# Patient Record
Sex: MALE | Race: WHITE | NOT HISPANIC OR LATINO | Employment: OTHER | URBAN - METROPOLITAN AREA
[De-identification: names, ages, dates, MRNs, and addresses within clinical notes are randomized per-mention and may not be internally consistent; named-entity substitution may affect disease eponyms.]

---

## 2017-02-08 ENCOUNTER — ANTICOAGULATION MONITORING (OUTPATIENT)
Dept: VASCULAR LAB | Facility: MEDICAL CENTER | Age: 82
End: 2017-02-08

## 2017-02-23 ENCOUNTER — HOSPITAL ENCOUNTER (EMERGENCY)
Facility: MEDICAL CENTER | Age: 82
End: 2017-02-23
Attending: EMERGENCY MEDICINE
Payer: MEDICARE

## 2017-02-23 VITALS
HEART RATE: 88 BPM | SYSTOLIC BLOOD PRESSURE: 175 MMHG | DIASTOLIC BLOOD PRESSURE: 86 MMHG | BODY MASS INDEX: 23.93 KG/M2 | HEIGHT: 69 IN | WEIGHT: 161.6 LBS | RESPIRATION RATE: 16 BRPM | OXYGEN SATURATION: 96 % | TEMPERATURE: 98.8 F

## 2017-02-23 DIAGNOSIS — I10 ESSENTIAL HYPERTENSION: ICD-10-CM

## 2017-02-23 DIAGNOSIS — H10.33 ACUTE CONJUNCTIVITIS OF BOTH EYES, UNSPECIFIED ACUTE CONJUNCTIVITIS TYPE: ICD-10-CM

## 2017-02-23 PROCEDURE — 99283 EMERGENCY DEPT VISIT LOW MDM: CPT

## 2017-02-23 RX ORDER — MOXIFLOXACIN 5 MG/ML
1 SOLUTION/ DROPS OPHTHALMIC
Qty: 1 BOTTLE | Refills: 0 | Status: SHIPPED | OUTPATIENT
Start: 2017-02-23 | End: 2018-09-01

## 2017-02-23 ASSESSMENT — PAIN SCALES - GENERAL: PAINLEVEL_OUTOF10: 0

## 2017-02-23 NOTE — ED AVS SNAPSHOT
Home Care Instructions                                                                                                                Joe Rashid   MRN: [id number]    Department:  [facility], Emergency Dept   Date of Visit:  2/23/2017            [facility], Emergency Dept    [address]    [place] [address]    Phone:  [phone]      You were seen by     Guy G Gansert, M.D.      Your Diagnosis Was     Acute conjunctivitis of both eyes, unspecified acute conjunctivitis type     H10.33       Follow-up Information     1. Follow up with [facility].    Why:  1.  Follow-up with your ophthalmologist tomorrow    Contact information    [address] [place]  [facility] [address] [phone]      Medication Information     Review all of your home medications and newly ordered medications with your primary doctor and/or pharmacist as soon as possible. Follow medication instructions as directed by your doctor and/or pharmacist.     Please keep your complete medication list with you and share with your physician. Update the information when medications are discontinued, doses are changed, or new medications (including over-the-counter products) are added; and carry medication information at all times in the event of emergency situations.               Medication List      START taking these medications        Instructions    moxifloxacin 0.5 % Soln   Commonly known as:  VIGAMOX    Place 1 Drop in both eyes every 2 hours while awake.   Dose:  1 Drop         ASK your doctor about these medications        Instructions    acetaminophen 500 MG Tabs   Commonly known as:  TYLENOL    Take 500-1,000 mg by mouth every 6 hours as needed for Mild Pain.   Dose:  500-1000 mg       CULTURELLE DIGESTIVE HEALTH Caps    Take 1 Capsule by mouth every day at 6 PM.   Dose:  1 Capsule       digoxin 125 MCG Tabs   Commonly known as:  LANOXIN    Take 1 Tab by mouth every day at 6 PM.    "  Dose:  125 mcg       diphenhydrAMINE 25 MG capsule   Commonly known as:  BENADRYL    Take 25 mg by mouth every 6 hours as needed for Itching.   Dose:  25 mg       FLOMAX 0.4 MG capsule   Generic drug:  tamsulosin    Take 0.4 mg by mouth BEDTIME(S).   Dose:  0.4 mg       furosemide 20 MG Tabs   Commonly known as:  LASIX    Take 20 mg by mouth every day.   Dose:  20 mg       ipratropium-albuterol 0.5-2.5 (3) MG/3ML nebulizer solution   Commonly known as:  DUONEB    3 mL by Nebulization route every four hours as needed for Shortness of Breath. 3ml inhale orally two times daily and 3ml inhale every 4 hours as needed for shortness of breath   Dose:  3 mL       lisinopril 40 MG tablet   Commonly known as:  PRINIVIL, ZESTRIL    Take 1 Tab by mouth every day.   Dose:  40 mg       metoprolol 25 MG Tabs   Commonly known as:  LOPRESSOR    Doctor's comments:  **Patient requests 90 days supply**   TAKE 1 TABLET BY MOUTH TWICE DAILY       * MULTI ADULT GUMMIES Chew    Take 1 Tab by mouth every day.   Dose:  1 Tab       * MULTIVITAMIN & MINERAL PO    Take 1 Capsule by mouth every day at 6 PM.   Dose:  1 Capsule       potassium chloride SA 10 MEQ Tbcr   Commonly known as:  K-DUR    Take 10 mEq by mouth 2 times a day.   Dose:  10 mEq       * Notice:  This list has 2 medication(s) that are the same as other medications prescribed for you. Read the directions carefully, and ask your doctor or other care provider to review them with you.              Discharge Instructions       Conjunctivitis  Conjunctivitis is commonly called \"pink eye.\" Conjunctivitis can be caused by bacterial or viral infection, allergies, or injuries. There is usually redness of the lining of the eye, itching, discomfort, and sometimes discharge. There may be deposits of matter along the eyelids. A viral infection usually causes a watery discharge, while a bacterial infection causes a yellowish, thick discharge. Pink eye is very contagious and spreads by direct " contact.  You may be given antibiotic eyedrops as part of your treatment. Before using your eye medicine, remove all drainage from the eye by washing gently with warm water and cotton balls. Continue to use the medication until you have awakened 2 mornings in a row without discharge from the eye. Do not rub your eye. This increases the irritation and helps spread infection. Use separate towels from other household members. Wash your hands with soap and water before and after touching your eyes. Use cold compresses to reduce pain and sunglasses to relieve irritation from light. Do not wear contact lenses or wear eye makeup until the infection is gone.  SEEK MEDICAL CARE IF:   · Your symptoms are not better after 3 days of treatment.  · You have increased pain or trouble seeing.  · The outer eyelids become very red or swollen.  Document Released: 01/25/2006 Document Revised: 03/11/2013 Document Reviewed: 12/18/2006  [employer]® Patient Information ©2014 [employer].            Patient Information     Patient Information    Following emergency treatment: all patient requiring follow-up care must return either to a private physician or a clinic if your condition worsens before you are able to obtain further medical attention, please return to the emergency room.     Billing Information    At [facility], we work to make the billing process streamlined for our patients.  Our Representatives are here to answer any questions you may have regarding your hospital bill.  If you have insurance coverage and have supplied your insurance information to us, we will submit a claim to your insurer on your behalf.  Should you have any questions regarding your bill, we can be reached online or by phone as follows:  Online: You are able pay your bills online or live chat with our representatives about any billing questions you may have. We are here to help Monday - Friday from 8:00am to 7:30pm and 9:00am - 12:00pm on Saturdays.   Please visit [email or web address]  for more information.   Phone:  [phone] or [phone]    Please note that your emergency physician, surgeon, pathologist, radiologist, anesthesiologist, and other specialists are not employed by [facility] and will therefore bill separately for their services.  Please contact them directly for any questions concerning their bills at the numbers below:     Emergency Physician Services:  [phone]  [facility]:  [phone]  [facility]:  [phone]  [facility]:  [phone]    1. Your final bill may vary from the amount quoted upon discharge if all procedures are not complete at that time, or if your doctor has additional procedures of which we are not aware. You will receive an additional bill if you return to the Emergency Department at [facility] for suture removal regardless of the facility of which the sutures were placed.     2. Please arrange for settlement of this account at the emergency registration.    3. All self-pay accounts are due in full at the time of treatment.  If you are unable to meet this obligation then payment is expected within 4-5 days.     4. If you have had radiology studies (CT, X-ray, Ultrasound, MRI), you have received a preliminary result during your emergency department visit. Please contact the radiology department [phone] to receive a copy of your final result. Please discuss the Final result with your primary physician or with the follow up physician provided.     Crisis Hotline:  [place] Crisis Hotline:  4-536-GVIGIKP or 1-123.328.5664  [place] Crisis Hotline:    1-846.877.5211 or 085-477-1435         ED Discharge Follow Up Questions    1. In order to provide you with very good care, we would like to follow up with a phone call in the next few days.  May we have your permission to contact you?     YES /  NO    2. What is the best phone number  to call you? (       )_____-__________    3. What is the best time to call you?      Morning  /  Afternoon  /  Evening                   Patient Signature:  ____________________________________________________________    Date:  ____________________________________________________________

## 2017-02-23 NOTE — ED AVS SNAPSHOT
2/23/2017          Joe Rashid  [address]  [place] [address]    Dear Joe:    [facility] wants to ensure your discharge home is safe and you or your loved ones have had all your questions answered regarding your care after you leave the hospital.    You may receive a telephone call within two days of your discharge.  This call is to make certain you understand your discharge instructions as well as ensure we provided you with the best care possible during your stay with us.     The call will only last approximately 3-5 minutes and will be done by a nurse.    Once again, we want to ensure your discharge home is safe and that you have a clear understanding of any next steps in your care.  If you have any questions or concerns, please do not hesitate to contact us, we are here for you.  Thank you for choosing [facility] for your healthcare needs.    Sincerely,    Lakhwinder Le    [facility]

## 2017-02-24 NOTE — ED PROVIDER NOTES
"ED Provider Note    CHIEF COMPLAINT  Chief Complaint   Patient presents with   • Eye Drainage       HPI  Joe Rashid is a 89 y.o. male who presents for evaluation drainage to his eyes of the last 3 days.  The patient states he noticed redness along with some yellow to green mucoid material draining from his eyes along with some increased tearing.  The patient has seen an ophthalmologist in the past that he has a prescription for ophthalmic prednisone medication.  In addition, the patient was noted to be hypertensive.  The patient is not having significant: Headache, chest pain, cardiorespiratory symptoms, gastrointestinal symptoms.  No other acute symptomatology or complaints.    REVIEW OF SYSTEMS  See HPI for further details. All other systems negative.    PAST MEDICAL HISTORY  Past Medical History   Diagnosis Date   • Hypertension    • Congestive heart failure    • Hepatitis B    • Chronic anticoagulation    • Chronic kidney disease, stage III (moderate)        FAMILY HISTORY  Family History   Problem Relation Age of Onset   • Heart Disease Brother        SOCIAL HISTORY  Nonsmoker; no alcohol or drugs;    SURGICAL HISTORY  Past Surgical History   Procedure Laterality Date   • Other       sinus surgery of unknown type   • Appendectomy     • Colon resection     • Pr transurethral elec-surg prostatectom     • Pr cryosurg ablation of prostate     • Cataract extraction with iol       both eyes   • Hip hemiarthroplasty Left 1/27/2016     Procedure: HIP HEMIARTHROPLASTY;  Surgeon: Adis Martínez M.D.;  Location: SURGERY [facility];  Service:        CURRENT MEDICATIONS  See nurses notes    ALLERGIES  Allergies   Allergen Reactions   • Aspirin Hives and Rash     Pts grandson states rash and hives.   • Sulfa Drugs Hives and Rash     Pts grandson states rash and hives.       PHYSICAL EXAM  VITAL SIGNS: /96 mmHg  Pulse 89  Temp(Src) 37.1 °C (98.8 °F)  Resp 14  Ht 1.753 m (5' 9.02\")  Wt 73.3 kg (161 lb 9.6 oz) "  BMI 23.85 kg/m2  SpO2 96%   Constitutional: 89-year-old male, awake, oriented ×3  HENT: Normocephalic, Atraumatic, Nares:Clear, Oropharynx: moist, well hydrated, posterior pharynx:clear   Eyes: SLIT Lamp exam: PERRL, EOMI, conjunctivae is beefy red bilaterally, corneas are clear without flare seen; anterior chambers deep and quiet;  Neck: Normal range of motion, No tenderness, Supple, No stridor.   Lymphatic: No lymphadenopathy noted.   Cardiovascular: Regular rate and rhythm without mumurs, gallups, rubs   Thorax & Lungs: Normal Equal breath sounds, No respiratory distress, No wheezing, no stridor, no rales. No chest tenderness.    Abdomen: Soft, nontender, nondistended, no organomegaly, positive bowel sounds normal in quality. No guarding or rebound.  Skin: Decreased skin turgor, skin atrophy, actinic keratoses; pink, warm, dry. No rashes, petechiae, purpura. Normal capillary refill.    Extremities: Intact distal pulses, No edema, No tenderness, No cyanosis,  Vascular: Pulses are 2+, symmetric in the upper and lower extremities.  Musculoskeletal: Diffuse arthritic changes. No tenderness to palpation or major deformities noted.   Neurologic: Alert & oriented x 3,  No gross focal deficits noted.   Psychiatric: Affect normal, Judgment normal, Mood normal.       RADIOLOGY/PROCEDURES  1. SLIT Lamp Exam    COURSE & MEDICAL DECISION MAKING  Pertinent Labs & Imaging studies reviewed. (See chart for details)  Discussion: At this time, the patient presents with findings consistent with significant conjunctivitis.  I did speak with ophthalmology on-call and discussed the case with him.  The patient will be placed on Vigamox ophthalmic drops.  I discussed the findings treat plan with the patient and his grandson is at the bedside.  The patient is to follow-up with his ophthalmologist tomorrow for further evaluation.    FINAL IMPRESSION  1. Acute conjunctivitis of both eyes, unspecified acute conjunctivitis type    2.  Essential hypertension           PLAN  1.  Approriate discharge instructions given  2.  Vigamox ophthalmic drops every 2 hours while awake  3.  Follow up closely with his ophthalmologist at [facility] tomorrow    Electronically signed by: Guy G Gansert, 2/23/2017 4:45 PM

## 2017-02-24 NOTE — ED NOTES
Pt BIB family with c/c of eye drainage. Pt states symptoms for several days. Pt stating right worse then left. Pt hypertensive. States he did take his medication today

## 2017-02-24 NOTE — DISCHARGE INSTRUCTIONS
"Conjunctivitis  Conjunctivitis is commonly called \"pink eye.\" Conjunctivitis can be caused by bacterial or viral infection, allergies, or injuries. There is usually redness of the lining of the eye, itching, discomfort, and sometimes discharge. There may be deposits of matter along the eyelids. A viral infection usually causes a watery discharge, while a bacterial infection causes a yellowish, thick discharge. Pink eye is very contagious and spreads by direct contact.  You may be given antibiotic eyedrops as part of your treatment. Before using your eye medicine, remove all drainage from the eye by washing gently with warm water and cotton balls. Continue to use the medication until you have awakened 2 mornings in a row without discharge from the eye. Do not rub your eye. This increases the irritation and helps spread infection. Use separate towels from other household members. Wash your hands with soap and water before and after touching your eyes. Use cold compresses to reduce pain and sunglasses to relieve irritation from light. Do not wear contact lenses or wear eye makeup until the infection is gone.  SEEK MEDICAL CARE IF:   · Your symptoms are not better after 3 days of treatment.  · You have increased pain or trouble seeing.  · The outer eyelids become very red or swollen.  Document Released: 01/25/2006 Document Revised: 03/11/2013 Document Reviewed: 12/18/2006  [employer]® Patient Information ©2014 [employer].    "

## 2017-04-26 PROBLEM — Z85.828 PERSONAL HISTORY OF OTHER MALIGNANT NEOPLASM OF SKIN: Status: ACTIVE | Noted: 2017-04-26

## 2017-04-26 PROBLEM — C44.319 BASAL CELL CARCINOMA OF SKIN OF OTHER PARTS OF FACE: Status: ACTIVE | Noted: 2017-04-26

## 2017-05-17 ENCOUNTER — APPOINTMENT (OUTPATIENT)
Dept: RADIOLOGY | Facility: MEDICAL CENTER | Age: 82
End: 2017-05-17
Attending: EMERGENCY MEDICINE
Payer: MEDICARE

## 2017-05-17 ENCOUNTER — HOSPITAL ENCOUNTER (OUTPATIENT)
Facility: MEDICAL CENTER | Age: 82
End: 2017-05-18
Attending: EMERGENCY MEDICINE | Admitting: HOSPITALIST
Payer: MEDICARE

## 2017-05-17 ENCOUNTER — APPOINTMENT (OUTPATIENT)
Dept: RADIOLOGY | Facility: MEDICAL CENTER | Age: 82
End: 2017-05-17
Attending: HOSPITALIST
Payer: MEDICARE

## 2017-05-17 ENCOUNTER — RESOLUTE PROFESSIONAL BILLING HOSPITAL PROF FEE (OUTPATIENT)
Dept: HOSPITALIST | Facility: MEDICAL CENTER | Age: 82
End: 2017-05-17
Payer: MEDICARE

## 2017-05-17 DIAGNOSIS — I63.9 CEREBROVASCULAR ACCIDENT (CVA), UNSPECIFIED MECHANISM (HCC): ICD-10-CM

## 2017-05-17 DIAGNOSIS — G45.9 TRANSIENT CEREBRAL ISCHEMIA, UNSPECIFIED TYPE: ICD-10-CM

## 2017-05-17 LAB
ALBUMIN SERPL BCP-MCNC: 4.2 G/DL (ref 3.2–4.9)
ALBUMIN/GLOB SERPL: 1.7 G/DL
ALP SERPL-CCNC: 62 U/L (ref 30–99)
ALT SERPL-CCNC: 22 U/L (ref 2–50)
ANION GAP SERPL CALC-SCNC: 8 MMOL/L (ref 0–11.9)
APPEARANCE UR: CLEAR
APTT PPP: 26.9 SEC (ref 24.7–36)
AST SERPL-CCNC: 19 U/L (ref 12–45)
BASOPHILS # BLD AUTO: 0.4 % (ref 0–1.8)
BASOPHILS # BLD: 0.03 K/UL (ref 0–0.12)
BILIRUB SERPL-MCNC: 1 MG/DL (ref 0.1–1.5)
BILIRUB UR QL STRIP.AUTO: NEGATIVE
BUN SERPL-MCNC: 20 MG/DL (ref 8–22)
CALCIUM SERPL-MCNC: 9.3 MG/DL (ref 8.5–10.5)
CHLORIDE SERPL-SCNC: 103 MMOL/L (ref 96–112)
CO2 SERPL-SCNC: 27 MMOL/L (ref 20–33)
COLOR UR: COLORLESS
CREAT SERPL-MCNC: 1.22 MG/DL (ref 0.5–1.4)
DIGOXIN SERPL-MCNC: <0.1 NG/ML (ref 0.8–2)
EOSINOPHIL # BLD AUTO: 0.08 K/UL (ref 0–0.51)
EOSINOPHIL NFR BLD: 1.1 % (ref 0–6.9)
ERYTHROCYTE [DISTWIDTH] IN BLOOD BY AUTOMATED COUNT: 43.1 FL (ref 35.9–50)
EST. AVERAGE GLUCOSE BLD GHB EST-MCNC: 143 MG/DL
GFR SERPL CREATININE-BSD FRML MDRD: 55 ML/MIN/1.73 M 2
GLOBULIN SER CALC-MCNC: 2.5 G/DL (ref 1.9–3.5)
GLUCOSE SERPL-MCNC: 97 MG/DL (ref 65–99)
GLUCOSE UR STRIP.AUTO-MCNC: NEGATIVE MG/DL
HBA1C MFR BLD: 6.6 % (ref 0–5.6)
HCT VFR BLD AUTO: 45.9 % (ref 42–52)
HGB BLD-MCNC: 14.9 G/DL (ref 14–18)
IMM GRANULOCYTES # BLD AUTO: 0.05 K/UL (ref 0–0.11)
IMM GRANULOCYTES NFR BLD AUTO: 0.7 % (ref 0–0.9)
INR PPP: 1.04 (ref 0.87–1.13)
KETONES UR STRIP.AUTO-MCNC: NEGATIVE MG/DL
LEUKOCYTE ESTERASE UR QL STRIP.AUTO: NEGATIVE
LV EJECT FRACT  99904: 60
LV EJECT FRACT MOD 2C 99903: 43.59
LV EJECT FRACT MOD 4C 99902: 47.38
LV EJECT FRACT MOD BP 99901: 41.28
LYMPHOCYTES # BLD AUTO: 1.24 K/UL (ref 1–4.8)
LYMPHOCYTES NFR BLD: 17.4 % (ref 22–41)
MAGNESIUM SERPL-MCNC: 2 MG/DL (ref 1.5–2.5)
MCH RBC QN AUTO: 28.4 PG (ref 27–33)
MCHC RBC AUTO-ENTMCNC: 32.5 G/DL (ref 33.7–35.3)
MCV RBC AUTO: 87.4 FL (ref 81.4–97.8)
MICRO URNS: NORMAL
MONOCYTES # BLD AUTO: 0.66 K/UL (ref 0–0.85)
MONOCYTES NFR BLD AUTO: 9.2 % (ref 0–13.4)
NEUTROPHILS # BLD AUTO: 5.08 K/UL (ref 1.82–7.42)
NEUTROPHILS NFR BLD: 71.2 % (ref 44–72)
NITRITE UR QL STRIP.AUTO: NEGATIVE
NRBC # BLD AUTO: 0 K/UL
NRBC BLD AUTO-RTO: 0 /100 WBC
PH UR STRIP.AUTO: 6.5 [PH]
PLATELET # BLD AUTO: 150 K/UL (ref 164–446)
PMV BLD AUTO: 9.6 FL (ref 9–12.9)
POTASSIUM SERPL-SCNC: 3.9 MMOL/L (ref 3.6–5.5)
PROT SERPL-MCNC: 6.7 G/DL (ref 6–8.2)
PROT UR QL STRIP: NEGATIVE MG/DL
PROTHROMBIN TIME: 13.9 SEC (ref 12–14.6)
RBC # BLD AUTO: 5.25 M/UL (ref 4.7–6.1)
RBC UR QL AUTO: NEGATIVE
SODIUM SERPL-SCNC: 138 MMOL/L (ref 135–145)
SP GR UR STRIP.AUTO: 1.01
TROPONIN I SERPL-MCNC: <0.01 NG/ML (ref 0–0.04)
TSH SERPL DL<=0.005 MIU/L-ACNC: 2.21 UIU/ML (ref 0.3–3.7)
WBC # BLD AUTO: 7.1 K/UL (ref 4.8–10.8)

## 2017-05-17 PROCEDURE — 84443 ASSAY THYROID STIM HORMONE: CPT

## 2017-05-17 PROCEDURE — 96372 THER/PROPH/DIAG INJ SC/IM: CPT | Mod: XU

## 2017-05-17 PROCEDURE — 83036 HEMOGLOBIN GLYCOSYLATED A1C: CPT

## 2017-05-17 PROCEDURE — G0378 HOSPITAL OBSERVATION PER HR: HCPCS

## 2017-05-17 PROCEDURE — 70498 CT ANGIOGRAPHY NECK: CPT

## 2017-05-17 PROCEDURE — 700117 HCHG RX CONTRAST REV CODE 255: Performed by: EMERGENCY MEDICINE

## 2017-05-17 PROCEDURE — 71010 DX-CHEST-LIMITED (1 VIEW): CPT

## 2017-05-17 PROCEDURE — 99220 PR INITIAL OBSERVATION CARE,LEVL III: CPT | Performed by: HOSPITALIST

## 2017-05-17 PROCEDURE — 70450 CT HEAD/BRAIN W/O DYE: CPT

## 2017-05-17 PROCEDURE — 99285 EMERGENCY DEPT VISIT HI MDM: CPT

## 2017-05-17 PROCEDURE — 85730 THROMBOPLASTIN TIME PARTIAL: CPT

## 2017-05-17 PROCEDURE — 700101 HCHG RX REV CODE 250: Performed by: HOSPITALIST

## 2017-05-17 PROCEDURE — 93306 TTE W/DOPPLER COMPLETE: CPT | Mod: 26 | Performed by: INTERNAL MEDICINE

## 2017-05-17 PROCEDURE — 85025 COMPLETE CBC W/AUTO DIFF WBC: CPT

## 2017-05-17 PROCEDURE — 700102 HCHG RX REV CODE 250 W/ 637 OVERRIDE(OP): Performed by: HOSPITALIST

## 2017-05-17 PROCEDURE — 70496 CT ANGIOGRAPHY HEAD: CPT

## 2017-05-17 PROCEDURE — 85610 PROTHROMBIN TIME: CPT

## 2017-05-17 PROCEDURE — 80162 ASSAY OF DIGOXIN TOTAL: CPT

## 2017-05-17 PROCEDURE — 93306 TTE W/DOPPLER COMPLETE: CPT

## 2017-05-17 PROCEDURE — 80053 COMPREHEN METABOLIC PANEL: CPT

## 2017-05-17 PROCEDURE — 700111 HCHG RX REV CODE 636 W/ 250 OVERRIDE (IP): Performed by: HOSPITALIST

## 2017-05-17 PROCEDURE — 83735 ASSAY OF MAGNESIUM: CPT

## 2017-05-17 PROCEDURE — A9270 NON-COVERED ITEM OR SERVICE: HCPCS | Performed by: HOSPITALIST

## 2017-05-17 PROCEDURE — 84484 ASSAY OF TROPONIN QUANT: CPT

## 2017-05-17 PROCEDURE — 81003 URINALYSIS AUTO W/O SCOPE: CPT

## 2017-05-17 RX ORDER — LABETALOL HYDROCHLORIDE 5 MG/ML
10 INJECTION, SOLUTION INTRAVENOUS EVERY 4 HOURS PRN
Status: DISCONTINUED | OUTPATIENT
Start: 2017-05-17 | End: 2017-05-18 | Stop reason: HOSPADM

## 2017-05-17 RX ORDER — AMOXICILLIN 250 MG
2 CAPSULE ORAL 2 TIMES DAILY
Status: DISCONTINUED | OUTPATIENT
Start: 2017-05-17 | End: 2017-05-18 | Stop reason: HOSPADM

## 2017-05-17 RX ORDER — CLOPIDOGREL BISULFATE 75 MG/1
75 TABLET ORAL DAILY
Status: DISCONTINUED | OUTPATIENT
Start: 2017-05-17 | End: 2017-05-18 | Stop reason: HOSPADM

## 2017-05-17 RX ORDER — ATORVASTATIN CALCIUM 40 MG/1
40 TABLET, FILM COATED ORAL EVERY EVENING
Status: DISCONTINUED | OUTPATIENT
Start: 2017-05-17 | End: 2017-05-18 | Stop reason: HOSPADM

## 2017-05-17 RX ORDER — ONDANSETRON 4 MG/1
4 TABLET, ORALLY DISINTEGRATING ORAL EVERY 4 HOURS PRN
Status: DISCONTINUED | OUTPATIENT
Start: 2017-05-17 | End: 2017-05-18 | Stop reason: HOSPADM

## 2017-05-17 RX ORDER — ONDANSETRON 2 MG/ML
4 INJECTION INTRAMUSCULAR; INTRAVENOUS EVERY 4 HOURS PRN
Status: DISCONTINUED | OUTPATIENT
Start: 2017-05-17 | End: 2017-05-18 | Stop reason: HOSPADM

## 2017-05-17 RX ORDER — MOXIFLOXACIN 5 MG/ML
1 SOLUTION/ DROPS OPHTHALMIC
Status: DISCONTINUED | OUTPATIENT
Start: 2017-05-17 | End: 2017-05-18 | Stop reason: HOSPADM

## 2017-05-17 RX ORDER — DIPHENHYDRAMINE HCL 25 MG
25 TABLET ORAL EVERY 6 HOURS PRN
Status: DISCONTINUED | OUTPATIENT
Start: 2017-05-17 | End: 2017-05-18 | Stop reason: HOSPADM

## 2017-05-17 RX ORDER — FUROSEMIDE 20 MG/1
20 TABLET ORAL DAILY
Status: DISCONTINUED | OUTPATIENT
Start: 2017-05-17 | End: 2017-05-18 | Stop reason: HOSPADM

## 2017-05-17 RX ORDER — LISINOPRIL 20 MG/1
40 TABLET ORAL DAILY
Status: DISCONTINUED | OUTPATIENT
Start: 2017-05-18 | End: 2017-05-18 | Stop reason: HOSPADM

## 2017-05-17 RX ORDER — IPRATROPIUM BROMIDE AND ALBUTEROL SULFATE 2.5; .5 MG/3ML; MG/3ML
3 SOLUTION RESPIRATORY (INHALATION)
Status: DISCONTINUED | OUTPATIENT
Start: 2017-05-17 | End: 2017-05-18 | Stop reason: HOSPADM

## 2017-05-17 RX ORDER — BISACODYL 10 MG
10 SUPPOSITORY, RECTAL RECTAL
Status: DISCONTINUED | OUTPATIENT
Start: 2017-05-17 | End: 2017-05-18 | Stop reason: HOSPADM

## 2017-05-17 RX ORDER — POLYETHYLENE GLYCOL 3350 17 G/17G
1 POWDER, FOR SOLUTION ORAL
Status: DISCONTINUED | OUTPATIENT
Start: 2017-05-17 | End: 2017-05-18 | Stop reason: HOSPADM

## 2017-05-17 RX ORDER — HYDRALAZINE HYDROCHLORIDE 20 MG/ML
10 INJECTION INTRAMUSCULAR; INTRAVENOUS
Status: DISCONTINUED | OUTPATIENT
Start: 2017-05-17 | End: 2017-05-18 | Stop reason: HOSPADM

## 2017-05-17 RX ORDER — DIGOXIN 125 MCG
125 TABLET ORAL DAILY
Status: DISCONTINUED | OUTPATIENT
Start: 2017-05-17 | End: 2017-05-18 | Stop reason: HOSPADM

## 2017-05-17 RX ORDER — POTASSIUM CHLORIDE 20 MEQ/1
10 TABLET, EXTENDED RELEASE ORAL 2 TIMES DAILY
Status: DISCONTINUED | OUTPATIENT
Start: 2017-05-17 | End: 2017-05-18 | Stop reason: HOSPADM

## 2017-05-17 RX ORDER — TAMSULOSIN HYDROCHLORIDE 0.4 MG/1
0.4 CAPSULE ORAL
Status: DISCONTINUED | OUTPATIENT
Start: 2017-05-17 | End: 2017-05-18 | Stop reason: HOSPADM

## 2017-05-17 RX ORDER — ACETAMINOPHEN 325 MG/1
650 TABLET ORAL EVERY 6 HOURS PRN
Status: DISCONTINUED | OUTPATIENT
Start: 2017-05-17 | End: 2017-05-18 | Stop reason: HOSPADM

## 2017-05-17 RX ADMIN — FUROSEMIDE 20 MG: 20 TABLET ORAL at 16:38

## 2017-05-17 RX ADMIN — IOHEXOL 100 ML: 350 INJECTION, SOLUTION INTRAVENOUS at 13:45

## 2017-05-17 RX ADMIN — CLOPIDOGREL BISULFATE 75 MG: 75 TABLET, FILM COATED ORAL at 16:38

## 2017-05-17 RX ADMIN — ROSUVASTATIN CALCIUM 125 MCG: 10 TABLET, FILM COATED ORAL at 17:44

## 2017-05-17 RX ADMIN — ENOXAPARIN SODIUM 40 MG: 100 INJECTION SUBCUTANEOUS at 16:39

## 2017-05-17 RX ADMIN — POTASSIUM CHLORIDE 10 MEQ: 1500 TABLET, EXTENDED RELEASE ORAL at 21:31

## 2017-05-17 RX ADMIN — ATORVASTATIN CALCIUM 40 MG: 40 TABLET, FILM COATED ORAL at 21:30

## 2017-05-17 RX ADMIN — METOPROLOL TARTRATE 25 MG: 25 TABLET, FILM COATED ORAL at 16:38

## 2017-05-17 RX ADMIN — MOXIFLOXACIN HYDROCHLORIDE 1 DROP: 5 SOLUTION/ DROPS OPHTHALMIC at 17:44

## 2017-05-17 ASSESSMENT — PAIN SCALES - GENERAL
PAINLEVEL_OUTOF10: 0

## 2017-05-17 ASSESSMENT — LIFESTYLE VARIABLES: EVER_SMOKED: NEVER

## 2017-05-17 ASSESSMENT — PAIN SCALES - WONG BAKER: WONGBAKER_NUMERICALRESPONSE: HURTS EVEN MORE

## 2017-05-17 NOTE — ED NOTES
.  Chief Complaint   Patient presents with   • Possible Stroke     symptom onset 1230   • Weakness     left hand   • Aphagia   • Facial Droop     BIBA from assisted living pt wife and pt noticed onset of symptoms at 1230 whe pt was unable to hold a glass and dropped it. Symptoms have since resolved lasting apx 5 minutes. Pt is A&O x 4. = . Pt to CT prior to room  Door time 1313

## 2017-05-17 NOTE — IP AVS SNAPSHOT
" Home Care Instructions                                                                                                                  Name:Joe Rashid  Medical Record Number:[id number]  CSN: [id number]    YOB: 1920   Age: 89 y.o.  Sex: male  HT:1.803 m (5' 11\") WT: 73 kg (160 lb 15 oz)          Admit Date: 5/17/2017     Discharge Date:   Today's Date: 5/18/2017  Attending Doctor:  Hector Soni D.O.                  Allergies:  Aspirin and Sulfa drugs            Discharge Instructions       Discharge Instructions    Discharged to home by car with relative. Discharged via wheelchair, hospital escort: Yes.  Special equipment needed: Not Applicable    Be sure to schedule a follow-up appointment with your primary care doctor or any specialists as instructed.     Discharge Plan:   Diet Plan: Discussed  Activity Level: Discussed  Confirmed Follow up Appointment: Patient to Call and Schedule Appointment  Confirmed Symptoms Management: Discussed  Medication Reconciliation Updated: Yes  Influenza Vaccine Indication: Not indicated: Previously immunized this influenza season and > 8 years of age    I understand that a diet low in cholesterol, fat, and sodium is recommended for good health. Unless I have been given specific instructions below for another diet, I accept this instruction as my diet prescription.   Other diet:     Special Instructions: None    · Is patient discharged on Warfarin / Coumadin?   No     · Is patient Post Blood Transfusion?  No    Depression / Suicide Risk    As you are discharged from this [facility] facility, it is important to learn how to keep safe from harming yourself.    Recognize the warning signs:  · Abrupt changes in personality, positive or negative- including increase in energy   · Giving away possessions  · Change in eating patterns- significant weight changes-  positive or negative  · Change in sleeping patterns- unable to sleep or sleeping all the time   · Unwillingness or " "inability to communicate  · Depression  · Unusual sadness, discouragement and loneliness  · Talk of wanting to die  · Neglect of personal appearance   · Rebelliousness- reckless behavior  · Withdrawal from people/activities they love  · Confusion- inability to concentrate     If you or a loved one observes any of these behaviors or has concerns about self-harm, here's what you can do:  · Talk about it- your feelings and reasons for harming yourself  · Remove any means that you might use to hurt yourself (examples: pills, rope, extension cords, firearm)  · Get professional help from the community (Mental Health, Substance Abuse, psychological counseling)  · Do not be alone:Call your Safe Contact- someone whom you trust who will be there for you.  · Call your local CRISIS HOTLINE 711-7830 or 748-373-5198  · Call your local Children's Mobile Crisis Response Team [place] (232) 874-1305 or www.Zipongo  · Call the toll free National Suicide Prevention Hotlines   · National Suicide Prevention Lifeline 910-181-DCLR (0845)  · Note Line Network 800-SUICIDE (704-5247)    Transient Ischemic Attack  A transient ischemic attack (TIA) is a \"warning stroke\" that causes stroke-like symptoms. Unlike a stroke, a TIA does not cause permanent damage to the brain. The symptoms of a TIA can happen very fast and do not last long. It is important to know the symptoms of a TIA and what to do. This can help prevent a major stroke or death.  CAUSES   A TIA is caused by a temporary blockage in an artery in the brain or neck (carotid artery). The blockage does not allow the brain to get the blood supply it needs and can cause different symptoms. The blockage can be caused by either:  · A blood clot.  · Fatty buildup (plaque) in a neck or brain artery.  RISK FACTORS  · High blood pressure (hypertension).  · High cholesterol.  · Diabetes mellitus.  · Heart disease.  · The buildup of plaque in the blood vessels (peripheral " artery disease or atherosclerosis).  · The buildup of plaque in the blood vessels that provide blood and oxygen to the brain (carotid artery stenosis).  · An abnormal heart rhythm (atrial fibrillation).  · Obesity.  · Using any tobacco products, including cigarettes, chewing tobacco, or electronic cigarettes.  · Taking oral contraceptives, especially in combination with using tobacco.  · Physical inactivity.  · A diet high in fats, salt (sodium), and calories.  · Excessive alcohol use.  · Use of illegal drugs (especially cocaine and methamphetamine).  · Being male.  · Being .  · Being over the age of 55 years.  · Family history of stroke.  · Previous history of blood clots, stroke, TIA, or heart attack.  · Sickle cell disease.  SIGNS AND SYMPTOMS   TIA symptoms are the same as a stroke but are temporary. These symptoms usually develop suddenly, or may be newly present upon waking from sleep:  · Sudden weakness or numbness of the face, arm, or leg, especially on one side of the body.  · Sudden trouble walking or difficulty moving arms or legs.  · Sudden confusion.  · Sudden personality changes.  · Trouble speaking (aphasia) or understanding.  · Difficulty swallowing.  · Sudden trouble seeing in one or both eyes.  · Double vision.  · Dizziness.  · Loss of balance or coordination.  · Sudden severe headache with no known cause.  · Trouble reading or writing.  · Loss of bowel or bladder control.  · Loss of consciousness.  DIAGNOSIS   Your health care provider may be able to determine the presence or absence of a TIA based on your symptoms, history, and physical exam. CT scan of the brain is usually performed to help identify a TIA. Other tests may include:  · Electrocardiography (ECG).  · Continuous heart monitoring.  · Echocardiography.  · Carotid ultrasonography.  · MRI.  · A scan of the brain circulation.  · Blood tests.  TREATMENT   Since the symptoms of TIA are the same as a stroke, it is important  to seek treatment as soon as possible. You may need a medicine to dissolve a blood clot (thrombolytic) if that is the cause of the TIA. This medicine cannot be given if too much time has passed. Treatment may also include:   · Rest, oxygen, fluids through an IV tube, and medicines to thin the blood (anticoagulants).  · Measures will be taken to prevent short-term and long-term complications, including infection from breathing foreign material into the lungs (aspiration pneumonia), blood clots in the legs, and falls.  · Procedures to either remove plaque in the carotid arteries or dilate carotid arteries that have narrowed due to plaque. Those procedures are:  ¨ Carotid endarterectomy.  ¨ Carotid angioplasty and stenting.  · Medicines and diet may be used to address diabetes, high blood pressure, and other underlying risk factors.  HOME CARE INSTRUCTIONS   · Take medicines only as directed by your health care provider. Follow the directions carefully. Medicines may be used to control risk factors for a stroke. Be sure you understand all your medicine instructions.  · You may be told to take aspirin or the anticoagulant warfarin. Warfarin needs to be taken exactly as instructed.  ¨ Taking too much or too little warfarin is dangerous. Too much warfarin increases the risk of bleeding. Too little warfarin continues to allow the risk for blood clots. While taking warfarin, you will need to have regular blood tests to measure your blood clotting time. A PT blood test measures how long it takes for blood to clot. Your PT is used to calculate another value called an INR. Your PT and INR help your health care provider to adjust your dose of warfarin. The dose can change for many reasons. It is critically important that you take warfarin exactly as prescribed.  ¨ Many foods, especially foods high in vitamin K can interfere with warfarin and affect the PT and INR. Foods high in vitamin K include spinach, kale, broccoli,  cabbage, ye and turnip greens, [place] sprouts, peas, cauliflower, seaweed, and parsley, as well as beef and pork liver, green tea, and soybean oil. You should eat a consistent amount of foods high in vitamin K. Avoid major changes in your diet, or notify your health care provider before changing your diet. Arrange a visit with a dietitian to answer your questions.  ¨ Many medicines can interfere with warfarin and affect the PT and INR. You must tell your health care provider about any and all medicines you take; this includes all vitamins and supplements. Be especially cautious with aspirin and anti-inflammatory medicines. Do not take or discontinue any prescribed or over-the-counter medicine except on the advice of your health care provider or pharmacist.  ¨ Warfarin can have side effects, such as excessive bruising or bleeding. You will need to hold pressure over cuts for longer than usual. Your health care provider or pharmacist will discuss other potential side effects.  ¨ Avoid sports or activities that may cause injury or bleeding.  ¨ Be careful when shaving, flossing your teeth, or handling sharp objects.  ¨ Alcohol can change the body's ability to handle warfarin. It is best to avoid alcoholic drinks or consume only very small amounts while taking warfarin. Notify your health care provider if you change your alcohol intake.  ¨ Notify your dentist or other health care providers before procedures.  · Eat a diet that includes 5 or more servings of fruits and vegetables each day. This may reduce the risk of stroke. Certain diets may be prescribed to address high blood pressure, high cholesterol, diabetes, or obesity.  ¨ A diet low in sodium, saturated fat, trans fat, and cholesterol is recommended to manage high blood pressure.  ¨ A diet low in saturated fat, trans fat, and cholesterol, and high in fiber may control cholesterol levels.  ¨ A controlled-carbohydrate, controlled-sugar diet is recommended to  manage diabetes.  ¨ A reduced-calorie diet that is low in sodium, saturated fat, trans fat, and cholesterol is recommended to manage obesity.  · Maintain a healthy weight.  · Stay physically active. It is recommended that you get at least 30 minutes of activity on most or all days.  · Do not use any tobacco products, including cigarettes, chewing tobacco, or electronic cigarettes. If you need help quitting, ask your health care provider.  · Limit alcohol intake to no more than 1 drink per day for nonpregnant women and 2 drinks per day for men. One drink equals 12 ounces of beer, 5 ounces of wine, or 1½ ounces of hard liquor.  · Do not abuse drugs.  · A safe home environment is important to reduce the risk of falls. Your health care provider may arrange for specialists to evaluate your home. Having grab bars in the bedroom and bathroom is often important. Your health care provider may arrange for equipment to be used at home, such as raised toilets and a seat for the shower.  · Follow all instructions for follow-up with your health care provider. This is very important. This includes any referrals and lab tests. Proper follow-up can prevent a stroke or another TIA from occurring.  PREVENTION   The risk of a TIA can be decreased by appropriately treating high blood pressure, high cholesterol, diabetes, heart disease, and obesity, and by quitting smoking, limiting alcohol, and staying physically active.  SEEK MEDICAL CARE IF:  · You have personality changes.  · You have difficulty swallowing.  · You are seeing double.  · You have dizziness.  · You have a fever.  SEEK IMMEDIATE MEDICAL CARE IF:   Any of the following symptoms may represent a serious problem that is an emergency. Do not wait to see if the symptoms will go away. Get medical help right away. Call your local emergency services (911 in [place] Do not drive yourself to the hospital.  · You have sudden weakness or numbness of the face, arm, or leg, especially  on one side of the body.  · You have sudden trouble walking or difficulty moving arms or legs.  · You have sudden confusion.  · You have trouble speaking (aphasia) or understanding.  · You have sudden trouble seeing in one or both eyes.  · You have a loss of balance or coordination.  · You have a sudden, severe headache with no known cause.  · You have new chest pain or an irregular heartbeat.  · You have a partial or total loss of consciousness.  MAKE SURE YOU:   · Understand these instructions.  · Will watch your condition.  · Will get help right away if you are not doing well or get worse.     This information is not intended to replace advice given to you by your health care provider. Make sure you discuss any questions you have with your health care provider.     Document Released: 09/27/2006 Document Revised: 01/08/2016 Document Reviewed: 03/25/2015  [employer] Patient Education ©2016 [employer]      Follow-up Information     1. Follow up with Mervin Pompa M.D..    Specialty:  Family Medicine    Why:  A provider will vist your home on 5/26/2017.    Contact information    [address]   [address]  [facility] [address]  [phone]           Discharge Medication Instructions:    Below are the medications your physician expects you to take upon discharge:    Review all your home medications and newly ordered medications with your doctor and/or pharmacist. Follow medication instructions as directed by your doctor and/or pharmacist.    Please keep your medication list with you and share with your physician.               Medication List      START taking these medications        Instructions    Morning Afternoon Evening Bedtime    clopidogrel 75 MG Tabs   Last time this was given:  75 mg on 5/18/2017  9:37 AM   Commonly known as:  PLAVIX        Take 1 Tab by mouth every day.   Dose:  75 mg                          CONTINUE taking these medications        Instructions    Morning Afternoon Evening Bedtime     acetaminophen 500 MG Tabs   Commonly known as:  TYLENOL        Take 500-1,000 mg by mouth every 6 hours as needed for Mild Pain.   Dose:  500-1000 mg                        CULTURELLE DIGESTIVE HEALTH Caps        Take 1 Capsule by mouth every day at 6 PM.   Dose:  1 Capsule                        digoxin 125 MCG Tabs   Last time this was given:  125 mcg on 5/17/2017  5:44 PM   Commonly known as:  LANOXIN        Take 1 Tab by mouth every day at 6 PM.   Dose:  125 mcg                        diphenhydrAMINE 25 MG capsule   Commonly known as:  BENADRYL        Take 25 mg by mouth every 6 hours as needed for Itching.   Dose:  25 mg                        FLOMAX 0.4 MG capsule   Last time this was given:  0.4 mg on 5/18/2017  9:38 AM   Generic drug:  tamsulosin        Take 0.4 mg by mouth BEDTIME(S).   Dose:  0.4 mg                        furosemide 20 MG Tabs   Last time this was given:  20 mg on 5/18/2017  9:38 AM   Commonly known as:  LASIX        Take 20 mg by mouth every day.   Dose:  20 mg                        ipratropium-albuterol 0.5-2.5 (3) MG/3ML nebulizer solution   Commonly known as:  DUONEB        3 mL by Nebulization route every four hours as needed for Shortness of Breath. 3ml inhale orally two times daily and 3ml inhale every 4 hours as needed for shortness of breath   Dose:  3 mL                        lisinopril 40 MG tablet   Last time this was given:  40 mg on 5/18/2017  9:37 AM   Commonly known as:  PRINIVIL, ZESTRIL        Take 1 Tab by mouth every day.   Dose:  40 mg                        metoprolol 25 MG Tabs   Last time this was given:  25 mg on 5/18/2017  9:38 AM   Commonly known as:  LOPRESSOR        Doctor's comments:  **Patient requests 90 days supply**   TAKE 1 TABLET BY MOUTH TWICE DAILY                        moxifloxacin 0.5 % Soln   Last time this was given:  1 Drop on 5/18/2017  9:39 AM   Commonly known as:  VIGAMOX        Place 1 Drop in both eyes every 2 hours while awake.   Dose:  1  Drop                        * MULTI ADULT GUMMIES Chew        Take 1 Tab by mouth every day.   Dose:  1 Tab                        * MULTIVITAMIN & MINERAL PO        Take 1 Capsule by mouth every day at 6 PM.   Dose:  1 Capsule                        potassium chloride SA 10 MEQ Tbcr   Last time this was given:  10 mEq on 5/18/2017  9:39 AM   Commonly known as:  K-DUR        Take 10 mEq by mouth 2 times a day.   Dose:  10 mEq                        * Notice:  This list has 2 medication(s) that are the same as other medications prescribed for you. Read the directions carefully, and ask your doctor or other care provider to review them with you.         Where to Get Your Medications      These medications were sent to [facility] [id number]  [place] - [address] AT [facility] [address]  [address] [place] [address]     Phone:  [phone]    - clopidogrel 75 MG Tabs            Instructions           Diet / Nutrition:    Follow any diet instructions given to you by your doctor or the dietician, including how much salt (sodium) you are allowed each day.    If you are overweight, talk to your doctor about a weight reduction plan.    Activity:    Remain physically active following your doctor's instructions about exercise and activity.    Rest often.     Any time you become even a little tired or short of breath, SIT DOWN and rest.    Worsening Symptoms:    Report any of the following signs and symptoms to the doctor's office immediately:    *Pain of jaw, arm, or neck  *Chest pain not relieved by medication                               *Dizziness or loss of consciousness  *Difficulty breathing even when at rest   *More tired than usual                                       *Bleeding drainage or swelling of surgical site  *Swelling of feet, ankles, legs or stomach                 *Fever (>100ºF)  *Pink or blood tinged sputum  *Weight gain (3lbs/day or 5lbs /week)           *Shock from  internal defibrillator (if applicable)  *Palpitations or irregular heartbeats                *Cool and/or numb extremities    Stroke Awareness    Common Risk Factors for Stroke include:    Age  Atrial Fibrillation  Carotid Artery Stenosis  Diabetes Mellitus  Excessive alcohol consumption  High blood pressure  Overweight   Physical inactivity  Smoking    Warning signs and symptoms of a stroke include:    *Sudden numbness or weakness of the face, arm or leg (especially on one side of the body).  *Sudden confusion, trouble speaking or understanding.  *Sudden trouble seeing in one or both eyes.  *Sudden trouble walking, dizziness, loss of balance or coordination.Sudden severe headache with no known cause.    It is very important to get treatment quickly when a stroke occurs. If you experience any of the above warning signs, call 911 immediately.                   Disclaimer         Quit Smoking / Tobacco Use:    I understand the use of any tobacco products increases my chance of suffering from future heart disease or stroke and could cause other illnesses which may shorten my life. Quitting the use of tobacco products is the single most important thing I can do to improve my health. For further information on smoking / tobacco cessation call a Toll Free Quit Line at 1-202.465.1444 (*National Cancer Pr[employer]) or 1-947.971.6863 (American Lung Association) or you can access the web based program at www.lungusa.org.    [place] Tobacco Users Help Line:  (456) 259-6433       Toll Free: 1-196.405.8136  Quit Tobacco Program [facility] (154)210-4406    Crisis Hotline:    [place] Crisis Hotline:  0-558-EXTBGUF or 1-838.252.9772    [place] Crisis Hotline:    1-388.964.4841 or 737-393-8821    Discharge Survey:   Thank you for choosing [facility]. We hope we did everything we could to make your hospital stay a pleasant one. You may be receiving a phone survey and we would appreciate your time and  participation in answering the questions. Your input is very valuable to us in our efforts to improve our service to our patients and their families.        My signature on this form indicates that:    1. I have reviewed and understand the above information.  2. My questions regarding this information have been answered to my satisfaction.  3. I have formulated a plan with my discharge nurse to obtain my prescribed medications for home.                  Disclaimer         __________________________________                     __________       ________                       Patient Signature                                                 Date                    Time

## 2017-05-17 NOTE — ED NOTES
Pt's unable to provide med list. Per pt, wife takes care of his medicine. Called and LM to pt's wife.

## 2017-05-17 NOTE — ED PROVIDER NOTES
ED Provider Note  CHIEF COMPLAINT  Possible CVA. History of hypertension and heart failure.    HPI  Joe Rashid is a 89 y.o. male who presents to triage as a pre-stroke alert. Apparently he lives with his 78-year-old wife at a extended care facility. They have gone down for lunch and about an hour to an hour and a half ago he had some drooling and drooping of the side of his face and some weakness in his left arm. He was brought in as a pre-alert stroke. And on arrival here he was starting to improve by 2 PM he is now back to normal. His NIH stroke at 2 PM his 0. He has history of hypertension and congestive heart failure. Medications include Lopressor, Prinivil, Lasix,    REVIEW OF SYSTEMS  No headache, no jaw pain, no chest pain, no abdominal pain. No bowel or bladder dysfunction.  ALL OTHER SYSTEMS NEGATIVE    ALLERGIES  Allergies   Allergen Reactions   • Aspirin Hives and Rash     Pts grandson states rash and hives.   • Sulfa Drugs Hives and Rash     Pts grandson states rash and hives.       CURRENT MEDICATIONS  Lopressor, Zestril, Lasix, Lanoxin.    PAST MEDICAL HISTORY  Past Medical History   Diagnosis Date   • Hypertension    • Congestive heart failure    • Hepatitis B    • Chronic anticoagulation    • Chronic kidney disease, stage III (moderate)        SURGICAL HISTORY  Past Surgical History   Procedure Laterality Date   • Other       sinus surgery of unknown type   • Appendectomy     • Colon resection     • Pr transurethral elec-surg prostatectom     • Pr cryosurg ablation of prostate     • Cataract extraction with iol       both eyes   • Hip hemiarthroplasty Left 1/27/2016     Procedure: HIP HEMIARTHROPLASTY;  Surgeon: Adis Martínez M.D.;  Location: SURGERY [facility];  Service:        FAMILY HISTORY  Family History   Problem Relation Age of Onset   • Heart Disease Brother        SOCIAL HISTORY  89 gentleman lives with his 98-year-old wife at a extended care facility.    PHYSICAL EXAM  GENERAL:  Alert smiling male adult  VITAL SIGNS: Pulse of 80 respiratory rate 16. Blood pressure was initially 210/102  Constitutional: Alert healthy-appearing adult HENT: Scalp is normal size and nontender. Ears are clear. Nose is clear. Throat is clear with no stridor no drooling no trismus. Teeth are all intact.  Eyes: Pupils equal round and reactive to light, extraocular motor fall. There is no scleral icterus.  Neck: Neck is supple and nontender. There is no meningismus. No adenitis. No thyromegaly.  Lymphatic: No adenopathy.   Cardiovascular: Heart regular rhythm without murmurs or gallops   Thorax & Lungs: No chest wall tenderness. Lungs are clear. Patient has good breath sounds bilateral. No rales, no rhonchi, no wheezes.  Abdomen: Abdomen is soft, nontender, not rigid, no guarding, and no organomegaly. There is no palpable hernia   Skin: Warm, pink, and dry with no erythema and no rash.   Back: Nontender, no midline bony tenderness, no flank tenderness.  Extremities: Full range of motion  No tenderness to palpation and no deformities noted. No calf or thigh swelling. No calf or thigh tenderness. No clinical DVT.  Neurologic: Alert & oriented . Cranial nerves are grossly intact as tested. Patient moves all 4 extremities well. Patient has good strong flexion and extension of the ankle joints knee joints hip joints and elbow joints. Sensation is normal and symmetrical in the upper and lower extremities.   Psychiatric: Patient is alert oriented coherent and rational.     RADIOLOGY/PROCEDURES  CT-CTA HEAD WITH & W/O-POST PROCESS   Final Result      CT angiogram of the [place] within normal limits. Mild calcified atherosclerotic plaque.      CT-CTA NECK WITH & W/O-POST PROCESSING   Final Result      CT angiogram of the neck within normal limits.      CT-HEAD W/O   Final Result      1.  Cerebral atrophy.      2.  White matter lucencies most consistent with small vessel ischemic change versus demyelination or  gliosis.      3.  Otherwise, Head CT without contrast with no acute findings. No evidence of acute cerebral  hemorrhage or mass lesion.      DX-CHEST-LIMITED (1 VIEW)    (Results Pending)         COURSE & MEDICAL DECISION MAKING  She arrives at triage as a pre-stroke alert. He has history of hypertension, congestive heart failure, chronic kidney disease. Medications include Lopressor and Prinivil for his hypertension. He is also on DuoNeb when necessary. He is also on Lanoxin. Sounds like his NIH stroke score is about 3 when this happened an hour and a half ago but now is 0. Because of his age and the fact that he is improving and he now is back to normal TPA will not be given. Neurology will be consulted.    Plan: #1 immediately over to CAT scan for the stroke protocol radiological studies. #2 stroke lab including CBC, CMP, troponin, ProTime, PTT, etc. #3. Review of previous medical records    Review of previous medical records: Medications include Lopressor, Prinivil, Lasix, DuoNeb, Lanoxin.    Laboratory and reexamination: Chemistry panel is normal. INR is normal. CBC is normal. No anemia. No bandemia. Troponin negative. CT angiogram of the [place] is normal. Some atherosclerotic plaques. CT angiogram of the neck is normal. CT of head is cerebral atrophy and white matter disease.  Results for orders placed or performed during the hospital encounter of 05/17/17   PROTHROMBIN TIME   Result Value Ref Range    PT 13.9 12.0 - 14.6 sec    INR 1.04 0.87 - 1.13   APTT   Result Value Ref Range    APTT 26.9 24.7 - 36.0 sec   CBC WITH DIFFERENTIAL   Result Value Ref Range    WBC 7.1 4.8 - 10.8 K/uL    RBC 5.25 4.70 - 6.10 M/uL    Hemoglobin 14.9 14.0 - 18.0 g/dL    Hematocrit 45.9 42.0 - 52.0 %    MCV 87.4 81.4 - 97.8 fL    MCH 28.4 27.0 - 33.0 pg    MCHC 32.5 (L) 33.7 - 35.3 g/dL    RDW 43.1 35.9 - 50.0 fL    Platelet Count 150 (L) 164 - 446 K/uL    MPV 9.6 9.0 - 12.9 fL    Neutrophils-Polys 71.20 44.00 - 72.00 %     Lymphocytes 17.40 (L) 22.00 - 41.00 %    Monocytes 9.20 0.00 - 13.40 %    Eosinophils 1.10 0.00 - 6.90 %    Basophils 0.40 0.00 - 1.80 %    Immature Granulocytes 0.70 0.00 - 0.90 %    Nucleated RBC 0.00 /100 WBC    Neutrophils (Absolute) 5.08 1.82 - 7.42 K/uL    Lymphs (Absolute) 1.24 1.00 - 4.80 K/uL    Monos (Absolute) 0.66 0.00 - 0.85 K/uL    Eos (Absolute) 0.08 0.00 - 0.51 K/uL    Baso (Absolute) 0.03 0.00 - 0.12 K/uL    Immature Granulocytes (abs) 0.05 0.00 - 0.11 K/uL    NRBC (Absolute) 0.00 K/uL   COMP METABOLIC PANEL   Result Value Ref Range    Sodium 138 135 - 145 mmol/L    Potassium 3.9 3.6 - 5.5 mmol/L    Chloride 103 96 - 112 mmol/L    Co2 27 20 - 33 mmol/L    Anion Gap 8.0 0.0 - 11.9    Glucose 97 65 - 99 mg/dL    Bun 20 8 - 22 mg/dL    Creatinine 1.22 0.50 - 1.40 mg/dL    Calcium 9.3 8.5 - 10.5 mg/dL    AST(SGOT) 19 12 - 45 U/L    ALT(SGPT) 22 2 - 50 U/L    Alkaline Phosphatase 62 30 - 99 U/L    Total Bilirubin 1.0 0.1 - 1.5 mg/dL    Albumin 4.2 3.2 - 4.9 g/dL    Total Protein 6.7 6.0 - 8.2 g/dL    Globulin 2.5 1.9 - 3.5 g/dL    A-G Ratio 1.7 g/dL   TROPONIN   Result Value Ref Range    Troponin I <0.01 0.00 - 0.04 ng/mL   ESTIMATED GFR   Result Value Ref Range    GFR If African American >60 >60 mL/min/1.73 m 2    GFR If Non African American 55 (A) >60 mL/min/1.73 m 2    CT of the head shows no acute stroke. Vascular studies of the head are normal. He'll be admitted. Hospitalist has been called the case discussed. Neurology has been called. Critical care time 35 minutes  FINAL IMPRESSION  1. Acute CVA resolving  2. TIA  3. Hypertension         Electronically signed by: Gary Gansert, 5/17/2017 2 PM

## 2017-05-17 NOTE — ED NOTES
Pt brought back to room via [place] from CT. Stood at side of [facility] and voided without difficulty. Pt A&Ox4, speech normal. Still has some weakness to L . Mild cough and runny nose. Wife at bedside.

## 2017-05-18 ENCOUNTER — PATIENT OUTREACH (OUTPATIENT)
Dept: HEALTH INFORMATION MANAGEMENT | Facility: OTHER | Age: 82
End: 2017-05-18

## 2017-05-18 VITALS
HEIGHT: 71 IN | RESPIRATION RATE: 20 BRPM | WEIGHT: 160.94 LBS | OXYGEN SATURATION: 95 % | DIASTOLIC BLOOD PRESSURE: 90 MMHG | TEMPERATURE: 96.6 F | SYSTOLIC BLOOD PRESSURE: 155 MMHG | HEART RATE: 65 BPM | BODY MASS INDEX: 22.53 KG/M2

## 2017-05-18 PROBLEM — G45.9 TIA (TRANSIENT ISCHEMIC ATTACK): Status: RESOLVED | Noted: 2017-05-17 | Resolved: 2017-05-18

## 2017-05-18 LAB
CHOLEST SERPL-MCNC: 142 MG/DL (ref 100–199)
HDLC SERPL-MCNC: 70 MG/DL
LDLC SERPL CALC-MCNC: 64 MG/DL
TRIGL SERPL-MCNC: 42 MG/DL (ref 0–149)

## 2017-05-18 PROCEDURE — 93880 EXTRACRANIAL BILAT STUDY: CPT

## 2017-05-18 PROCEDURE — G0378 HOSPITAL OBSERVATION PER HR: HCPCS

## 2017-05-18 PROCEDURE — 700102 HCHG RX REV CODE 250 W/ 637 OVERRIDE(OP): Performed by: HOSPITALIST

## 2017-05-18 PROCEDURE — G8989 SELF CARE D/C STATUS: HCPCS | Mod: CI

## 2017-05-18 PROCEDURE — 97535 SELF CARE MNGMENT TRAINING: CPT

## 2017-05-18 PROCEDURE — 700111 HCHG RX REV CODE 636 W/ 250 OVERRIDE (IP): Performed by: HOSPITALIST

## 2017-05-18 PROCEDURE — 99217 PR OBSERVATION CARE DISCHARGE: CPT | Performed by: INTERNAL MEDICINE

## 2017-05-18 PROCEDURE — 97165 OT EVAL LOW COMPLEX 30 MIN: CPT

## 2017-05-18 PROCEDURE — G8988 SELF CARE GOAL STATUS: HCPCS | Mod: CI

## 2017-05-18 PROCEDURE — A9270 NON-COVERED ITEM OR SERVICE: HCPCS | Performed by: HOSPITALIST

## 2017-05-18 PROCEDURE — 36415 COLL VENOUS BLD VENIPUNCTURE: CPT

## 2017-05-18 PROCEDURE — 93880 EXTRACRANIAL BILAT STUDY: CPT | Mod: 26 | Performed by: SURGERY

## 2017-05-18 PROCEDURE — 80061 LIPID PANEL: CPT

## 2017-05-18 PROCEDURE — 70551 MRI BRAIN STEM W/O DYE: CPT

## 2017-05-18 PROCEDURE — 96372 THER/PROPH/DIAG INJ SC/IM: CPT

## 2017-05-18 PROCEDURE — G8987 SELF CARE CURRENT STATUS: HCPCS | Mod: CI

## 2017-05-18 RX ORDER — CLOPIDOGREL BISULFATE 75 MG/1
75 TABLET ORAL DAILY
Qty: 30 TAB | Refills: 2 | Status: SHIPPED | OUTPATIENT
Start: 2017-05-18 | End: 2017-09-28 | Stop reason: SDUPTHER

## 2017-05-18 RX ADMIN — METOPROLOL TARTRATE 25 MG: 25 TABLET, FILM COATED ORAL at 09:38

## 2017-05-18 RX ADMIN — ENOXAPARIN SODIUM 40 MG: 100 INJECTION SUBCUTANEOUS at 09:39

## 2017-05-18 RX ADMIN — STANDARDIZED SENNA CONCENTRATE AND DOCUSATE SODIUM 2 TABLET: 8.6; 5 TABLET, FILM COATED ORAL at 09:38

## 2017-05-18 RX ADMIN — FUROSEMIDE 20 MG: 20 TABLET ORAL at 09:38

## 2017-05-18 RX ADMIN — MOXIFLOXACIN HYDROCHLORIDE 1 DROP: 5 SOLUTION/ DROPS OPHTHALMIC at 09:39

## 2017-05-18 RX ADMIN — TAMSULOSIN HYDROCHLORIDE 0.4 MG: 0.4 CAPSULE ORAL at 09:38

## 2017-05-18 RX ADMIN — CLOPIDOGREL BISULFATE 75 MG: 75 TABLET, FILM COATED ORAL at 09:37

## 2017-05-18 RX ADMIN — POTASSIUM CHLORIDE 10 MEQ: 1500 TABLET, EXTENDED RELEASE ORAL at 09:39

## 2017-05-18 RX ADMIN — LISINOPRIL 40 MG: 20 TABLET ORAL at 09:37

## 2017-05-18 ASSESSMENT — PAIN SCALES - GENERAL: PAINLEVEL_OUTOF10: 0

## 2017-05-18 ASSESSMENT — ACTIVITIES OF DAILY LIVING (ADL): TOILETING: INDEPENDENT

## 2017-05-18 NOTE — PROGRESS NOTES
Pt impulsive and forgetful, pt doesn't use call light to get up.  Bed alarm set.  Will continue to closely monitor. Call light within reach.

## 2017-05-18 NOTE — PROGRESS NOTES
Received pt from ED.  Pt is A/O x4.  Respirations are even and unlabored on RA.  Neuro intact.  Rn swallow eval completed and diet ordered and placed in chart.  Monitors applied, VSS.  POC reviewed, verbalized understanding.  Call light within reach.

## 2017-05-18 NOTE — PROGRESS NOTES
Received report from evening Rn. Pt is A/Ox4.  Respirations are even and unlabored.  Monitors applied, VSS.  POC reviewed, verbalized understanding.  Neuro intact.  Will continue to closely monitor. Call light within reach.

## 2017-05-18 NOTE — PROGRESS NOTES
IV D/C'd.  Discharge instructions provided to pt.  Pt states understanding.  Pt states all questions have been answered.  Copy of discharge provided to pt.  Signed copy in chart.  Prescriptions provided to pt, copy in chart. Pt states that all personal belongings are in possession.  Pt escorted off unit without incident.

## 2017-05-18 NOTE — H&P
DATE OF ADMISSION:  5/17/2017    CHIEF COMPLAINT:  Left hand weakness.    PRIMARY CARE PHYSICIAN:  Mervin Pompa MD    ADMITTED TO:  [facility] hospitalist, Luiz Gaytan MD    DIAGNOSIS:  Transient ischemic attack, rule out cerebrovascular accident.    HISTORY OF CURRENT ILLNESS:  The patient is a 89-year-old gentleman who lives   in an assisted living facility with his wife who is 98 ; they have been    over 70 years.  The patient today at 10:00 a.m. this morning   developed sudden onset of left hand weakness.  He could not grab anything with   the left, could not move the left and decided to come to the emergency room.    By the time he got to the emergency room around 11:30, the weakness had   started to resolve and shortly after that completely resolved.  The patient   thus at this point will be admitted to the clinical decision unit under   neurological monitoring.  He will be continued at this point on Plavix.  He   cannot take ASPIRIN AS HE IS ALLERGIC TO IT.  He will also continue with a   statin and patient will have a workup for stroke with an MRI, carotid   ultrasound, as well as an echocardiogram.    PAST MEDICAL HISTORY:  Includes hypertension, congestive heart failure,   hepatitis B, history of chronic anticoagulation, and chronic kidney disease   stage III.    PAST SURGICAL HISTORY:  Includes sinus surgery, appendectomy, colon resection,   transurethral resection of his prostate with cryosurgery of the prostate with   ablation, cataracts bilaterally, and left hip hemiarthroplasty.    ALLERGIES:  HE HAS ALLERGY TO ASPIRIN AS WELL AS SULFA.    MEDICATIONS:  At the time of admission include Tylenol 500 mg every 6 hours   p.r.n. for fever or pain, Lanoxin 125 mcg p.o. daily, Benadryl 25 mg every 6   hours p.r.n. for itching, Lasix 20 mg p.o. daily, DuoNeb 3 mL nebulized every   6 hours p.r.n. for shortness of breath, lactobacillus 1 tablet p.o. daily,   lisinopril 40 mg p.o. daily, metoprolol  25 mg p.o. b.i.d., moxifloxacin eye   drops 0.5% solution 1 drop in both eyes every 2 hours while awake,   multivitamin 1 tablet p.o. daily, potassium chloride 20 mEq p.o. daily, and   Flomax 0.4 mg p.o. daily.    SOCIAL HISTORY:  He has never smoked.  No drugs.  No alcohol.  He has an   advanced directive.  He wishes to be DNR code status.    FAMILY HISTORY:  He does not remember of any pertinent family history.    REVIEW OF SYSTEMS:  He complains of left hand weakness which lasted about an   hour and 30 minutes and has at this point completely resolved.  It was   accompanied by headache.  He denies any nausea, vomiting, chest pain,   shortness of breath, or abdominal pain.  Denies any problems with gait.    Denies any ataxia.  All other review of systems were reviewed and are   negative.    PHYSICAL EXAMINATION:  VITAL SIGNS:  Temperature 36.8 with a pulse 81, respirations 18, and blood   pressure 215/108.  His weight, his weight is 73 kilograms and height is 180   cm.  GENERAL:  He is currently alert, awake, oriented x3, pleasant, cooperative   gentleman who appears his stated age.  HEENT:  Head atraumatic.  Eyes follow in normal range of gaze.  Pupils are   equal, round, reactive bilaterally.  Nose is midline without discharge.  Ears   bilaterally intact without discharge.  Oral cavity, moist mucous membranes.    No apparent focus of infection in the oral cavity.  NECK:  Soft and supple.  No JVD, carotid bruit, thyromegaly, or   lymphadenopathy appreciated.  CHEST WALL:  Moves equal with inspiration and expiration.  No paradoxical   motion.  No reproducible chest wall tenderness.  HEART:  S1, S2.  No murmurs or gallops detected.  LUNGS:  At this point, clear to auscultation.  No rales or rhonchi detected.  ABDOMEN:  Soft.  Bowel sounds present in all 4 quadrants.  No hepatomegaly,   splenomegaly, rebound or tenderness elicited.  GENITAL:  Exam deferred.  RECTAL:  Exam Deferred.  EXTREMITIES:  Upper and lower  extremities, positive pulses, no edema.  Good   muscle strength.  Good muscle tone.  Positive deep tendon reflexes.  NEUROLOGIC:  Cranial nerves II-XII grossly within normal limits without any   focal deficits appreciated.  There is no deviation on the latency exam.  Past   pointing is within normal limits.  Gait at this point is shuffling, but intact   and normal according to patient's previous status.    LABORATORY EVALUATION:  At this point, WBC count is 7.1 with a hemoglobin   14.9, hematocrit 45.9, and platelets are 150.  Sodium 138, potassium 3.9,   chloride 103, CO2 of 27, BUN 20, creatinine 1.22, calcium 9.3 with a glucose   of 97.  Liver function tests are within normal limits.  INR is 1 with a   troponin less than 0.01.  Urinalysis is within normal limits.  At this point   imaging studies, CT of the head without contrast which was done earlier today   shows cerebral atrophy, white matter lucencies consistent with small vessel   disease, but no acute findings of hemorrhage, lesion, or infarct.  The patient   then had also a CTA of the head, which shows [place] within normal   limits with atherosclerosis and then CTA of the neck which shows CTA of the   neck within normal limits.  Chest x-ray, which I have reviewed myself, shows   no acute cardiopulmonary process.  He does have mild atelectasis bilaterally,   which is equal.    IMPRESSION AND PLAN:  At this point:  1.  Transient ischemic attack, rule out cerebrovascular accident.  Patient   will undergo at this point an MRI of the brain.  He will undergo an ultrasound   of the carotids.  He will also have at this point a 2D echocardiogram.  We   will continue on Plavix as well as a statin.  He will be receiving high statin   therapy.  The patient for his chronic obstructive pulmonary disease will   continue with oxygen as needed and then also with DuoNeb.  2.  For his atrial fibrillation, continue with the digoxin, he is also at this   point  anticoagulation.  He has a history of fluid overload.  Continue with   Lasix once appropriate.  3.  For his conjunctivitis bilaterally, continue with moxifloxacin eyedrops.  4.  For his benign prostatic hypertrophy, continue with Flomax.  5.  For her hypertension, continue with metoprolol and lisinopril.  6.  For hypokalemia, continue with potassium supplementation.  7.  Patient at this point will be on deep venous thrombosis and   gastroesophageal reflux disease prophylaxis.  Patient at this point is   observation admission to the neurology floor.       ____________________________________     MD VON KRAUS / BRUNO    DD:  05/17/2017 17:01:48  DT:  05/17/2017 19:22:03    D#:  [id number]  Job#:  [id number]    cc: Mervin Pompa MD

## 2017-05-18 NOTE — THERAPY
"Occupational Therapy Evaluation completed.   Functional Status:  Pt seen for OT eval due to recent TIA. Pt reports sensation has returned to his normal and his coordination/strength is at baseline. SBA for ADL transfers with SPC. This appears at baseline as well. See PT note for gait assist recs. Pt has assistance at home for ADLs/IADLs.   Plan of Care: Patient with no further skilled OT needs in the acute care setting at this time  Discharge Recommendations:  Equipment: No Equipment Needed. Post-acute therapy Currently anticipate no further skilled therapy needs once patient is discharged from the inpatient setting.    See \"Rehab Therapy-Acute\" Patient Summary Report for complete documentation.    "

## 2017-05-18 NOTE — DISCHARGE INSTRUCTIONS
Discharge Instructions    Discharged to home by car with relative. Discharged via wheelchair, hospital escort: Yes.  Special equipment needed: Not Applicable    Be sure to schedule a follow-up appointment with your primary care doctor or any specialists as instructed.     Discharge Plan:   Diet Plan: Discussed  Activity Level: Discussed  Confirmed Follow up Appointment: Patient to Call and Schedule Appointment  Confirmed Symptoms Management: Discussed  Medication Reconciliation Updated: Yes  Influenza Vaccine Indication: Not indicated: Previously immunized this influenza season and > 8 years of age    I understand that a diet low in cholesterol, fat, and sodium is recommended for good health. Unless I have been given specific instructions below for another diet, I accept this instruction as my diet prescription.   Other diet:     Special Instructions: None    · Is patient discharged on Warfarin / Coumadin?   No     · Is patient Post Blood Transfusion?  No    Depression / Suicide Risk    As you are discharged from this [facility] facility, it is important to learn how to keep safe from harming yourself.    Recognize the warning signs:  · Abrupt changes in personality, positive or negative- including increase in energy   · Giving away possessions  · Change in eating patterns- significant weight changes-  positive or negative  · Change in sleeping patterns- unable to sleep or sleeping all the time   · Unwillingness or inability to communicate  · Depression  · Unusual sadness, discouragement and loneliness  · Talk of wanting to die  · Neglect of personal appearance   · Rebelliousness- reckless behavior  · Withdrawal from people/activities they love  · Confusion- inability to concentrate     If you or a loved one observes any of these behaviors or has concerns about self-harm, here's what you can do:  · Talk about it- your feelings and reasons for harming yourself  · Remove any means that you might use to hurt  "yourself (examples: pills, rope, extension cords, firearm)  · Get professional help from the community (Mental Health, Substance Abuse, psychological counseling)  · Do not be alone:Call your Safe Contact- someone whom you trust who will be there for you.  · Call your local CRISIS HOTLINE 183-0957 or 234-481-8073  · Call your local Children's Mobile Crisis Response Team [place] (488) 998-4720 or www.Wiser (formerly Wise[employer]er)  · Call the toll free National Suicide Prevention Hotlines   · National Suicide Prevention Lifeline 004-726-UDVX (3540)  · CellTran Line Network 800-SUICIDE (726-5596)    Transient Ischemic Attack  A transient ischemic attack (TIA) is a \"warning stroke\" that causes stroke-like symptoms. Unlike a stroke, a TIA does not cause permanent damage to the brain. The symptoms of a TIA can happen very fast and do not last long. It is important to know the symptoms of a TIA and what to do. This can help prevent a major stroke or death.  CAUSES   A TIA is caused by a temporary blockage in an artery in the brain or neck (carotid artery). The blockage does not allow the brain to get the blood supply it needs and can cause different symptoms. The blockage can be caused by either:  · A blood clot.  · Fatty buildup (plaque) in a neck or brain artery.  RISK FACTORS  · High blood pressure (hypertension).  · High cholesterol.  · Diabetes mellitus.  · Heart disease.  · The buildup of plaque in the blood vessels (peripheral artery disease or atherosclerosis).  · The buildup of plaque in the blood vessels that provide blood and oxygen to the brain (carotid artery stenosis).  · An abnormal heart rhythm (atrial fibrillation).  · Obesity.  · Using any tobacco products, including cigarettes, chewing tobacco, or electronic cigarettes.  · Taking oral contraceptives, especially in combination with using tobacco.  · Physical inactivity.  · A diet high in fats, salt (sodium), and calories.  · Excessive alcohol use.  · Use of " illegal drugs (especially cocaine and methamphetamine).  · Being male.  · Being .  · Being over the age of 55 years.  · Family history of stroke.  · Previous history of blood clots, stroke, TIA, or heart attack.  · Sickle cell disease.  SIGNS AND SYMPTOMS   TIA symptoms are the same as a stroke but are temporary. These symptoms usually develop suddenly, or may be newly present upon waking from sleep:  · Sudden weakness or numbness of the face, arm, or leg, especially on one side of the body.  · Sudden trouble walking or difficulty moving arms or legs.  · Sudden confusion.  · Sudden personality changes.  · Trouble speaking (aphasia) or understanding.  · Difficulty swallowing.  · Sudden trouble seeing in one or both eyes.  · Double vision.  · Dizziness.  · Loss of balance or coordination.  · Sudden severe headache with no known cause.  · Trouble reading or writing.  · Loss of bowel or bladder control.  · Loss of consciousness.  DIAGNOSIS   Your health care provider may be able to determine the presence or absence of a TIA based on your symptoms, history, and physical exam. CT scan of the brain is usually performed to help identify a TIA. Other tests may include:  · Electrocardiography (ECG).  · Continuous heart monitoring.  · Echocardiography.  · Carotid ultrasonography.  · MRI.  · A scan of the brain circulation.  · Blood tests.  TREATMENT   Since the symptoms of TIA are the same as a stroke, it is important to seek treatment as soon as possible. You may need a medicine to dissolve a blood clot (thrombolytic) if that is the cause of the TIA. This medicine cannot be given if too much time has passed. Treatment may also include:   · Rest, oxygen, fluids through an IV tube, and medicines to thin the blood (anticoagulants).  · Measures will be taken to prevent short-term and long-term complications, including infection from breathing foreign material into the lungs (aspiration pneumonia), blood clots in  the legs, and falls.  · Procedures to either remove plaque in the carotid arteries or dilate carotid arteries that have narrowed due to plaque. Those procedures are:  ¨ Carotid endarterectomy.  ¨ Carotid angioplasty and stenting.  · Medicines and diet may be used to address diabetes, high blood pressure, and other underlying risk factors.  HOME CARE INSTRUCTIONS   · Take medicines only as directed by your health care provider. Follow the directions carefully. Medicines may be used to control risk factors for a stroke. Be sure you understand all your medicine instructions.  · You may be told to take aspirin or the anticoagulant warfarin. Warfarin needs to be taken exactly as instructed.  ¨ Taking too much or too little warfarin is dangerous. Too much warfarin increases the risk of bleeding. Too little warfarin continues to allow the risk for blood clots. While taking warfarin, you will need to have regular blood tests to measure your blood clotting time. A PT blood test measures how long it takes for blood to clot. Your PT is used to calculate another value called an INR. Your PT and INR help your health care provider to adjust your dose of warfarin. The dose can change for many reasons. It is critically important that you take warfarin exactly as prescribed.  ¨ Many foods, especially foods high in vitamin K can interfere with warfarin and affect the PT and INR. Foods high in vitamin K include spinach, kale, broccoli, cabbage, ye and turnip greens, [place] sprouts, peas, cauliflower, seaweed, and parsley, as well as beef and pork liver, green tea, and soybean oil. You should eat a consistent amount of foods high in vitamin K. Avoid major changes in your diet, or notify your health care provider before changing your diet. Arrange a visit with a dietitian to answer your questions.  ¨ Many medicines can interfere with warfarin and affect the PT and INR. You must tell your health care provider about any and all  medicines you take; this includes all vitamins and supplements. Be especially cautious with aspirin and anti-inflammatory medicines. Do not take or discontinue any prescribed or over-the-counter medicine except on the advice of your health care provider or pharmacist.  ¨ Warfarin can have side effects, such as excessive bruising or bleeding. You will need to hold pressure over cuts for longer than usual. Your health care provider or pharmacist will discuss other potential side effects.  ¨ Avoid sports or activities that may cause injury or bleeding.  ¨ Be careful when shaving, flossing your teeth, or handling sharp objects.  ¨ Alcohol can change the body's ability to handle warfarin. It is best to avoid alcoholic drinks or consume only very small amounts while taking warfarin. Notify your health care provider if you change your alcohol intake.  ¨ Notify your dentist or other health care providers before procedures.  · Eat a diet that includes 5 or more servings of fruits and vegetables each day. This may reduce the risk of stroke. Certain diets may be prescribed to address high blood pressure, high cholesterol, diabetes, or obesity.  ¨ A diet low in sodium, saturated fat, trans fat, and cholesterol is recommended to manage high blood pressure.  ¨ A diet low in saturated fat, trans fat, and cholesterol, and high in fiber may control cholesterol levels.  ¨ A controlled-carbohydrate, controlled-sugar diet is recommended to manage diabetes.  ¨ A reduced-calorie diet that is low in sodium, saturated fat, trans fat, and cholesterol is recommended to manage obesity.  · Maintain a healthy weight.  · Stay physically active. It is recommended that you get at least 30 minutes of activity on most or all days.  · Do not use any tobacco products, including cigarettes, chewing tobacco, or electronic cigarettes. If you need help quitting, ask your health care provider.  · Limit alcohol intake to no more than 1 drink per day for  nonpregnant women and 2 drinks per day for men. One drink equals 12 ounces of beer, 5 ounces of wine, or 1½ ounces of hard liquor.  · Do not abuse drugs.  · A safe home environment is important to reduce the risk of falls. Your health care provider may arrange for specialists to evaluate your home. Having grab bars in the bedroom and bathroom is often important. Your health care provider may arrange for equipment to be used at home, such as raised toilets and a seat for the shower.  · Follow all instructions for follow-up with your health care provider. This is very important. This includes any referrals and lab tests. Proper follow-up can prevent a stroke or another TIA from occurring.  PREVENTION   The risk of a TIA can be decreased by appropriately treating high blood pressure, high cholesterol, diabetes, heart disease, and obesity, and by quitting smoking, limiting alcohol, and staying physically active.  SEEK MEDICAL CARE IF:  · You have personality changes.  · You have difficulty swallowing.  · You are seeing double.  · You have dizziness.  · You have a fever.  SEEK IMMEDIATE MEDICAL CARE IF:   Any of the following symptoms may represent a serious problem that is an emergency. Do not wait to see if the symptoms will go away. Get medical help right away. Call your local emergency services (911 in [place] Do not drive yourself to the hospital.  · You have sudden weakness or numbness of the face, arm, or leg, especially on one side of the body.  · You have sudden trouble walking or difficulty moving arms or legs.  · You have sudden confusion.  · You have trouble speaking (aphasia) or understanding.  · You have sudden trouble seeing in one or both eyes.  · You have a loss of balance or coordination.  · You have a sudden, severe headache with no known cause.  · You have new chest pain or an irregular heartbeat.  · You have a partial or total loss of consciousness.  MAKE SURE YOU:   · Understand these  instructions.  · Will watch your condition.  · Will get help right away if you are not doing well or get worse.     This information is not intended to replace advice given to you by your health care provider. Make sure you discuss any questions you have with your health care provider.     Document Released: 09/27/2006 Document Revised: 01/08/2016 Document Reviewed: 03/25/2015  [employer] Interactive Patient Education ©2016 [employer]

## 2017-05-18 NOTE — THERAPY
Pt is a 89 y/o male presenting to physical therapy with sudden onset L UE weakness which has since resolved. Pt reports he just worked with OT, groomed at sink and walked the unit. Feels he is back to his old self, and does not require FWW at this time. Pt encouraged to continue to use SPC as needed for fall prevention and energy conservation. Has good support from 89 y/o spouse. Would recommend  PT for gait, balance and fall prevention training if desired. Otherwise no further acute skilled PT needs at this time. Thanks

## 2017-05-18 NOTE — PROGRESS NOTES
Neurology Progress Note        Subjective:  I am following up Joe in neurological consultation today.  He was admitted last night after presenting with transient left sided weakness and dysarthria.  He reports he is back to normal today.  He has no specific complaints.    Objective:  Vitals:  Filed Vitals:    05/17/17 2000 05/18/17 0000 05/18/17 0400 05/18/17 0828   BP: 165/74 175/86 141/79 165/85   Pulse: 60 60 60 65   Temp: 36.5 °C (97.7 °F) 36.3 °C (97.3 °F) 36.6 °C (97.9 °F) 36.1 °C (96.9 °F)   Resp: 20 19 20 20   Height:       Weight:       SpO2: 92% 96% 95%      General:  Awake, alert and in no apparent distress, cooperative with exam  Mental Status:  Alert, oriented to person, place and month, speech is fluent, comprehension is intact.  Cranial Nerves:  PERRL, EOMI with no nystagmus, face is symmetric, facial sensation is intact.  No dysarthria.  Motor: 5/5 throughout  Coordination:  Normal finger to nose bilaterally  Gait:  Deferred    Recent Labs      05/17/17   1315   WBC  7.1   RBC  5.25   HEMOGLOBIN  14.9   HEMATOCRIT  45.9   MCV  87.4   MCH  28.4   RDW  43.1   PLATELETCT  150*   MPV  9.6   NEUTSPOLYS  71.20   LYMPHOCYTES  17.40*   MONOCYTES  9.20   EOSINOPHILS  1.10   BASOPHILS  0.40     Recent Labs      05/17/17   1315   SODIUM  138   POTASSIUM  3.9   CHLORIDE  103   CO2  27   GLUCOSE  97   BUN  20     MRI brain:1.  No evidence of acute territorial infarct, intracranial hemorrhage or mass lesion.  2.  Small right mastoid effusion. Findings could be consistent with mastoiditis in the appropriate clinical setting.  3.  Mild pansinusitis.  4.  Moderate diffuse cerebral and cerebellar substance loss.  5.  Moderate microangiopathic ischemic change versus demyelination or gliosis.    Echo:Normal left ventricular systolic function.   No evidence of valvular abnormality based on Doppler evaluation.     Impression: Mr. Rashid is a 89 year old man with a TIA.  His main risk factor is age and  hypertension.    Recommendations:  1.  Plavix for secondary stroke risk reduction given his aspirin allergy  2.  Could hold off on statin given his age and normal cholesterol.  3.  Ok for discharge from my standpoint.

## 2017-05-18 NOTE — PROGRESS NOTES
Pt aao x 4. Rosa. No expressive aphasia noted. Impulsive and forgetful. NIHSS completed. Pt on tele-SR, SB. Frequent urination noted. poc discussed with pt and granddaughter. Built in bed alarm on. Call light within reach. Instructed pt to call for needs. Hourly rounding in use

## 2017-05-18 NOTE — DISCHARGE PLANNING
Care Transition Team Assessment    Information Source  Orientation : Oriented x 4  Information Given By: Patient         Elopement Risk  Legal Hold: No  Ambulatory or Self Mobile in Wheelchair: Yes  Disoriented: No  Psychiatric Symptoms: None  History of Wandering: No  Elopement this Admit: No  Vocalizing Wanting to Leave: No  Displays Behaviors, Body Language Wanting to Leave: No-Not at Risk for Elopement  Elopement Risk: Not at Risk for Elopement    Interdisciplinary Discharge Planning  Does Admitting Nurse Feel This Could be a Complex Discharge?: No  Lives with - Patient's Self Care Capacity: Spouse  Support Systems: Family Member(s)  Housing / Facility:  (Independant Living)  Able to Return to Previous ADL's: Future Time w/Therapy  Mobility Issues: Yes (uses cane)  Prior Services: None, Home-Independent                   Vision / Hearing Impairment  Vision Impairment : Yes  Right Eye Vision: Wears Glasses  Left Eye Vision: Wears Glasses  Hearing Impairment : No              Domestic Abuse  Have you ever been the victim of abuse or violence?: No  Physical Abuse or Sexual Abuse: No  Verbal Abuse or Emotional Abuse: No  Possible Abuse Reported to:: Not Applicable

## 2017-05-18 NOTE — CONSULTS
DATE OF SERVICE:  05/17/2017    DATE OF CONSULTATION:  05/17/2017    REFERRING PHYSICIAN:  Guy G. Gansert, MD    REFERRAL REASON:  Possible stroke.    HISTORY OF PRESENT ILLNESS:  The patient a 89-year-old man who was paged out   as a code stroke prior to arrival.  It was noted he had onset of left arm   weakness and difficulty speaking at 12:30 p.m.  He was trying to reach for a   glass and it felt through his hand and broken, he was unable to  it.  He   lives in an assisted living facility and they also noted some drooping on the   left side of his face.  By the time he arrived at the emergency room, he was   starting to improve and he had NIH stroke scale of 1 at the time of my   assessment.  He has no previous history of stroke or TIA.    PAST MEDICAL HISTORY:  Significant for hypertension and congestive heart   failure.    HOME MEDICATIONS:  1.  Lopressor.  2.  Zestril.  3.  Lasix.  4.  Digoxin.    SOCIAL HISTORY:  Denies any tobacco, alcohol, or drug use.  He lives in   assisted living facility with his wife of 74 years.    REVIEW OF SYSTEMS:  Otherwise, negative except for as mentioned above.    PHYSICAL EXAMINATION:  VITAL SIGNS:  Initial blood pressure was 215/108, temperature 36.8 degrees   Celsius, heart rate 81.  GENERAL:  The patient is on the ambulance stretcher, in no apparent distress.    He is pleasant and cooperative with the exam.  MENTAL STATUS:  He is awake, alert, oriented x3.  His speech is fluent.  He is   able to name all the objects on the card.  His comprehension appears intact.  CRANIAL NERVES:  Pupils are small, but reactive.  Extraocular movements are   intact with no nystagmus.  Visual fields are full to confrontation   bilaterally.  Extraocular movements are intact with no nystagmus.  Face is   symmetric.  Facial sensation is intact.  Initially, he had some mild   dysarthria.  This appeared to improve throughout our evaluation.  MOTOR:  5/5 throughout with no drift.  SENSATION:   Intact to light touch throughout.  No neglect.  COORDINATION:  Normal finger-to-nose and heel-to-shin bilaterally.  GAIT:  Deferred.    DIAGNOSTIC DATA:  The patient had a CT of the brain, which showed cerebral   atrophy.  Small vessel ischemic change noted.  No acute changes.  CT angiogram   of the neck showed to be within normal limits.  CT angiogram of the head   showed no occlusion with mild calcified atherosclerotic plaque seen.    IMPRESSION:  The patient is a 89-year-old man who presented with left-sided   weakness and dysarthria, which resolved consistent with a transient ischemic   attack.  It is certainly possibly a small cortical infarct, which rapidly   resolved.  His blood pressure was quite elevated, so this could be a case of   hypertensive emergency as well.    PLAN:  1.  Start him on Plavix given his aspirin allergy that is listed, if this is a   true allergy.  2.  Get an MRI of the brain and echocardiogram to evaluate for transient   ischemic attack or stroke.  3.  He is not an IV alteplase candidate due to the resolving minor to   non-disabling symptoms.       ____________________________________     MD ASHLY Barahona / BRUNO    DD:  05/17/2017 16:59:08  DT:  05/17/2017 17:21:24    D#:  [id number]  Job#:  [id number]

## 2017-05-19 ENCOUNTER — PATIENT OUTREACH (OUTPATIENT)
Dept: HEALTH INFORMATION MANAGEMENT | Facility: OTHER | Age: 82
End: 2017-05-19

## 2017-05-19 NOTE — DISCHARGE SUMMARY
YOB: 1920    DATE OF ADMISSION:  05/17/2017    DATE OF DISCHARGE:  05/18/2017    CHIEF COMPLAINT ON ADMISSION:  Left hand numbness and weakness.    HISTORY OF PRESENT ILLNESS AND HOSPITAL COURSE:  For complete history and   physical, please review the note posted by Dr. Gaytan on 05/17/2017.  In   summary, this is a 89-year-old male who presented to the emergency room with   sudden onset of left hand weakness and numbness.  On admission to the   emergency room, his symptoms did resolve, but he was worked up for CVA.    Initial laboratory data was unremarkable.  CT of the head was negative for any   acute intracranial abnormalities.  CTA of the head and of the neck were both   within normal limits.  Chest x-ray was negative for any acute abnormalities.    MRI of the brain was negative for any acute infarct, intracranial hemorrhage,   or mass lesion.  Echocardiogram revealed a left ventricular ejection fraction   of 60% with no other acute abnormalities.  The patient's vital signs remained   stable and he had no further neurological deficits over his stay.  The patient   likely experienced a transient ischemic attack, which has resolved.  As the   patient is stable, he will be discharged home at this time.    DISCHARGE PROBLEM LIST:  1.  Transient ischemic attack, resolved.  2.  Atrial fibrillation.  3.  Hypertension.  4.  Congestive heart failure.  5.  Chronic kidney disease, stage III.    FOLLOWUP APPOINTMENTS:  The patient will follow up with Dr. Mervin Pompa on   05/26/2017.    DISCHARGE MEDICATIONS:  1.  Tylenol 500 mg tabs, take 500-1000 mg by mouth every 6 hours as needed for   mild pain.  2.  Plavix 75 mg tab, take 1 tab by mouth every day.  3.  Lanoxin 125 mcg tab, take 1 tab by mouth every day at 6 p.m.  4.  Benadryl 25 mg capsule, take 25 mg by mouth every 6 hours as needed for   itching.  5.  Lasix 20 mg tab, take 20 mg by mouth every day.  6.  DuoNeb 0.5/2.5 mg nebulizer solution, inhale 3  mL by nebulization route   every 4 hours as needed for shortness of breath.  7.  Lactobacillus capsule, take 1 capsule by mouth every day at 6 p.m.  8.  Lisinopril 40 mg tablet, take 1 tab by mouth every day.  9.  Metoprolol 25 mg tab, take 1 tab by mouth twice daily.  10.  Moxifloxacin 0.5% solution, place 1 drop in both eyes every 2 hours while   awake.  11.  Multivitamin tab, take 1 by mouth every day.  12.  Potassium chloride 10 mEq tab, take 10 mEq by mouth 2 times a day.  13.  Flomax 0.4 mg capsule, take 0.4 mg by mouth at bedtime.    DIET:  Cardiac diet.    ACTIVITY:  As tolerated.    CONSULTATIONS:  Neurology.    LABORATORY DATA:  Sodium 138, potassium 3.9, chloride 103, CO2 of 27, glucose   97, BUN 20, creatinine 1.22.  White blood cell 7.1, hemoglobin 14.9,   hematocrit 45.9, platelet count 150.    Total time on discharge process was approximately 36 minutes.       ____________________________________     RAYNA Booker    CSF / NTS    DD:  05/18/2017 12:18:51  DT:  05/18/2017 22:54:10    D#:  [id number]  Job#:  [id number]

## 2017-06-16 NOTE — DISCHARGE SUMMARY
Please review the discharge summary posted on 05/18/2017 for a complete HPI and hospital course.  In addition to the hospital course, it should be noted the patient has a history of atrial fibrillation, although this was not evident on EKG performed over admission.  The patient remained in NSR over his hospital stay.  Therefore, there was no need for any further anticoagulation other than Plavix.

## 2017-06-16 NOTE — ADDENDUM NOTE
Encounter addended by: WIL Traore on: 6/16/2017  1:29 PM<BR>     Documentation filed: Clinical Notes

## 2017-07-03 ENCOUNTER — HOSPITAL ENCOUNTER (OUTPATIENT)
Dept: LAB | Facility: MEDICAL CENTER | Age: 82
End: 2017-07-03
Attending: INTERNAL MEDICINE
Payer: MEDICARE

## 2017-07-03 LAB
ALBUMIN SERPL BCP-MCNC: 3.9 G/DL (ref 3.2–4.9)
ALBUMIN/GLOB SERPL: 1.6 G/DL
ALP SERPL-CCNC: 57 U/L (ref 30–99)
ALT SERPL-CCNC: 15 U/L (ref 2–50)
ANION GAP SERPL CALC-SCNC: 5 MMOL/L (ref 0–11.9)
AST SERPL-CCNC: 16 U/L (ref 12–45)
BASOPHILS # BLD AUTO: 0.5 % (ref 0–1.8)
BASOPHILS # BLD: 0.04 K/UL (ref 0–0.12)
BILIRUB SERPL-MCNC: 0.8 MG/DL (ref 0.1–1.5)
BUN SERPL-MCNC: 25 MG/DL (ref 8–22)
CALCIUM SERPL-MCNC: 9.4 MG/DL (ref 8.5–10.5)
CHLORIDE SERPL-SCNC: 98 MMOL/L (ref 96–112)
CO2 SERPL-SCNC: 30 MMOL/L (ref 20–33)
CREAT SERPL-MCNC: 1.14 MG/DL (ref 0.5–1.4)
CRP SERPL HS-MCNC: 0.2 MG/DL (ref 0–0.75)
EOSINOPHIL # BLD AUTO: 0.07 K/UL (ref 0–0.51)
EOSINOPHIL NFR BLD: 0.8 % (ref 0–6.9)
ERYTHROCYTE [DISTWIDTH] IN BLOOD BY AUTOMATED COUNT: 42 FL (ref 35.9–50)
ERYTHROCYTE [SEDIMENTATION RATE] IN BLOOD BY WESTERGREN METHOD: 1 MM/HOUR (ref 0–20)
GFR SERPL CREATININE-BSD FRML MDRD: 59 ML/MIN/1.73 M 2
GLOBULIN SER CALC-MCNC: 2.4 G/DL (ref 1.9–3.5)
GLUCOSE SERPL-MCNC: 159 MG/DL (ref 65–99)
HCT VFR BLD AUTO: 47.1 % (ref 42–52)
HGB BLD-MCNC: 15.5 G/DL (ref 14–18)
IMM GRANULOCYTES # BLD AUTO: 0.05 K/UL (ref 0–0.11)
IMM GRANULOCYTES NFR BLD AUTO: 0.6 % (ref 0–0.9)
LYMPHOCYTES # BLD AUTO: 1.88 K/UL (ref 1–4.8)
LYMPHOCYTES NFR BLD: 21.7 % (ref 22–41)
MCH RBC QN AUTO: 29 PG (ref 27–33)
MCHC RBC AUTO-ENTMCNC: 32.9 G/DL (ref 33.7–35.3)
MCV RBC AUTO: 88 FL (ref 81.4–97.8)
MONOCYTES # BLD AUTO: 0.76 K/UL (ref 0–0.85)
MONOCYTES NFR BLD AUTO: 8.8 % (ref 0–13.4)
NEUTROPHILS # BLD AUTO: 5.85 K/UL (ref 1.82–7.42)
NEUTROPHILS NFR BLD: 67.6 % (ref 44–72)
NRBC # BLD AUTO: 0 K/UL
NRBC BLD AUTO-RTO: 0 /100 WBC
PLATELET # BLD AUTO: 161 K/UL (ref 164–446)
PMV BLD AUTO: 9.6 FL (ref 9–12.9)
POTASSIUM SERPL-SCNC: 4.7 MMOL/L (ref 3.6–5.5)
PROT SERPL-MCNC: 6.3 G/DL (ref 6–8.2)
RBC # BLD AUTO: 5.35 M/UL (ref 4.7–6.1)
SODIUM SERPL-SCNC: 133 MMOL/L (ref 135–145)
WBC # BLD AUTO: 8.7 K/UL (ref 4.8–10.8)

## 2017-07-03 PROCEDURE — 85025 COMPLETE CBC W/AUTO DIFF WBC: CPT

## 2017-07-03 PROCEDURE — 86140 C-REACTIVE PROTEIN: CPT

## 2017-07-03 PROCEDURE — 80053 COMPREHEN METABOLIC PANEL: CPT

## 2017-07-03 PROCEDURE — 36415 COLL VENOUS BLD VENIPUNCTURE: CPT

## 2017-07-03 PROCEDURE — 85652 RBC SED RATE AUTOMATED: CPT

## 2017-09-07 DIAGNOSIS — I10 ESSENTIAL HYPERTENSION, BENIGN: ICD-10-CM

## 2017-09-07 RX ORDER — LISINOPRIL 40 MG/1
40 TABLET ORAL DAILY
Qty: 30 TAB | Refills: 0 | Status: SHIPPED | OUTPATIENT
Start: 2017-09-07 | End: 2017-11-29 | Stop reason: SDUPTHER

## 2017-09-14 ENCOUNTER — HOSPITAL ENCOUNTER (OUTPATIENT)
Dept: PHYSICAL THERAPY | Facility: REHABILITATION | Age: 82
End: 2017-09-14
Attending: INTERNAL MEDICINE
Payer: MEDICARE

## 2017-09-14 PROCEDURE — 97542 WHEELCHAIR MNGMENT TRAINING: CPT

## 2017-09-28 ENCOUNTER — OFFICE VISIT (OUTPATIENT)
Dept: CARDIOLOGY | Facility: MEDICAL CENTER | Age: 82
End: 2017-09-28
Payer: MEDICARE

## 2017-09-28 VITALS
HEIGHT: 71 IN | WEIGHT: 160 LBS | SYSTOLIC BLOOD PRESSURE: 136 MMHG | BODY MASS INDEX: 22.4 KG/M2 | DIASTOLIC BLOOD PRESSURE: 70 MMHG | OXYGEN SATURATION: 95 % | HEART RATE: 52 BPM

## 2017-09-28 DIAGNOSIS — I48.0 PAROXYSMAL ATRIAL FIBRILLATION (HCC): ICD-10-CM

## 2017-09-28 DIAGNOSIS — Z86.73 HISTORY OF TIA (TRANSIENT ISCHEMIC ATTACK): Chronic | ICD-10-CM

## 2017-09-28 DIAGNOSIS — I10 ESSENTIAL HYPERTENSION, BENIGN: ICD-10-CM

## 2017-09-28 DIAGNOSIS — I27.20 PULMONARY HYPERTENSION (HCC): ICD-10-CM

## 2017-09-28 PROCEDURE — 99214 OFFICE O/P EST MOD 30 MIN: CPT | Performed by: INTERNAL MEDICINE

## 2017-09-28 RX ORDER — CLOPIDOGREL BISULFATE 75 MG/1
75 TABLET ORAL DAILY
Qty: 90 TAB | Refills: 3 | Status: SHIPPED | OUTPATIENT
Start: 2017-09-28 | End: 2018-01-01

## 2017-09-28 NOTE — LETTER
[facility]   [address], [address]  [place] [address]  Phone: [phone]  Fax: [phone]              Joe Rashid  1/7/1920    Encounter Date: 9/28/2017    Chaz Linder M.D.          PROGRESS NOTE:  Subjective:   Joe Rashid is a 89 y.o. male who presents today For follow-up of his history of paroxysmal atrial fibrillation with TIA      Doing well especially given his age no major health concerns since her last visit    Past Medical History:   Diagnosis Date   • Chronic anticoagulation    • Chronic kidney disease, stage III (moderate)    • Congestive heart failure (CMS-HCC)    • Hepatitis B    • History of TIA (transient ischemic attack)    • Hypertension      Past Surgical History:   Procedure Laterality Date   • HIP HEMIARTHROPLASTY Left 1/27/2016    Procedure: HIP HEMIARTHROPLASTY;  Surgeon: Adis Martínez M.D.;  Location: SURGERY [facility];  Service:    • APPENDECTOMY     • CATARACT EXTRACTION WITH IOL      both eyes   • COLON RESECTION     • OTHER      sinus surgery of unknown type   • PB CRYOSURG ABLATION OF PBOSTATE     • PB TRANSURETHRAL ELEC-SURG PBOSTATECTOM       Family History   Problem Relation Age of Onset   • Heart Disease Brother      History   Smoking Status   • Never Smoker   Smokeless Tobacco   • Never Used     Allergies   Allergen Reactions   • Aspirin Hives and Rash     Pts grandson states rash and hives.   • Sulfa Drugs Hives and Rash     Pts grandson states rash and hives.     Outpatient Encounter Prescriptions as of 9/28/2017   Medication Sig Dispense Refill   • Probiotic Product (PROBIOTIC DAILY PO) Take  by mouth.     • Cholecalciferol (VITAMIN D PO) Take  by mouth.     • clopidogrel (PLAVIX) 75 MG Tab Take 1 Tab by mouth every day. 90 Tab 3   • lisinopril (PRINIVIL, ZESTRIL) 40 MG tablet Take 1 Tab by mouth every day. Needs to be seen for further refills. Thank you 30 Tab 0   • moxifloxacin (VIGAMOX) 0.5 % Solution Place  1 Drop in both eyes every 2 hours while awake. 1 Bottle 0   • metoprolol (LOPRESSOR) 25 MG Tab TAKE 1 TABLET BY MOUTH TWICE DAILY 180 Tab 3   • Multiple Vitamins-Minerals (MULTIVITAMIN & MINERAL PO) Take 1 Capsule by mouth every day at 6 PM.     • tamsulosin (FLOMAX) 0.4 MG capsule Take 0.4 mg by mouth BEDTIME(S).     • [DISCONTINUED] clopidogrel (PLAVIX) 75 MG Tab Take 1 Tab by mouth every day. 30 Tab 2   • [DISCONTINUED] potassium chloride SA (K-DUR) 10 MEQ Tab CR Take 10 mEq by mouth 2 times a day.     • [DISCONTINUED] diphenhydrAMINE (BENADRYL) 25 MG capsule Take 25 mg by mouth every 6 hours as needed for Itching.     • [DISCONTINUED] furosemide (LASIX) 20 MG Tab Take 20 mg by mouth every day.     • [DISCONTINUED] Lactobacillus-Inulin ([facility] DIGESTIVE [facility]) Cap Take 1 Capsule by mouth every day at 6 PM.     • acetaminophen (TYLENOL) 500 MG Tab Take 500-1,000 mg by mouth every 6 hours as needed for Mild Pain.     • [DISCONTINUED] ipratropium-albuterol (DUONEB) 0.5-2.5 (3) MG/3ML nebulizer solution 3 mL by Nebulization route every four hours as needed for Shortness of Breath. 3ml inhale orally two times daily and 3ml inhale every 4 hours as needed for shortness of breath     • [DISCONTINUED] digoxin (LANOXIN) 125 MCG Tab Take 1 Tab by mouth every day at 6 PM. 30 Tab 3   • Multiple Vitamins-Minerals (MULTI ADULT GUMMIES) Chew Tab Take 1 Tab by mouth every day.       No facility-administered encounter medications on file as of 9/28/2017.      Review of Systems   Constitutional: Negative for chills and fever.   HENT: Negative for sore throat.    Eyes: Negative for blurred vision.   Respiratory: Negative for cough and shortness of breath.    Cardiovascular: Negative for chest pain, palpitations, claudication, leg swelling and PND.   Gastrointestinal: Negative for abdominal pain and nausea.   Musculoskeletal: Negative for falls and joint pain.   Skin: Negative for rash.   Neurological: Negative for dizziness,  "focal weakness and weakness.   Endo/Heme/Allergies: Does not bruise/bleed easily.        Objective:   /70   Pulse (!) 52   Ht 1.803 m (5' 11\")   Wt 72.6 kg (160 lb)   SpO2 95%   BMI 22.32 kg/m²      Physical Exam   Constitutional: No distress.   HENT:   Mouth/Throat: Oropharynx is clear and moist.   Eyes: No scleral icterus.   Neck: Neck supple. No JVD present.   Cardiovascular: Normal rate, regular rhythm, normal heart sounds and intact distal pulses.  Exam reveals no gallop and no friction rub.    No murmur heard.  Pulmonary/Chest: Effort normal. He has no rales.   Abdominal: Soft. Bowel sounds are normal. There is no tenderness.   Musculoskeletal: He exhibits no edema.   Neurological: He is alert.   Skin: No rash noted. He is not diaphoretic.   Psychiatric: He has a normal mood and affect.       Assessment:     1. Paroxysmal atrial fibrillation (CMS-HCC)     2. Essential hypertension, benign     3. History of TIA (transient ischemic attack)     4. Pulmonary hypertension - estiated RVSP 70 mmHG 9/2015         Medical Decision Making:  Today's Assessment / Status / Plan:     It was my pleasure to meet with Mr. Rashid.    We'll continue to monitor him closely he's done quite well over the past year with no specific changes he is tolerating anticoagulation well with Plavix which is reasonable given his advanced age    I will see Mr. Rashid back in 1 year time and encouraged him to follow up with us over the phone or e-mail using my MyChart as issues arise.    It is my pleasure to participate in the care of Mr. Rashid.  Please do not hesitate to contact me with questions or concerns.    Chaz Linder MD PhD FACC  Cardiologist [facility]        Mervin Pompa M.D.  [address]  [place] [address]  VIA Facsimile: [phone]                 "

## 2017-10-10 ASSESSMENT — ENCOUNTER SYMPTOMS
WEAKNESS: 0
NAUSEA: 0
FALLS: 0
COUGH: 0
DIZZINESS: 0
FEVER: 0
CHILLS: 0
BRUISES/BLEEDS EASILY: 0
PND: 0
ABDOMINAL PAIN: 0
FOCAL WEAKNESS: 0
SORE THROAT: 0
SHORTNESS OF BREATH: 0
PALPITATIONS: 0
CLAUDICATION: 0
BLURRED VISION: 0

## 2017-10-11 NOTE — PROGRESS NOTES
Subjective:   Joe Rashid is a 89 y.o. male who presents today For follow-up of his history of paroxysmal atrial fibrillation with TIA      Doing well especially given his age no major health concerns since her last visit    Past Medical History:   Diagnosis Date   • Chronic anticoagulation    • Chronic kidney disease, stage III (moderate)    • Congestive heart failure (CMS-HCC)    • Hepatitis B    • History of TIA (transient ischemic attack)    • Hypertension      Past Surgical History:   Procedure Laterality Date   • HIP HEMIARTHROPLASTY Left 1/27/2016    Procedure: HIP HEMIARTHROPLASTY;  Surgeon: Adis Martínez M.D.;  Location: SURGERY [facility];  Service:    • APPENDECTOMY     • CATARACT EXTRACTION WITH IOL      both eyes   • COLON RESECTION     • OTHER      sinus surgery of unknown type   • PB CRYOSURG ABLATION OF PBOSTATE     • PB TRANSURETHRAL ELEC-SURG PBOSTATECTOM       Family History   Problem Relation Age of Onset   • Heart Disease Brother      History   Smoking Status   • Never Smoker   Smokeless Tobacco   • Never Used     Allergies   Allergen Reactions   • Aspirin Hives and Rash     Pts grandson states rash and hives.   • Sulfa Drugs Hives and Rash     Pts grandson states rash and hives.     Outpatient Encounter Prescriptions as of 9/28/2017   Medication Sig Dispense Refill   • Probiotic Product (PROBIOTIC DAILY PO) Take  by mouth.     • Cholecalciferol (VITAMIN D PO) Take  by mouth.     • clopidogrel (PLAVIX) 75 MG Tab Take 1 Tab by mouth every day. 90 Tab 3   • lisinopril (PRINIVIL, ZESTRIL) 40 MG tablet Take 1 Tab by mouth every day. Needs to be seen for further refills. Thank you 30 Tab 0   • moxifloxacin (VIGAMOX) 0.5 % Solution Place 1 Drop in both eyes every 2 hours while awake. 1 Bottle 0   • metoprolol (LOPRESSOR) 25 MG Tab TAKE 1 TABLET BY MOUTH TWICE DAILY 180 Tab 3   • Multiple Vitamins-Minerals (MULTIVITAMIN & MINERAL PO) Take 1 Capsule by mouth every day at 6 PM.     • tamsulosin  "(FLOMAX) 0.4 MG capsule Take 0.4 mg by mouth BEDTIME(S).     • [DISCONTINUED] clopidogrel (PLAVIX) 75 MG Tab Take 1 Tab by mouth every day. 30 Tab 2   • [DISCONTINUED] potassium chloride SA (K-DUR) 10 MEQ Tab CR Take 10 mEq by mouth 2 times a day.     • [DISCONTINUED] diphenhydrAMINE (BENADRYL) 25 MG capsule Take 25 mg by mouth every 6 hours as needed for Itching.     • [DISCONTINUED] furosemide (LASIX) 20 MG Tab Take 20 mg by mouth every day.     • [DISCONTINUED] Lactobacillus-Inulin ([facility] DIGESTIVE [facility]) Cap Take 1 Capsule by mouth every day at 6 PM.     • acetaminophen (TYLENOL) 500 MG Tab Take 500-1,000 mg by mouth every 6 hours as needed for Mild Pain.     • [DISCONTINUED] ipratropium-albuterol (DUONEB) 0.5-2.5 (3) MG/3ML nebulizer solution 3 mL by Nebulization route every four hours as needed for Shortness of Breath. 3ml inhale orally two times daily and 3ml inhale every 4 hours as needed for shortness of breath     • [DISCONTINUED] digoxin (LANOXIN) 125 MCG Tab Take 1 Tab by mouth every day at 6 PM. 30 Tab 3   • Multiple Vitamins-Minerals (MULTI ADULT GUMMIES) Chew Tab Take 1 Tab by mouth every day.       No facility-administered encounter medications on file as of 9/28/2017.      Review of Systems   Constitutional: Negative for chills and fever.   HENT: Negative for sore throat.    Eyes: Negative for blurred vision.   Respiratory: Negative for cough and shortness of breath.    Cardiovascular: Negative for chest pain, palpitations, claudication, leg swelling and PND.   Gastrointestinal: Negative for abdominal pain and nausea.   Musculoskeletal: Negative for falls and joint pain.   Skin: Negative for rash.   Neurological: Negative for dizziness, focal weakness and weakness.   Endo/Heme/Allergies: Does not bruise/bleed easily.        Objective:   /70   Pulse (!) 52   Ht 1.803 m (5' 11\")   Wt 72.6 kg (160 lb)   SpO2 95%   BMI 22.32 kg/m²     Physical Exam   Constitutional: No distress.   HENT: "   Mouth/Throat: Oropharynx is clear and moist.   Eyes: No scleral icterus.   Neck: Neck supple. No JVD present.   Cardiovascular: Normal rate, regular rhythm, normal heart sounds and intact distal pulses.  Exam reveals no gallop and no friction rub.    No murmur heard.  Pulmonary/Chest: Effort normal. He has no rales.   Abdominal: Soft. Bowel sounds are normal. There is no tenderness.   Musculoskeletal: He exhibits no edema.   Neurological: He is alert.   Skin: No rash noted. He is not diaphoretic.   Psychiatric: He has a normal mood and affect.       Assessment:     1. Paroxysmal atrial fibrillation (CMS-HCC)     2. Essential hypertension, benign     3. History of TIA (transient ischemic attack)     4. Pulmonary hypertension - estiated RVSP 70 mmHG 9/2015         Medical Decision Making:  Today's Assessment / Status / Plan:     It was my pleasure to meet with Mr. Rashid.    We'll continue to monitor him closely he's done quite well over the past year with no specific changes he is tolerating anticoagulation well with Plavix which is reasonable given his advanced age    I will see Mr. Rashid back in 1 year time and encouraged him to follow up with us over the phone or e-mail using my MyChart as issues arise.    It is my pleasure to participate in the care of Mr. Rashid.  Please do not hesitate to contact me with questions or concerns.    Chaz Linder MD PhD [facility]  Cardiologist [facility]

## 2017-10-12 ENCOUNTER — TELEPHONE (OUTPATIENT)
Dept: CARDIOLOGY | Facility: MEDICAL CENTER | Age: 82
End: 2017-10-12

## 2017-10-12 NOTE — TELEPHONE ENCOUNTER
----- Message from Tracy Henry sent at 10/12/2017  4:18 PM PDT -----  Regarding: Problem with fingers bleeding  CW/Ana    Patient was given a prescription for Clopidogrel and is having problems with 2 of his fingers bleeding. He wants a call back at [phone] to find out what he should do.

## 2017-10-12 NOTE — TELEPHONE ENCOUNTER
"Spoke with patient who states he has two \"pin prick\" sized areas on two fingers that have bled 3 times now without any injury, dryness, cracking of his skin, etc. The bleeding is stopped and does not last long and the size is so small that he is not oozing very much blood.  Advised to put a snug bandage over the 2 sites, see how he does overnight and I will speak with him tomorrow.  He agrees.  "

## 2017-10-13 ENCOUNTER — TELEPHONE (OUTPATIENT)
Dept: CARDIOLOGY | Facility: MEDICAL CENTER | Age: 82
End: 2017-10-13

## 2017-10-13 NOTE — TELEPHONE ENCOUNTER
Patient states he has no bleeding since yesterday.  He will monitor and call back if problems.  He understands to not stop his Plavix.

## 2017-10-13 NOTE — TELEPHONE ENCOUNTER
----- Message from Joi Castillo sent at 10/13/2017  9:09 AM PDT -----  Regarding: update  Contact: [phone]  CARLA/alexandra    Pt calling to update you following yesterdays call about fingers bleeding.  Please call pt at [phone]

## 2017-10-13 NOTE — TELEPHONE ENCOUNTER
----- Message from Joi Castillo sent at 10/13/2017  9:09 AM PDT -----  Regarding: update  Contact: [phone]  CARLA/alexandra    Pt calling to update you following yesterdays call about fingers bleeding.  Please call pt at [phone]

## 2017-10-16 ENCOUNTER — APPOINTMENT (RX ONLY)
Dept: URBAN - METROPOLITAN AREA CLINIC 4 | Facility: CLINIC | Age: 82
Setting detail: DERMATOLOGY
End: 2017-10-16

## 2017-10-16 DIAGNOSIS — L82.1 OTHER SEBORRHEIC KERATOSIS: ICD-10-CM

## 2017-10-16 DIAGNOSIS — L57.0 ACTINIC KERATOSIS: ICD-10-CM

## 2017-10-16 PROBLEM — Z85.828 PERSONAL HISTORY OF OTHER MALIGNANT NEOPLASM OF SKIN: Status: ACTIVE | Noted: 2017-10-16

## 2017-10-16 PROBLEM — D48.5 NEOPLASM OF UNCERTAIN BEHAVIOR OF SKIN: Status: ACTIVE | Noted: 2017-10-16

## 2017-10-16 PROBLEM — I10 ESSENTIAL (PRIMARY) HYPERTENSION: Status: ACTIVE | Noted: 2017-10-16

## 2017-10-16 PROCEDURE — 99213 OFFICE O/P EST LOW 20 MIN: CPT | Mod: 25

## 2017-10-16 PROCEDURE — ? LIQUID NITROGEN

## 2017-10-16 PROCEDURE — 11100: CPT | Mod: 59

## 2017-10-16 PROCEDURE — ? BIOPSY BY SHAVE METHOD

## 2017-10-16 PROCEDURE — ? COUNSELING

## 2017-10-16 PROCEDURE — 17000 DESTRUCT PREMALG LESION: CPT

## 2017-10-16 ASSESSMENT — LOCATION SIMPLE DESCRIPTION DERM
LOCATION SIMPLE: LEFT FOREHEAD
LOCATION SIMPLE: RIGHT FOREARM

## 2017-10-16 ASSESSMENT — LOCATION ZONE DERM
LOCATION ZONE: FACE
LOCATION ZONE: ARM

## 2017-10-16 ASSESSMENT — LOCATION DETAILED DESCRIPTION DERM
LOCATION DETAILED: RIGHT VENTRAL PROXIMAL FOREARM
LOCATION DETAILED: LEFT LATERAL FOREHEAD

## 2017-10-16 NOTE — PROCEDURE: LIQUID NITROGEN
Detail Level: Detailed
Post-Care Instructions: I reviewed with the patient in detail post-care instructions. Patient is to wear sunprotection, and avoid picking at any of the treated lesions. Pt may apply Vaseline to crusted or scabbing areas.
Consent: The patient's consent was obtained including but not limited to risks of blistering, scarring, darker or lighter pigmentary change, and recurrence.
Render Post-Care Instructions In Note?: no
Duration Of Freeze Thaw-Cycle (Seconds): 0
Number Of Freeze-Thaw Cycles: 2 freeze-thaw cycles

## 2017-10-16 NOTE — PROCEDURE: BIOPSY BY SHAVE METHOD
Bill For Surgical Tray: no
Silver Nitrate Text: The wound bed was treated with silver nitrate after the biopsy was performed.
Detail Level: Detailed
Lab Facility: 
Wound Care: Petrolatum
Electrodesiccation And Curettage Text: The wound bed was treated with electrodesiccation and curettage after the biopsy was performed.
Billing Type: Third-Party Bill
Post-Care Instructions: I reviewed with the patient in detail post-care instructions. Patient is to keep the biopsy site dry overnight, and then apply bacitracin twice daily until healed. Patient may apply hydrogen peroxide soaks to remove any crusting.
Cryotherapy Text: The wound bed was treated with cryotherapy after the biopsy was performed.
Hemostasis: Drysol
Type Of Destruction Used: Curettage
Additional Anesthesia Volume In Cc (Will Not Render If 0): 0
Biopsy Method: Personna blade
Biopsy Type: H and E
Curettage Text: The wound bed was treated with curettage after the biopsy was performed.
Lab: [id number]
Dressing: bandage
Electrodesiccation Text: The wound bed was treated with electrodesiccation after the biopsy was performed.
Consent: Written consent was obtained and risks were reviewed including but not limited to scarring, infection, bleeding, scabbing, incomplete removal, nerve damage and allergy to anesthesia.
Notification Instructions: Patient will be notified of biopsy results. However, patient instructed to call the office if not contacted within 2 weeks.
Anesthesia Type: 1% lidocaine with epinephrine
Anesthesia Volume In Cc: 0.5

## 2017-11-09 ENCOUNTER — APPOINTMENT (OUTPATIENT)
Dept: RADIOLOGY | Facility: MEDICAL CENTER | Age: 82
End: 2017-11-09
Attending: EMERGENCY MEDICINE
Payer: MEDICARE

## 2017-11-09 ENCOUNTER — HOSPITAL ENCOUNTER (EMERGENCY)
Facility: MEDICAL CENTER | Age: 82
End: 2017-11-09
Attending: EMERGENCY MEDICINE
Payer: MEDICARE

## 2017-11-09 VITALS
DIASTOLIC BLOOD PRESSURE: 101 MMHG | SYSTOLIC BLOOD PRESSURE: 213 MMHG | RESPIRATION RATE: 16 BRPM | BODY MASS INDEX: 23.58 KG/M2 | TEMPERATURE: 97.2 F | HEIGHT: 69 IN | OXYGEN SATURATION: 95 % | HEART RATE: 79 BPM | WEIGHT: 159.17 LBS

## 2017-11-09 DIAGNOSIS — S09.90XA CLOSED HEAD INJURY, INITIAL ENCOUNTER: ICD-10-CM
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
## 2017-11-09 DIAGNOSIS — R03.0 ELEVATED BLOOD PRESSURE READING: ICD-10-CM

## 2017-11-09 LAB
ANION GAP SERPL CALC-SCNC: 10 MMOL/L (ref 0–11.9)
APPEARANCE UR: CLEAR
BASOPHILS # BLD AUTO: 0.4 % (ref 0–1.8)
BASOPHILS # BLD: 0.03 K/UL (ref 0–0.12)
BUN SERPL-MCNC: 25 MG/DL (ref 8–22)
CALCIUM SERPL-MCNC: 9.3 MG/DL (ref 8.5–10.5)
CHLORIDE SERPL-SCNC: 101 MMOL/L (ref 96–112)
CO2 SERPL-SCNC: 26 MMOL/L (ref 20–33)
COLOR UR AUTO: YELLOW
CREAT SERPL-MCNC: 1.05 MG/DL (ref 0.5–1.4)
EOSINOPHIL # BLD AUTO: 0.11 K/UL (ref 0–0.51)
EOSINOPHIL NFR BLD: 1.5 % (ref 0–6.9)
ERYTHROCYTE [DISTWIDTH] IN BLOOD BY AUTOMATED COUNT: 41.4 FL (ref 35.9–50)
GFR SERPL CREATININE-BSD FRML MDRD: >60 ML/MIN/1.73 M 2
GLUCOSE SERPL-MCNC: 96 MG/DL (ref 65–99)
GLUCOSE UR QL STRIP.AUTO: NEGATIVE MG/DL
HCT VFR BLD AUTO: 49.5 % (ref 42–52)
HGB BLD-MCNC: 16.3 G/DL (ref 14–18)
IMM GRANULOCYTES # BLD AUTO: 0.03 K/UL (ref 0–0.11)
IMM GRANULOCYTES NFR BLD AUTO: 0.4 % (ref 0–0.9)
KETONES UR QL STRIP.AUTO: NEGATIVE MG/DL
LEUKOCYTE ESTERASE UR QL STRIP.AUTO: NEGATIVE
LYMPHOCYTES # BLD AUTO: 1.24 K/UL (ref 1–4.8)
LYMPHOCYTES NFR BLD: 17.2 % (ref 22–41)
MCH RBC QN AUTO: 28.6 PG (ref 27–33)
MCHC RBC AUTO-ENTMCNC: 32.9 G/DL (ref 33.7–35.3)
MCV RBC AUTO: 87 FL (ref 81.4–97.8)
MONOCYTES # BLD AUTO: 0.59 K/UL (ref 0–0.85)
MONOCYTES NFR BLD AUTO: 8.2 % (ref 0–13.4)
NEUTROPHILS # BLD AUTO: 5.23 K/UL (ref 1.82–7.42)
NEUTROPHILS NFR BLD: 72.3 % (ref 44–72)
NITRITE UR QL STRIP.AUTO: NEGATIVE
NRBC # BLD AUTO: 0 K/UL
NRBC BLD AUTO-RTO: 0 /100 WBC
PH UR STRIP.AUTO: 6 [PH]
PLATELET # BLD AUTO: 143 K/UL (ref 164–446)
PMV BLD AUTO: 9.7 FL (ref 9–12.9)
POTASSIUM SERPL-SCNC: 4.3 MMOL/L (ref 3.6–5.5)
PROT UR QL STRIP: 30 MG/DL
RBC # BLD AUTO: 5.69 M/UL (ref 4.7–6.1)
RBC UR QL AUTO: ABNORMAL
SODIUM SERPL-SCNC: 137 MMOL/L (ref 135–145)
SP GR UR: 1.02
TROPONIN I SERPL-MCNC: <0.01 NG/ML (ref 0–0.04)
WBC # BLD AUTO: 7.2 K/UL (ref 4.8–10.8)

## 2017-11-09 PROCEDURE — 700101 HCHG RX REV CODE 250: Performed by: EMERGENCY MEDICINE

## 2017-11-09 PROCEDURE — 80048 BASIC METABOLIC PNL TOTAL CA: CPT

## 2017-11-09 PROCEDURE — 70450 CT HEAD/BRAIN W/O DYE: CPT

## 2017-11-09 PROCEDURE — 96374 THER/PROPH/DIAG INJ IV PUSH: CPT

## 2017-11-09 PROCEDURE — 81002 URINALYSIS NONAUTO W/O SCOPE: CPT

## 2017-11-09 PROCEDURE — 85025 COMPLETE CBC W/AUTO DIFF WBC: CPT

## 2017-11-09 PROCEDURE — 96375 TX/PRO/DX INJ NEW DRUG ADDON: CPT

## 2017-11-09 PROCEDURE — 99284 EMERGENCY DEPT VISIT MOD MDM: CPT

## 2017-11-09 PROCEDURE — 93005 ELECTROCARDIOGRAM TRACING: CPT | Performed by: EMERGENCY MEDICINE

## 2017-11-09 PROCEDURE — 84484 ASSAY OF TROPONIN QUANT: CPT

## 2017-11-09 PROCEDURE — 700111 HCHG RX REV CODE 636 W/ 250 OVERRIDE (IP): Performed by: EMERGENCY MEDICINE

## 2017-11-09 RX ORDER — HYDRALAZINE HYDROCHLORIDE 20 MG/ML
10 INJECTION INTRAMUSCULAR; INTRAVENOUS ONCE
Status: COMPLETED | OUTPATIENT
Start: 2017-11-09 | End: 2017-11-09

## 2017-11-09 RX ORDER — METOPROLOL TARTRATE 1 MG/ML
5 INJECTION, SOLUTION INTRAVENOUS ONCE
Status: COMPLETED | OUTPATIENT
Start: 2017-11-09 | End: 2017-11-09

## 2017-11-09 RX ADMIN — HYDRALAZINE HYDROCHLORIDE 10 MG: 20 INJECTION INTRAMUSCULAR; INTRAVENOUS at 18:40

## 2017-11-09 RX ADMIN — METOPROLOL TARTRATE 5 MG: 5 INJECTION INTRAVENOUS at 15:57

## 2017-11-09 ASSESSMENT — LIFESTYLE VARIABLES: DO YOU DRINK ALCOHOL: NO

## 2017-11-09 ASSESSMENT — PAIN SCALES - GENERAL: PAINLEVEL_OUTOF10: 0

## 2017-11-09 NOTE — ED NOTES
Pt arrived to room with so at bedside. Agreed with triage nurse. Pt stated that he fell yesterday. Pt hypertensive, BP is 206/127.

## 2017-11-09 NOTE — ED NOTES
Chief Complaint   Patient presents with   • T-5000 Head Injury     Pt reports his legs gave out and he fell, hitting head on a desk.  Pt noted to have redness/abrasion on right forehead.  Pt denies LOC.  Pt AA&Ox4.  Pt reports he takes Plavix.     Pt to triage in NAD.  Pt educated on triage process and instructed to notify triage RN of any change in status.

## 2017-11-10 ENCOUNTER — PATIENT OUTREACH (OUTPATIENT)
Dept: HEALTH INFORMATION MANAGEMENT | Facility: OTHER | Age: 82
End: 2017-11-10

## 2017-11-10 NOTE — ED NOTES
D/C home with written and verbal instructions re: Rx, activity, f/u.  Verbalizes understanding.  Patient wheeled out to lobby with wife and placed in cab.

## 2017-11-10 NOTE — ED PROVIDER NOTES
"ED Provider Note    Scribed for Shilpi Hung M.D. by Maribel Jenkins. 11/9/2017, 4:35 PM.    Primary care provider: Mervin Pompa M.D.  Means of arrival: walk-in   History obtained from: patient  History limited by: none     CHIEF COMPLAINT  Chief Complaint   Patient presents with   • T-5000 Head Injury     Pt reports his legs gave out and he fell, hitting head on a desk.  Pt noted to have redness/abrasion on right forehead.  Pt denies LOC.  Pt AA&Ox4.  Pt reports he takes Plavix.       HPI  Joe Rashid is a 89 y.o. male who presents to the Emergency Department for evaluation of head injury. Patient reports head injury occurred yesterday. He states he woke up feeling weak and felt weak throughout the day. He states he went to sit at his desk, his \"legs gave out\", and he hit his forehead on desk leg. Denies loss of consciousness, dizziness, or history of falls. Denies chest pain, shortness of breath, or leg swelling. Patient denies any further medical complaints. Patient reports his cardiologist is Dr Linder. He reports history of hypertension and is complaint with hypertension medication daily.     REVIEW OF SYSTEMS  Pertinent positives include head injury, weakness, falls. Pertinent negatives include no chest pain, shortness of breath, loss of consciousness, dizziness or history of falls. All other systems reviewed and negative. C    PAST MEDICAL HISTORY   has a past medical history of Atrial fibrillation (CMS-HCC); Chronic anticoagulation; Chronic kidney disease, stage III (moderate); Congestive heart failure (CMS-HCC); Hepatitis B; History of TIA (transient ischemic attack); and Hypertension.    SURGICAL HISTORY   has a past surgical history that includes other; appendectomy; colon resection; transurethral elec-surg prostatectom; cryosurg ablation of prostate; cataract extraction with iol; and hip hemiarthroplasty (Left, 1/27/2016).    SOCIAL HISTORY  Social History   Substance Use Topics   • Smoking status: " "Never Smoker   • Smokeless tobacco: Never Used   • Alcohol use No      History   Drug Use No       FAMILY HISTORY  Family History   Problem Relation Age of Onset   • Heart Disease Brother        CURRENT MEDICATIONS  Home Medications     Reviewed by Carmen Huerta R.N. (Registered Nurse) on 11/09/17 at 1511  Med List Status: Partial   Medication Last Dose Status   acetaminophen (TYLENOL) 500 MG Tab  Active   Cholecalciferol (VITAMIN D PO) 9/28/2017 Active   clopidogrel (PLAVIX) 75 MG Tab  Active   lisinopril (PRINIVIL, ZESTRIL) 40 MG tablet 9/28/2017 Active   metoprolol (LOPRESSOR) 25 MG Tab 9/28/2017 Active   moxifloxacin (VIGAMOX) 0.5 % Solution 9/28/2017 Active   Multiple Vitamins-Minerals (MULTI ADULT GUMMIES) Chew Tab  Active   Multiple Vitamins-Minerals (MULTIVITAMIN & MINERAL PO) 9/28/2017 Active   Probiotic Product (PROBIOTIC DAILY PO) 9/28/2017 Active   tamsulosin (FLOMAX) 0.4 MG capsule 9/28/2017 Active              ALLERGIES  Allergies   Allergen Reactions   • Aspirin Hives and Rash     Pts grandson states rash and hives.   • Sulfa Drugs Hives and Rash     Pts grandson states rash and hives.     PHYSICAL EXAM  VITAL SIGNS: BP (!) 213/101 Comment: 241/208 rightr arm  Pulse 63   Temp 36.2 °C (97.2 °F) (Temporal)   Resp 18   Ht 1.753 m (5' 9\")   Wt 72.2 kg (159 lb 2.8 oz)   SpO2 95%   BMI 23.51 kg/m²   Constitutional: Alert in no apparent distress.  HENT: Hematoma to right forehead, Bilateral external ears normal, Nose normal.   Eyes: Pupils are equal and reactive, Conjunctiva normal, Non-icteric.   Neck: Normal range of motion, No tenderness, Supple, No stridor.   Cardiovascular: Tachycardic, no murmurs.   Thorax & Lungs: Normal breath sounds, No respiratory distress, No wheezing, No chest tenderness.   Abdomen: Bowel sounds normal, Soft, No tenderness, No masses, No peritoneal signs.  Skin: Warm, Dry, No erythema, No rash.   Musculoskeletal:  No major deformities noted.   Neurologic: Alert and " oriented, moving all extremities without difficulty, no focal deficits.    LABS  Results for orders placed or performed during the hospital encounter of 11/09/17   CBC WITH DIFFERENTIAL   Result Value Ref Range    WBC 7.2 4.8 - 10.8 K/uL    RBC 5.69 4.70 - 6.10 M/uL    Hemoglobin 16.3 14.0 - 18.0 g/dL    Hematocrit 49.5 42.0 - 52.0 %    MCV 87.0 81.4 - 97.8 fL    MCH 28.6 27.0 - 33.0 pg    MCHC 32.9 (L) 33.7 - 35.3 g/dL    RDW 41.4 35.9 - 50.0 fL    Platelet Count 143 (L) 164 - 446 K/uL    MPV 9.7 9.0 - 12.9 fL    Neutrophils-Polys 72.30 (H) 44.00 - 72.00 %    Lymphocytes 17.20 (L) 22.00 - 41.00 %    Monocytes 8.20 0.00 - 13.40 %    Eosinophils 1.50 0.00 - 6.90 %    Basophils 0.40 0.00 - 1.80 %    Immature Granulocytes 0.40 0.00 - 0.90 %    Nucleated RBC 0.00 /100 WBC    Neutrophils (Absolute) 5.23 1.82 - 7.42 K/uL    Lymphs (Absolute) 1.24 1.00 - 4.80 K/uL    Monos (Absolute) 0.59 0.00 - 0.85 K/uL    Eos (Absolute) 0.11 0.00 - 0.51 K/uL    Baso (Absolute) 0.03 0.00 - 0.12 K/uL    Immature Granulocytes (abs) 0.03 0.00 - 0.11 K/uL    NRBC (Absolute) 0.00 K/uL   BASIC METABOLIC PANEL   Result Value Ref Range    Sodium 137 135 - 145 mmol/L    Potassium 4.3 3.6 - 5.5 mmol/L    Chloride 101 96 - 112 mmol/L    Co2 26 20 - 33 mmol/L    Glucose 96 65 - 99 mg/dL    Bun 25 (H) 8 - 22 mg/dL    Creatinine 1.05 0.50 - 1.40 mg/dL    Calcium 9.3 8.5 - 10.5 mg/dL    Anion Gap 10.0 0.0 - 11.9   TROPONIN   Result Value Ref Range    Troponin I <0.01 0.00 - 0.04 ng/mL   ESTIMATED GFR   Result Value Ref Range    GFR If African American >60 >60 mL/min/1.73 m 2    GFR If Non African American >60 >60 mL/min/1.73 m 2   POC UA   Result Value Ref Range    POC Color Yellow     POC Appearance Clear     POC Glucose Negative Negative mg/dL    POC Ketones Negative Negative mg/dL    POC Specific Gravity 1.020 1.005 - 1.030    POC Blood Small (A) Negative    POC Urine PH 6.0 5.0 - 8.0    POC Protein 30 (A) Negative mg/dL    POC Nitrites Negative  Negative    POC Leukocyte Esterase Negative Negative   EKG (NOW)   Result Value Ref Range    Report       [facility] Emergency Dept.    Test Date:  2017  Pt Name:    YEIMY AGARWAL                   Department: ER  MRN:        [id number]                      Room:       BL 21  Gender:     M                            Technician: [id number]  :        [id number]                   Requested By:ERNESTO RUCKER  Order #:    [id number]                    Reading MD:    Measurements  Intervals                                Axis  Rate:       60                           P:          61  WY:         148                          QRS:        -32  QRSD:       102                          T:          29  QT:         452  QTc:        452    Interpretive Statements  SINUS RHYTHM  LEFT AXIS DEVIATION  LEFT VENTRICULAR HYPERTROPHY  Compared to ECG 2016 20:11:45  Left ventricular hypertrophy now present       All labs reviewed by me.    EKG  12 Lead EKG interpreted by me to show:  Normal sinus rhythm  Rate 60  Axis: Left axis deviation  Intervals: Normal  Normal T waves  Normal ST segments  My impression of this EKG: Does not indicate ischemia or arrythmia at this time.    RADIOLOGY  CT-HEAD W/O   Final Result      1.  Mild soft tissue swelling over the right side of the forehead.   2.  Moderate cerebral atrophy. Age-appropriate.   3.  Mild supratentorial white matter disease most consistent with microvascular ischemic change.   4.  No evidence of acute hemorrhagic or nonhemorrhagic intracranial injury pattern.   5.  Overall, no significant change from 2017.        The radiologist's interpretation of all radiological studies have been reviewed by me.    COURSE & MEDICAL DECISION MAKING  Pertinent Labs & Imaging studies reviewed. (See chart for details)    I reviewed patient's medical records which showed patient was admitted in May 2017 for CVA work up.     Differential diagnoses include but are not limited  to: Intracranial bleed, infection, dehydration     3:22 PM - Patient seen and examined at bedside. Patient will be treated with Lopressor 5 mg. Ordered EKG, CBC with differential, POC Urinalysis, Troponin, BMP, CT-Head to evaluate his symptoms.     7:08 PM Recheck: Patient is resting comfortably and reports feeling improved. I updated him on his results, which did not indicate any acute abnormalites.I discussed with patient his blood pressure is currently high. He states history of blood pressure elevation in the ED and his blood pressure is normal at home. I explained that he is now stable for discharge. I advised him to follow up with his primary care provider regarding his blood pressure and to return to the ED for worsening or new onset symptoms. She understands and will comply.       The patient will return for new or worsening symptoms and is stable at the time of discharge. Patient was given return precautions. Patient and/or family member verbalizes understanding and will comply.    DISPOSITION:  Patient will be discharged home in stable condition.    FOLLOW UP:  Mervin Pompa M.D.  [address] [address]  [phone]      Pplease follow up regarding your blood pressure. It was quite elevated today.    [facility], Emergency Dept  [address] [facility] [id number] [phone]    Return for worsening pain, headache, chest pain or other concerns    OUTPATIENT MEDICATIONS:  Discharge Medication List as of 11/9/2017  7:24 PM        FINAL IMPRESSION  1. Fall, initial encounter    2. Closed head injury, initial encounter    3. Elevated blood pressure reading         This dictation has been created using voice recognition software and/or scribes. The accuracy of the dictation is limited by the abilities of the software and the expertise of the scribes. I expect there may be some errors of grammar and possibly content. I made every attempt to manually correct the errors  within my dictation. However, errors related to voice recognition software and/or scribes may still exist and should be interpreted within the appropriate context.     I, Maribel Jenkins (Scribe), am scribing for, and in the presence of, Shilpi Hung M.D..    Electronically signed by: Maribel Jenkins (Scribe), 11/9/2017    I, Shilpi Hung M.D. personally performed the services described in this documentation, as scribed by Maribel Jenkins in my presence, and it is both accurate and complete.    The note accurately reflects work and decisions made by me.  Shilpi Hung  11/9/2017  8:30 PM

## 2017-11-10 NOTE — DISCHARGE INSTRUCTIONS
Concussion, Adult  A concussion, or closed-head injury, is a brain injury caused by a direct blow to the head or by a quick and sudden movement (jolt) of the head or neck. Concussions are usually not life-threatening. Even so, the effects of a concussion can be serious. If you have had a concussion before, you are more likely to experience concussion-like symptoms after a direct blow to the head.   CAUSES  · Direct blow to the head, such as from running into another player during a soccer game, being hit in a fight, or hitting your head on a hard surface.  · A jolt of the head or neck that causes the brain to move back and forth inside the skull, such as in a car crash.  SIGNS AND SYMPTOMS  The signs of a concussion can be hard to notice. Early on, they may be missed by you, family members, and health care providers. You may look fine but act or feel differently.  Symptoms are usually temporary, but they may last for days, weeks, or even longer. Some symptoms may appear right away while others may not show up for hours or days. Every head injury is different. Symptoms include:  · Mild to moderate headaches that will not go away.  · A feeling of pressure inside your head.  · Having more trouble than usual:  ¨ Learning or remembering things you have heard.  ¨ Answering questions.  ¨ Paying attention or concentrating.  ¨ Organizing daily tasks.  ¨ Making decisions and solving problems.  · Slowness in thinking, acting or reacting, speaking, or reading.  · Getting lost or being easily confused.  · Feeling tired all the time or lacking energy (fatigued).  · Feeling drowsy.  · Sleep disturbances.  ¨ Sleeping more than usual.  ¨ Sleeping less than usual.  ¨ Trouble falling asleep.  ¨ Trouble sleeping (insomnia).  · Loss of balance or feeling lightheaded or dizzy.  · Nausea or vomiting.  · Numbness or tingling.  · Increased sensitivity to:  ¨ Sounds.  ¨ Lights.  ¨ Distractions.  · Vision problems or eyes that tire  easily.  · Diminished sense of taste or smell.  · Ringing in the ears.  · Mood changes such as feeling sad or anxious.  · Becoming easily irritated or angry for little or no reason.  · Lack of motivation.  · Seeing or hearing things other people do not see or hear (hallucinations).  DIAGNOSIS  Your health care provider can usually diagnose a concussion based on a description of your injury and symptoms. He or she will ask whether you passed out (lost consciousness) and whether you are having trouble remembering events that happened right before and during your injury.  Your evaluation might include:  · A brain scan to look for signs of injury to the brain. Even if the test shows no injury, you may still have a concussion.  · Blood tests to be sure other problems are not present.  TREATMENT  · Concussions are usually treated in an emergency department, in urgent care, or at a clinic. You may need to stay in the hospital overnight for further treatment.  · Tell your health care provider if you are taking any medicines, including prescription medicines, over-the-counter medicines, and natural remedies. Some medicines, such as blood thinners (anticoagulants) and aspirin, may increase the chance of complications. Also tell your health care provider whether you have had alcohol or are taking illegal drugs. This information may affect treatment.  · Your health care provider will send you home with important instructions to follow.  · How fast you will recover from a concussion depends on many factors. These factors include how severe your concussion is, what part of your brain was injured, your age, and how healthy you were before the concussion.  · Most people with mild injuries recover fully. Recovery can take time. In general, recovery is slower in older persons. Also, persons who have had a concussion in the past or have other medical problems may find that it takes longer to recover from their current injury.  HOME  CARE INSTRUCTIONS  General Instructions  · Carefully follow the directions your health care provider gave you.  · Only take over-the-counter or prescription medicines for pain, discomfort, or fever as directed by your health care provider.  · Take only those medicines that your health care provider has approved.  · Do not drink alcohol until your health care provider says you are well enough to do so. Alcohol and certain other drugs may slow your recovery and can put you at risk of further injury.  · If it is harder than usual to remember things, write them down.  · If you are easily distracted, try to do one thing at a time. For example, do not try to watch TV while fixing dinner.  · Talk with family members or close friends when making important decisions.  · Keep all follow-up appointments. Repeated evaluation of your symptoms is recommended for your recovery.  · Watch your symptoms and tell others to do the same. Complications sometimes occur after a concussion. Older adults with a brain injury may have a higher risk of serious complications, such as a blood clot on the brain.  · Tell your teachers, school nurse, school counselor, , , or  about your injury, symptoms, and restrictions. Tell them about what you can or cannot do. They should watch for:  ¨ Increased problems with attention or concentration.  ¨ Increased difficulty remembering or learning new information.  ¨ Increased time needed to complete tasks or assignments.  ¨ Increased irritability or decreased ability to cope with stress.  ¨ Increased symptoms.  · Rest. Rest helps the brain to heal. Make sure you:  ¨ Get plenty of sleep at night. Avoid staying up late at night.  ¨ Keep the same bedtime hours on weekends and weekdays.  ¨ Rest during the day. Take daytime naps or rest breaks when you feel tired.  · Limit activities that require a lot of thought or concentration. These include:  ¨ Doing homework or job-related  work.  ¨ Watching TV.  ¨ Working on the computer.  · Avoid any situation where there is potential for another head injury (football, hockey, soccer, basketball, martial arts, downhill snow sports and horseback riding). Your condition will get worse every time you experience a concussion. You should avoid these activities until you are evaluated by the appropriate follow-up health care providers.  Returning To Your Regular Activities  You will need to return to your normal activities slowly, not all at once. You must give your body and brain enough time for recovery.  · Do not return to sports or other athletic activities until your health care provider tells you it is safe to do so.  · Ask your health care provider when you can drive, ride a bicycle, or operate heavy machinery. Your ability to react may be slower after a brain injury. Never do these activities if you are dizzy.  · Ask your health care provider about when you can return to work or school.  Preventing Another Concussion  It is very important to avoid another brain injury, especially before you have recovered. In rare cases, another injury can lead to permanent brain damage, brain swelling, or death. The risk of this is greatest during the first 7-10 days after a head injury. Avoid injuries by:  · Wearing a seat belt when riding in a car.  · Drinking alcohol only in moderation.  · Wearing a helmet when biking, skiing, skateboarding, skating, or doing similar activities.  · Avoiding activities that could lead to a second concussion, such as contact or recreational sports, until your health care provider says it is okay.  · Taking safety measures in your home.  ¨ Remove clutter and tripping hazards from floors and stairways.  ¨ Use grab bars in bathrooms and handrails by stairs.  ¨ Place non-slip mats on floors and in bathtubs.  ¨ Improve lighting in dim areas.  SEEK MEDICAL CARE IF:  · You have increased problems paying attention or  concentrating.  · You have increased difficulty remembering or learning new information.  · You need more time to complete tasks or assignments than before.  · You have increased irritability or decreased ability to cope with stress.  · You have more symptoms than before.  Seek medical care if you have any of the following symptoms for more than 2 weeks after your injury:  · Lasting (chronic) headaches.  · Dizziness or balance problems.  · Nausea.  · Vision problems.  · Increased sensitivity to noise or light.  · Depression or mood swings.  · Anxiety or irritability.  · Memory problems.  · Difficulty concentrating or paying attention.  · Sleep problems.  · Feeling tired all the time.  SEEK IMMEDIATE MEDICAL CARE IF:  · You have severe or worsening headaches. These may be a sign of a blood clot in the brain.  · You have weakness (even if only in one hand, leg, or part of the face).  · You have numbness.  · You have decreased coordination.  · You vomit repeatedly.  · You have increased sleepiness.  · One pupil is larger than the other.  · You have convulsions.  · You have slurred speech.  · You have increased confusion. This may be a sign of a blood clot in the brain.  · You have increased restlessness, agitation, or irritability.  · You are unable to recognize people or places.  · You have neck pain.  · It is difficult to wake you up.  · You have unusual behavior changes.  · You lose consciousness.  MAKE SURE YOU:  · Understand these instructions.  · Will watch your condition.  · Will get help right away if you are not doing well or get worse.     This information is not intended to replace advice given to you by your health care provider. Make sure you discuss any questions you have with your health care provider.     Document Released: 03/09/2005 Document Revised: 01/08/2016 Document Reviewed: 07/10/2014  [employer] Interactive Patient Education ©2016 [employer]      Hypertension  Hypertension, commonly called  high blood pressure, is when the force of blood pumping through your arteries is too strong. Your arteries are the blood vessels that carry blood from your heart throughout your body. A blood pressure reading consists of a higher number over a lower number, such as 110/72. The higher number (systolic) is the pressure inside your arteries when your heart pumps. The lower number (diastolic) is the pressure inside your arteries when your heart relaxes. Ideally you want your blood pressure below 120/80.  Hypertension forces your heart to work harder to pump blood. Your arteries may become narrow or stiff. Having untreated or uncontrolled hypertension can cause heart attack, stroke, kidney disease, and other problems.  RISK FACTORS  Some risk factors for high blood pressure are controllable. Others are not.   Risk factors you cannot control include:   · Race. You may be at higher risk if you are .  · Age. Risk increases with age.  · Gender. Men are at higher risk than women before age 45 years. After age 65, women are at higher risk than men.  Risk factors you can control include:  · Not getting enough exercise or physical activity.  · Being overweight.  · Getting too much fat, sugar, calories, or salt in your diet.  · Drinking too much alcohol.  SIGNS AND SYMPTOMS  Hypertension does not usually cause signs or symptoms. Extremely high blood pressure (hypertensive crisis) may cause headache, anxiety, shortness of breath, and nosebleed.  DIAGNOSIS  To check if you have hypertension, your health care provider will measure your blood pressure while you are seated, with your arm held at the level of your heart. It should be measured at least twice using the same arm. Certain conditions can cause a difference in blood pressure between your right and left arms. A blood pressure reading that is higher than normal on one occasion does not mean that you need treatment. If it is not clear whether you have high blood  pressure, you may be asked to return on a different day to have your blood pressure checked again. Or, you may be asked to monitor your blood pressure at home for 1 or more weeks.  TREATMENT  Treating high blood pressure includes making lifestyle changes and possibly taking medicine. Living a healthy lifestyle can help lower high blood pressure. You may need to change some of your habits.  Lifestyle changes may include:  · Following the DASH diet. This diet is high in fruits, vegetables, and whole grains. It is low in salt, red meat, and added sugars.  · Keep your sodium intake below 2,300 mg per day.  · Getting at least 30-45 minutes of aerobic exercise at least 4 times per week.  · Losing weight if necessary.  · Not smoking.  · Limiting alcoholic beverages.  · Learning ways to reduce stress.  Your health care provider may prescribe medicine if lifestyle changes are not enough to get your blood pressure under control, and if one of the following is true:  · You are 18-59 years of age and your systolic blood pressure is above 140.  · You are 60 years of age or older, and your systolic blood pressure is above 150.  · Your diastolic blood pressure is above 90.  · You have diabetes, and your systolic blood pressure is over 140 or your diastolic blood pressure is over 90.  · You have kidney disease and your blood pressure is above 140/90.  · You have heart disease and your blood pressure is above 140/90.  Your personal target blood pressure may vary depending on your medical conditions, your age, and other factors.  HOME CARE INSTRUCTIONS  · Have your blood pressure rechecked as directed by your health care provider.    · Take medicines only as directed by your health care provider. Follow the directions carefully. Blood pressure medicines must be taken as prescribed. The medicine does not work as well when you skip doses. Skipping doses also puts you at risk for problems.  · Do not smoke.    · Monitor your blood  pressure at home as directed by your health care provider.   SEEK MEDICAL CARE IF:   · You think you are having a reaction to medicines taken.  · You have recurrent headaches or feel dizzy.  · You have swelling in your ankles.  · You have trouble with your vision.  SEEK IMMEDIATE MEDICAL CARE IF:  · You develop a severe headache or confusion.  · You have unusual weakness, numbness, or feel faint.  · You have severe chest or abdominal pain.  · You vomit repeatedly.  · You have trouble breathing.  MAKE SURE YOU:   · Understand these instructions.  · Will watch your condition.  · Will get help right away if you are not doing well or get worse.     This information is not intended to replace advice given to you by your health care provider. Make sure you discuss any questions you have with your health care provider.     Document Released: 12/18/2006 Document Revised: 05/03/2016 Document Reviewed: 10/10/2014  [employer] Interactive Patient Education ©2016 [employer]

## 2017-11-20 LAB — EKG IMPRESSION: NORMAL

## 2017-11-29 DIAGNOSIS — I10 ESSENTIAL HYPERTENSION, BENIGN: ICD-10-CM

## 2017-11-29 DIAGNOSIS — I48.0 PAROXYSMAL ATRIAL FIBRILLATION (HCC): ICD-10-CM

## 2017-11-29 RX ORDER — LISINOPRIL 40 MG/1
TABLET ORAL
Qty: 90 TAB | Refills: 3 | Status: ON HOLD | OUTPATIENT
Start: 2017-11-29 | End: 2018-01-01

## 2017-12-04 ENCOUNTER — PATIENT OUTREACH (OUTPATIENT)
Dept: HEALTH INFORMATION MANAGEMENT | Facility: OTHER | Age: 82
End: 2017-12-04

## 2018-01-01 ENCOUNTER — HOME CARE VISIT (OUTPATIENT)
Dept: HOME HEALTH SERVICES | Facility: HOME HEALTHCARE | Age: 83
End: 2018-01-01
Payer: MEDICARE

## 2018-01-01 ENCOUNTER — APPOINTMENT (OUTPATIENT)
Dept: RADIOLOGY | Facility: MEDICAL CENTER | Age: 83
End: 2018-01-01
Attending: EMERGENCY MEDICINE
Payer: MEDICARE

## 2018-01-01 ENCOUNTER — TELEPHONE (OUTPATIENT)
Dept: CARDIOLOGY | Facility: MEDICAL CENTER | Age: 83
End: 2018-01-01

## 2018-01-01 ENCOUNTER — OFFICE VISIT (OUTPATIENT)
Dept: CARDIOLOGY | Facility: MEDICAL CENTER | Age: 83
End: 2018-01-01
Payer: MEDICARE

## 2018-01-01 ENCOUNTER — ANTICOAGULATION VISIT (OUTPATIENT)
Dept: VASCULAR LAB | Facility: MEDICAL CENTER | Age: 83
End: 2018-01-01
Attending: INTERNAL MEDICINE
Payer: MEDICARE

## 2018-01-01 ENCOUNTER — PATIENT OUTREACH (OUTPATIENT)
Dept: HEALTH INFORMATION MANAGEMENT | Facility: OTHER | Age: 83
End: 2018-01-01

## 2018-01-01 ENCOUNTER — TELEPHONE (OUTPATIENT)
Dept: HEALTH INFORMATION MANAGEMENT | Facility: OTHER | Age: 83
End: 2018-01-01

## 2018-01-01 ENCOUNTER — TELEPHONE (OUTPATIENT)
Dept: VASCULAR LAB | Facility: MEDICAL CENTER | Age: 83
End: 2018-01-01

## 2018-01-01 ENCOUNTER — APPOINTMENT (OUTPATIENT)
Dept: RADIOLOGY | Facility: MEDICAL CENTER | Age: 83
End: 2018-01-01
Attending: HOSPITALIST
Payer: MEDICARE

## 2018-01-01 ENCOUNTER — HOME CARE VISIT (OUTPATIENT)
Dept: HOME HEALTH SERVICES | Facility: HOME HEALTHCARE | Age: 83
End: 2018-01-01

## 2018-01-01 ENCOUNTER — HOSPITAL ENCOUNTER (OUTPATIENT)
Facility: MEDICAL CENTER | Age: 83
End: 2018-09-04
Attending: EMERGENCY MEDICINE | Admitting: INTERNAL MEDICINE
Payer: MEDICARE

## 2018-01-01 ENCOUNTER — HOME HEALTH ADMISSION (OUTPATIENT)
Dept: HOME HEALTH SERVICES | Facility: HOME HEALTHCARE | Age: 83
End: 2018-01-01
Payer: MEDICARE

## 2018-01-01 ENCOUNTER — HOSPITAL ENCOUNTER (OUTPATIENT)
Dept: LAB | Facility: MEDICAL CENTER | Age: 83
End: 2018-09-13
Attending: NURSE PRACTITIONER
Payer: MEDICARE

## 2018-01-01 VITALS
RESPIRATION RATE: 16 BRPM | SYSTOLIC BLOOD PRESSURE: 118 MMHG | HEART RATE: 72 BPM | TEMPERATURE: 97.4 F | DIASTOLIC BLOOD PRESSURE: 57 MMHG | OXYGEN SATURATION: 95 %

## 2018-01-01 VITALS
SYSTOLIC BLOOD PRESSURE: 160 MMHG | RESPIRATION RATE: 18 BRPM | TEMPERATURE: 98.1 F | HEART RATE: 76 BPM | DIASTOLIC BLOOD PRESSURE: 76 MMHG | OXYGEN SATURATION: 97 %

## 2018-01-01 VITALS
DIASTOLIC BLOOD PRESSURE: 60 MMHG | OXYGEN SATURATION: 95 % | TEMPERATURE: 99.1 F | HEART RATE: 68 BPM | SYSTOLIC BLOOD PRESSURE: 128 MMHG

## 2018-01-01 VITALS — SYSTOLIC BLOOD PRESSURE: 125 MMHG | HEART RATE: 56 BPM | DIASTOLIC BLOOD PRESSURE: 81 MMHG

## 2018-01-01 VITALS
HEART RATE: 68 BPM | TEMPERATURE: 48.4 F | SYSTOLIC BLOOD PRESSURE: 128 MMHG | DIASTOLIC BLOOD PRESSURE: 60 MMHG | RESPIRATION RATE: 20 BRPM | OXYGEN SATURATION: 95 %

## 2018-01-01 VITALS
RESPIRATION RATE: 17 BRPM | OXYGEN SATURATION: 95 % | DIASTOLIC BLOOD PRESSURE: 58 MMHG | TEMPERATURE: 97.6 F | SYSTOLIC BLOOD PRESSURE: 118 MMHG | HEART RATE: 73 BPM

## 2018-01-01 VITALS
WEIGHT: 155 LBS | HEIGHT: 70 IN | SYSTOLIC BLOOD PRESSURE: 90 MMHG | BODY MASS INDEX: 22.19 KG/M2 | DIASTOLIC BLOOD PRESSURE: 60 MMHG | RESPIRATION RATE: 14 BRPM | TEMPERATURE: 98.1 F | OXYGEN SATURATION: 95 % | HEART RATE: 83 BPM

## 2018-01-01 VITALS
TEMPERATURE: 98.5 F | HEART RATE: 64 BPM | OXYGEN SATURATION: 98 % | DIASTOLIC BLOOD PRESSURE: 64 MMHG | RESPIRATION RATE: 16 BRPM | SYSTOLIC BLOOD PRESSURE: 121 MMHG

## 2018-01-01 VITALS
HEART RATE: 72 BPM | TEMPERATURE: 97.2 F | OXYGEN SATURATION: 94 % | WEIGHT: 152.78 LBS | SYSTOLIC BLOOD PRESSURE: 156 MMHG | RESPIRATION RATE: 19 BRPM | HEIGHT: 70 IN | BODY MASS INDEX: 21.87 KG/M2 | DIASTOLIC BLOOD PRESSURE: 81 MMHG

## 2018-01-01 VITALS
SYSTOLIC BLOOD PRESSURE: 130 MMHG | RESPIRATION RATE: 17 BRPM | OXYGEN SATURATION: 95 % | DIASTOLIC BLOOD PRESSURE: 70 MMHG | HEART RATE: 73 BPM | TEMPERATURE: 98 F

## 2018-01-01 VITALS
OXYGEN SATURATION: 91 % | HEIGHT: 70 IN | SYSTOLIC BLOOD PRESSURE: 118 MMHG | HEART RATE: 73 BPM | TEMPERATURE: 97.3 F | BODY MASS INDEX: 22.19 KG/M2 | RESPIRATION RATE: 18 BRPM | WEIGHT: 155 LBS | DIASTOLIC BLOOD PRESSURE: 60 MMHG

## 2018-01-01 VITALS
HEART RATE: 58 BPM | DIASTOLIC BLOOD PRESSURE: 64 MMHG | OXYGEN SATURATION: 95 % | TEMPERATURE: 98.5 F | RESPIRATION RATE: 18 BRPM | SYSTOLIC BLOOD PRESSURE: 132 MMHG

## 2018-01-01 VITALS
RESPIRATION RATE: 18 BRPM | SYSTOLIC BLOOD PRESSURE: 132 MMHG | TEMPERATURE: 98.5 F | HEART RATE: 58 BPM | DIASTOLIC BLOOD PRESSURE: 64 MMHG | OXYGEN SATURATION: 95 %

## 2018-01-01 VITALS
SYSTOLIC BLOOD PRESSURE: 164 MMHG | TEMPERATURE: 98.4 F | RESPIRATION RATE: 16 BRPM | DIASTOLIC BLOOD PRESSURE: 72 MMHG | HEART RATE: 51 BPM

## 2018-01-01 VITALS
RESPIRATION RATE: 16 BRPM | HEART RATE: 51 BPM | OXYGEN SATURATION: 97 % | SYSTOLIC BLOOD PRESSURE: 164 MMHG | DIASTOLIC BLOOD PRESSURE: 72 MMHG | TEMPERATURE: 98.4 F

## 2018-01-01 VITALS
TEMPERATURE: 98.7 F | RESPIRATION RATE: 16 BRPM | DIASTOLIC BLOOD PRESSURE: 80 MMHG | OXYGEN SATURATION: 97 % | SYSTOLIC BLOOD PRESSURE: 156 MMHG | HEART RATE: 50 BPM

## 2018-01-01 VITALS
SYSTOLIC BLOOD PRESSURE: 152 MMHG | OXYGEN SATURATION: 94 % | DIASTOLIC BLOOD PRESSURE: 70 MMHG | HEIGHT: 69 IN | BODY MASS INDEX: 22.81 KG/M2 | WEIGHT: 154 LBS | HEART RATE: 84 BPM

## 2018-01-01 VITALS
HEART RATE: 62 BPM | DIASTOLIC BLOOD PRESSURE: 66 MMHG | SYSTOLIC BLOOD PRESSURE: 130 MMHG | RESPIRATION RATE: 16 BRPM | TEMPERATURE: 98.6 F

## 2018-01-01 DIAGNOSIS — I48.0 PAROXYSMAL ATRIAL FIBRILLATION (HCC): ICD-10-CM

## 2018-01-01 DIAGNOSIS — I10 ESSENTIAL HYPERTENSION, BENIGN: ICD-10-CM

## 2018-01-01 DIAGNOSIS — Z79.01 CHRONIC ANTICOAGULATION: ICD-10-CM

## 2018-01-01 DIAGNOSIS — Z86.73 HISTORY OF TIA (TRANSIENT ISCHEMIC ATTACK): Chronic | ICD-10-CM

## 2018-01-01 DIAGNOSIS — Z86.73 HISTORY OF TIA (TRANSIENT ISCHEMIC ATTACK): ICD-10-CM

## 2018-01-01 DIAGNOSIS — G45.9 TIA (TRANSIENT ISCHEMIC ATTACK): ICD-10-CM

## 2018-01-01 DIAGNOSIS — Z79.899 HIGH RISK MEDICATION USE: ICD-10-CM

## 2018-01-01 LAB
ABO GROUP BLD: NORMAL
ALBUMIN SERPL BCP-MCNC: 4.1 G/DL (ref 3.2–4.9)
ALBUMIN/GLOB SERPL: 2 G/DL
ALP SERPL-CCNC: 56 U/L (ref 30–99)
ALT SERPL-CCNC: 17 U/L (ref 2–50)
ANION GAP SERPL CALC-SCNC: 8 MMOL/L (ref 0–11.9)
ANION GAP SERPL CALC-SCNC: 9 MMOL/L (ref 0–11.9)
ANION GAP SERPL CALC-SCNC: 9 MMOL/L (ref 0–11.9)
APPEARANCE UR: CLEAR
APTT PPP: 29.2 SEC (ref 24.7–36)
AST SERPL-CCNC: 18 U/L (ref 12–45)
BASOPHILS # BLD AUTO: 0.4 % (ref 0–1.8)
BASOPHILS # BLD AUTO: 0.4 % (ref 0–1.8)
BASOPHILS # BLD: 0.03 K/UL (ref 0–0.12)
BASOPHILS # BLD: 0.04 K/UL (ref 0–0.12)
BILIRUB SERPL-MCNC: 0.8 MG/DL (ref 0.1–1.5)
BILIRUB UR QL STRIP.AUTO: NEGATIVE
BLD GP AB SCN SERPL QL: NORMAL
BUN SERPL-MCNC: 21 MG/DL (ref 8–22)
BUN SERPL-MCNC: 25 MG/DL (ref 8–22)
BUN SERPL-MCNC: 38 MG/DL (ref 8–22)
CALCIUM SERPL-MCNC: 9 MG/DL (ref 8.5–10.5)
CALCIUM SERPL-MCNC: 9.1 MG/DL (ref 8.5–10.5)
CALCIUM SERPL-MCNC: 9.7 MG/DL (ref 8.5–10.5)
CHLORIDE SERPL-SCNC: 101 MMOL/L (ref 96–112)
CHLORIDE SERPL-SCNC: 102 MMOL/L (ref 96–112)
CHLORIDE SERPL-SCNC: 103 MMOL/L (ref 96–112)
CHOLEST SERPL-MCNC: 135 MG/DL (ref 100–199)
CO2 SERPL-SCNC: 25 MMOL/L (ref 20–33)
CO2 SERPL-SCNC: 26 MMOL/L (ref 20–33)
CO2 SERPL-SCNC: 27 MMOL/L (ref 20–33)
COLOR UR: YELLOW
CREAT SERPL-MCNC: 1.11 MG/DL (ref 0.5–1.4)
CREAT SERPL-MCNC: 1.19 MG/DL (ref 0.5–1.4)
CREAT SERPL-MCNC: 1.51 MG/DL (ref 0.5–1.4)
EKG IMPRESSION: NORMAL
EKG IMPRESSION: NORMAL
EOSINOPHIL # BLD AUTO: 0.1 K/UL (ref 0–0.51)
EOSINOPHIL # BLD AUTO: 0.11 K/UL (ref 0–0.51)
EOSINOPHIL NFR BLD: 1.1 % (ref 0–6.9)
EOSINOPHIL NFR BLD: 1.5 % (ref 0–6.9)
EPI CELLS #/AREA URNS HPF: ABNORMAL /HPF
ERYTHROCYTE [DISTWIDTH] IN BLOOD BY AUTOMATED COUNT: 40.3 FL (ref 35.9–50)
ERYTHROCYTE [DISTWIDTH] IN BLOOD BY AUTOMATED COUNT: 42.2 FL (ref 35.9–50)
ERYTHROCYTE [DISTWIDTH] IN BLOOD BY AUTOMATED COUNT: 42.7 FL (ref 35.9–50)
EST. AVERAGE GLUCOSE BLD GHB EST-MCNC: 134 MG/DL
GLOBULIN SER CALC-MCNC: 2.1 G/DL (ref 1.9–3.5)
GLUCOSE BLD-MCNC: 83 MG/DL (ref 65–99)
GLUCOSE SERPL-MCNC: 116 MG/DL (ref 65–99)
GLUCOSE SERPL-MCNC: 208 MG/DL (ref 65–99)
GLUCOSE SERPL-MCNC: 94 MG/DL (ref 65–99)
GLUCOSE UR STRIP.AUTO-MCNC: NEGATIVE MG/DL
HBA1C MFR BLD: 6.3 % (ref 0–5.6)
HCT VFR BLD AUTO: 46.7 % (ref 42–52)
HCT VFR BLD AUTO: 47.4 % (ref 42–52)
HCT VFR BLD AUTO: 49.2 % (ref 42–52)
HDLC SERPL-MCNC: 67 MG/DL
HGB BLD-MCNC: 15.1 G/DL (ref 14–18)
HGB BLD-MCNC: 15.6 G/DL (ref 14–18)
HGB BLD-MCNC: 16.5 G/DL (ref 14–18)
HYALINE CASTS #/AREA URNS LPF: ABNORMAL /LPF
IMM GRANULOCYTES # BLD AUTO: 0.03 K/UL (ref 0–0.11)
IMM GRANULOCYTES # BLD AUTO: 0.03 K/UL (ref 0–0.11)
IMM GRANULOCYTES NFR BLD AUTO: 0.3 % (ref 0–0.9)
IMM GRANULOCYTES NFR BLD AUTO: 0.4 % (ref 0–0.9)
INR BLD: 1.2 (ref 0.9–1.2)
INR BLD: 1.2 (ref 0.9–1.2)
INR PPP: 1.13 (ref 0.87–1.13)
INR PPP: 1.2 (ref 2–3.5)
INR PPP: 1.2 (ref 2–3.5)
KETONES UR STRIP.AUTO-MCNC: ABNORMAL MG/DL
LDLC SERPL CALC-MCNC: 62 MG/DL
LEUKOCYTE ESTERASE UR QL STRIP.AUTO: NEGATIVE
LV EJECT FRACT  99904: 70
LV EJECT FRACT MOD 2C 99903: 56.11
LV EJECT FRACT MOD 4C 99902: 74.04
LV EJECT FRACT MOD BP 99901: 65.62
LYMPHOCYTES # BLD AUTO: 1.06 K/UL (ref 1–4.8)
LYMPHOCYTES # BLD AUTO: 1.17 K/UL (ref 1–4.8)
LYMPHOCYTES NFR BLD: 12.9 % (ref 22–41)
LYMPHOCYTES NFR BLD: 14.6 % (ref 22–41)
MAGNESIUM SERPL-MCNC: 1.9 MG/DL (ref 1.5–2.5)
MCH RBC QN AUTO: 29.1 PG (ref 27–33)
MCHC RBC AUTO-ENTMCNC: 32.3 G/DL (ref 33.7–35.3)
MCHC RBC AUTO-ENTMCNC: 32.9 G/DL (ref 33.7–35.3)
MCHC RBC AUTO-ENTMCNC: 33.5 G/DL (ref 33.7–35.3)
MCV RBC AUTO: 86.8 FL (ref 81.4–97.8)
MCV RBC AUTO: 88.4 FL (ref 81.4–97.8)
MCV RBC AUTO: 90 FL (ref 81.4–97.8)
MICRO URNS: ABNORMAL
MONOCYTES # BLD AUTO: 0.78 K/UL (ref 0–0.85)
MONOCYTES # BLD AUTO: 0.79 K/UL (ref 0–0.85)
MONOCYTES NFR BLD AUTO: 10.7 % (ref 0–13.4)
MONOCYTES NFR BLD AUTO: 8.7 % (ref 0–13.4)
NEUTROPHILS # BLD AUTO: 5.25 K/UL (ref 1.82–7.42)
NEUTROPHILS # BLD AUTO: 6.92 K/UL (ref 1.82–7.42)
NEUTROPHILS NFR BLD: 72.4 % (ref 44–72)
NEUTROPHILS NFR BLD: 76.6 % (ref 44–72)
NITRITE UR QL STRIP.AUTO: NEGATIVE
NRBC # BLD AUTO: 0 K/UL
NRBC # BLD AUTO: 0 K/UL
NRBC BLD-RTO: 0 /100 WBC
NRBC BLD-RTO: 0 /100 WBC
PH UR STRIP.AUTO: 5 [PH]
PLATELET # BLD AUTO: 141 K/UL (ref 164–446)
PLATELET # BLD AUTO: 150 K/UL (ref 164–446)
PLATELET # BLD AUTO: 163 K/UL (ref 164–446)
PMV BLD AUTO: 10.5 FL (ref 9–12.9)
PMV BLD AUTO: 9.5 FL (ref 9–12.9)
PMV BLD AUTO: 9.6 FL (ref 9–12.9)
POTASSIUM SERPL-SCNC: 3.7 MMOL/L (ref 3.6–5.5)
POTASSIUM SERPL-SCNC: 4 MMOL/L (ref 3.6–5.5)
POTASSIUM SERPL-SCNC: 4.1 MMOL/L (ref 3.6–5.5)
PROT SERPL-MCNC: 6.2 G/DL (ref 6–8.2)
PROT UR QL STRIP: NEGATIVE MG/DL
PROTHROMBIN TIME: 14.2 SEC (ref 12–14.6)
RBC # BLD AUTO: 5.19 M/UL (ref 4.7–6.1)
RBC # BLD AUTO: 5.36 M/UL (ref 4.7–6.1)
RBC # BLD AUTO: 5.67 M/UL (ref 4.7–6.1)
RBC # URNS HPF: ABNORMAL /HPF
RBC UR QL AUTO: ABNORMAL
RH BLD: NORMAL
SODIUM SERPL-SCNC: 136 MMOL/L (ref 135–145)
SODIUM SERPL-SCNC: 136 MMOL/L (ref 135–145)
SODIUM SERPL-SCNC: 138 MMOL/L (ref 135–145)
SP GR UR STRIP.AUTO: >=1.045
TRANS CELLS #/AREA URNS HPF: ABNORMAL /HPF
TRIGL SERPL-MCNC: 32 MG/DL (ref 0–149)
TROPONIN I SERPL-MCNC: 0.02 NG/ML (ref 0–0.04)
UROBILINOGEN UR STRIP.AUTO-MCNC: 0.2 MG/DL
WBC # BLD AUTO: 7.3 K/UL (ref 4.8–10.8)
WBC # BLD AUTO: 7.5 K/UL (ref 4.8–10.8)
WBC # BLD AUTO: 9.1 K/UL (ref 4.8–10.8)
WBC #/AREA URNS HPF: ABNORMAL /HPF

## 2018-01-01 PROCEDURE — G8987 SELF CARE CURRENT STATUS: HCPCS | Mod: CJ

## 2018-01-01 PROCEDURE — 665998 HH PPS REVENUE CREDIT

## 2018-01-01 PROCEDURE — 665999 HH PPS REVENUE DEBIT

## 2018-01-01 PROCEDURE — A9270 NON-COVERED ITEM OR SERVICE: HCPCS | Performed by: INTERNAL MEDICINE

## 2018-01-01 PROCEDURE — G8980 MOBILITY D/C STATUS: HCPCS | Mod: CI

## 2018-01-01 PROCEDURE — 84484 ASSAY OF TROPONIN QUANT: CPT

## 2018-01-01 PROCEDURE — A4216 STERILE WATER/SALINE, 10 ML: HCPCS

## 2018-01-01 PROCEDURE — 70551 MRI BRAIN STEM W/O DYE: CPT

## 2018-01-01 PROCEDURE — 700102 HCHG RX REV CODE 250 W/ 637 OVERRIDE(OP): Performed by: INTERNAL MEDICINE

## 2018-01-01 PROCEDURE — A9270 NON-COVERED ITEM OR SERVICE: HCPCS | Performed by: HOSPITALIST

## 2018-01-01 PROCEDURE — 36415 COLL VENOUS BLD VENIPUNCTURE: CPT

## 2018-01-01 PROCEDURE — 70496 CT ANGIOGRAPHY HEAD: CPT

## 2018-01-01 PROCEDURE — G0151 HHCP-SERV OF PT,EA 15 MIN: HCPCS

## 2018-01-01 PROCEDURE — 700117 HCHG RX CONTRAST REV CODE 255: Performed by: EMERGENCY MEDICINE

## 2018-01-01 PROCEDURE — G8988 SELF CARE GOAL STATUS: HCPCS | Mod: CI

## 2018-01-01 PROCEDURE — G0493 RN CARE EA 15 MIN HH/HOSPICE: HCPCS

## 2018-01-01 PROCEDURE — 93005 ELECTROCARDIOGRAM TRACING: CPT | Performed by: EMERGENCY MEDICINE

## 2018-01-01 PROCEDURE — G0153 HHCP-SVS OF S/L PATH,EA 15MN: HCPCS

## 2018-01-01 PROCEDURE — 85027 COMPLETE CBC AUTOMATED: CPT

## 2018-01-01 PROCEDURE — A5120 SKIN BARRIER, WIPE OR SWAB: HCPCS

## 2018-01-01 PROCEDURE — 85610 PROTHROMBIN TIME: CPT

## 2018-01-01 PROCEDURE — G8978 MOBILITY CURRENT STATUS: HCPCS | Mod: CI

## 2018-01-01 PROCEDURE — 99212 OFFICE O/P EST SF 10 MIN: CPT

## 2018-01-01 PROCEDURE — 86901 BLOOD TYPING SEROLOGIC RH(D): CPT

## 2018-01-01 PROCEDURE — 99220 PR INITIAL OBSERVATION CARE,LEVL III: CPT | Performed by: INTERNAL MEDICINE

## 2018-01-01 PROCEDURE — 97161 PT EVAL LOW COMPLEX 20 MIN: CPT

## 2018-01-01 PROCEDURE — 80061 LIPID PANEL: CPT

## 2018-01-01 PROCEDURE — 99226 PR SUBSEQUENT OBSERVATION CARE,LEVEL III: CPT | Performed by: INTERNAL MEDICINE

## 2018-01-01 PROCEDURE — 85730 THROMBOPLASTIN TIME PARTIAL: CPT

## 2018-01-01 PROCEDURE — 700105 HCHG RX REV CODE 258: Performed by: INTERNAL MEDICINE

## 2018-01-01 PROCEDURE — 83735 ASSAY OF MAGNESIUM: CPT

## 2018-01-01 PROCEDURE — 71045 X-RAY EXAM CHEST 1 VIEW: CPT

## 2018-01-01 PROCEDURE — 99285 EMERGENCY DEPT VISIT HI MDM: CPT

## 2018-01-01 PROCEDURE — 97165 OT EVAL LOW COMPLEX 30 MIN: CPT

## 2018-01-01 PROCEDURE — 700102 HCHG RX REV CODE 250 W/ 637 OVERRIDE(OP): Performed by: HOSPITALIST

## 2018-01-01 PROCEDURE — 80048 BASIC METABOLIC PNL TOTAL CA: CPT

## 2018-01-01 PROCEDURE — 83036 HEMOGLOBIN GLYCOSYLATED A1C: CPT

## 2018-01-01 PROCEDURE — 99214 OFFICE O/P EST MOD 30 MIN: CPT | Performed by: INTERNAL MEDICINE

## 2018-01-01 PROCEDURE — 70450 CT HEAD/BRAIN W/O DYE: CPT

## 2018-01-01 PROCEDURE — G0378 HOSPITAL OBSERVATION PER HR: HCPCS

## 2018-01-01 PROCEDURE — A6212 FOAM DRG <=16 SQ IN W/BORDER: HCPCS

## 2018-01-01 PROCEDURE — 86850 RBC ANTIBODY SCREEN: CPT

## 2018-01-01 PROCEDURE — 85025 COMPLETE CBC W/AUTO DIFF WBC: CPT

## 2018-01-01 PROCEDURE — 82962 GLUCOSE BLOOD TEST: CPT

## 2018-01-01 PROCEDURE — 80053 COMPREHEN METABOLIC PANEL: CPT

## 2018-01-01 PROCEDURE — G8979 MOBILITY GOAL STATUS: HCPCS | Mod: CI

## 2018-01-01 PROCEDURE — 81001 URINALYSIS AUTO W/SCOPE: CPT

## 2018-01-01 PROCEDURE — 99213 OFFICE O/P EST LOW 20 MIN: CPT

## 2018-01-01 PROCEDURE — 99217 PR OBSERVATION CARE DISCHARGE: CPT | Performed by: INTERNAL MEDICINE

## 2018-01-01 PROCEDURE — 94760 N-INVAS EAR/PLS OXIMETRY 1: CPT

## 2018-01-01 PROCEDURE — 665001 SOC-HOME HEALTH

## 2018-01-01 PROCEDURE — 93306 TTE W/DOPPLER COMPLETE: CPT

## 2018-01-01 PROCEDURE — G0152 HHCP-SERV OF OT,EA 15 MIN: HCPCS

## 2018-01-01 PROCEDURE — 93306 TTE W/DOPPLER COMPLETE: CPT | Mod: 26 | Performed by: INTERNAL MEDICINE

## 2018-01-01 PROCEDURE — 86900 BLOOD TYPING SEROLOGIC ABO: CPT

## 2018-01-01 RX ORDER — ATORVASTATIN CALCIUM 40 MG/1
40 TABLET, FILM COATED ORAL EVERY EVENING
Qty: 30 TAB | Refills: 0 | Status: SHIPPED | OUTPATIENT
Start: 2018-01-01 | End: 2018-01-01

## 2018-01-01 RX ORDER — CLOPIDOGREL BISULFATE 75 MG/1
75 TABLET ORAL DAILY
Status: DISCONTINUED | OUTPATIENT
Start: 2018-01-01 | End: 2018-01-01

## 2018-01-01 RX ORDER — ATORVASTATIN CALCIUM 40 MG/1
40 TABLET, FILM COATED ORAL EVERY EVENING
Qty: 30 TAB | Refills: 0 | Status: ON HOLD | OUTPATIENT
Start: 2018-01-01 | End: 2018-01-01

## 2018-01-01 RX ORDER — LISINOPRIL 20 MG/1
40 TABLET ORAL
Status: DISCONTINUED | OUTPATIENT
Start: 2018-01-01 | End: 2018-01-01 | Stop reason: HOSPADM

## 2018-01-01 RX ORDER — BLOOD PRESSURE TEST KIT
1 KIT MISCELLANEOUS ONCE
Qty: 1 KIT | Refills: 0 | Status: SHIPPED | OUTPATIENT
Start: 2018-01-01 | End: 2018-01-01

## 2018-01-01 RX ORDER — LABETALOL HYDROCHLORIDE 5 MG/ML
10 INJECTION, SOLUTION INTRAVENOUS EVERY 4 HOURS PRN
Status: DISCONTINUED | OUTPATIENT
Start: 2018-01-01 | End: 2018-01-01

## 2018-01-01 RX ORDER — ACETAMINOPHEN 325 MG/1
650 TABLET ORAL EVERY 6 HOURS PRN
Qty: 30 TAB | Refills: 0 | Status: SHIPPED | OUTPATIENT
Start: 2018-01-01 | End: 2019-01-01

## 2018-01-01 RX ORDER — ATORVASTATIN CALCIUM 80 MG/1
80 TABLET, FILM COATED ORAL EVERY EVENING
Qty: 30 TAB | Refills: 0 | Status: SHIPPED | OUTPATIENT
Start: 2018-01-01 | End: 2019-01-01

## 2018-01-01 RX ORDER — CLONIDINE HYDROCHLORIDE 0.1 MG/1
0.1 TABLET ORAL 2 TIMES DAILY PRN
Status: DISCONTINUED | OUTPATIENT
Start: 2018-01-01 | End: 2018-01-01 | Stop reason: HOSPADM

## 2018-01-01 RX ORDER — CLOPIDOGREL BISULFATE 75 MG/1
75 TABLET ORAL DAILY
Qty: 90 TAB | Refills: 3 | Status: ON HOLD | OUTPATIENT
Start: 2018-01-01 | End: 2018-01-01

## 2018-01-01 RX ORDER — CLONIDINE HYDROCHLORIDE 0.1 MG/1
0.1 TABLET ORAL 2 TIMES DAILY PRN
Qty: 30 TAB | Refills: 0 | Status: SHIPPED | OUTPATIENT
Start: 2018-01-01 | End: 2019-01-01

## 2018-01-01 RX ORDER — LISINOPRIL 20 MG/1
20 TABLET ORAL 2 TIMES DAILY
Qty: 60 TAB | Refills: 0 | Status: SHIPPED | OUTPATIENT
Start: 2018-01-01 | End: 2019-01-01 | Stop reason: SDUPTHER

## 2018-01-01 RX ORDER — TAMSULOSIN HYDROCHLORIDE 0.4 MG/1
0.4 CAPSULE ORAL EVERY EVENING
Status: DISCONTINUED | OUTPATIENT
Start: 2018-01-01 | End: 2018-01-01 | Stop reason: HOSPADM

## 2018-01-01 RX ORDER — LISINOPRIL 5 MG/1
5 TABLET ORAL
Status: DISCONTINUED | OUTPATIENT
Start: 2018-01-01 | End: 2018-01-01

## 2018-01-01 RX ORDER — ATORVASTATIN CALCIUM 80 MG/1
80 TABLET, FILM COATED ORAL EVERY EVENING
Status: DISCONTINUED | OUTPATIENT
Start: 2018-01-01 | End: 2018-01-01 | Stop reason: HOSPADM

## 2018-01-01 RX ORDER — FINASTERIDE 5 MG/1
5 TABLET, FILM COATED ORAL
Status: DISCONTINUED | OUTPATIENT
Start: 2018-01-01 | End: 2018-01-01 | Stop reason: HOSPADM

## 2018-01-01 RX ORDER — SODIUM CHLORIDE 9 MG/ML
1000 INJECTION, SOLUTION INTRAVENOUS ONCE
Status: COMPLETED | OUTPATIENT
Start: 2018-01-01 | End: 2018-01-01

## 2018-01-01 RX ORDER — HYDRALAZINE HYDROCHLORIDE 20 MG/ML
10 INJECTION INTRAMUSCULAR; INTRAVENOUS
Status: DISCONTINUED | OUTPATIENT
Start: 2018-01-01 | End: 2018-01-01

## 2018-01-01 RX ORDER — CLOPIDOGREL BISULFATE 75 MG/1
75 TABLET ORAL DAILY
Status: DISCONTINUED | OUTPATIENT
Start: 2018-01-01 | End: 2018-01-01 | Stop reason: HOSPADM

## 2018-01-01 RX ORDER — LISINOPRIL 20 MG/1
20 TABLET ORAL 2 TIMES DAILY
Status: DISCONTINUED | OUTPATIENT
Start: 2018-01-01 | End: 2018-01-01 | Stop reason: HOSPADM

## 2018-01-01 RX ORDER — CETIRIZINE HYDROCHLORIDE 10 MG/1
10 TABLET ORAL DAILY
COMMUNITY
End: 2019-01-01

## 2018-01-01 RX ADMIN — SENNOSIDES AND DOCUSATE SODIUM 2 TABLET: 8.6; 5 TABLET ORAL at 05:06

## 2018-01-01 RX ADMIN — TAMSULOSIN HYDROCHLORIDE 0.4 MG: 0.4 CAPSULE ORAL at 17:12

## 2018-01-01 RX ADMIN — CLONIDINE HYDROCHLORIDE 0.1 MG: 0.1 TABLET ORAL at 18:39

## 2018-01-01 RX ADMIN — CLOPIDOGREL 75 MG: 75 TABLET, FILM COATED ORAL at 05:06

## 2018-01-01 RX ADMIN — APIXABAN 2.5 MG: 2.5 TABLET, FILM COATED ORAL at 16:47

## 2018-01-01 RX ADMIN — LISINOPRIL 40 MG: 20 TABLET ORAL at 19:07

## 2018-01-01 RX ADMIN — SODIUM CHLORIDE 1000 ML: 9 INJECTION, SOLUTION INTRAVENOUS at 15:24

## 2018-01-01 RX ADMIN — METOPROLOL TARTRATE 25 MG: 25 TABLET, FILM COATED ORAL at 06:31

## 2018-01-01 RX ADMIN — TAMSULOSIN HYDROCHLORIDE 0.4 MG: 0.4 CAPSULE ORAL at 13:37

## 2018-01-01 RX ADMIN — FINASTERIDE 5 MG: 5 TABLET, FILM COATED ORAL at 13:37

## 2018-01-01 RX ADMIN — METOPROLOL TARTRATE 25 MG: 25 TABLET, FILM COATED ORAL at 15:23

## 2018-01-01 RX ADMIN — IOHEXOL 100 ML: 350 INJECTION, SOLUTION INTRAVENOUS at 12:16

## 2018-01-01 RX ADMIN — APIXABAN 2.5 MG: 2.5 TABLET, FILM COATED ORAL at 13:37

## 2018-01-01 RX ADMIN — ATORVASTATIN CALCIUM 40 MG: 40 TABLET, FILM COATED ORAL at 16:47

## 2018-01-01 RX ADMIN — APIXABAN 2.5 MG: 2.5 TABLET, FILM COATED ORAL at 06:31

## 2018-01-01 RX ADMIN — CLOPIDOGREL 75 MG: 75 TABLET, FILM COATED ORAL at 06:31

## 2018-01-01 RX ADMIN — METOPROLOL TARTRATE 25 MG: 25 TABLET, FILM COATED ORAL at 17:03

## 2018-01-01 RX ADMIN — LISINOPRIL 5 MG: 5 TABLET ORAL at 16:52

## 2018-01-01 RX ADMIN — APIXABAN 2.5 MG: 2.5 TABLET, FILM COATED ORAL at 17:12

## 2018-01-01 RX ADMIN — ATORVASTATIN CALCIUM 80 MG: 80 TABLET, FILM COATED ORAL at 17:12

## 2018-01-01 RX ADMIN — LISINOPRIL 20 MG: 20 TABLET ORAL at 17:11

## 2018-01-01 SDOH — ECONOMIC STABILITY: HOUSING INSECURITY
HOME SAFETY: RECOMMEND NONSLIP BATHMAT OUTSIDE OF SHOWER, EDUCATED IN USE OF SHOWER CHAIR FOR SAFETY. PT/WIFE DENY NEED AT THIS TIME.

## 2018-01-01 SDOH — ECONOMIC STABILITY: HOUSING INSECURITY
HOME SAFETY: PT AND WIFE AGREE TO KEEP WALKING PATHWAYS CLEAR, FWW AT ALL TIMES W/ SUPPORTIVE SHOES  PT DENIES ANY SMALL GREAT GRANDCHILDREN VISIT/ON FLOOR AS IN THE PAST 'THEY ARE IN SCHOOL NOW"

## 2018-01-01 SDOH — ECONOMIC STABILITY: HOUSING INSECURITY: UNSAFE APPLIANCES: 0

## 2018-01-01 SDOH — ECONOMIC STABILITY: HOUSING INSECURITY: UNSAFE COOKING RANGE AREA: 0

## 2018-01-01 SDOH — ECONOMIC STABILITY: HOUSING INSECURITY
HOME SAFETY: AS REPORTED FOR FALL PREVENTION  RE-EDUC TO PT AND WIFE FOR A OUTSIDE SURFACES, PACING OF ACTIVITY TO AVOID RUSHING

## 2018-01-01 ASSESSMENT — ENCOUNTER SYMPTOMS
MYALGIAS: 0
CLAUDICATION: 0
HEMOPTYSIS: 0
RESPIRATORY SYMPTOMS COMMENTS: NO ISSUES AT THE TIME OF THIS ASSESSMENT.
CLAUDICATION: 0
EYE PAIN: 0
ABDOMINAL PAIN: 0
BLURRED VISION: 0
VOMITING: DENIES
RESPIRATORY SYMPTOMS COMMENTS: NO ISSUES AT THE TIME OF THIS ASSESSMENT.
BRUISES/BLEEDS EASILY: 0
CHILLS: 0
SHORTNESS OF BREATH: 0
WEIGHT LOSS: 0
DOUBLE VISION: 0
TINGLING: 0
DEPRESSION: 0
NAUSEA: NO ISSUES AT THE TIME OF THIS ASSESSMENT.
INSOMNIA: 0
HALLUCINATIONS: 0
MYALGIAS: 0
VOMITING: NO ISSUES AT THE TIME OF THIS ASSESSMENT.
NERVOUS/ANXIOUS: 0
LOSS OF CONSCIOUSNESS: 0
TREMORS: 0
COUGH: 0
FALLS: 0
LOSS OF CONSCIOUSNESS: 0
SINUS PAIN: 0
BLOOD IN STOOL: 0
NAUSEA: 0
ABDOMINAL PAIN: 0
ORTHOPNEA: 0
DIZZINESS: 0
CHILLS: 0
SHORTNESS OF BREATH: T
MEMORY LOSS: 0
PND: 0
CONSTIPATION: 0
NAUSEA: 0
FEVER: 0
WHEEZING: 0
SENSORY CHANGE: 0
DIAPHORESIS: 0
DEPRESSION: 0
BLOOD IN STOOL: 0
SORE THROAT: 0
SPEECH CHANGE: 1
BLURRED VISION: 0
NAUSEA: NO ISSUES AT THE TIME OF THIS ASSESSMENT.
DIZZINESS: 0
SHORTNESS OF BREATH: T
HEADACHES: 0
PALPITATIONS: 0
FALLS: 0
EYE DISCHARGE: 0
NAUSEA: NO ISSUES AT THE TIME OF THIS ASSESSMENT.
STRIDOR: 0
SHORTNESS OF BREATH: 0
SPEECH CHANGE: 0
WEAKNESS: 0
HEADACHES: 0
SHORTNESS OF BREATH: T
HALLUCINATIONS: 0
VOMITING: NO ISSUES AT THE TIME OF THIS ASSESSMENT.
SENSORY CHANGE: 0
FEVER: 0
NECK PAIN: 0
VOMITING: NO ISSUES AT THE TIME OF THIS ASSESSMENT.
DIARRHEA: 0
FOCAL WEAKNESS: 1
SEIZURES: 0
NAUSEA: NO ISSUES AT THE TIME OF THIS ASSESSMENT.
SPUTUM PRODUCTION: 0
BACK PAIN: 0
RESPIRATORY SYMPTOMS COMMENTS: NO ISSUES AT THE TIME OF THIS ASSESSMENT.
RESPIRATORY SYMPTOMS COMMENTS: NO ISSUES AT THE TIME OF THIS ASSESSMENT.
FLANK PAIN: 0
DEPRESSED MOOD: 1
COUGH: 0
VOMITING: 0
PALPITATIONS: 0
DOUBLE VISION: 0
HEARTBURN: 0
WEIGHT LOSS: 0
DIFFICULTY THINKING: 1
ORTHOPNEA: 0
PND: 0
NAUSEA: DENIES
VOMITING: 0
VOMITING: NO ISSUES AT THE TIME OF THIS ASSESSMENT.

## 2018-01-01 ASSESSMENT — COGNITIVE AND FUNCTIONAL STATUS - GENERAL
TOILETING: A LITTLE
SUGGESTED CMS G CODE MODIFIER MOBILITY: CI
DAILY ACTIVITIY SCORE: 23
MOBILITY SCORE: 23
SUGGESTED CMS G CODE MODIFIER DAILY ACTIVITY: CI
CLIMB 3 TO 5 STEPS WITH RAILING: A LITTLE

## 2018-01-01 ASSESSMENT — PATIENT HEALTH QUESTIONNAIRE - PHQ9
SUM OF ALL RESPONSES TO PHQ9 QUESTIONS 1 AND 2: 0
1. LITTLE INTEREST OR PLEASURE IN DOING THINGS: 00
SUM OF ALL RESPONSES TO PHQ9 QUESTIONS 1 AND 2: 0
2. FEELING DOWN, DEPRESSED, IRRITABLE, OR HOPELESS: NOT AT ALL
1. LITTLE INTEREST OR PLEASURE IN DOING THINGS: NOT AT ALL
2. FEELING DOWN, DEPRESSED, IRRITABLE, OR HOPELESS: 00
1. LITTLE INTEREST OR PLEASURE IN DOING THINGS: NOT AT ALL
2. FEELING DOWN, DEPRESSED, IRRITABLE, OR HOPELESS: NOT AT ALL

## 2018-01-01 ASSESSMENT — ACTIVITIES OF DAILY LIVING (ADL)
GROOMING_ASSISTANCE: 0
TRANSPORTATION COMMENTS: REQUIRES ASSIST OF ONE OTHER PERSON TO LEAVE THE HOME
OASIS_M1830: 01
GROOMING_COMMENTS: STANDING AT SINK
TRANSPORTATION COMMENTS: REQUIRES ASSIST OF ONE OTHER PERSON TO LEAVE THE HOME
ORAL_CARE_ASSISTANCE: 0
DRESSING_UB_ASSISTANCE: 0
HOME_HEALTH_OASIS: 01
TRANSPORTATION COMMENTS: REQUIRES ASSIST OF ONE OTHER PERSON TO LEAVE THE HOME
DRESSING_LB_ASSISTANCE: 0
BATHING_ASSISTANCE: 1
LAUNDRY_ASSISTANCE: 6
HOME_HEALTH_OASIS: 01
TRANSPORTATION COMMENTS: REQUIRES ASSIST OF ONE OTHER PERSON TO LEAVE THE HOME
MEAL_PREP_ASSISTANCE: 6
TRANSPORTATION_ASSISTANCE: 6
HOME_HEALTH_OASIS: 00
TELEPHONE_ASSISTANCE: 0
OASIS_M1830: 02
TRANSPORTATION COMMENTS: W/ AD
TRANSPORTATION COMMENTS: REQUIRES ASSIST OF ONE OTHER PERSON TO LEAVE THE HOME
EATING_ASSISTANCE: 0
SHOPPING_ASSISTANCE: 6
TOILETING: INDEPENDENT
TOILETING_ASSISTANCE: 0
ADLS_COMMENTS: <!--EPICS-->PEND OT EVAL<!--EPICE-->
HOUSEKEEPING_ASSISTANCE: 6
TRANSPORTATION COMMENTS: W/ AD

## 2018-01-01 ASSESSMENT — LIFESTYLE VARIABLES
SUBSTANCE_ABUSE: 0
EVER_SMOKED: NEVER
EVER_SMOKED: NEVER
ALCOHOL_USE: NO

## 2018-01-01 ASSESSMENT — PAIN SCALES - GENERAL
PAINLEVEL_OUTOF10: 0

## 2018-01-01 ASSESSMENT — COPD QUESTIONNAIRES
COPD SCREENING SCORE: 2
DO YOU EVER COUGH UP ANY MUCUS OR PHLEGM?: NO/ONLY WITH OCCASIONAL COLDS OR INFECTIONS
HAVE YOU SMOKED AT LEAST 100 CIGARETTES IN YOUR ENTIRE LIFE: NO/DON'T KNOW
DURING THE PAST 4 WEEKS HOW MUCH DID YOU FEEL SHORT OF BREATH: NONE/LITTLE OF THE TIME

## 2018-01-01 ASSESSMENT — GAIT ASSESSMENTS
GAIT LEVEL OF ASSIST: SUPERVISED
DEVIATION: INCREASED BASE OF SUPPORT
ASSISTIVE DEVICE: FRONT WHEEL WALKER
DISTANCE (FEET): 250

## 2018-01-08 ENCOUNTER — HOSPITAL ENCOUNTER (OUTPATIENT)
Dept: LAB | Facility: MEDICAL CENTER | Age: 83
End: 2018-01-08
Attending: INTERNAL MEDICINE
Payer: MEDICARE

## 2018-01-08 LAB
25(OH)D3 SERPL-MCNC: 39 NG/ML (ref 30–100)
ALBUMIN SERPL BCP-MCNC: 3.9 G/DL (ref 3.2–4.9)
ALBUMIN/GLOB SERPL: 1.6 G/DL
ALP SERPL-CCNC: 55 U/L (ref 30–99)
ALT SERPL-CCNC: 15 U/L (ref 2–50)
ANION GAP SERPL CALC-SCNC: 6 MMOL/L (ref 0–11.9)
AST SERPL-CCNC: 17 U/L (ref 12–45)
BASOPHILS # BLD AUTO: 0.7 % (ref 0–1.8)
BASOPHILS # BLD: 0.05 K/UL (ref 0–0.12)
BILIRUB SERPL-MCNC: 0.8 MG/DL (ref 0.1–1.5)
BUN SERPL-MCNC: 25 MG/DL (ref 8–22)
CALCIUM SERPL-MCNC: 9.3 MG/DL (ref 8.5–10.5)
CHLORIDE SERPL-SCNC: 101 MMOL/L (ref 96–112)
CO2 SERPL-SCNC: 29 MMOL/L (ref 20–33)
CREAT SERPL-MCNC: 1.37 MG/DL (ref 0.5–1.4)
EOSINOPHIL # BLD AUTO: 0.24 K/UL (ref 0–0.51)
EOSINOPHIL NFR BLD: 3.3 % (ref 0–6.9)
ERYTHROCYTE [DISTWIDTH] IN BLOOD BY AUTOMATED COUNT: 44 FL (ref 35.9–50)
GLOBULIN SER CALC-MCNC: 2.5 G/DL (ref 1.9–3.5)
GLUCOSE SERPL-MCNC: 139 MG/DL (ref 65–99)
HCT VFR BLD AUTO: 46.4 % (ref 42–52)
HGB BLD-MCNC: 14.9 G/DL (ref 14–18)
IMM GRANULOCYTES # BLD AUTO: 0.05 K/UL (ref 0–0.11)
IMM GRANULOCYTES NFR BLD AUTO: 0.7 % (ref 0–0.9)
LYMPHOCYTES # BLD AUTO: 0.88 K/UL (ref 1–4.8)
LYMPHOCYTES NFR BLD: 12.1 % (ref 22–41)
MCH RBC QN AUTO: 28.7 PG (ref 27–33)
MCHC RBC AUTO-ENTMCNC: 32.1 G/DL (ref 33.7–35.3)
MCV RBC AUTO: 89.2 FL (ref 81.4–97.8)
MONOCYTES # BLD AUTO: 0.59 K/UL (ref 0–0.85)
MONOCYTES NFR BLD AUTO: 8.1 % (ref 0–13.4)
NEUTROPHILS # BLD AUTO: 5.44 K/UL (ref 1.82–7.42)
NEUTROPHILS NFR BLD: 75.1 % (ref 44–72)
NRBC # BLD AUTO: 0 K/UL
NRBC BLD-RTO: 0 /100 WBC
PLATELET # BLD AUTO: 163 K/UL (ref 164–446)
PMV BLD AUTO: 10.1 FL (ref 9–12.9)
POTASSIUM SERPL-SCNC: 4.1 MMOL/L (ref 3.6–5.5)
PROT SERPL-MCNC: 6.4 G/DL (ref 6–8.2)
PSA SERPL-MCNC: 2.62 NG/ML (ref 0–4)
RBC # BLD AUTO: 5.2 M/UL (ref 4.7–6.1)
SODIUM SERPL-SCNC: 136 MMOL/L (ref 135–145)
TSH SERPL DL<=0.005 MIU/L-ACNC: 2.54 UIU/ML (ref 0.38–5.33)
VIT B12 SERPL-MCNC: 608 PG/ML (ref 211–911)
WBC # BLD AUTO: 7.3 K/UL (ref 4.8–10.8)

## 2018-01-08 PROCEDURE — 84443 ASSAY THYROID STIM HORMONE: CPT

## 2018-01-08 PROCEDURE — 82306 VITAMIN D 25 HYDROXY: CPT | Mod: GA

## 2018-01-08 PROCEDURE — 84153 ASSAY OF PSA TOTAL: CPT | Mod: GA

## 2018-01-08 PROCEDURE — 36415 COLL VENOUS BLD VENIPUNCTURE: CPT

## 2018-01-08 PROCEDURE — 85025 COMPLETE CBC W/AUTO DIFF WBC: CPT

## 2018-01-08 PROCEDURE — 82607 VITAMIN B-12: CPT

## 2018-01-08 PROCEDURE — 80053 COMPREHEN METABOLIC PANEL: CPT

## 2018-03-27 ENCOUNTER — HOSPITAL ENCOUNTER (OUTPATIENT)
Dept: LAB | Facility: MEDICAL CENTER | Age: 83
End: 2018-03-27
Attending: INTERNAL MEDICINE
Payer: MEDICARE

## 2018-03-27 LAB
ALBUMIN SERPL BCP-MCNC: 4 G/DL (ref 3.2–4.9)
ALBUMIN/GLOB SERPL: 1.7 G/DL
ALP SERPL-CCNC: 58 U/L (ref 30–99)
ALT SERPL-CCNC: 18 U/L (ref 2–50)
ANION GAP SERPL CALC-SCNC: 4 MMOL/L (ref 0–11.9)
APPEARANCE UR: CLEAR
AST SERPL-CCNC: 16 U/L (ref 12–45)
BACTERIA #/AREA URNS HPF: NEGATIVE /HPF
BASOPHILS # BLD AUTO: 0.5 % (ref 0–1.8)
BASOPHILS # BLD: 0.03 K/UL (ref 0–0.12)
BILIRUB SERPL-MCNC: 0.8 MG/DL (ref 0.1–1.5)
BILIRUB UR QL STRIP.AUTO: NEGATIVE
BUN SERPL-MCNC: 27 MG/DL (ref 8–22)
CALCIUM SERPL-MCNC: 9 MG/DL (ref 8.5–10.5)
CHLORIDE SERPL-SCNC: 102 MMOL/L (ref 96–112)
CO2 SERPL-SCNC: 31 MMOL/L (ref 20–33)
COLOR UR: YELLOW
CREAT SERPL-MCNC: 1.35 MG/DL (ref 0.5–1.4)
CULTURE IF INDICATED INDCX: YES UA CULTURE
EOSINOPHIL # BLD AUTO: 0.12 K/UL (ref 0–0.51)
EOSINOPHIL NFR BLD: 2 % (ref 0–6.9)
EPI CELLS #/AREA URNS HPF: ABNORMAL /HPF
ERYTHROCYTE [DISTWIDTH] IN BLOOD BY AUTOMATED COUNT: 43.2 FL (ref 35.9–50)
GLOBULIN SER CALC-MCNC: 2.4 G/DL (ref 1.9–3.5)
GLUCOSE SERPL-MCNC: 241 MG/DL (ref 65–99)
GLUCOSE UR STRIP.AUTO-MCNC: 100 MG/DL
HCT VFR BLD AUTO: 48.1 % (ref 42–52)
HGB BLD-MCNC: 15.3 G/DL (ref 14–18)
HYALINE CASTS #/AREA URNS LPF: ABNORMAL /LPF
IMM GRANULOCYTES # BLD AUTO: 0.03 K/UL (ref 0–0.11)
IMM GRANULOCYTES NFR BLD AUTO: 0.5 % (ref 0–0.9)
INR PPP: 1.14 (ref 0.87–1.13)
KETONES UR STRIP.AUTO-MCNC: NEGATIVE MG/DL
LEUKOCYTE ESTERASE UR QL STRIP.AUTO: ABNORMAL
LYMPHOCYTES # BLD AUTO: 1.07 K/UL (ref 1–4.8)
LYMPHOCYTES NFR BLD: 17.5 % (ref 22–41)
MCH RBC QN AUTO: 29 PG (ref 27–33)
MCHC RBC AUTO-ENTMCNC: 31.8 G/DL (ref 33.7–35.3)
MCV RBC AUTO: 91.1 FL (ref 81.4–97.8)
MICRO URNS: ABNORMAL
MONOCYTES # BLD AUTO: 0.55 K/UL (ref 0–0.85)
MONOCYTES NFR BLD AUTO: 9 % (ref 0–13.4)
NEUTROPHILS # BLD AUTO: 4.32 K/UL (ref 1.82–7.42)
NEUTROPHILS NFR BLD: 70.5 % (ref 44–72)
NITRITE UR QL STRIP.AUTO: NEGATIVE
NRBC # BLD AUTO: 0 K/UL
NRBC BLD-RTO: 0 /100 WBC
PH UR STRIP.AUTO: 6 [PH]
PLATELET # BLD AUTO: 150 K/UL (ref 164–446)
PMV BLD AUTO: 9.8 FL (ref 9–12.9)
POTASSIUM SERPL-SCNC: 4.6 MMOL/L (ref 3.6–5.5)
PROT SERPL-MCNC: 6.4 G/DL (ref 6–8.2)
PROT UR QL STRIP: NEGATIVE MG/DL
PROTHROMBIN TIME: 14.3 SEC (ref 12–14.6)
RBC # BLD AUTO: 5.28 M/UL (ref 4.7–6.1)
RBC # URNS HPF: >150 /HPF
RBC UR QL AUTO: ABNORMAL
SODIUM SERPL-SCNC: 137 MMOL/L (ref 135–145)
SP GR UR STRIP.AUTO: 1.02
UROBILINOGEN UR STRIP.AUTO-MCNC: 0.2 MG/DL
WBC # BLD AUTO: 6.1 K/UL (ref 4.8–10.8)
WBC #/AREA URNS HPF: ABNORMAL /HPF

## 2018-03-27 PROCEDURE — 85025 COMPLETE CBC W/AUTO DIFF WBC: CPT

## 2018-03-27 PROCEDURE — 80053 COMPREHEN METABOLIC PANEL: CPT

## 2018-03-27 PROCEDURE — 36415 COLL VENOUS BLD VENIPUNCTURE: CPT

## 2018-03-27 PROCEDURE — 85610 PROTHROMBIN TIME: CPT

## 2018-03-27 PROCEDURE — 81001 URINALYSIS AUTO W/SCOPE: CPT

## 2018-03-27 PROCEDURE — 87086 URINE CULTURE/COLONY COUNT: CPT

## 2018-03-29 LAB
BACTERIA UR CULT: NORMAL
SIGNIFICANT IND 70042: NORMAL
SITE SITE: NORMAL
SOURCE SOURCE: NORMAL

## 2018-07-10 ENCOUNTER — HOSPITAL ENCOUNTER (EMERGENCY)
Facility: MEDICAL CENTER | Age: 83
End: 2018-07-10
Attending: EMERGENCY MEDICINE
Payer: MEDICARE

## 2018-07-10 ENCOUNTER — APPOINTMENT (OUTPATIENT)
Dept: RADIOLOGY | Facility: MEDICAL CENTER | Age: 83
End: 2018-07-10
Attending: EMERGENCY MEDICINE
Payer: MEDICARE

## 2018-07-10 VITALS
SYSTOLIC BLOOD PRESSURE: 190 MMHG | TEMPERATURE: 98.7 F | DIASTOLIC BLOOD PRESSURE: 100 MMHG | HEART RATE: 81 BPM | BODY MASS INDEX: 22.19 KG/M2 | WEIGHT: 155 LBS | HEIGHT: 70 IN | RESPIRATION RATE: 18 BRPM | OXYGEN SATURATION: 95 %

## 2018-07-10 DIAGNOSIS — I10 HYPERTENSION, UNSPECIFIED TYPE: ICD-10-CM

## 2018-07-10 DIAGNOSIS — G45.9 TRANSIENT CEREBRAL ISCHEMIA, UNSPECIFIED TYPE: ICD-10-CM

## 2018-07-10 LAB
ALBUMIN SERPL BCP-MCNC: 5.3 G/DL (ref 3.2–4.9)
ALBUMIN/GLOB SERPL: 5.3 G/DL
ALP SERPL-CCNC: 52 U/L (ref 30–99)
ALT SERPL-CCNC: 17 U/L (ref 2–50)
ANION GAP SERPL CALC-SCNC: 7 MMOL/L (ref 0–11.9)
APTT PPP: 27.7 SEC (ref 24.7–36)
AST SERPL-CCNC: 16 U/L (ref 12–45)
BASOPHILS # BLD AUTO: 0.7 % (ref 0–1.8)
BASOPHILS # BLD: 0.05 K/UL (ref 0–0.12)
BILIRUB SERPL-MCNC: 0.7 MG/DL (ref 0.1–1.5)
BUN SERPL-MCNC: 23 MG/DL (ref 8–22)
CALCIUM SERPL-MCNC: 8.9 MG/DL (ref 8.5–10.5)
CHLORIDE SERPL-SCNC: 102 MMOL/L (ref 96–112)
CO2 SERPL-SCNC: 28 MMOL/L (ref 20–33)
CREAT SERPL-MCNC: 1.02 MG/DL (ref 0.5–1.4)
EKG IMPRESSION: NORMAL
EOSINOPHIL # BLD AUTO: 0.19 K/UL (ref 0–0.51)
EOSINOPHIL NFR BLD: 2.7 % (ref 0–6.9)
ERYTHROCYTE [DISTWIDTH] IN BLOOD BY AUTOMATED COUNT: 42.8 FL (ref 35.9–50)
GLOBULIN SER CALC-MCNC: 1 G/DL (ref 1.9–3.5)
GLUCOSE SERPL-MCNC: 84 MG/DL (ref 65–99)
HCT VFR BLD AUTO: 48.7 % (ref 42–52)
HGB BLD-MCNC: 15.7 G/DL (ref 14–18)
IMM GRANULOCYTES # BLD AUTO: 0.04 K/UL (ref 0–0.11)
IMM GRANULOCYTES NFR BLD AUTO: 0.6 % (ref 0–0.9)
INR PPP: 1.09 (ref 0.87–1.13)
LYMPHOCYTES # BLD AUTO: 1.1 K/UL (ref 1–4.8)
LYMPHOCYTES NFR BLD: 15.4 % (ref 22–41)
MCH RBC QN AUTO: 28.9 PG (ref 27–33)
MCHC RBC AUTO-ENTMCNC: 32.2 G/DL (ref 33.7–35.3)
MCV RBC AUTO: 89.7 FL (ref 81.4–97.8)
MONOCYTES # BLD AUTO: 0.67 K/UL (ref 0–0.85)
MONOCYTES NFR BLD AUTO: 9.4 % (ref 0–13.4)
NEUTROPHILS # BLD AUTO: 5.11 K/UL (ref 1.82–7.42)
NEUTROPHILS NFR BLD: 71.2 % (ref 44–72)
NRBC # BLD AUTO: 0 K/UL
NRBC BLD-RTO: 0 /100 WBC
PLATELET # BLD AUTO: 162 K/UL (ref 164–446)
PMV BLD AUTO: 9.9 FL (ref 9–12.9)
POTASSIUM SERPL-SCNC: 4.3 MMOL/L (ref 3.6–5.5)
PROT SERPL-MCNC: 6.3 G/DL (ref 6–8.2)
PROTHROMBIN TIME: 13.8 SEC (ref 12–14.6)
RBC # BLD AUTO: 5.43 M/UL (ref 4.7–6.1)
SODIUM SERPL-SCNC: 137 MMOL/L (ref 135–145)
TROPONIN I SERPL-MCNC: 0.02 NG/ML (ref 0–0.04)
WBC # BLD AUTO: 7.2 K/UL (ref 4.8–10.8)

## 2018-07-10 PROCEDURE — 96374 THER/PROPH/DIAG INJ IV PUSH: CPT

## 2018-07-10 PROCEDURE — 84484 ASSAY OF TROPONIN QUANT: CPT

## 2018-07-10 PROCEDURE — 85610 PROTHROMBIN TIME: CPT

## 2018-07-10 PROCEDURE — 99284 EMERGENCY DEPT VISIT MOD MDM: CPT

## 2018-07-10 PROCEDURE — 93005 ELECTROCARDIOGRAM TRACING: CPT | Performed by: EMERGENCY MEDICINE

## 2018-07-10 PROCEDURE — 85025 COMPLETE CBC W/AUTO DIFF WBC: CPT

## 2018-07-10 PROCEDURE — 85730 THROMBOPLASTIN TIME PARTIAL: CPT

## 2018-07-10 PROCEDURE — 700101 HCHG RX REV CODE 250: Performed by: EMERGENCY MEDICINE

## 2018-07-10 PROCEDURE — 70450 CT HEAD/BRAIN W/O DYE: CPT

## 2018-07-10 PROCEDURE — 80053 COMPREHEN METABOLIC PANEL: CPT

## 2018-07-10 RX ORDER — LABETALOL HYDROCHLORIDE 5 MG/ML
10 INJECTION, SOLUTION INTRAVENOUS ONCE
Status: COMPLETED | OUTPATIENT
Start: 2018-07-10 | End: 2018-07-10

## 2018-07-10 RX ADMIN — LABETALOL HYDROCHLORIDE 10 MG: 5 INJECTION, SOLUTION INTRAVENOUS at 15:09

## 2018-07-10 ASSESSMENT — PAIN SCALES - GENERAL: PAINLEVEL_OUTOF10: 0

## 2018-07-10 NOTE — DISCHARGE INSTRUCTIONS
"    Hypertension  Hypertension is another name for high blood pressure. High blood pressure forces your heart to work harder to pump blood. A blood pressure reading has two numbers, which includes a higher number over a lower number (example: 110/72).  Follow these instructions at home:  · Have your blood pressure rechecked by your doctor.  · Only take medicine as told by your doctor. Follow the directions carefully. The medicine does not work as well if you skip doses. Skipping doses also puts you at risk for problems.  · Do not smoke.  · Monitor your blood pressure at home as told by your doctor.  Contact a doctor if:  · You think you are having a reaction to the medicine you are taking.  · You have repeat headaches or feel dizzy.  · You have puffiness (swelling) in your ankles.  · You have trouble with your vision.  Get help right away if:  · You get a very bad headache and are confused.  · You feel weak, numb, or faint.  · You get chest or belly (abdominal) pain.  · You throw up (vomit).  · You cannot breathe very well.  This information is not intended to replace advice given to you by your health care provider. Make sure you discuss any questions you have with your health care provider.  Document Released: 06/05/2009 Document Revised: 05/25/2017 Document Reviewed: 10/10/2014  [employer] Interactive Patient Education © 2017 [employer]  Transient Ischemic Attack  A transient ischemic attack (TIA) is a \"warning stroke\" that causes stroke-like symptoms. A TIA does not cause lasting damage to the brain. The symptoms of a TIA can happen fast and do not last long. It is important to know the symptoms of a TIA and what to do. This can help prevent stroke or death.  Follow these instructions at home:  · Take medicines only as told by your doctor. Make sure you understand all of the instructions.  · You may need to take aspirin or warfarin medicine. Warfarin needs to be taken exactly as told.  ¨ Taking too much or too " little warfarin is dangerous. Blood tests must be done as often as told by your doctor. A PT blood test measures how long it takes for blood to clot. Your PT is used to calculate another value called an INR. Your PT and INR help your doctor adjust your warfarin dosage. He or she will make sure you are taking the right amount.  ¨ Food can cause problems with warfarin and affect the results of your blood tests. This is true for foods high in vitamin K. Eat the same amount of foods high in vitamin K each day. Foods high in vitamin K include spinach, kale, broccoli, cabbage, ye and turnip greens, [place] sprouts, peas, cauliflower, seaweed, and parsley. Other foods high in vitamin K include beef and pork liver, green tea, and soybean oil. Eat the same amount of foods high in vitamin K each day. Avoid big changes in your diet. Tell your doctor before changing your diet. Talk to a  (dietitian) if you have questions.  ¨ Many medicines can cause problems with warfarin and affect your PT and INR. Tell your doctor about all medicines you take. This includes vitamins and dietary pills (supplements). Do not take or stop taking any prescribed or over-the-counter medicines unless your doctor tells you to.  ¨ Warfarin can cause more bruising or bleeding. Hold pressure over any cuts for longer than normal. Talk to your doctor about other side effects of warfarin.  ¨ Avoid sports or activities that may cause injury or bleeding.  ¨ Be careful when you shave, floss, or use sharp objects.  ¨ Avoid or drink very little alcohol while taking warfarin. Tell your doctor if you change how much alcohol you drink.  ¨ Tell your dentist and other doctors that you take warfarin before any procedures.  · Follow your diet program as told, if you are given one.  · Keep a healthy weight.  · Stay active. Try to get at least 30 minutes of activity on all or most days.  · Do not use any tobacco products, including cigarettes,  chewing tobacco, or electronic cigarettes. If you need help quitting, ask your doctor.  · Limit alcohol intake to no more than 1 drink per day for nonpregnant women and 2 drinks per day for men. One drink equals 12 ounces of beer, 5 ounces of wine, or 1½ ounces of hard liquor.  · Do not abuse drugs.  · Keep your home safe so you do not fall. You can do this by:  ¨ Putting grab bars in the bedroom and bathroom.  ¨ Raising toilet seats.  ¨ Putting a seat in the shower.  · Keep all follow-up visits as told by your doctor. This is important.  Contact a doctor if:  · Your personality changes.  · You have trouble swallowing.  · You have double vision.  · You are dizzy.  · You have a fever.  Get help right away if:  These symptoms may be an emergency. Do not wait to see if the symptoms will go away. Get medical help right away. Call your local emergency services (911 in the [place] Do not drive yourself to the hospital.  · You have sudden weakness or lose feeling (go numb), especially on one side of the body. This can affect your:  ¨ Face.  ¨ Arm.  ¨ Leg.  · You have sudden trouble walking.  · You have sudden trouble moving your arms or legs.  · You have sudden confusion.  · You have trouble talking.  · You have trouble understanding.  · You have sudden trouble seeing in one or both eyes.  · You lose your balance.  · Your movements are not smooth.  · You have a sudden, very bad headache with no known cause.  · You have new chest pain.  · Your heartbeat is unsteady.  · You are partly or totally unaware of what is going on around you.  This information is not intended to replace advice given to you by your health care provider. Make sure you discuss any questions you have with your health care provider.  Document Released: 09/26/2009 Document Revised: 08/21/2017 Document Reviewed: 03/25/2015  [employer] Interactive Patient Education © 2017 [employer]

## 2018-07-10 NOTE — ED PROVIDER NOTES
ED Provider Note    CHIEF COMPLAINT  Chief Complaint   Patient presents with   • Transient Ischemic Attack       HPI  Joe Rashid is a 89 y.o. male who presents for evaluation of a brief episode in which his left hand became tingly and clumsy had clumsiness of the left leg and difficulty word finding. Patient presented here at approximately 2 PM. Incident happened at around 12:30 and resolved after about 10-15 minutes. Patient has a prior history of TIA. He has been admitted and evaluated for this in the past and is on Plavix. He denies any chest pain palpitations headache. No other symptoms reported. No other complaints    REVIEW OF SYSTEMS  See HPI for further details. No high fevers chills muscles weight loss numbness tingling weakness rash All other systems are negative.     PAST MEDICAL HISTORY  Past Medical History:   Diagnosis Date   • Atrial fibrillation (HCC)    • Chronic anticoagulation    • Chronic kidney disease, stage III (moderate)    • Congestive heart failure (HCC)    • Hepatitis B    • History of TIA (transient ischemic attack)    • Hypertension        FAMILY HISTORY  Noncontributory    SOCIAL HISTORY  Social History     Social History   • Marital status:      Spouse name: N/A   • Number of children: N/A   • Years of education: N/A     Social History Main Topics   • Smoking status: Never Smoker   • Smokeless tobacco: Never Used   • Alcohol use No   • Drug use: No   • Sexual activity: Not on file     Other Topics Concern   • Not on file     Social History Narrative   • No narrative on file       SURGICAL HISTORY  Past Surgical History:   Procedure Laterality Date   • HIP HEMIARTHROPLASTY Left 1/27/2016    Procedure: HIP HEMIARTHROPLASTY;  Surgeon: Adis Martínez M.D.;  Location: SURGERY [facility];  Service:    • APPENDECTOMY     • CATARACT EXTRACTION WITH IOL      both eyes   • COLON RESECTION     • OTHER      sinus surgery of unknown type   • PB CRYOSURG ABLATION OF  "PBOSTATE     • PB TRANSURETHRAL ELEC-SURG PBOSTATECTOM         CURRENT MEDICATIONS  No current facility-administered medications for this encounter.     Current Outpatient Prescriptions:   •  lisinopril (PRINIVIL, ZESTRIL) 40 MG tablet, TAKE 1 TABLET BY MOUTH EVERY DAY, Disp: 90 Tab, Rfl: 3  •  metoprolol (LOPRESSOR) 25 MG Tab, TAKE 1 TABLET BY MOUTH TWICE DAILY, Disp: 180 Tab, Rfl: 3  •  Probiotic Product (PROBIOTIC DAILY PO), Take  by mouth., Disp: , Rfl:   •  Cholecalciferol (VITAMIN D PO), Take  by mouth., Disp: , Rfl:   •  clopidogrel (PLAVIX) 75 MG Tab, Take 1 Tab by mouth every day., Disp: 90 Tab, Rfl: 3  •  moxifloxacin (VIGAMOX) 0.5 % Solution, Place 1 Drop in both eyes every 2 hours while awake., Disp: 1 Bottle, Rfl: 0  •  metoprolol (LOPRESSOR) 25 MG Tab, TAKE 1 TABLET BY MOUTH TWICE DAILY, Disp: 180 Tab, Rfl: 3  •  Multiple Vitamins-Minerals (MULTIVITAMIN & MINERAL PO), Take 1 Capsule by mouth every day at 6 PM., Disp: , Rfl:   •  acetaminophen (TYLENOL) 500 MG Tab, Take 500-1,000 mg by mouth every 6 hours as needed for Mild Pain., Disp: , Rfl:   •  tamsulosin (FLOMAX) 0.4 MG capsule, Take 0.4 mg by mouth BEDTIME(S)., Disp: , Rfl:   •  Multiple Vitamins-Minerals (MULTI ADULT GUMMIES) Chew Tab, Take 1 Tab by mouth every day., Disp: , Rfl:       ALLERGIES  Allergies   Allergen Reactions   • Aspirin Hives and Rash     Pts grandson states rash and hives.   • Sulfa Drugs Hives and Rash     Pts grandson states rash and hives.       PHYSICAL EXAM  VITAL SIGNS: BP (!) 190/100   Pulse 87   Temp 37.1 °C (98.7 °F)   Resp 14   Ht 1.778 m (5' 10\")   Wt 70.3 kg (155 lb)   SpO2 92%   BMI 22.24 kg/m²  Room air O2: 97    Constitutional: Well developed, Well nourished, No acute distress, Non-toxic appearance.   HENT: Normocephalic, Atraumatic, Bilateral external ears normal, Oropharynx moist, No oral exudates, Nose normal.   Eyes: PERRLA, EOMI, Conjunctiva normal, No discharge.   Neck: Normal range of motion, No " tenderness, Supple, No stridor.   Cardiovascular: Normal heart rate, Normal rhythm, No murmurs, No rubs, No gallops.   Thorax & Lungs: Normal breath sounds, No respiratory distress, No wheezing, No chest tenderness.   Abdomen: Bowel sounds normal, Soft, No tenderness, No masses, No pulsatile masses.   Skin: Warm, Dry, No erythema, No rash.   Back: No tenderness, No CVA tenderness.   Extremities: Intact distal pulses, No edema, No tenderness, No cyanosis, No clubbing.   Musculoskeletal: Good range of motion in all major joints. No tenderness to palpation or major deformities noted.   Neurologic: Alert & oriented x 3, Normal motor function, Normal sensory function, No focal deficits noted. Cranial nerves II through XII grossly intact no pronator drift is normal no facial droop. NIH stroke scale score of 0  Psychiatric: Affect normal, Judgment normal, Mood normal.     EKG Interpretation    Interpreted by me    Rhythm: normal sinus   Rate: normal  Axis: LVH is noted   Ectopy: none  Conduction: normal  ST Segments: no acute change  T Waves: no acute change  Q Waves: none    Clinical Impression: no acute changes and normal EKG    CT-HEAD W/O   Final Result      1.  Diffuse atrophy and white matter changes.   2.  No acute intracranial hemorrhage or territorial infarct.           Results for orders placed or performed during the hospital encounter of 07/10/18   CBC WITH DIFFERENTIAL   Result Value Ref Range    WBC 7.2 4.8 - 10.8 K/uL    RBC 5.43 4.70 - 6.10 M/uL    Hemoglobin 15.7 14.0 - 18.0 g/dL    Hematocrit 48.7 42.0 - 52.0 %    MCV 89.7 81.4 - 97.8 fL    MCH 28.9 27.0 - 33.0 pg    MCHC 32.2 (L) 33.7 - 35.3 g/dL    RDW 42.8 35.9 - 50.0 fL    Platelet Count 162 (L) 164 - 446 K/uL    MPV 9.9 9.0 - 12.9 fL    Neutrophils-Polys 71.20 44.00 - 72.00 %    Lymphocytes 15.40 (L) 22.00 - 41.00 %    Monocytes 9.40 0.00 - 13.40 %    Eosinophils 2.70 0.00 - 6.90 %    Basophils 0.70 0.00 - 1.80 %    Immature Granulocytes 0.60 0.00 -  0.90 %    Nucleated RBC 0.00 /100 WBC    Neutrophils (Absolute) 5.11 1.82 - 7.42 K/uL    Lymphs (Absolute) 1.10 1.00 - 4.80 K/uL    Monos (Absolute) 0.67 0.00 - 0.85 K/uL    Eos (Absolute) 0.19 0.00 - 0.51 K/uL    Baso (Absolute) 0.05 0.00 - 0.12 K/uL    Immature Granulocytes (abs) 0.04 0.00 - 0.11 K/uL    NRBC (Absolute) 0.00 K/uL   COMP METABOLIC PANEL   Result Value Ref Range    Sodium 137 135 - 145 mmol/L    Potassium 4.3 3.6 - 5.5 mmol/L    Chloride 102 96 - 112 mmol/L    Co2 28 20 - 33 mmol/L    Anion Gap 7.0 0.0 - 11.9    Glucose 84 65 - 99 mg/dL    Bun 23 (H) 8 - 22 mg/dL    Creatinine 1.02 0.50 - 1.40 mg/dL    Calcium 8.9 8.5 - 10.5 mg/dL    AST(SGOT) 16 12 - 45 U/L    ALT(SGPT) 17 2 - 50 U/L    Alkaline Phosphatase 52 30 - 99 U/L    Total Bilirubin 0.7 0.1 - 1.5 mg/dL    Albumin 5.3 (H) 3.2 - 4.9 g/dL    Total Protein 6.3 6.0 - 8.2 g/dL    Globulin 1.0 (L) 1.9 - 3.5 g/dL    A-G Ratio 5.3 g/dL   PROTHROMBIN TIME   Result Value Ref Range    PT 13.8 12.0 - 14.6 sec    INR 1.09 0.87 - 1.13   APTT   Result Value Ref Range    APTT 27.7 24.7 - 36.0 sec   ESTIMATED GFR   Result Value Ref Range    GFR If African American >60 >60 mL/min/1.73 m 2    GFR If Non African American >60 >60 mL/min/1.73 m 2   EKG (ER)   Result Value Ref Range    Report       [facility]    Test Date:  2018-07-10  Pt Name:    YEIMY AGARWAL                   Department: ER  MRN:        [id number]                      Room:        10  Gender:     Male                         Technician: [id number]  :        1920                   Requested By:SAMUEL MCCANN  Order #:    [id number]                    Reading MD: SAMUEL MCCANN MD    Measurements  Intervals                                Axis  Rate:       81                           P:          16  HI:         156                          QRS:        94  QRSD:       100                          T:          8  QT:         400  QTc:        465    Interpretive  Statements  SINUS RHYTHM  LEFT POSTERIOR FASCICULAR BLOCK  Compared to ECG 11/09/2017 16:04:12  Left posterior fascicular block now present  Left-axis deviation no longer present  Left ventricular hypertrophy no longer present    Electronically Signed On 7- 14:49:53 PDT by JESÚS ESCOBAR MD              COURSE & MEDICAL DECISION MAKING  Pertinent Labs & Imaging studies reviewed. (See chart for details)  And IV was established. Patient presents here with symptoms strongly suggestive of a resolved TIA. I reviewed his records and he had an admission last year and had an extensive workup including MRI of the brain, carotid studies which were unremarkable echocardiogram which is unremarkable. Here his blood pressure was initially moderately high then came down and came up again. I did administer a dose of IV labetalol. I reviewed the case with his PCP Dr. Pompa who knows the patient well. He agrees that the patient is already anticoagulated with Plavix and any additional or more aggressive anticoagulation is not indicated in the 89-year-old.. The patient would prefer to go home. His blood pressure was still marginally elevated prior to discharge but the patient will follow up with his PCP in 2-3 days.     FINAL IMPRESSION  1.  1. Transient cerebral ischemia, unspecified type    2. Hypertension, unspecified type             Electronically signed by: Jesús Escobar, 7/10/2018 1:58 PM

## 2018-07-10 NOTE — ED NOTES
Pt dc to wr - cab to [facility], pt encouraged to follow up with pcp for bp reeval  , pt voiced understanding

## 2018-07-10 NOTE — ED TRIAGE NOTES
BIB [facility] to R10 w/ c/o L hand weakness & slurred speech onset 1220, lasting approx 10 min.  Pt reports he was attempting to take his medications, developed the hand weakness & dropped the glass of water on the floor.  Pt symptoms have completely resolved on arrival to the ER.  NIH 0.  Pt in gown, on monitor, chart up for ERP.

## 2018-07-10 NOTE — ED TRIAGE NOTES
"Lab called due to troponin delay - dr dumont request - lab states \"it'll be about 20 more minutes\" , dr dumont informed  "

## 2018-07-11 ENCOUNTER — PATIENT OUTREACH (OUTPATIENT)
Dept: HEALTH INFORMATION MANAGEMENT | Facility: OTHER | Age: 83
End: 2018-07-11

## 2018-07-16 ENCOUNTER — APPOINTMENT (RX ONLY)
Dept: URBAN - METROPOLITAN AREA CLINIC 4 | Facility: CLINIC | Age: 83
Setting detail: DERMATOLOGY
End: 2018-07-16

## 2018-07-16 DIAGNOSIS — L82.1 OTHER SEBORRHEIC KERATOSIS: ICD-10-CM

## 2018-07-16 DIAGNOSIS — L57.0 ACTINIC KERATOSIS: ICD-10-CM

## 2018-07-16 PROBLEM — D48.5 NEOPLASM OF UNCERTAIN BEHAVIOR OF SKIN: Status: ACTIVE | Noted: 2018-07-16

## 2018-07-16 PROCEDURE — 17000 DESTRUCT PREMALG LESION: CPT

## 2018-07-16 PROCEDURE — 17003 DESTRUCT PREMALG LES 2-14: CPT

## 2018-07-16 PROCEDURE — ? LIQUID NITROGEN

## 2018-07-16 PROCEDURE — ? COUNSELING

## 2018-07-16 PROCEDURE — 11100: CPT | Mod: 59

## 2018-07-16 PROCEDURE — 17282 DSTR MAL LS F/E/E/N/L/M1.1-2: CPT | Mod: 59

## 2018-07-16 PROCEDURE — ? BIOPSY BY SHAVE METHOD AND DESTRUCTION

## 2018-07-16 PROCEDURE — 99213 OFFICE O/P EST LOW 20 MIN: CPT | Mod: 25

## 2018-07-16 ASSESSMENT — LOCATION ZONE DERM
LOCATION ZONE: ARM
LOCATION ZONE: FACE

## 2018-07-16 ASSESSMENT — LOCATION DETAILED DESCRIPTION DERM
LOCATION DETAILED: RIGHT PROXIMAL RADIAL DORSAL FOREARM
LOCATION DETAILED: RIGHT SUPERIOR LATERAL FOREHEAD
LOCATION DETAILED: RIGHT DORSAL WRIST
LOCATION DETAILED: RIGHT PROXIMAL DORSAL FOREARM
LOCATION DETAILED: LEFT SUPERIOR FOREHEAD
LOCATION DETAILED: RIGHT DISTAL DORSAL FOREARM

## 2018-07-16 ASSESSMENT — LOCATION SIMPLE DESCRIPTION DERM
LOCATION SIMPLE: LEFT FOREHEAD
LOCATION SIMPLE: RIGHT FOREHEAD
LOCATION SIMPLE: RIGHT WRIST
LOCATION SIMPLE: RIGHT FOREARM

## 2018-07-16 NOTE — PROCEDURE: BIOPSY BY SHAVE METHOD AND DESTRUCTION
Biopsy Type: H and E
Destruction Type: electrodesiccation
Lab: [id number]
Bill As?: Malignant Destruction
Anesthesia Type: 1% lidocaine with epinephrine
Bill 59009 For Specimen Handling/Conveyance To Laboratory?: no
Anesthesia Volume In Cc: 0.5
Detail Level: Detailed
Notification Instructions: Patient will be notified of biopsy results. However, patient instructed to call the office if not contacted within 2 weeks.
Number Of Curettages: 2
Billing Type: Third-Party Bill
Consent: Written consent was obtained and risks were reviewed including but not limited to scarring, infection, bleeding, scabbing, incomplete removal, nerve damage and allergy to anesthesia.
Lab Facility: [id number]
Wound Care: Petrolatum
Size Of Lesion In Cm (Optional): 0
Post-Care Instructions: I reviewed with the patient in detail post-care instructions. Patient is to keep the biopsy site dry overnight, and then apply bacitracin twice daily until healed. Patient may apply hydrogen peroxide soaks to remove any crusting.
Hemostasis: Drysol
Size Of Lesion After Curettage: 1.8
Bill As?: Biopsy by Shave Method

## 2018-07-16 NOTE — PROCEDURE: LIQUID NITROGEN
Consent: The patient's consent was obtained including but not limited to risks of blistering, scarring, darker or lighter pigmentary change, and recurrence.
Post-Care Instructions: I reviewed with the patient in detail post-care instructions. Patient is to wear sunprotection, and avoid picking at any of the treated lesions. Pt may apply Vaseline to crusted or scabbing areas.
Detail Level: Detailed
Render Post-Care Instructions In Note?: no
Duration Of Freeze Thaw-Cycle (Seconds): 0
Number Of Freeze-Thaw Cycles: 2 freeze-thaw cycles

## 2018-09-01 ENCOUNTER — APPOINTMENT (OUTPATIENT)
Dept: RADIOLOGY | Facility: MEDICAL CENTER | Age: 83
End: 2018-09-01
Attending: EMERGENCY MEDICINE
Payer: MEDICARE

## 2018-09-01 ENCOUNTER — HOSPITAL ENCOUNTER (OUTPATIENT)
Facility: MEDICAL CENTER | Age: 83
End: 2018-09-03
Attending: EMERGENCY MEDICINE | Admitting: HOSPITALIST
Payer: MEDICARE

## 2018-09-01 DIAGNOSIS — I48.0 PAROXYSMAL ATRIAL FIBRILLATION (HCC): ICD-10-CM

## 2018-09-01 DIAGNOSIS — G45.9 TRANSIENT CEREBRAL ISCHEMIA, UNSPECIFIED TYPE: ICD-10-CM

## 2018-09-01 DIAGNOSIS — Z86.73 HISTORY OF TIA (TRANSIENT ISCHEMIC ATTACK): Chronic | ICD-10-CM

## 2018-09-01 LAB
ABO GROUP BLD: NORMAL
ALBUMIN SERPL BCP-MCNC: 4.1 G/DL (ref 3.2–4.9)
ALBUMIN/GLOB SERPL: 1.6 G/DL
ALP SERPL-CCNC: 59 U/L (ref 30–99)
ALT SERPL-CCNC: 20 U/L (ref 2–50)
ANION GAP SERPL CALC-SCNC: 8 MMOL/L (ref 0–11.9)
APPEARANCE UR: CLEAR
APTT PPP: 28.4 SEC (ref 24.7–36)
AST SERPL-CCNC: 20 U/L (ref 12–45)
BASOPHILS # BLD AUTO: 0.6 % (ref 0–1.8)
BASOPHILS # BLD: 0.04 K/UL (ref 0–0.12)
BILIRUB SERPL-MCNC: 0.8 MG/DL (ref 0.1–1.5)
BILIRUB UR QL STRIP.AUTO: NEGATIVE
BLD GP AB SCN SERPL QL: NORMAL
BUN SERPL-MCNC: 27 MG/DL (ref 8–22)
CALCIUM SERPL-MCNC: 9 MG/DL (ref 8.5–10.5)
CHLORIDE SERPL-SCNC: 102 MMOL/L (ref 96–112)
CO2 SERPL-SCNC: 26 MMOL/L (ref 20–33)
COLOR UR: YELLOW
CREAT SERPL-MCNC: 1.12 MG/DL (ref 0.5–1.4)
EOSINOPHIL # BLD AUTO: 0.11 K/UL (ref 0–0.51)
EOSINOPHIL NFR BLD: 1.8 % (ref 0–6.9)
ERYTHROCYTE [DISTWIDTH] IN BLOOD BY AUTOMATED COUNT: 42.4 FL (ref 35.9–50)
GLOBULIN SER CALC-MCNC: 2.5 G/DL (ref 1.9–3.5)
GLUCOSE BLD-MCNC: 104 MG/DL (ref 65–99)
GLUCOSE SERPL-MCNC: 115 MG/DL (ref 65–99)
GLUCOSE UR STRIP.AUTO-MCNC: NEGATIVE MG/DL
HCT VFR BLD AUTO: 47.7 % (ref 42–52)
HGB BLD-MCNC: 15.5 G/DL (ref 14–18)
IMM GRANULOCYTES # BLD AUTO: 0.04 K/UL (ref 0–0.11)
IMM GRANULOCYTES NFR BLD AUTO: 0.6 % (ref 0–0.9)
INR PPP: 1.08 (ref 0.87–1.13)
KETONES UR STRIP.AUTO-MCNC: NEGATIVE MG/DL
LEUKOCYTE ESTERASE UR QL STRIP.AUTO: NEGATIVE
LYMPHOCYTES # BLD AUTO: 1.24 K/UL (ref 1–4.8)
LYMPHOCYTES NFR BLD: 20.1 % (ref 22–41)
MCH RBC QN AUTO: 29 PG (ref 27–33)
MCHC RBC AUTO-ENTMCNC: 32.5 G/DL (ref 33.7–35.3)
MCV RBC AUTO: 89.2 FL (ref 81.4–97.8)
MICRO URNS: NORMAL
MONOCYTES # BLD AUTO: 0.7 K/UL (ref 0–0.85)
MONOCYTES NFR BLD AUTO: 11.3 % (ref 0–13.4)
NEUTROPHILS # BLD AUTO: 4.04 K/UL (ref 1.82–7.42)
NEUTROPHILS NFR BLD: 65.6 % (ref 44–72)
NITRITE UR QL STRIP.AUTO: NEGATIVE
NRBC # BLD AUTO: 0 K/UL
NRBC BLD-RTO: 0 /100 WBC
PH UR STRIP.AUTO: 7 [PH]
PLATELET # BLD AUTO: 150 K/UL (ref 164–446)
PMV BLD AUTO: 9.3 FL (ref 9–12.9)
POTASSIUM SERPL-SCNC: 4.1 MMOL/L (ref 3.6–5.5)
PROT SERPL-MCNC: 6.6 G/DL (ref 6–8.2)
PROT UR QL STRIP: NEGATIVE MG/DL
PROTHROMBIN TIME: 13.7 SEC (ref 12–14.6)
RBC # BLD AUTO: 5.35 M/UL (ref 4.7–6.1)
RBC UR QL AUTO: NEGATIVE
RH BLD: NORMAL
SODIUM SERPL-SCNC: 136 MMOL/L (ref 135–145)
SP GR UR STRIP.AUTO: 1.02
TROPONIN I SERPL-MCNC: <0.01 NG/ML (ref 0–0.04)
UROBILINOGEN UR STRIP.AUTO-MCNC: 0.2 MG/DL
WBC # BLD AUTO: 6.2 K/UL (ref 4.8–10.8)

## 2018-09-01 PROCEDURE — 85025 COMPLETE CBC W/AUTO DIFF WBC: CPT

## 2018-09-01 PROCEDURE — 700117 HCHG RX CONTRAST REV CODE 255: Performed by: EMERGENCY MEDICINE

## 2018-09-01 PROCEDURE — 86850 RBC ANTIBODY SCREEN: CPT

## 2018-09-01 PROCEDURE — 96374 THER/PROPH/DIAG INJ IV PUSH: CPT | Mod: XU

## 2018-09-01 PROCEDURE — 94760 N-INVAS EAR/PLS OXIMETRY 1: CPT

## 2018-09-01 PROCEDURE — 70450 CT HEAD/BRAIN W/O DYE: CPT

## 2018-09-01 PROCEDURE — G0378 HOSPITAL OBSERVATION PER HR: HCPCS

## 2018-09-01 PROCEDURE — 700102 HCHG RX REV CODE 250 W/ 637 OVERRIDE(OP): Performed by: HOSPITALIST

## 2018-09-01 PROCEDURE — 99285 EMERGENCY DEPT VISIT HI MDM: CPT

## 2018-09-01 PROCEDURE — 82962 GLUCOSE BLOOD TEST: CPT

## 2018-09-01 PROCEDURE — 80053 COMPREHEN METABOLIC PANEL: CPT

## 2018-09-01 PROCEDURE — 700111 HCHG RX REV CODE 636 W/ 250 OVERRIDE (IP): Performed by: HOSPITALIST

## 2018-09-01 PROCEDURE — A9270 NON-COVERED ITEM OR SERVICE: HCPCS | Performed by: HOSPITALIST

## 2018-09-01 PROCEDURE — 81003 URINALYSIS AUTO W/O SCOPE: CPT

## 2018-09-01 PROCEDURE — 85730 THROMBOPLASTIN TIME PARTIAL: CPT

## 2018-09-01 PROCEDURE — 85610 PROTHROMBIN TIME: CPT

## 2018-09-01 PROCEDURE — 70496 CT ANGIOGRAPHY HEAD: CPT

## 2018-09-01 PROCEDURE — 86901 BLOOD TYPING SEROLOGIC RH(D): CPT

## 2018-09-01 PROCEDURE — 70498 CT ANGIOGRAPHY NECK: CPT

## 2018-09-01 PROCEDURE — 700101 HCHG RX REV CODE 250: Performed by: HOSPITALIST

## 2018-09-01 PROCEDURE — 93005 ELECTROCARDIOGRAM TRACING: CPT | Performed by: EMERGENCY MEDICINE

## 2018-09-01 PROCEDURE — 71045 X-RAY EXAM CHEST 1 VIEW: CPT

## 2018-09-01 PROCEDURE — 86900 BLOOD TYPING SEROLOGIC ABO: CPT

## 2018-09-01 PROCEDURE — 0042T CT-CEREBRAL PERFUSION ANALYSIS: CPT

## 2018-09-01 PROCEDURE — 96375 TX/PRO/DX INJ NEW DRUG ADDON: CPT | Mod: XU

## 2018-09-01 PROCEDURE — 84484 ASSAY OF TROPONIN QUANT: CPT

## 2018-09-01 PROCEDURE — 99220 PR INITIAL OBSERVATION CARE,LEVL III: CPT | Performed by: HOSPITALIST

## 2018-09-01 RX ORDER — SODIUM PHOSPHATE,MONO-DIBASIC 19G-7G/118
500 ENEMA (ML) RECTAL
COMMUNITY
End: 2019-01-01

## 2018-09-01 RX ORDER — ONDANSETRON 4 MG/1
4 TABLET, ORALLY DISINTEGRATING ORAL EVERY 4 HOURS PRN
Status: DISCONTINUED | OUTPATIENT
Start: 2018-09-01 | End: 2018-01-01 | Stop reason: HOSPADM

## 2018-09-01 RX ORDER — LABETALOL HYDROCHLORIDE 5 MG/ML
10 INJECTION, SOLUTION INTRAVENOUS EVERY 4 HOURS PRN
Status: DISCONTINUED | OUTPATIENT
Start: 2018-09-01 | End: 2018-01-01 | Stop reason: HOSPADM

## 2018-09-01 RX ORDER — ENALAPRILAT 1.25 MG/ML
1.25 INJECTION INTRAVENOUS EVERY 6 HOURS PRN
Status: DISCONTINUED | OUTPATIENT
Start: 2018-09-01 | End: 2018-01-01 | Stop reason: HOSPADM

## 2018-09-01 RX ORDER — FINASTERIDE 5 MG/1
5 TABLET, FILM COATED ORAL DAILY
COMMUNITY

## 2018-09-01 RX ORDER — ATORVASTATIN CALCIUM 40 MG/1
40 TABLET, FILM COATED ORAL EVERY EVENING
Status: DISCONTINUED | OUTPATIENT
Start: 2018-09-01 | End: 2018-01-01 | Stop reason: HOSPADM

## 2018-09-01 RX ORDER — ONDANSETRON 2 MG/ML
4 INJECTION INTRAMUSCULAR; INTRAVENOUS EVERY 4 HOURS PRN
Status: DISCONTINUED | OUTPATIENT
Start: 2018-09-01 | End: 2018-01-01 | Stop reason: HOSPADM

## 2018-09-01 RX ORDER — BISACODYL 10 MG
10 SUPPOSITORY, RECTAL RECTAL
Status: DISCONTINUED | OUTPATIENT
Start: 2018-09-01 | End: 2018-01-01 | Stop reason: HOSPADM

## 2018-09-01 RX ORDER — ENALAPRILAT 1.25 MG/ML
1.25 INJECTION INTRAVENOUS EVERY 6 HOURS PRN
Status: DISCONTINUED | OUTPATIENT
Start: 2018-09-01 | End: 2018-09-01

## 2018-09-01 RX ORDER — TAMSULOSIN HYDROCHLORIDE 0.4 MG/1
0.4 CAPSULE ORAL
Status: DISCONTINUED | OUTPATIENT
Start: 2018-09-01 | End: 2018-01-01 | Stop reason: HOSPADM

## 2018-09-01 RX ORDER — CLOPIDOGREL BISULFATE 75 MG/1
75 TABLET ORAL DAILY
Status: DISCONTINUED | OUTPATIENT
Start: 2018-01-01 | End: 2018-01-01

## 2018-09-01 RX ORDER — POLYETHYLENE GLYCOL 3350 17 G/17G
1 POWDER, FOR SOLUTION ORAL
Status: DISCONTINUED | OUTPATIENT
Start: 2018-09-01 | End: 2018-01-01 | Stop reason: HOSPADM

## 2018-09-01 RX ORDER — FINASTERIDE 5 MG/1
5 TABLET, FILM COATED ORAL
Status: DISCONTINUED | OUTPATIENT
Start: 2018-01-01 | End: 2018-01-01 | Stop reason: HOSPADM

## 2018-09-01 RX ORDER — AMOXICILLIN 250 MG
2 CAPSULE ORAL 2 TIMES DAILY
Status: DISCONTINUED | OUTPATIENT
Start: 2018-09-01 | End: 2018-01-01 | Stop reason: HOSPADM

## 2018-09-01 RX ORDER — ACETAMINOPHEN 325 MG/1
650 TABLET ORAL EVERY 6 HOURS PRN
Status: DISCONTINUED | OUTPATIENT
Start: 2018-09-01 | End: 2018-01-01 | Stop reason: HOSPADM

## 2018-09-01 RX ADMIN — SENNOSIDES AND DOCUSATE SODIUM 2 TABLET: 8.6; 5 TABLET ORAL at 20:40

## 2018-09-01 RX ADMIN — ENALAPRILAT 1.25 MG: 1.25 INJECTION INTRAVENOUS at 22:00

## 2018-09-01 RX ADMIN — TAMSULOSIN HYDROCHLORIDE 0.4 MG: 0.4 CAPSULE ORAL at 20:40

## 2018-09-01 RX ADMIN — IOHEXOL 120 ML: 350 INJECTION, SOLUTION INTRAVENOUS at 17:15

## 2018-09-01 RX ADMIN — LABETALOL HYDROCHLORIDE 10 MG: 5 INJECTION, SOLUTION INTRAVENOUS at 20:43

## 2018-09-01 RX ADMIN — ATORVASTATIN CALCIUM 40 MG: 40 TABLET, FILM COATED ORAL at 20:40

## 2018-09-01 ASSESSMENT — PATIENT HEALTH QUESTIONNAIRE - PHQ9
2. FEELING DOWN, DEPRESSED, IRRITABLE, OR HOPELESS: NOT AT ALL
1. LITTLE INTEREST OR PLEASURE IN DOING THINGS: NOT AT ALL
SUM OF ALL RESPONSES TO PHQ9 QUESTIONS 1 AND 2: 0

## 2018-09-01 ASSESSMENT — COGNITIVE AND FUNCTIONAL STATUS - GENERAL
MOBILITY SCORE: 19
SUGGESTED CMS G CODE MODIFIER MOBILITY: CK
HELP NEEDED FOR BATHING: A LITTLE
STANDING UP FROM CHAIR USING ARMS: A LITTLE
SUGGESTED CMS G CODE MODIFIER DAILY ACTIVITY: CK
MOVING TO AND FROM BED TO CHAIR: A LITTLE
TOILETING: A LITTLE
MOVING FROM LYING ON BACK TO SITTING ON SIDE OF FLAT BED: A LITTLE
PERSONAL GROOMING: A LITTLE
CLIMB 3 TO 5 STEPS WITH RAILING: A LITTLE
DRESSING REGULAR UPPER BODY CLOTHING: A LITTLE
DRESSING REGULAR LOWER BODY CLOTHING: A LITTLE
WALKING IN HOSPITAL ROOM: A LITTLE
DAILY ACTIVITIY SCORE: 19

## 2018-09-01 ASSESSMENT — PAIN SCALES - GENERAL: PAINLEVEL_OUTOF10: 0

## 2018-09-01 ASSESSMENT — ENCOUNTER SYMPTOMS
CARDIOVASCULAR NEGATIVE: 1
GASTROINTESTINAL NEGATIVE: 1
SENSORY CHANGE: 1
HEADACHES: 1
MUSCULOSKELETAL NEGATIVE: 1
RESPIRATORY NEGATIVE: 1
PSYCHIATRIC NEGATIVE: 1
SPEECH CHANGE: 1
EYES NEGATIVE: 1

## 2018-09-01 ASSESSMENT — LIFESTYLE VARIABLES
EVER_SMOKED: NEVER
ALCOHOL_USE: NO

## 2018-09-01 NOTE — ED PROVIDER NOTES
ED Provider Note    Scribed for No att. providers found by Jennifer Boyle. 9/1/2018, 4:57 PM.    Primary care provider: Mervin Pompa M.D.  Means of arrival: Walk-in  History obtained from: Patient  History limited by: Some history provided by wife, poor historian    CHIEF COMPLAINT  Chief Complaint   Patient presents with   • Possible Stroke     Pt BIB family via wheelchair to triage for symptoms of possible stroke. pt reports left hand numbness/slurred speech that came on around 1555. Pt A&Ox4/ reports to be on Plavix.        HPI  Joe Rashid is a 89 y.o. male with a history of TIA who presents to the Emergency Department for evaluation of possible stroke. Wife states patient was fumbling with a ring of keys in his left hand at approximately 3:50PM today when he suddenly did not feel well. He developed numbness and weakness in the left hand, drooling, and slurred speech. This episode lasted 10-15 minutes. Wife contacted his doctor who recommended taking him in to the ER. Patient has a headache. He denies a personal history of CVA. He is able to recall the current month but unable to recall the current year. Patient is on Plavix.     Some history provided by wife. Patient is otherwise a poor historian.     REVIEW OF SYSTEMS  Pertinent positives include headache, left-sided numbness and weakness, drooling, slurred speech. Some history provided by wife. Patient is otherwise a poor historian.   C.     PAST MEDICAL HISTORY   has a past medical history of Atrial fibrillation (HCC); Chronic anticoagulation; Chronic kidney disease, stage III (moderate); Congestive heart failure (HCC); Hepatitis B; History of TIA (transient ischemic attack); and Hypertension.    SURGICAL HISTORY   has a past surgical history that includes other; appendectomy; colon resection; transurethral elec-surg prostatectom; cryosurg ablation of prostate; cataract extraction with iol; and hip hemiarthroplasty (Left, 1/27/2016).    SOCIAL  HISTORY  Social History   Substance Use Topics   • Smoking status: Never Smoker   • Smokeless tobacco: Never Used   • Alcohol use No      History   Drug Use No       FAMILY HISTORY  Family History   Problem Relation Age of Onset   • Heart Disease Brother        CURRENT MEDICATIONS  No current facility-administered medications for this encounter.     Current Outpatient Prescriptions:   •  lisinopril (PRINIVIL, ZESTRIL) 40 MG tablet, TAKE 1 TABLET BY MOUTH EVERY DAY, Disp: 90 Tab, Rfl: 3  •  metoprolol (LOPRESSOR) 25 MG Tab, TAKE 1 TABLET BY MOUTH TWICE DAILY, Disp: 180 Tab, Rfl: 3  •  Probiotic Product (PROBIOTIC DAILY PO), Take  by mouth., Disp: , Rfl:   •  Cholecalciferol (VITAMIN D PO), Take  by mouth., Disp: , Rfl:   •  clopidogrel (PLAVIX) 75 MG Tab, Take 1 Tab by mouth every day., Disp: 90 Tab, Rfl: 3  •  moxifloxacin (VIGAMOX) 0.5 % Solution, Place 1 Drop in both eyes every 2 hours while awake., Disp: 1 Bottle, Rfl: 0  •  metoprolol (LOPRESSOR) 25 MG Tab, TAKE 1 TABLET BY MOUTH TWICE DAILY, Disp: 180 Tab, Rfl: 3  •  Multiple Vitamins-Minerals (MULTIVITAMIN & MINERAL PO), Take 1 Capsule by mouth every day at 6 PM., Disp: , Rfl:   •  acetaminophen (TYLENOL) 500 MG Tab, Take 500-1,000 mg by mouth every 6 hours as needed for Mild Pain., Disp: , Rfl:   •  tamsulosin (FLOMAX) 0.4 MG capsule, Take 0.4 mg by mouth BEDTIME(S)., Disp: , Rfl:   •  Multiple Vitamins-Minerals (MULTI ADULT GUMMIES) Chew Tab, Take 1 Tab by mouth every day., Disp: , Rfl:     ALLERGIES  Allergies   Allergen Reactions   • Aspirin Hives and Rash     Pts grandson states rash and hives.   • Sulfa Drugs Hives and Rash     Pts grandson states rash and hives.       PHYSICAL EXAM  VITAL SIGNS: BP (!) 190/109   Pulse 72   Temp 36.3 °C (97.4 °F)   Resp 16   SpO2 94%     Constitutional: Well developed, Well nourished, No acute distress, Non-toxic appearance.   HENT: Normocephalic, Atraumatic, TMs normal, mucous membranes moist, no erythema, exudates,  swelling, or masses, nares patent  Eyes: nonicteric  Neck: Supple, no meningismus  Lymphatic: No lymphadenopathy noted.   Cardiovascular: Regular rate and rhythm, no gallops rubs or murmurs  Lungs: Clear bilaterally   Abdomen: Bowel sounds normal, Soft, No tenderness  Skin: Warm, Dry, no rash  Back: No tenderness, No CVA tenderness.   Genitalia: Deferred  Rectal: Deferred  Extremities: No edema  Neurologic: NIH 3. Speech normal, no aphasia. Appears to have some neglect towards the right side, intermittent drift in the left upper extremity. Sensation intact. Visual fields intact. Finger-nose-finger intact.   Psychiatric: Affect normal    DIAGNOSTIC STUDIES / PROCEDURES    LABS  Results for orders placed or performed during the hospital encounter of 09/01/18   CBC WITH DIFFERENTIAL   Result Value Ref Range    WBC 6.2 4.8 - 10.8 K/uL    RBC 5.35 4.70 - 6.10 M/uL    Hemoglobin 15.5 14.0 - 18.0 g/dL    Hematocrit 47.7 42.0 - 52.0 %    MCV 89.2 81.4 - 97.8 fL    MCH 29.0 27.0 - 33.0 pg    MCHC 32.5 (L) 33.7 - 35.3 g/dL    RDW 42.4 35.9 - 50.0 fL    Platelet Count 150 (L) 164 - 446 K/uL    MPV 9.3 9.0 - 12.9 fL    Neutrophils-Polys 65.60 44.00 - 72.00 %    Lymphocytes 20.10 (L) 22.00 - 41.00 %    Monocytes 11.30 0.00 - 13.40 %    Eosinophils 1.80 0.00 - 6.90 %    Basophils 0.60 0.00 - 1.80 %    Immature Granulocytes 0.60 0.00 - 0.90 %    Nucleated RBC 0.00 /100 WBC    Neutrophils (Absolute) 4.04 1.82 - 7.42 K/uL    Lymphs (Absolute) 1.24 1.00 - 4.80 K/uL    Monos (Absolute) 0.70 0.00 - 0.85 K/uL    Eos (Absolute) 0.11 0.00 - 0.51 K/uL    Baso (Absolute) 0.04 0.00 - 0.12 K/uL    Immature Granulocytes (abs) 0.04 0.00 - 0.11 K/uL    NRBC (Absolute) 0.00 K/uL   COMP METABOLIC PANEL   Result Value Ref Range    Sodium 136 135 - 145 mmol/L    Potassium 4.1 3.6 - 5.5 mmol/L    Chloride 102 96 - 112 mmol/L    Co2 26 20 - 33 mmol/L    Anion Gap 8.0 0.0 - 11.9    Glucose 115 (H) 65 - 99 mg/dL    Bun 27 (H) 8 - 22 mg/dL    Creatinine  1.12 0.50 - 1.40 mg/dL    Calcium 9.0 8.5 - 10.5 mg/dL    AST(SGOT) 20 12 - 45 U/L    ALT(SGPT) 20 2 - 50 U/L    Alkaline Phosphatase 59 30 - 99 U/L    Total Bilirubin 0.8 0.1 - 1.5 mg/dL    Albumin 4.1 3.2 - 4.9 g/dL    Total Protein 6.6 6.0 - 8.2 g/dL    Globulin 2.5 1.9 - 3.5 g/dL    A-G Ratio 1.6 g/dL   PROTHROMBIN TIME   Result Value Ref Range    PT 13.7 12.0 - 14.6 sec    INR 1.08 0.87 - 1.13   APTT   Result Value Ref Range    APTT 28.4 24.7 - 36.0 sec   TROPONIN   Result Value Ref Range    Troponin I <0.01 0.00 - 0.04 ng/mL   ESTIMATED GFR   Result Value Ref Range    GFR If African American >60 >60 mL/min/1.73 m 2    GFR If Non African American >60 >60 mL/min/1.73 m 2   All labs reviewed by me.    EKG  Obtained at 5:27 PM  Normal Sinus rhythm   Rate 71  Left axis with LVH  Intervals normal   No ST segment elevation or depression.   No T-wave inversions    RADIOLOGY  CT-CTA NECK WITH & W/O-POST PROCESSING   Final Result      CT angiogram of the neck within normal limits.      CT-CTA HEAD WITH & W/O-POST PROCESS   Final Result      1.  No evidence of intracranial vascular occlusion or aneurysm.      2.  Diminutive right vertebral artery.      3.  Normal variant fetal origin of the right posterior cerebral artery.      CT-HEAD W/O   Final Result      1.  No evidence of acute intracranial process.      2.  There is again seen interstitial pulmonary edema and bibasilar atelectasis.      CT-CEREBRAL PERFUSION ANALYSIS   Final Result      1.  CT perfusion examination over the limited section of brain reveals 0 mL of brain parenchyma has less than 30% of cerebral blood flow (CBF).      2.  Please note that the cerebral perfusion was performed on the limited brain tissue around the basal ganglia region. Infarct/ischemia outside the CT perfusion sections can be missed in this study.      DX-CHEST-PORTABLE (1 VIEW)    (Results Pending)   The radiologist's interpretation of all radiological studies have been reviewed by  me.    COURSE & MEDICAL DECISION MAKING  Nursing notes, VS, PMSFHx reviewed in chart.     4:57 PM Patient seen and examined at charge desk. The patient presents with stroke-like symptoms and the differential diagnosis includes but is not limited to CVA, TIA,. Ordered for DX Chest, CT Head, CT Cerebral Perfusion Analysis, CTA Head, CTA Neck, CBC, CMP, Prothrombin Time, APTT, COD, Troponin, Urinalysis, EKG to evaluate. Will obtain the rest of exam once patient is moved to room red 5.     5:19 PM Patient was evaluated by Dr. Wheeler, Neurology, at bedside who gave patient an NIH score of 1. No tPA.     5:38 PM Will hold ASA as patient has a documented allergy. Paging Hospitalist.     Decision Making:  This is a 89 y.o. year old male who presents with difficulty using his left hand as well as numbness in the left hand. He also had some dysarthria. This seemed to mostly resolve prior to arrival. On initial evaluation in triage she appear to have a stroke score of around 3, on subsequent evaluation by neurology history of score was 1. He appears to have very mild deficits. He is in the TPA window but does not have anythe TPA window but does not have any large deficits to suggest large deficits to suggest that TPA is indicated.  Patient has an allergy to aspirin so he did not receive it.  Patient's blood pressure is elevated and we are allowing this as he may have had  full stroke as opposed to TIA.    DISPOSITION:  Patient will be admitted to hospitalist in guarded condition.      FINAL IMPRESSION  1. Transient cerebral ischemia, unspecified type          I, Jennifer Boyle (Vangie), am scribing for, and in the presence of, No att. providers found.    Electronically signed by: Jennifer Boyle (Vangie), 9/1/2018    I, No att. providers found personally performed the services described in this documentation, as scribed by Jennifer Boyle in my presence, and it is both accurate and complete.    The note accurately  reflects work and decisions made by me.  Jose Jacques  9/1/2018  5:47 PM

## 2018-09-02 NOTE — ED TRIAGE NOTES
Chief Complaint   Patient presents with   • Possible Stroke     Pt BIB family via wheelchair to triage for symptoms of possible stroke. pt reports left hand numbness/slurred speech that came on around 1555. Pt A&Ox4/ reports to be on Plavix.

## 2018-09-02 NOTE — DISCHARGE PLANNING
Anticipated Discharge Disposition: Home with HH    Action: Met with pt at bedside to discuss HH. Pt agreeable to services and states he believes he's used [facility] in the past. Pt would like to use them again  Choice form completed and faxed to [facility]    Barriers to Discharge: HH acceptance     Plan: F/U with [facility]

## 2018-09-02 NOTE — DISCHARGE PLANNING
Aware of PMR referral from Dr. Kahlil Antonio. Left handed numbness, slurred speech. Symptoms resolved. MRI negative for acute infarct. Current chart documentation does not support diagnosis meeting CMS criteria for IRF level care. Chart notes do not reflect 2/3 therapy need meeting CMS criteria for IRF level care. This will not be forwarded to Physiatry for consult per protocol. If there are interval changes, please reach out to Rehab admission [phone]. Thank you for the referral.

## 2018-09-02 NOTE — PROGRESS NOTES
Monitor Summary: SR 67-79, WI .16, QRS .08, QT .40 with bigem,PAC & 1.3-1.4 second pause per strip from monitor room

## 2018-09-02 NOTE — THERAPY
"89 y/o mlae adm for lurred speech dx: TIA. Intact motor and sensory components BLE. Intact balance with level ground amb with FWW. To benefit from home health sevices. No further  acute PT services required a this time.    Physical Therapy Evaluation completed.   Bed Mobility:  Supine to Sit: Supervised  Transfers: Sit to Stand: Supervised  Gait: Level Of Assist: Supervised with Front-Wheel Walker       Plan of Care: Patient with no further skilled PT needs in the acute care setting at this time  Discharge Recommendations: Equipment: No Equipment Needed. Post-acute therapy Discharge to home with  home health for additional skilled therapy services.    See \"Rehab Therapy-Acute\" Patient Summary Report for complete documentation.     "

## 2018-09-02 NOTE — CARE PLAN
Problem: Risk of Aspiration  Goal: Absence of aspiration  Outcome: PROGRESSING AS EXPECTED  Dysphagia screening performed PO meds given     Problem: Risk for Deep Vein Thrombosis/Venous Thromboembolism  Goal: DVT/VTE Prevention Measures in Place  Outcome: PROGRESSING AS EXPECTED  SCDs in use

## 2018-09-02 NOTE — PROGRESS NOTES
Pt arrived to unit with transport. Pt A&Ox4, denies pain, N/T. Pt ambulates x1 hand held assist to restroom. Pt passed dysphagia screening, on regular diet. Tele monitor and SCD's in place. Stroke education book provided. Pt and family updated on plan of care, all questions answered at this time. Bed alarm on, call light within reach, hourly rounding in place.

## 2018-09-02 NOTE — PROGRESS NOTES
Talked to DIONNA regarding DC, DIONNA is coordinating with [facility] to see if they accept over the weekend. Dr. Thompson notified and states that HH must be in place for DC.

## 2018-09-02 NOTE — CARE PLAN
Problem: Safety  Goal: Will remain free from injury  Outcome: PROGRESSING AS EXPECTED  Patient has been educated on fall risk and verbalizes understanding. Bed alarm on, treaded socks, bed in lowest position  Goal: Will remain free from falls  Outcome: PROGRESSING AS EXPECTED  Patient calls appropriately, treaded socks on, bed in lowest position

## 2018-09-02 NOTE — PROGRESS NOTES
Patient Diagnosis and Management daily plan per neurology:      1) Transient Left numbness: Patient with PAF and episodes of TIA, never anticoagulated  NIH 0  Today he does not remember me, despite of spending a long time in the ER with me  This raises the possibility of possible seizures as the reason for the TIA as well.  Therefore : Eliquis 5 mg BID; neurology as outpatient with bridge clinic with EEG to be schedule.  If EEG is abnormal, then they will consider AEDs for treatment    Neurology signs off.    Complete neurological note to support plan is below.    SUBJECTIVE:No complains, no deficit        ROS:Unchanged      PHYSICAL EXAMINATION:    GENERAL:lying in bed ; taking in the phone, resting comfortably     CARDIOVASCULAR:S1,S2, no AFIB but seeing with bigeminy in telemetry    PULMONARY:CTA      EXTREMITIES:No edema    NEUROLOGICAL:    MENTAL:awake, alert, some memory recollection problem      CRANIAL NERVES:2-12 Normal.    MOTOR:5/5    SENSORY:Intact to all     DTR:+1    BABINSKI:neg    Movements: No abnormal noticed.    CEREBELLAR:  No   dysmetria     GAIT: deferred.                      Vitals:    09/01/18 2115 09/02/18 0000 09/02/18 0400 09/02/18 0800   BP: (!) 175/131 148/61 143/58 (!) 176/92   Pulse:  66 (!) 54 85   Resp:  20 20 16   Temp:  36.7 °C (98.1 °F) 36.5 °C (97.7 °F) 36.8 °C (98.2 °F)   SpO2:  91% 90% 94%   Weight:       Height:                      Imaging: neuroimaging reviewed and directly visualized by me  DX-CHEST-PORTABLE (1 VIEW)   Final Result      Cardiomegaly.      CT-CTA NECK WITH & W/O-POST PROCESSING   Final Result      CT angiogram of the neck within normal limits.      CT-CTA HEAD WITH & W/O-POST PROCESS   Final Result      1.  No evidence of intracranial vascular occlusion or aneurysm.      2.  Diminutive right vertebral artery.      3.  Normal variant fetal origin of the right posterior cerebral artery.      CT-HEAD W/O   Final Result      1.  No evidence of acute  intracranial process.      2.  There is again seen interstitial pulmonary edema and bibasilar atelectasis.      CT-CEREBRAL PERFUSION ANALYSIS   Final Result      1.  CT perfusion examination over the limited section of brain reveals 0 mL of brain parenchyma has less than 30% of cerebral blood flow (CBF).      2.  Please note that the cerebral perfusion was performed on the limited brain tissue around the basal ganglia region. Infarct/ischemia outside the CT perfusion sections can be missed in this study.      ECHOCARDIOGRAM COMP W/O CONT    (Results Pending)   MR-BRAIN-W/O    (Results Pending)                     Callie Sanchez M.D.    Diplomate Neurology, Vascular Neurology and Neurophysiology

## 2018-09-02 NOTE — FACE TO FACE
Face to Face Supporting Documentation - Home Health    The encounter with this patient was in whole or in part the primary reason for home health admission.    Date of encounter:   Patient:                    MRN:                       YOB: 2018  Joe Rashid  [id number]  1/7/1920     Home health to see patient for:  Skilled Nursing care for assessment, interventions & education  PT/OT  Skilled need for:  Medication Management stroke, par afib be on Eliquis    Skilled nursing interventions to include:  Comment: as above    Homebound status evidenced by:  Needs the assistance of another person in order to leave the home. Leaving home requires a considerable and taxing effort. There is a normal inability to leave the home.    Community Physician to provide follow up care: Mervin Pompa M.D.     Optional Interventions? No      I certify the face to face encounter for this home health care referral meets the CMS requirements and the encounter/clinical assessment with the patient was, in whole, or in part, for the medical condition(s) listed above, which is the primary reason for home health care. Based on my clinical findings: the service(s) are medically necessary, support the need for home health care, and the homebound criteria are met.  I certify that this patient has had a face to face encounter by myself.  Cristo Thompson M.D. - NPI: [id number]

## 2018-09-02 NOTE — PROGRESS NOTES
2 RN skin check performed. Area of blanchable redness noted to sacrum. No other signs of skin break down noted.

## 2018-09-02 NOTE — ED NOTES
Pt transferred to [id number], Roshan ORTIZ aware of pt's pending arrival, all belongings accounted for.

## 2018-09-02 NOTE — ED NOTES
Report received from Milly ORTIZ. Pt. Assisted multiple times to standing position at bedside to use urinal. Pt.unsteady on feet. Call light within reach. Will continue to monitor.

## 2018-09-02 NOTE — CONSULTS
CC:  :stroke alert    Date of Admission: 9/1/2018    Today's Date: 09/01/18    Consulting Physician: Jose Jacques M.D.      HPI:    Joe Rashid is a 89 y.o. male r handed male, with history of A FIB, only on Plavix due to  bleeding in the past, who has had TIAs; and comes with sudden left arm numb and weak and slurred speech  By the time of arrival to the CT, and my evaluation he had NIH 1 and TPA was not given  CTA no significant.  History of  AFIB, and previous TIA, no OAC due to  bleed    ROS:   Positive Left arm numb, and dysarthria, mild confusion  Pertinent negative: no headache, no chest pain, no SOB        Past Medical History:   Past Medical History:   Diagnosis Date   • Atrial fibrillation (HCC)    • Chronic anticoagulation    • Chronic kidney disease, stage III (moderate)    • Congestive heart failure (HCC)    • Hepatitis B    • History of TIA (transient ischemic attack)    • Hypertension        Past Surgical History:   Past Surgical History:   Procedure Laterality Date   • HIP HEMIARTHROPLASTY Left 1/27/2016    Procedure: HIP HEMIARTHROPLASTY;  Surgeon: Adis Martínez M.D.;  Location: SURGERY [facility];  Service:    • APPENDECTOMY     • CATARACT EXTRACTION WITH IOL      both eyes   • COLON RESECTION     • OTHER      sinus surgery of unknown type   • PB CRYOSURG ABLATION OF PBOSTATE     • PB TRANSURETHRAL ELEC-SURG PBOSTATECTOM         Social History:   Social History     Social History   • Marital status:      Spouse name: N/A   • Number of children: N/A   • Years of education: N/A     Occupational History   • Not on file.     Social History Main Topics   • Smoking status: Never Smoker   • Smokeless tobacco: Never Used   • Alcohol use No   • Drug use: No   • Sexual activity: Not on file     Other Topics Concern   • Not on file     Social History Narrative   • No narrative on file       Family History:   Family History   Problem Relation Age of Onset   • Heart Disease Brother   "      Allergies:   Allergies   Allergen Reactions   • Aspirin Hives and Rash     Pts grandson states rash and hives.   • Sulfa Drugs Hives and Rash     Pts grandson states rash and hives.       No current facility-administered medications for this encounter.     Current Outpatient Prescriptions:   •  lisinopril (PRINIVIL, ZESTRIL) 40 MG tablet, TAKE 1 TABLET BY MOUTH EVERY DAY, Disp: 90 Tab, Rfl: 3  •  metoprolol (LOPRESSOR) 25 MG Tab, TAKE 1 TABLET BY MOUTH TWICE DAILY, Disp: 180 Tab, Rfl: 3  •  Probiotic Product (PROBIOTIC DAILY PO), Take  by mouth., Disp: , Rfl:   •  Cholecalciferol (VITAMIN D PO), Take  by mouth., Disp: , Rfl:   •  clopidogrel (PLAVIX) 75 MG Tab, Take 1 Tab by mouth every day., Disp: 90 Tab, Rfl: 3  •  moxifloxacin (VIGAMOX) 0.5 % Solution, Place 1 Drop in both eyes every 2 hours while awake., Disp: 1 Bottle, Rfl: 0  •  metoprolol (LOPRESSOR) 25 MG Tab, TAKE 1 TABLET BY MOUTH TWICE DAILY, Disp: 180 Tab, Rfl: 3  •  Multiple Vitamins-Minerals (MULTIVITAMIN & MINERAL PO), Take 1 Capsule by mouth every day at 6 PM., Disp: , Rfl:   •  acetaminophen (TYLENOL) 500 MG Tab, Take 500-1,000 mg by mouth every 6 hours as needed for Mild Pain., Disp: , Rfl:   •  tamsulosin (FLOMAX) 0.4 MG capsule, Take 0.4 mg by mouth BEDTIME(S)., Disp: , Rfl:   •  Multiple Vitamins-Minerals (MULTI ADULT GUMMIES) Chew Tab, Take 1 Tab by mouth every day., Disp: , Rfl:       PHYSICAL EXAM    Vitals:    09/01/18 1652 09/01/18 1714   BP: (!) 190/109    Pulse: 72    Resp: 16    Temp: 36.3 °C (97.4 °F)    SpO2: 94%    Weight:  70.3 kg (155 lb)   Height:  1.778 m (5' 10\")       Head/Neck: NCAT. no meningismus neg kernig neg brudzinski. No obvious mass or heard bruit. No tender arteries or lost pulses. No rash of head or neck.  Has Kyphosis     Cardiovascular: Regular, rhythmic, no AFIB    Pulmonary: Normal breath sounds    Extremities: Normal inspection, No edema, normal pulses    Skin: Warm, dry, intact. No rashes observed " head/neck or body  No stigmata  Mild eczema     Eyes/Funduscopic: no papilledema or atrophy. Norm post segments.    Mental Status: Awake, alert, oriented x 3. Name/repeat/fluent/command follows. No neglect/extinction. Attention and concentration, recent & remote memory, Fund of Knowledge wnl     Cranial Nerves: CN II-XII intact. Pupillary reflex + 4  No afferent pupillary defect. EOM full;  VF full; No nystagmus.       Motor:  5/5,      Sensory: symmetric to all modalities.  Coordination: dysmetria absent    DTR's: + ;  no clonus.    Babinski: neg    Gait/Station: n/a fall concern       NIHSS:     1a: level of conciousness0  1b:month/age1  1c:open eyes/close hand0  2. Best gaze0  3:Visual fields0  4: facial palsy0  5: a motor left arm0  5 b: motor right arm0  6 a: motor left leg0  6 b : motor right leg0  7: limb ataxia0  8: sensory0  9: best language1  10: dysarthria-  11: extinction /neglect:0     Total:   1    Labs:  Recent Labs      09/01/18   1659   WBC  6.2   RBC  5.35   HEMOGLOBIN  15.5   HEMATOCRIT  47.7   MCV  89.2   MCH  29.0   MCHC  32.5*   RDW  42.4   PLATELETCT  150*   MPV  9.3         Recent Labs      09/01/18   1659   APTT  28.4   INR  1.08         Recent Labs      09/01/18   1659   TROPONINI  <0.01         No results for input(s): SODIUM, POTASSIUM, CHLORIDE, CO2, GLUCOSE, BUN, CPKTOTAL in the last 72 hours.  No results for input(s): SODIUM, POTASSIUM, CHLORIDE, CO2, BUN, CREATININE, MAGNESIUM, PHOSPHORUS, CALCIUM in the last 72 hours.  Recent Labs      09/01/18   1659   APTT  28.4   INR  1.08     Results for orders placed or performed during the hospital encounter of 05/17/17   Echocardiogram Comp W/O cont   Result Value Ref Range    Eject.Frac. MOD BP 41.28     Eject.Frac. MOD 4C 47.38     Eject.Frac. MOD 2C 43.59     Left Ventrical Ejection Fraction 60               Imaging: neuroimaging reviewed and directly visualized by me  CT-CTA HEAD WITH & W/O-POST PROCESS   Final Result      1.  No evidence  of intracranial vascular occlusion or aneurysm.      2.  Diminutive right vertebral artery.      3.  Normal variant fetal origin of the right posterior cerebral artery.      CT-HEAD W/O   Final Result      1.  No evidence of acute intracranial process.      2.  There is again seen interstitial pulmonary edema and bibasilar atelectasis.      CT-CEREBRAL PERFUSION ANALYSIS   Final Result      1.  CT perfusion examination over the limited section of brain reveals 0 mL of brain parenchyma has less than 30% of cerebral blood flow (CBF).      2.  Please note that the cerebral perfusion was performed on the limited brain tissue around the basal ganglia region. Infarct/ischemia outside the CT perfusion sections can be missed in this study.      DX-CHEST-PORTABLE (1 VIEW)    (Results Pending)   CT-CTA NECK WITH & W/O-POST PROCESSING    (Results Pending)       Assessment/Plan:    R TIA: with slurred speech, difficulty thinking and problems with the Left hand, that lasted for 1 hour.    Arrived to ER and after CT and CTA and the time of my evaluation at 5:15 am the patient only deficit was an error on naming in two objects  He cannot get ASA as he gets hives.  Admit to floor, for stroke monitoring.  Since he is A FIB, no history of falling, and no more  blood, and resolution of UTI ( that caused the hematuria), this patient will be recommended to get full anticoagulation if MRI is negative tomorrow.  He also needs, ECHO.  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    A FIB: Currently on sinus rhythm, likely PAF, ECHO to be done.  Will recommend eliquis 5mg BID if MRI is negative.    Callie Sanchez M.D.    Clinical Professor, [employer]  Diplomate, Neurology , Vascular Neurology, Neuphysiology

## 2018-09-02 NOTE — ASSESSMENT & PLAN NOTE
Plavix  Statin  Neuroloogy recommends Eliquis. Reviewed with Pharmacy and guidelines state that given age, weight, kidney function he should be in full dose. However he also has a bleeding risk, I.e., age>75, history of CKD Stage 3, and him being started on Plavix, I will opt to reduce to 2.5mg for now.

## 2018-09-02 NOTE — ASSESSMENT & PLAN NOTE
Home medications and titrate  after 24 hours permissive hypertension  Stable continue her home medications.

## 2018-09-02 NOTE — H&P
Hospital Medicine History & Physical Note    Date of Service  9/1/2018    Primary Care Physician  Mervin Pompa M.D.    Consultants  Neurology    Code Status  DNR    Chief Complaint  Left hand numbness    History of Presenting Illness  89 y.o. male who presented 9/1/2018 with past history of TIA, atrial fibrillation comes to the emergency department complaining of left hand numbness ongoing for 15 minutes. There is also associated slurred speech .patient brought to the emergency room  and his symptoms had already resolved. Patient was seen by neurology. Was not a TPA candidate. Patient was on Plavix as he has aspirin intolerance/allergy.    's only other complaint was headache . Mild bitemporal dull ache, intensity 4/ 10, no radiation          Review of Systems  Review of Systems   Constitutional: Positive for malaise/fatigue.   HENT: Negative.    Eyes: Negative.    Respiratory: Negative.    Cardiovascular: Negative.    Gastrointestinal: Negative.    Genitourinary: Negative.    Musculoskeletal: Negative.    Skin: Negative.    Neurological: Positive for sensory change, speech change and headaches.   Psychiatric/Behavioral: Negative.    All other systems reviewed and are negative.      Past Medical History   has a past medical history of Atrial fibrillation (HCC); Chronic anticoagulation; Chronic kidney disease, stage III (moderate); Congestive heart failure (HCC); Hepatitis B; History of TIA (transient ischemic attack); and Hypertension. He also has no past medical history of Diabetes.    Surgical History   has a past surgical history that includes other; appendectomy; colon resection; pr transurethral elec-surg prostatectom; pr cryosurg ablation of prostate; cataract extraction with iol; and hip hemiarthroplasty (Left, 1/27/2016).     Family History  family history includes Heart Disease in his brother.     Social History   reports that he has never smoked. He has never used smokeless tobacco. He reports that he  does not drink alcohol or use drugs.    Allergies  Allergies   Allergen Reactions   • Aspirin Hives and Rash     Pts grandson states rash and hives.   • Sulfa Drugs Hives and Rash     Pts grandson states rash and hives.       Medications  Prior to Admission Medications   Prescriptions Last Dose Informant Patient Reported? Taking?   Multiple Vitamins-Minerals (MULTIVITAMIN & MINERAL PO) 9/1/2018 at 1130 Patient Yes No   Sig: Take 1 Capsule by mouth every day before lunch.   Probiotic Product (PROBIOTIC DAILY PO) 8/31/2018 at 1730 Patient Yes No   Sig: Take 1 Tab by mouth every evening.   clopidogrel (PLAVIX) 75 MG Tab 9/1/2018 at 0700 Patient No No   Sig: Take 1 Tab by mouth every day.   finasteride (PROSCAR) 5 MG Tab 9/1/2018 at 1130 Patient Yes Yes   Sig: Take 5 mg by mouth every day with lunch.   glucosamine Sulfate 500 MG Cap 8/31/2018 at 1130 Patient Yes Yes   Sig: Take 500 mg by mouth every day with lunch.   lisinopril (PRINIVIL, ZESTRIL) 40 MG tablet 9/1/2018 at 0700 Patient No No   Sig: TAKE 1 TABLET BY MOUTH EVERY DAY   metoprolol (LOPRESSOR) 25 MG Tab 9/1/2018 at 0700 Patient No No   Sig: TAKE 1 TABLET BY MOUTH TWICE DAILY   tamsulosin (FLOMAX) 0.4 MG capsule 8/31/2018 at 2000 Patient Yes No   Sig: Take 0.4 mg by mouth BEDTIME(S).   vitamin D (CHOLECALCIFEROL) 1000 UNIT Tab 8/31/2018 at 2000 Patient Yes Yes   Sig: Take 1,000 Units by mouth every bedtime.      Facility-Administered Medications: None       Physical Exam  Blood Pressure : (!) 190/109   Temperature: 36.3 °C (97.4 °F)   Pulse: 72   Respiration: 16   Pulse Oximetry: 94 %     Physical Exam   Constitutional: He is oriented to person, place, and time. He appears well-developed and well-nourished. No distress.   HENT:   Head: Normocephalic and atraumatic.   Mouth/Throat: No oropharyngeal exudate.   Eyes: Left eye exhibits no discharge. No scleral icterus.   Neck: No JVD present. No tracheal deviation present. No thyromegaly present.   Cardiovascular:  Intact distal pulses.  Exam reveals no gallop and no friction rub.    No murmur heard.  Irregular regular   Pulmonary/Chest: Effort normal and breath sounds normal. No respiratory distress. He has no wheezes.   Abdominal: Soft. He exhibits no distension. There is no tenderness. There is no rebound.   Musculoskeletal: He exhibits no edema or deformity.   Neurological: He is alert and oriented to person, place, and time. No cranial nerve deficit. Coordination normal.   Skin: No rash noted. He is not diaphoretic. No erythema. No pallor.   Psychiatric: He has a normal mood and affect.       Laboratory:  Recent Labs      09/01/18   1659   WBC  6.2   RBC  5.35   HEMOGLOBIN  15.5   HEMATOCRIT  47.7   MCV  89.2   MCH  29.0   MCHC  32.5*   RDW  42.4   PLATELETCT  150*   MPV  9.3     Recent Labs      09/01/18   1659   SODIUM  136   POTASSIUM  4.1   CHLORIDE  102   CO2  26   GLUCOSE  115*   BUN  27*   CREATININE  1.12   CALCIUM  9.0     Recent Labs      09/01/18   1659   ALTSGPT  20   ASTSGOT  20   ALKPHOSPHAT  59   TBILIRUBIN  0.8   GLUCOSE  115*     Recent Labs      09/01/18   1659   APTT  28.4   INR  1.08             Lab Results   Component Value Date    TROPONINI <0.01 09/01/2018       Urinalysis:    Recent Labs      09/01/18   1737   SPECGRAVITY  1.024   GLUCOSEUR  Negative   KETONES  Negative   NITRITE  Negative   LEUKESTERAS  Negative        Imaging:  DX-CHEST-PORTABLE (1 VIEW)   Final Result      Cardiomegaly.      CT-CTA NECK WITH & W/O-POST PROCESSING   Final Result      CT angiogram of the neck within normal limits.      CT-CTA HEAD WITH & W/O-POST PROCESS   Final Result      1.  No evidence of intracranial vascular occlusion or aneurysm.      2.  Diminutive right vertebral artery.      3.  Normal variant fetal origin of the right posterior cerebral artery.      CT-HEAD W/O   Final Result      1.  No evidence of acute intracranial process.      2.  There is again seen interstitial pulmonary edema and bibasilar  atelectasis.      CT-CEREBRAL PERFUSION ANALYSIS   Final Result      1.  CT perfusion examination over the limited section of brain reveals 0 mL of brain parenchyma has less than 30% of cerebral blood flow (CBF).      2.  Please note that the cerebral perfusion was performed on the limited brain tissue around the basal ganglia region. Infarct/ischemia outside the CT perfusion sections can be missed in this study.      ECHOCARDIOGRAM COMP W/O CONT    (Results Pending)   MR-BRAIN-W/O    (Results Pending)         Assessment/Plan:  I anticipate this patient is appropriate for observation status at this time.    * TIA (transient ischemic attack)- (present on admission)   Assessment & Plan    Neuro checks    Speech therapy   Physical Therapy  Occupational therapy    Plavix daily  MRI brain    Echo    Consider long-term coagulation 24-48 hours after MRI results      Permissive hypertension for 24 hours        Chronic kidney disease, stage III (moderate)- (present on admission)   Assessment & Plan    Avoid nephrotoxins        BPH (benign prostatic hyperplasia)- (present on admission)   Assessment & Plan    Proscar    Flomax        Paroxysmal atrial fibrillation (HCC)- (present on admission)   Assessment & Plan    Rate is controlled     Consider long-term coagulation after neurology clearance        Essential hypertension, benign- (present on admission)   Assessment & Plan    Home medications and titrate  after 24 hours permissive hypertension            VTE prophylaxis:scd

## 2018-09-02 NOTE — DISCHARGE SUMMARY
Discharge Summary    CHIEF COMPLAINT ON ADMISSION  Chief Complaint   Patient presents with   • Possible Stroke     Pt BIB family via wheelchair to triage for symptoms of possible stroke. pt reports left hand numbness/slurred speech that came on around 1555. Pt A&Ox4/ reports to be on Plavix.        Reason for Admission  Possible Stroke     Admission Date  9/1/2018    CODE STATUS  DNAR/DNI    HPI & HOSPITAL COURSE  This is a 89 y.o. male here with Possible Stroke (Pt BIB family via wheelchair to triage for symptoms of possible stroke. pt reports left hand numbness/slurred speech that came on around 1555. Pt A&Ox4/ reports to be on Plavix. )    Please review Dr. Kahlil Antonio M.D. notes for further details of history of present illness, past medical/social/family histories, allergies and medications. Please review Dr. Tam, Neurology consultation notes.    89 y.o. male admitted 9/1/2018 with Possible Stroke (Pt BIB family via wheelchair to triage for symptoms of possible stroke. pt reports left hand numbness/slurred speech that came on around 1555. Pt A&Ox4/ reports to be on Plavix. )  History of TIA and paroxysmal atiral fibrillation not on anticoagulation.  Presents with L hand numbness, dysarthria.  Symptoms resolved at ED.  Head CT no intracranial bleed  EKG sinus but in the past, has Afib  CTA head and neck, unremarkable  MRI No evidence of acute infarction, hemorrhage, or mass lesion and overall, no acute findings and no significant change since MRI brain 5/17/2017.  Echocardiogram normal EF, normal wall motion but has severe pulmonary hypertension  Plavix was started as she has an aspirin allergy (hives). Neurology consulted. Anticoagulation recommended. Eliquis started. Follow up recommended. Neurology signed off.  Blood pressures were elevated because of permissive hypertension. Since MRI is negative and he doesn;t have any current symptoms, his lisinopril and metoprolol were restarted. His blood  pressures remained stable and was 110-120 systolic blood pressure at discharge on his chelita edoses of medications. He was not lightheaded and ambulated with walker at discharge.. PT/OT saw him. He is planned for [facility]. [facility] accepted him. His [facility] can record his blood pressures for primary provider to review.    His hospital course complicated by incontinence. He has known prostate issues. He was started to Flomax. He can follow up with primary provider who can refer him to Urology if needed.     He is ambulating with walker and PT, afebrile, hemodynamically stable, tolerating food and moving bowels. Joe Rashid wanted to be discharged today.    Discharge Physical Exam  General/Constitutional: No acute distress. Elderly, frail, somewhat thin.   Eyes: Conjunctivae and EOM are normal. No scleral icterus.   Neck: Normal range of motion. Neck supple.   Cardiovascular: Normal rate and regular rhythm.  Exam reveals no gallop and no friction rub.    No murmur heard.  Irregular   Pulmonary/Chest: Effort normal and breath sounds normal. No respiratory distress. He has no wheezes. He has no rales.   Abdominal: Soft. Bowel sounds are normal. He exhibits no distension. There is no tenderness. There is no rebound and no guarding.   Musculoskeletal: He exhibits no edema or tenderness.   Neurological: He is alert.   Cognitive deficits   Skin: Skin is warm.   Psychiatric: He has a normal mood and affect. His behavior is normal.   Pleasant     Imaging  ECHOCARDIOGRAM COMP W/O CONT   Final Result      MR-BRAIN-W/O   Final Result      1.  Moderate-advanced cerebral atrophy. Age-appropriate for patient's stated age of 89 years.   2.  Mild-moderate supratentorial white matter disease consistent with microvascular ischemic change. Stable findings since 5/17/2017.   3.  No evidence of acute infarction, hemorrhage, or mass lesion.   4.  Overall, no acute findings and no significant change since MRI brain 5/17/2017.       DX-CHEST-PORTABLE (1 VIEW)   Final Result      Cardiomegaly.      CT-CTA NECK WITH & W/O-POST PROCESSING   Final Result      CT angiogram of the neck within normal limits.      CT-CTA HEAD WITH & W/O-POST PROCESS   Final Result      1.  No evidence of intracranial vascular occlusion or aneurysm.      2.  Diminutive right vertebral artery.      3.  Normal variant fetal origin of the right posterior cerebral artery.      CT-HEAD W/O   Final Result      1.  No evidence of acute intracranial process.      2.  There is again seen interstitial pulmonary edema and bibasilar atelectasis.      CT-CEREBRAL PERFUSION ANALYSIS   Final Result      1.  CT perfusion examination over the limited section of brain reveals 0 mL of brain parenchyma has less than 30% of cerebral blood flow (CBF).      2.  Please note that the cerebral perfusion was performed on the limited brain tissue around the basal ganglia region. Infarct/ischemia outside the CT perfusion sections can be missed in this study.                      Therefore, he is discharged in good and stable condition to home with organized home healthcare and close outpatient follow-up.      Discharge Date  9/3/18    FOLLOW UP ITEMS POST DISCHARGE      DISCHARGE DIAGNOSES  Principal Problem:    TIA (transient ischemic attack) POA: Yes  Active Problems:    Chronic kidney disease, stage III (moderate) (Chronic) POA: Yes    Essential hypertension, benign POA: Yes    Paroxysmal atrial fibrillation (HCC) POA: Yes    BPH (benign prostatic hyperplasia) POA: Yes      FOLLOW UP  Future Appointments  Date Time Provider Department Center   12/18/2018 8:45 AM Chaz Linder M.D. [facility] None     No follow-up provider specified.    Plan to discharge tomorrow if blood pressures remain stable.   Follow up with Mervin Pompa M.D. In 1 week. Defer Urology referral to him for incontinence.  Follow up Dr. Tam or stroke bridge clinic in 3-4 weeks.  Follow up to anticaogulation clinic on  Tuesday, 9/4 for Eliquis education and initiation.    Instruct Joe Rashid to return to the ER in the event of worsening symptoms.  the patient on the importance of compliance and the patient has agreed to proceed with all medical recommendations and follow up plan indicated above.    the patient that all medications come with benefits and risks. Risks may include permanent injury or death and these risks can be minimized with close reassessment and monitoring.    MEDICATIONS ON DISCHARGE     Medication List      START taking these medications      Instructions   acetaminophen 325 MG Tabs  Commonly known as:  TYLENOL   Take 2 Tabs by mouth every 6 hours as needed (Mild Pain; (Pain scale 1-3); Temp greater than 100.5 F).  Dose:  650 mg     apixaban 2.5mg Tabs  Commonly known as:  ELIQUIS   Take 1 Tab by mouth 2 Times a Day.  Dose:  2.5 mg     atorvastatin 40 MG Tabs  Commonly known as:  LIPITOR   Take 1 Tab by mouth every evening.  Dose:  40 mg        CONTINUE taking these medications      Instructions   clopidogrel 75 MG Tabs  Commonly known as:  PLAVIX   Take 1 Tab by mouth every day.  Dose:  75 mg     finasteride 5 MG Tabs  Commonly known as:  PROSCAR   Take 5 mg by mouth every day with lunch.  Dose:  5 mg     FLOMAX 0.4 MG capsule  Generic drug:  tamsulosin   Take 0.4 mg by mouth BEDTIME(S).  Dose:  0.4 mg     glucosamine Sulfate 500 MG Caps   Take 500 mg by mouth every day with lunch.  Dose:  500 mg     lisinopril 40 MG tablet  Commonly known as:  PRINIVIL, ZESTRIL   TAKE 1 TABLET BY MOUTH EVERY DAY     metoprolol 25 MG Tabs  Commonly known as:  LOPRESSOR   Doctor's comments:  **Patient requests 90 days supply**  TAKE 1 TABLET BY MOUTH TWICE DAILY     MULTIVITAMIN & MINERAL PO   Take 1 Capsule by mouth every day before lunch.  Dose:  1 Capsule     PROBIOTIC DAILY PO   Take 1 Tab by mouth every evening.  Dose:  1 Tab     vitamin D 1000 UNIT Tabs  Commonly known as:  cholecalciferol   Take  1,000 Units by mouth every bedtime.  Dose:  1000 Units            Allergies  Allergies   Allergen Reactions   • Aspirin Hives and Rash     Pts grandson states rash and hives.   • Sulfa Drugs Hives and Rash     Pts grandson states rash and hives.       DIET  Orders Placed This Encounter   Procedures   • Diet Order Regular     Standing Status:   Standing     Number of Occurrences:   1     Order Specific Question:   Diet:     Answer:   Regular [1]       ACTIVITY  As per PT/OT at [facility]    CONSULTATIONS  Neurology    PROCEDURES  Ct-cta Head With & W/o-post Process    Result Date: 9/1/2018 9/1/2018 5:02 PM HISTORY/REASON FOR EXAM:  Slurred speech and left-sided weakness. TECHNIQUE/EXAM DESCRIPTION: CT angiogram of the [facility] [place] without and with contrast.  Initial precontrast images were obtained of the head from the skull base through the vertex.  Postcontrast images were obtained of the [facility] [place] following the power injection of nonionic contrast at 5.0 mL/sec. Thin-section helical images were obtained with overlapping reconstruction interval. Coronal and sagittal multiplanar volume reformats were generated.  3D angiographic images were reviewed on PACS.  Maximum intensity projection (MIP) images were generated and reviewed. 120 mL of Omnipaque 350 nonionic contrast was injected intravenously. Low dose optimization technique was utilized for this CT exam including automated exposure control and adjustment of the mA and/or kV according to patient size. COMPARISON:  None. FINDINGS: The right vertebral artery is very small in size. The left vertebral and basilar arteries are patent. There is normal variant fetal origin of the right posterior cerebral artery. The posterior cerebral arteries are patent bilaterally. There is atherosclerotic plaque involving the internal carotid arteries bilaterally. The anterior and middle cerebral arteries are patent bilaterally. No evidence of intracranial hemorrhage.     1.  No  evidence of intracranial vascular occlusion or aneurysm. 2.  Diminutive right vertebral artery. 3.  Normal variant fetal origin of the right posterior cerebral artery.    Ct-cta Neck With & W/o-post Processing    Result Date: 9/1/2018 9/1/2018 5:02 PM HISTORY/REASON FOR EXAM:  Neurological Deficit Slurred speech. Left-sided weakness. TECHNIQUE/EXAM DESCRIPTION: CT angiogram of the neck with contrast. Postcontrast images were obtained of the neck from the great vessels through the skull base following the power injection of nonionic contrast at 5.0 mL/sec. Thin-section helical images were obtained with overlapping reconstruction interval. Coronal and oblique multiplanar volume reformats were generated. Cervical internal carotid artery percent stenosis is calculated using the standard method according to the NASCET criteria wherein a segment of uniform caliber mid or distal cervical internal carotid is used as the reference denominator. 3D angiographic images were reviewed on PACS.  Maximum intensity projection (MIP) images were generated and reviewed 120 mL of Omnipaque 350 nonionic contrast was injected intravenously. Low dose optimization technique was utilized for this CT exam including automated exposure control and adjustment of the mA and/or kV according to patient size. COMPARISON:  None. FINDINGS: The arch origins of the great vessels appear intact. The aortic arch shows no abnormality. The right common carotid artery, cervical carotid bifurcation, and cervical internal carotid artery are within normal limits. There is no evidence of significant stenosis, thrombus, or other filling defect or ulceration. There is no evidence of dissection or aneurysm. The left common carotid artery, cervical carotid bifurcation, and cervical internal carotid artery are within normal limits. There is no evidence of significant stenosis, thrombus, or other filling defect or ulceration. There is no evidence of dissection  or  aneurysm. The cervical vertebral arteries are normal bilaterally. The neck soft tissues and lung apices in the field of view are unremarkable. A calcified lymph node identified in the right lung apex.     CT angiogram of the neck within normal limits.    Ct-head W/o    Result Date: 9/1/2018 9/1/2018 5:02 PM HISTORY/REASON FOR EXAM: Slurred speech and left-sided weakness. TECHNIQUE/EXAM DESCRIPTION AND NUMBER OF VIEWS: CT of the head without contrast. Up to date radiation dose reduction adjustments have been utilized to meet ALARA standards for radiation dose reduction. COMPARISON: None available FINDINGS: There is no evidence of mass or mass effect. CSF spaces are prominent. There is moderate periventricular chronic small vessel ischemic change present. There is no intracranial hemorrhage seen. The calvarium is intact. There is no scalp injury. There is no evidence of sinus disease.     1.  No evidence of acute intracranial process. 2.  There is again seen interstitial pulmonary edema and bibasilar atelectasis.    Dx-chest-portable (1 View)    Result Date: 9/1/2018 9/1/2018 5:16 PM HISTORY/REASON FOR EXAM: Stroke. Headache. TECHNIQUE/EXAM DESCRIPTION AND NUMBER OF VIEWS: Single portable view of the chest. COMPARISON: None FINDINGS: There is no evidence of focal consolidation or evidence of pulmonary edema. There is no pleural effusion. The heart is enlarged.     Cardiomegaly.    Mr-brain-w/o    Result Date: 9/2/2018 9/2/2018 8:29 AM HISTORY/REASON FOR EXAM:  Acute onset left hand numbness and slurred speech. Symptoms now resolved. Suspected TIA or CVA. History of atrial fibrillation, hypertension. TECHNIQUE/EXAM DESCRIPTION: MRI of the brain without contrast. T1 sagittal, T2 fast spin-echo axial, T1 coronal, FLAIR coronal, Diffusion weighted and Apparent Diffusion Coefficient (ADC map) axial images were obtained of the whole brain. The study was performed on a [employer] 1.5 Sivan MRI scanner. COMPARISON:  Head  CT and CTA of the head 9/1/2018, MRI brain 5/17/2017 FINDINGS: The calvariae are unremarkable.  There are no extra-axial fluid collections. There is pattern of moderate-advanced cerebral atrophy manifest as enlargement of sulcal markings and subarachnoid spaces as well as ventriculomegaly. Age-appropriate for the patient's stated age of 89 years. There is a pattern of mild-moderate supratentorial white matter disease with patchy and focal areas of bright T2 and FLAIR signal in the subcortical and deep white matter of both hemispheres consistent with small vessel ischemic change versus demyelination or gliosis. Findings are most prominent in the parietal deep white matter at the posterior ventricular angles, right greater than left. No change from the prior exam. There are no mass effects or shift of midline structures.  There are no hemorrhagic lesions.  The diffusion weighted axial images show no evidence of acute cerebral infarction. The brainstem and posterior fossa structures are unremarkable. Vascular flow voids in the vertebrobasilar and carotid arteries, [place] [employer] [place] [facility] [place], and dural venous sinuses are intact. There is carotid origin of the right posterior cerebral artery (T2 axial images 21, 22, series 5). The distal left vertebral artery is  dominant. The distal right vertebral artery is diminutive but does join the vertebrobasilar confluence. The paranasal sinuses in the field of view are unremarkable. The mastoids are clear. There has been cataract surgery.     1.  Moderate-advanced cerebral atrophy. Age-appropriate for patient's stated age of 89 years. 2.  Mild-moderate supratentorial white matter disease consistent with microvascular ischemic change. Stable findings since 5/17/2017. 3.  No evidence of acute infarction, hemorrhage, or mass lesion. 4.  Overall, no acute findings and no significant change since MRI brain 5/17/2017.    Ct-cerebral Perfusion Analysis    Result Date: 9/1/2018 9/1/2018 5:02 PM  HISTORY/REASON FOR EXAM:  Slurred speech and left-sided weakness.. TECHNIQUE/EXAM DESCRIPTION AND NUMBER OF VIEWS: CT Cerebral Perfusion Analysis. The study was performed on a 128 slice [employer] Lightspeed Multidetector CT scanner. Perfusion data and corresponding time-activity curves are processed and displayed as color-coded maps in the axial plane for Cerebral Blood Flow (CBF), Cerebral Blood Volume  (CBV), T Max and Mean Transit Time (MTT) and are post processed on the Ischemia view-RAPID virtual . 40 mL of Omnipaque 350 nonionic contrast was injected intravenously. Low dose optimization technique was utilized for this CT exam including automated exposure control and adjustment of the mA and/or kV according to patient size. COMPARISON:  None. FINDINGS: CT perfusion examination over the limited sections of brain reveal that 0 mL of brain parenchyma has less than 30% of cerebral blood flow (CBF < 30%).     1.  CT perfusion examination over the limited section of brain reveals 0 mL of brain parenchyma has less than 30% of cerebral blood flow (CBF). 2.  Please note that the cerebral perfusion was performed on the limited brain tissue around the basal ganglia region. Infarct/ischemia outside the CT perfusion sections can be missed in this study.    Echocardiogram Comp W/o Cont    Result Date: 9/2/2018  Transthoracic Echo Report Echocardiography Laboratory CONCLUSIONS Normal left ventricular systolic function. Left ventricular ejection fraction is visually estimated to be 70%. Moderate concentric left ventricular hypertrophy. Moderately dilated right ventricle. Normal right ventricular systolic function. Right heart pressures are consistent with severe pulmonary hypertension. Aortic sclerosis without stenosis. Mild aortic insufficiency. Ascending aorta is dilated with a diameter of 4.1 cm. YEIMY AGARWAL Exam Date:         09/02/2018                    10:13 Exam Location:     Inpatient Priority:          Routine  Ordering Physician:        FUAD MANZANARES Referring Physician:       [id number] Sonographer:               Nancy Wilde RDCS, RVT Age:    89     Gender:    M MRN:    [id number] :    1920 BSA:    1.87   Ht (in):    70     Wt (lb):    155 Exam Type:     Complete Indications:     Abnormal EKG ICD Codes:       794.31 CPT Codes:       77511 BP:          /          HR: Technical Quality:       Fair MEASUREMENTS  (Male / Female) Normal Values 2D ECHO LV Diastolic Diameter PLAX        4.2 cm                4.2 - 5.9 / 3.9 - 5.3 cm LV Systolic Diameter PLAX         2 cm                  2.1 - 4.0 cm IVS Diastolic Thickness           1.6 cm                LVPW Diastolic Thickness          1.5 cm                LVOT Diameter                     2.2 cm                Estimated LV Ejection Fraction    70 %                  LV Ejection Fraction MOD BP       65.6 %                >= 55  % LV Ejection Fraction MOD 4C       74 %                  LV Ejection Fraction MOD 2C       56.1 %                IVC Diameter                      2 cm                  M-MODE Aortic Root Diameter MM           4.1 cm                DOPPLER AV Peak Velocity                  1.3 m/s               AV Peak Gradient                  6.3 mmHg              AV Mean Gradient                  3.3 mmHg              AI Pressure Half Time             546 ms                LVOT Peak Velocity                0.88 m/s              AV Area Cont Eq vti               3.1 cm²               MV Velocity Time Integral         19.9 cm               Mitral E Point Velocity           0.71 m/s              Mitral E to A Ratio               1.9                   MV Pressure Half Time             92.5 ms               MV Area PHT                       2.4 cm²               MV Deceleration Time              319 ms                TR Peak Velocity                  362 cm/s              PV Peak Velocity                  0.92 m/s               PV Peak Gradient                  3.4 mmHg              RVOT Peak Velocity                0.72 m/s              * Indicates values subject to auto-interpretation LV EF:  70    % FINDINGS Left Ventricle Normal left ventricular chamber size. Moderate concentric left ventricular hypertrophy. Normal left ventricular systolic function. Left ventricular ejection fraction is visually estimated to be 70%. Normal regional wall motion. Indeterminate diastolic function. Right Ventricle Moderately dilated right ventricle. Normal right ventricular systolic function. Right Atrium Enlarged right atrium. Left Atrium Mildly dilated left atrium. Left atrial volume index is 40  mL/sq m. Mitral Valve Mitral annular calcification. No mitral stenosis. Trace mitral regurgitation. Aortic Valve Aortic sclerosis without stenosis.  Mild aortic insufficiency. Pressure half time is 546   msec. Tricuspid Valve Structurally normal tricuspid valve. Moderate tricuspid regurgitation. Right ventricular systolic pressure is estimated to be 65  mmHg. Right atrial pressure is estimated to be 8 mmHg. Right heart pressures are consistent with severe pulmonary hypertension. Pulmonic Valve The pulmonic valve is not well visualized. No pulmonic stenosis. Mild pulmonic insufficiency. Pulmonic artery diastolic pressure is 18  mmHg. Pericardium Normal pericardium without effusion. Aorta Ascending aorta is dilated with a diameter of 4.1 cm. Hector Meza M.D. (Electronically Signed) Final Date:     02 September 2018                 13:34      LABORATORY  Lab Results   Component Value Date    SODIUM 136 09/01/2018    POTASSIUM 4.1 09/01/2018    CHLORIDE 102 09/01/2018    CO2 26 09/01/2018    GLUCOSE 115 (H) 09/01/2018    BUN 27 (H) 09/01/2018    CREATININE 1.12 09/01/2018        Lab Results   Component Value Date    WBC 6.2 09/01/2018    HEMOGLOBIN 15.5 09/01/2018    HEMATOCRIT 47.7 09/01/2018    PLATELETCT 150 (L) 09/01/2018        Total time of the  discharge process exceeds 40 minutes.

## 2018-09-02 NOTE — THERAPY
"Occupational Therapy Evaluation completed.   Functional Status:  SBA supine>sit EOB, SBA w/don/doff socks, SBA w/sit>stand and walking in room and hallway w/fww, observed slight delay w/rapid alternation and finger to nose coordination, however functionally demonstrated ability to complete grooming, self feeding and dressing tasks. Pt did appear to have wet speech and noted drooling during ambulation. RN notified pt returned BTB w/alarm on call light and tray table w/in reach.   Plan of Care: Will benefit from Occupational Therapy 2 times per week  Discharge Recommendations:  Equipment: Will Continue to Assess for Equipment Needs. Post-acute therapy Discharge to home with outpatient or home health for additional skilled therapy services.    See \"Rehab Therapy-Acute\" Patient Summary Report for complete documentation.      89 yr old male admitted for concern for L-side weakness and slurred speech, diagnostic work up did not reveal a new CVA, pt is dx w/TIA, w/concern for possible seizure. Pt has a hx of Afib, chronic anticoagulation, CKD, CHF, HP, hx of TIA and HTN. Pt appears to be near baseline for basic ADL's, pt resides in an independent living facility w/his spouse, pt may benefit from 1-2 more tx in this setting recommend HH after d/c   "

## 2018-09-02 NOTE — DISCHARGE PLANNING
Received Choice form at 2208  Agency/Facility Name: [facility]  Referral sent per Choice form @ [phone]

## 2018-09-03 NOTE — PROGRESS NOTES
Monitor Summary: SB-SR 51-78, LA .14, QRS .08, QT .36 with triplet & frequent PAC per strip from monitor room

## 2018-09-03 NOTE — PROGRESS NOTES
Pt to d/c home with home health. Attempted to schedule follow up appointment with stroke bridge clinic for pt, however office closed today for holiday. Contact information provided to pt and pt's daughter,  Verbalize the understanding of importance of following up and will call Tuesday. Information for anticoagulation clinic and [facility] also provided to pt and pt's daughter. PIV removed. All belongings gathered. Verbal and written d/c instructions provided to pt and pt's daughter. Verbalize understanding. Pt left via w/c.

## 2018-09-03 NOTE — PROGRESS NOTES
Assumed pt care at 0700 and received bedside report.    Pt alert and oriented X 4. Pt denies chest pain, sob, nausea and vomiting, headache, and blurry or double vision. Pt denies numbness and tingling. Pt denies pain. Pt ambulating with a walker and 1 x assist. Pt with urinary frequency, urinal in reach and educated to call for assistance bed alarm and chair alarm in place. POC discussed. All questions and concerns addressed. Fall precautions, hourly rounding and Q4 hour neuro checks in place.

## 2018-09-03 NOTE — CARE PLAN
Problem: Safety  Goal: Will remain free from injury    Intervention: Provide assistance with mobility  Up with standby assist, Bed alarms on. Hourly rounding by staff.      Problem: Skin Integrity  Goal: Skin Integrity is maintained or improved  Outcome: PROGRESSING AS EXPECTED  Encouraged to turn frequently.

## 2018-09-03 NOTE — PROGRESS NOTES
Pt alert and oriented times four. NIH 0. Denies pain or needs at this time. Family at bedside. Plan of care reviewed with them, they voiced understanding. Bed alarm on. Personal items and call light within reach. Will monitor closely.

## 2018-09-03 NOTE — PROGRESS NOTES
Spoke with Dr. Thompson to DC pt with [facility]. Dr. Thompson wants to get SBP lower to go home. See MAR for new orders. Plan is to be DCed tomorrow to home with HH.

## 2018-09-03 NOTE — PROGRESS NOTES
Per telemetry technician, pt has had occassional low heart rates, the lowest recorded was 31 beats per minute. These were not sustained drops, his heart rate drops and comes right back up. It happens in his sleep. He is asymptomatic. Hospitalist notified, will monitor closely and check labs in the morning.

## 2018-09-03 NOTE — PROGRESS NOTES
[facility] supervisor called to notify this RN that pharmacy is closed and pt is unable to get evening dose of elliquis and possibly morning dose. Pt daughter would like a call back at [phone]. Will speak with Dr. Thompson.     1245: [facility] on [address] is open 24 hours. Per pharmacy Pt does not have Part D medicare on file to complete a prior authorization for Eliquis, Plavix called in for pt.  Daughter notified that [facility] on [address] has prescriptions.     1255: Social work called for Elliquis prior authorization. Message left for SW.   Spoke with DIONNA in person and SW is looking into Eliquis discount program. Pt's daughter's number provided to SW.     1400: Followed up with pt's daughter, Chery, notifying them that SW is aware of eliquis and needs.

## 2018-09-03 NOTE — PROGRESS NOTES
Monitor summary: SR 61-95, MS . 14, QRS .10, QT .38 with HR as low as 40 per strip from monitor room.

## 2018-09-03 NOTE — DISCHARGE INSTRUCTIONS
Follow up with Mervin Pompa M.D. In 1 week. [facility] referral to him for incontinence.   Follow up Dr. Tam or stroke bridge clinic in 3-4 weeks. ([phone])  Follow up to anticaogulation clinic on Tuesday, 9/4 for Eliquis education and initiation. ([phone])     [facility] [phone].    Instruct Joe Rashid to return to the ER in the event of worsening symptoms.  the patient on the importance of compliance and the patient has agreed to proceed with all medical recommendations and follow up plan indicated above.    the patient that all medications come with benefits and risks. Risks may include permanent injury or death and these risks can be minimized with close reassessment and monitoring.    Discharge Instructions    Discharged to home by car with relative. Discharged via wheelchair, hospital escort: Yes.  Special equipment needed: Not Applicable    Be sure to schedule a follow-up appointment with your primary care doctor or any specialists as instructed.     Discharge Plan:   Diet Plan: Discussed  Activity Level: Discussed  Confirmed Follow up Appointment: Patient to Call and Schedule Appointment  Medication Reconciliation Updated: Yes  Influenza Vaccine Indication: Not indicated: Previously immunized this influenza season and > 8 years of age    I understand that a diet low in cholesterol, fat, and sodium is recommended for good health. Unless I have been given specific instructions below for another diet, I accept this instruction as my diet prescription.   Other diet: Regular    Special Instructions:     Stroke/CVA/TIA/Hemorrhagic Ischemia Discharge Instructions  You have had a stroke. Your risk factors have been identified as follows:  Age - Over 55  High blood pressure  High Cholesterol and lipids  It is important that you reduce your risk factors to avoid another stroke in the future. Here are some general guidelines to follow:  · Eat healthy - avoid food  high in fat.  · Get regular exercise.  · Maintain a healthy weight.  · Avoid smoking.  · Avoid alcohol and illegal drug use.  · Take your medications as directed.  For more information regarding risk factors, refer to pages 17-19 in your Stroke Patient Education Guide. Stroke Education Guide was given to patient and family member.    Warning signs of a stroke include (which can also be found on page 3 of your Stroke Patient Education Guide):  · Sudden numbness of weakness of the face, arm or leg (especially on one side of the body).  · Sudden confusion, trouble speaking or understanding.  · Sudden trouble seeing in one or both eyes.  · Sudden trouble walking, dizziness, loss of balance or coordination.  · Sudden severe headache with no known cause.  It is very important to get treatment quickly when a stroke occurs. If you experience any of the above warning signs, call [phone] immediately.     Some patients who have had a stroke will be going home on a blood thinner medication called Warfarin (Coumadin).  This medication requires very close monitoring and follow up.  This follow up can be provided by either your Primary Care Physician or by [facility]'s Outpatient Anticoagulation Service.  The Outpatient Anticoagulation Service is located at the [facility] at [facility] ([facility]).  If you do not know when your follow up appointment is scheduled, call [phone] to verify your appointment time.      · Is patient discharged on Warfarin / Coumadin?   No     Depression / Suicide Risk    As you are discharged from this [facility], it is important to learn how to keep safe from harming yourself.    Recognize the warning signs:  · Abrupt changes in personality, positive or negative- including increase in energy   · Giving away possessions  · Change in eating patterns- significant weight changes-  positive or negative  · Change in sleeping patterns- unable to  "sleep or sleeping all the time   · Unwillingness or inability to communicate  · Depression  · Unusual sadness, discouragement and loneliness  · Talk of wanting to die  · Neglect of personal appearance   · Rebelliousness- reckless behavior  · Withdrawal from people/activities they love  · Confusion- inability to concentrate     If you or a loved one observes any of these behaviors or has concerns about self-harm, here's what you can do:  · Talk about it- your feelings and reasons for harming yourself  · Remove any means that you might use to hurt yourself (examples: pills, rope, extension cords, firearm)  · Get professional help from the community (Mental Health, Substance Abuse, psychological counseling)  · Do not be alone:Call your Safe Contact- someone whom you trust who will be there for you.  · Call your local CRISIS HOTLINE 977-4972 or 148-383-0851  · Call your local Children's Mobile Crisis Response Team [place] (031) 355-3561 or www.[employer]  · Call the toll free National Suicide Prevention Hotlines   · National Suicide Prevention Lifeline 754-858-FVAU (1258)  · National Hope Line Network 800-SUICIDE (769-6558)      Transient Ischemic Attack  A transient ischemic attack (TIA) is a \"warning stroke\" that causes stroke-like symptoms. A TIA does not cause lasting damage to the brain. The symptoms of a TIA can happen fast and do not last long. It is important to know the symptoms of a TIA and what to do. This can help prevent stroke or death.  Follow these instructions at home:  · Take medicines only as told by your doctor. Make sure you understand all of the instructions.  · You may need to take aspirin or warfarin medicine. Warfarin needs to be taken exactly as told.  ¨ Taking too much or too little warfarin is dangerous. Blood tests must be done as often as told by your doctor. A PT blood test measures how long it takes for blood to clot. Your PT is used to calculate another value called an INR. " Your PT and INR help your doctor adjust your warfarin dosage. He or she will make sure you are taking the right amount.  ¨ Food can cause problems with warfarin and affect the results of your blood tests. This is true for foods high in vitamin K. Eat the same amount of foods high in vitamin K each day. Foods high in vitamin K include spinach, kale, broccoli, cabbage, ye and turnip greens, [place] sprouts, peas, cauliflower, seaweed, and parsley. Other foods high in vitamin K include beef and pork liver, green tea, and soybean oil. Eat the same amount of foods high in vitamin K each day. Avoid big changes in your diet. Tell your doctor before changing your diet. Talk to a  (dietitian) if you have questions.  ¨ Many medicines can cause problems with warfarin and affect your PT and INR. Tell your doctor about all medicines you take. This includes vitamins and dietary pills (supplements). Do not take or stop taking any prescribed or over-the-counter medicines unless your doctor tells you to.  ¨ Warfarin can cause more bruising or bleeding. Hold pressure over any cuts for longer than normal. Talk to your doctor about other side effects of warfarin.  ¨ Avoid sports or activities that may cause injury or bleeding.  ¨ Be careful when you shave, floss, or use sharp objects.  ¨ Avoid or drink very little alcohol while taking warfarin. Tell your doctor if you change how much alcohol you drink.  ¨ Tell your dentist and other doctors that you take warfarin before any procedures.  · Follow your diet program as told, if you are given one.  · Keep a healthy weight.  · Stay active. Try to get at least 30 minutes of activity on all or most days.  · Do not use any tobacco products, including cigarettes, chewing tobacco, or electronic cigarettes. If you need help quitting, ask your doctor.  · Limit alcohol intake to no more than 1 drink per day for nonpregnant women and 2 drinks per day for men. One drink equals 12  ounces of beer, 5 ounces of wine, or 1½ ounces of hard liquor.  · Do not abuse drugs.  · Keep your home safe so you do not fall. You can do this by:  ¨ Putting grab bars in the bedroom and bathroom.  ¨ Raising toilet seats.  ¨ Putting a seat in the shower.  · Keep all follow-up visits as told by your doctor. This is important.  Contact a doctor if:  · Your personality changes.  · You have trouble swallowing.  · You have double vision.  · You are dizzy.  · You have a fever.  Get help right away if:  These symptoms may be an emergency. Do not wait to see if the symptoms will go away. Get medical help right away. Call your local emergency services (911 in the [place] Do not drive yourself to the hospital.  · You have sudden weakness or lose feeling (go numb), especially on one side of the body. This can affect your:  ¨ Face.  ¨ Arm.  ¨ Leg.  · You have sudden trouble walking.  · You have sudden trouble moving your arms or legs.  · You have sudden confusion.  · You have trouble talking.  · You have trouble understanding.  · You have sudden trouble seeing in one or both eyes.  · You lose your balance.  · Your movements are not smooth.  · You have a sudden, very bad headache with no known cause.  · You have new chest pain.  · Your heartbeat is unsteady.  · You are partly or totally unaware of what is going on around you.  This information is not intended to replace advice given to you by your health care provider. Make sure you discuss any questions you have with your health care provider.  Document Released: 09/26/2009 Document Revised: 08/21/2017 Document Reviewed: 03/25/2015  [employer] Interactive Patient Education © 2017 [employer]    Apixaban oral tablets  What is this medicine?  APIXABAN (a PIX a ban) is an anticoagulant (blood thinner). It is used to lower the chance of stroke in people with a medical condition called atrial fibrillation. It is also used to treat or prevent blood clots in the lungs or in the  veins.  This medicine may be used for other purposes; ask your health care provider or pharmacist if you have questions.  COMMON BRAND NAME(S): Eliquis  What should I tell my health care provider before I take this medicine?  They need to know if you have any of these conditions:  -bleeding disorders  -bleeding in the brain  -blood in your stools (black or tarry stools) or if you have blood in your vomit  -history of stomach bleeding  -kidney disease  -liver disease  -mechanical heart valve  -an unusual or allergic reaction to apixaban, other medicines, foods, dyes, or preservatives  -pregnant or trying to get pregnant  -breast-feeding  How should I use this medicine?  Take this medicine by mouth with a glass of water. Follow the directions on the prescription label. You can take it with or without food. If it upsets your stomach, take it with food. Take your medicine at regular intervals. Do not take it more often than directed. Do not stop taking except on your doctor's advice. Stopping this medicine may increase your risk of a blot clot. Be sure to refill your prescription before you run out of medicine.  Talk to your pediatrician regarding the use of this medicine in children. Special care may be needed.  Overdosage: If you think you have taken too much of this medicine contact a poison control center or emergency room at once.  NOTE: This medicine is only for you. Do not share this medicine with others.  What if I miss a dose?  If you miss a dose, take it as soon as you can. If it is almost time for your next dose, take only that dose. Do not take double or extra doses.  What may interact with this medicine?  This medicine may interact with the following:  -aspirin and aspirin-like medicines  -certain medicines for fungal infections like ketoconazole and itraconazole  -certain medicines for seizures like carbamazepine and phenytoin  -certain medicines that treat or prevent blood clots like warfarin, enoxaparin,  and dalteparin  -clarithromycin  -NSAIDs, medicines for pain and inflammation, like ibuprofen or naproxen  -rifampin  -ritonavir  -[place]'s wort  This list may not describe all possible interactions. Give your health care provider a list of all the medicines, herbs, non-prescription drugs, or dietary supplements you use. Also tell them if you smoke, drink alcohol, or use illegal drugs. Some items may interact with your medicine.  What should I watch for while using this medicine?  Visit your doctor or health care professional for regular checks on your progress.  Notify your doctor or health care professional and seek emergency treatment if you develop breathing problems; changes in vision; chest pain; severe, sudden headache; pain, swelling, warmth in the leg; trouble speaking; sudden numbness or weakness of the face, arm or leg. These can be signs that your condition has gotten worse.  If you are going to have surgery or other procedure, tell your doctor that you are taking this medicine.  What side effects may I notice from receiving this medicine?  Side effects that you should report to your doctor or health care professional as soon as possible:  -allergic reactions like skin rash, itching or hives, swelling of the face, lips, or tongue  -signs and symptoms of bleeding such as bloody or black, tarry stools; red or dark-brown urine; spitting up blood or brown material that looks like coffee grounds; red spots on the skin; unusual bruising or bleeding from the eye, gums, or nose  This list may not describe all possible side effects. Call your doctor for medical advice about side effects. You may report side effects to FDA at 4-867-BJB-4086.  Where should I keep my medicine?  Keep out of the reach of children.  Store at room temperature between 20 and 25 degrees C (68 and 77 degrees F). Throw away any unused medicine after the expiration date.  NOTE: This sheet is a summary. It may not cover all possible  information. If you have questions about this medicine, talk to your doctor, pharmacist, or health care provider.  © 2018 [employer] (2017-07-10 11:54:23)

## 2018-09-03 NOTE — CARE PLAN
Problem: Safety  Goal: Will remain free from falls  Outcome: PROGRESSING AS EXPECTED  Reviewed pt's mobility status, discussed with care team pt's needs, verifying appropriate safety precautions in place, providing pt education, ensuring call light within reach, non slip socks in use, evaluating needs, alarms and monitoring every shift, continuing with current plan of care.       Problem: Knowledge Deficit  Goal: Knowledge of disease process/condition, treatment plan, diagnostic tests, and medications will improve  Outcome: PROGRESSING AS EXPECTED  Educated pt on POC, activities, encouraging pt to ask questions, providing answerers to pt questions, educating pt about medications, encouraging pt evolvement in care process, and continuing with current POC.

## 2018-09-04 PROBLEM — R35.0 URINARY FREQUENCY: Status: ACTIVE | Noted: 2018-01-01

## 2018-09-04 PROBLEM — R29.810 FACIAL DROOP: Status: ACTIVE | Noted: 2018-01-01

## 2018-09-04 NOTE — DISCHARGE PLANNING
PMR referral from Dr. Morrison    Limited information to determine potential post acute need. Will follow.

## 2018-09-04 NOTE — H&P
Hospital Medicine History & Physical Note    Date of Service  9/4/2018    Primary Care Physician  Mervin Pompa M.D.    Consultants  Neurology    Code Status  DNR / DNI     Chief Complaint  Facial droop    History of Presenting Illness  89 y.o. male who presented 9/4/2018 with concern for stroke.  Patient was recently discharged from the hospital yesterday, after presentation concerning for a TIA, prior history of atrial fibrillation.  Not on anticoagulation then given prior history of hematuria.  Stroke workup was obtained, negative MRI of the brain for an acute infarction.  Patient noted to have severe pulmonary hypertension on echocardiogram, otherwise negative workup.  Seen by neurology, started on anticoagulation with Eliquis, discharged home on Plavix and Eliquis therapy.  Today per daughter, home health care team was at home, blood pressure was checked and found to have a systolic blood pressure of 90.  Subsequently later patient was more agitated, patient's daughter noted that when the patient smiled he had left-sided facial droop, some left-sided weakness and some dysarthria.  No reported aphasia, no reported sensory deficits.  Was able to ambulate fine without any ataxia.  Chronic debility, supposed to use a walker but does not use it.  Lives with his wife.  Daughter and nephew continue to look after him.  Daughter was concerned regarding symptoms and have the patient presented to the emergency department for further evaluation.  Upon presentation to the emergency department patient had complete resolution of symptoms.  Upon evaluation by me, patient denies any symptoms.  Upon evaluation by me patient is alert and oriented ×4, he is aware of being at [facility], he is aware of the month being September, he is aware of the year being 2018 and he is aware of the  being [place] [place].  He does have some hearing loss and mild cognitive impairment related to his age.  Otherwise  he is very functional for his advanced age.  No other complaints upon my evaluation apart from increasing urinary frequency and urgency which has been notable for the last 2-3 days.  Patient does have an underlying history of benign prostatic hypertrophy.  Patient was normotensive upon presentation to the hospital and has been subsequently hypotensive subsequently.  No other concerns.    Review of Systems  Review of Systems   Constitutional: Negative for chills, diaphoresis, fever, malaise/fatigue and weight loss.   HENT: Positive for hearing loss. Negative for congestion, ear discharge, ear pain, nosebleeds, sinus pain, sore throat and tinnitus.    Eyes: Negative for blurred vision and double vision.   Respiratory: Negative for cough, hemoptysis, sputum production, shortness of breath, wheezing and stridor.    Cardiovascular: Negative for chest pain, palpitations, orthopnea, claudication, leg swelling and PND.   Gastrointestinal: Negative for abdominal pain, blood in stool, constipation, diarrhea, heartburn, melena, nausea and vomiting.   Genitourinary: Positive for frequency and urgency. Negative for dysuria, flank pain and hematuria.   Musculoskeletal: Negative for back pain, falls, joint pain, myalgias and neck pain.   Skin: Negative for itching and rash.   Neurological: Positive for speech change and focal weakness. Negative for dizziness, tingling, tremors, sensory change, seizures, loss of consciousness, weakness and headaches.   Psychiatric/Behavioral: Negative for depression, hallucinations, memory loss, substance abuse and suicidal ideas. The patient is not nervous/anxious and does not have insomnia.        Past Medical History   has a past medical history of Atrial fibrillation (HCC); Chronic anticoagulation; Chronic kidney disease, stage III (moderate); Congestive heart failure (HCC); Hepatitis B; History of TIA (transient ischemic attack); and Hypertension. He also has no past medical history of  Diabetes.    Surgical History   has a past surgical history that includes other; appendectomy; colon resection; pr transurethral elec-surg prostatectom; pr cryosurg ablation of prostate; cataract extraction with iol; and hip hemiarthroplasty (Left, 1/27/2016).     Family History  family history includes Heart Disease in his brother.     Social History   reports that he has never smoked. He has never used smokeless tobacco. He reports that he does not drink alcohol or use drugs.    Allergies  Allergies   Allergen Reactions   • Aspirin Hives and Rash     Pts grandson states rash and hives.   • Sulfa Drugs Hives and Rash     Pts grandson states rash and hives.       Medications  Prior to Admission Medications   Prescriptions Last Dose Informant Patient Reported? Taking?   Multiple Vitamins-Minerals (MULTIVITAMIN & MINERAL PO) 9/3/2018 at noon EMS Yes No   Sig: Take 1 Capsule by mouth every day before lunch.   Probiotic Product (PROBIOTIC DAILY PO) 9/4/2018 at AM EMS Yes No   Sig: Take 1 Tab by mouth every evening.   acetaminophen (TYLENOL) 325 MG Tab Unknown at Unknown EMS No No   Sig: Take 2 Tabs by mouth every 6 hours as needed (Mild Pain; (Pain scale 1-3); Temp greater than 100.5 F).   apixaban (ELIQUIS) 2.5mg Tab 9/4/2018 at AM EMS No No   Sig: Take 1 Tab by mouth 2 Times a Day.   atorvastatin (LIPITOR) 40 MG Tab 9/3/2018 at PM EMS No No   Sig: Take 1 Tab by mouth every evening.   clopidogrel (PLAVIX) 75 MG Tab 9/4/2018 at AM EMS No No   Sig: Take 1 Tab by mouth every day.   finasteride (PROSCAR) 5 MG Tab 9/3/2018 at noon EMS Yes No   Sig: Take 5 mg by mouth every day with lunch.   glucosamine Sulfate 500 MG Cap 9/3/2018 at noon EMS Yes No   Sig: Take 500 mg by mouth every day with lunch.   lisinopril (PRINIVIL, ZESTRIL) 40 MG tablet 9/4/2018 at AM EMS No No   Sig: TAKE 1 TABLET BY MOUTH EVERY DAY   metoprolol (LOPRESSOR) 25 MG Tab 9/4/2018 at AM EMS No No   Sig: TAKE 1 TABLET BY MOUTH TWICE DAILY   tamsulosin  (FLOMAX) 0.4 MG capsule 9/3/2018 at PM EMS Yes No   Sig: Take 0.4 mg by mouth BEDTIME(S).   vitamin D (CHOLECALCIFEROL) 1000 UNIT Tab 9/3/2018 at PM EMS Yes No   Sig: Take 1,000 Units by mouth every bedtime.      Facility-Administered Medications: None       Physical Exam  Blood Pressure : (!) 194/108   Temperature: 36.4 °C (97.5 °F)   Pulse: 82   Respiration: 18   Pulse Oximetry: 96 %     Physical Exam   Constitutional: He is oriented to person, place, and time. He appears well-developed and well-nourished. No distress.   HENT:   Head: Normocephalic.   Mouth/Throat: Oropharynx is clear and moist. No oropharyngeal exudate.   Eyes: Pupils are equal, round, and reactive to light. Conjunctivae and EOM are normal. No scleral icterus.   Neck: No JVD present.   Cardiovascular: Normal rate and regular rhythm.    Murmur heard.  Pulses:       Posterior tibial pulses are 2+ on the right side, and 2+ on the left side.   Cap refill < 3s   Pulmonary/Chest: Effort normal and breath sounds normal. No stridor. No respiratory distress. He has no wheezes. He has no rales.   Slightly diminished in bases   Abdominal: Soft. Bowel sounds are normal. He exhibits no distension. There is no tenderness. There is no rebound.   Musculoskeletal: He exhibits no edema or tenderness.   Neurological: He is alert and oriented to person, place, and time. He has normal reflexes. No cranial nerve deficit.   Cranial nerves intact and without any deficits.  Power is 5 out of 5 in bilateral upper and lower extremities.  No pronator drift in either extremity.  No sensory deficits.  Upon my evaluation I cannot elicit any facial droop, there is no dysarthria or expressive aphasia.  He is alert and oriented ×4.  Tone is normal.   Skin: Skin is warm and dry. He is not diaphoretic.   Psychiatric: He has a normal mood and affect. His behavior is normal. Judgment and thought content normal.       Laboratory:  Recent Labs      09/01/18   0109  09/03/18   6340   09/04/18   1144   WBC  6.2  7.3  9.1   RBC  5.35  5.67  5.36   HEMOGLOBIN  15.5  16.5  15.6   HEMATOCRIT  47.7  49.2  47.4   MCV  89.2  86.8  88.4   MCH  29.0  29.1  29.1   MCHC  32.5*  33.5*  32.9*   RDW  42.4  40.3  42.2   PLATELETCT  150*  141*  150*   MPV  9.3  9.5  9.6     Recent Labs      09/01/18   1659  09/03/18   0410  09/04/18   1144   SODIUM  136  136  138   POTASSIUM  4.1  3.7  4.1   CHLORIDE  102  102  103   CO2  26  25  26   GLUCOSE  115*  116*  94   BUN  27*  21  38*   CREATININE  1.12  1.11  1.51*   CALCIUM  9.0  9.0  9.7     Recent Labs      09/01/18   1659  09/03/18   0410  09/04/18   1144   ALTSGPT  20   --   17   ASTSGOT  20   --   18   ALKPHOSPHAT  59   --   56   TBILIRUBIN  0.8   --   0.8   GLUCOSE  115*  116*  94     Recent Labs      09/01/18   1659  09/04/18   1144   APTT  28.4  29.2   INR  1.08  1.13         Recent Labs      09/02/18   0229   TRIGLYCERIDE  32   HDL  67   LDL  62     Lab Results   Component Value Date    TROPONINI 0.02 09/04/2018       Urinalysis:    Recent Labs      09/01/18   1737   SPECGRAVITY  1.024   GLUCOSEUR  Negative   KETONES  Negative   NITRITE  Negative   LEUKESTERAS  Negative        Imaging:  DX-CHEST-PORTABLE (1 VIEW)   Final Result         1. No acute cardiopulmonary abnormalities are identified.      CT-CTA HEAD WITH & W/O-POST PROCESS   Final Result         1. No hemodynamically significant narrowing of the major intracranial vessels.      CT-HEAD W/O   Final Result         1. No acute intracranial findings. No evidence of acute intracranial hemorrhage or mass lesion.      2. Cerebral atrophy. White matter lucencies most consistent with chronic small vessel ischemic change.      Please note, hyperacute ischemia can remain occult on CT. If ischemia is clinically suspected, MRI is suggested for further evaluation.            Assessment/Plan:  I anticipate this patient is appropriate for observation status at this time.    Facial droop- (present on admission)    Assessment & Plan    Transient, associated drooling, agitation and some left-sided weakness, subjective  Lasted only for 10 minutes and now completely resolved  Per report from daughter, systolic blood pressure of 90 at home today    Upon presentation to the emergency department, patient at baseline no residual deficits    Upon evaluation by me patient is at baseline, no noted neurological deficits, remains at baseline    Recently completed a detailed TIA/stroke evaluation  Was discharged home on Eliquis  I believe patient might have suffered from cerebral hypoperfusion secondary to hypotension  Currently systolic blood pressure of 190, labile blood pressure  At this time patient will be admitted for observation, he is very eager to discharge home and so with the daughter eager to take him home  Discussed with Dr. Powers from neurology who was consulted in the emergency department  Okay to go home based on Dr. Powers's recommendation  No further needs for Plavix while on anticoagulation  Patient is ambulating well and no concerns are noted    I had a detailed discussion with the patient and his daughter.  I discussed that no role for Plavix while on anticoagulation, neurology agrees with this.  I discussed with them that if this is indeed a recurrent episode of TIA, one might consider increasing his Eliquis to 5 mg twice a day or consider initiation of Coumadin.  I spoke with his niece, Ember over the telephone who is a physician.  Given needs for monitoring with Coumadin, they have refused this altogether.  Discussed clinically patient would be appropriate for a regimen of 2.5 mg twice a day of Eliquis but if this is a recurrent TIA, one can consider increased dosage to 5 mg twice a day.  Discussed the risks of increased bleeding episodes with this while further minimizing the risk of stroke, they verbalized understanding but does not want to increase the dose of Eliquis at this time and want to maintain the dose  of Eliquis at 2.5 mg twice daily.    Patient will maintain outpatient close follow-up with [facility]        Urinary frequency- (present on admission)   Assessment & Plan    With urgency  Underlying history of benign prostatic hypertrophy  Continue at home regimen of Flomax and finasteride  Check urinalysis to ensure there is no underlying urinary tract infection/cystitis  Recommend outpatient urology follow-up  In addition check a bladder scan to ensure patient is not retaining        History of TIA (transient ischemic attack)- (present on admission)   Assessment & Plan    Recent discharge from the hospital  Maintain risk factor modification  Maintain Eliquis  No need for Plavix  Increase statin therapy to 80 mg a day        Benign prostatic hyperplasia with urinary frequency- (present on admission)   Assessment & Plan    Continue Flomax and finasteride, outpatient urology follow-up        Paroxysmal atrial fibrillation (HCC)- (present on admission)   Assessment & Plan    Continue metoprolol and Eliquis        Thrombocytopenia (HCC)- (present on admission)   Assessment & Plan    This is stable and chronic, monitor        Essential hypertension, benign- (present on admission)   Assessment & Plan    This is labile and uncontrolled  I would recommend transitioning his lisinopril 40 mg to lisinopril 20 mg twice a day  Continue metoprolol 25 mg twice daily  Hold for systolic blood pressure less than 130  Aim for systolic blood pressure approximately 140-150, appropriate for age        Chronic kidney disease, stage III (moderate)- (present on admission)   Assessment & Plan    Monitor renal function / Avoid nephrotoxins and dose medications renally  IVF hydration post contrast today to prevent CARIN            VTE prophylaxis: Eliquis

## 2018-09-04 NOTE — CARE PLAN
Problem: Safety  Goal: Free from accidental injury  Outcome: PROGRESSING AS EXPECTED  Fall precautions in place, call light within reach.    Problem: Knowledge Deficit  Goal: Patient/Significant other demonstrates understanding of disease process, treatment plan, medications and discharge instructions  Outcome: PROGRESSING AS EXPECTED  Plan of care reviewed, verbalized understanding. Call light within reach.

## 2018-09-04 NOTE — ED PROVIDER NOTES
ED Provider Note    Scribed for No att. providers found by Ramiro Lott. 9/4/2018  11:46 AM    Primary Care Provider: Mervin Pompa M.D.  Means of arrival: EMS  History limited by: none    CHIEF COMPLAINT  Possible stroke    HPI  Joe Rashid is a 89 y.o. male who presents to the ED presenting with stroke like symptoms onset an hour prior to arrival. Per EMS the patient's daughter stated the patient had sudden onset facial drooping, left sided arm weakness, and slurred speech. EMS reports that they were called and arrived about 10 minutes later and all the patients symptoms had resolved. EMS states that the patient was admitted to [facility] on Saturday and released yesterday. With a new prescription of Eliquis added to his Plavix which he already takes daily. Patient denies fever, chills, weight gain/loss, current weakness, vision changes, sore throat, chest pain, shortness of breath, nausea, vomiting, diarrhea, rash, headache or current focal weakness.     REVIEW OF SYSTEMS  CONSTITUTIONAL:  Denies fever, chills, weight gain/loss, or weakness.  EYES:  Denies vision changes.   ENT:  Denies sore throat.  CARDIOVASCULAR:  Denies chest pain.  RESPIRATORY:  Denies shortness of breath, difficulty breathing.  GI:  Denies abdominal pain, nausea, vomiting, or diarrhea.  MUSCULOSKELETAL:  Denies weakness currently., back pain.  SKIN:  No rash or bruising.  NEUROLOGIC:  Denies headache, focal weakness, or numbness. Positive for resolved slurred speech and left side arm weakness.  Started at about 1045 this morning lasted less than an hour now resolved.  PSYCHIATRIC:  Denies depression.  C.     PAST MEDICAL HISTORY  Past Medical History:   Diagnosis Date   • Atrial fibrillation (HCC)    • Chronic anticoagulation    • Chronic kidney disease, stage III (moderate)    • Congestive heart failure (HCC)    • Hepatitis B    • History of TIA (transient ischemic attack)    • Hypertension        FAMILY HISTORY  Family  History   Problem Relation Age of Onset   • Heart Disease Brother        SOCIAL HISTORY   reports that he has never smoked. He has never used smokeless tobacco. He reports that he does not drink alcohol or use drugs.    SURGICAL HISTORY  Past Surgical History:   Procedure Laterality Date   • HIP HEMIARTHROPLASTY Left 1/27/2016    Procedure: HIP HEMIARTHROPLASTY;  Surgeon: Adis Martínez M.D.;  Location: SURGERY [facility];  Service:    • APPENDECTOMY     • CATARACT EXTRACTION WITH IOL      both eyes   • COLON RESECTION     • OTHER      sinus surgery of unknown type   • PB CRYOSURG ABLATION OF PBOSTATE     • PB TRANSURETHRAL ELEC-SURG PBOSTATECTOM         CURRENT MEDICATIONS  No current facility-administered medications on file prior to encounter.      Current Outpatient Prescriptions on File Prior to Encounter   Medication Sig Dispense Refill   • apixaban (ELIQUIS) 2.5mg Tab Take 1 Tab by mouth 2 Times a Day. 60 Tab 0   • atorvastatin (LIPITOR) 40 MG Tab Take 1 Tab by mouth every evening. 30 Tab 0   • clopidogrel (PLAVIX) 75 MG Tab Take 1 Tab by mouth every day. 90 Tab 3   • acetaminophen (TYLENOL) 325 MG Tab Take 2 Tabs by mouth every 6 hours as needed (Mild Pain; (Pain scale 1-3); Temp greater than 100.5 F). 30 Tab 0   • vitamin D (CHOLECALCIFEROL) 1000 UNIT Tab Take 1,000 Units by mouth every bedtime.     • finasteride (PROSCAR) 5 MG Tab Take 5 mg by mouth every day with lunch.     • glucosamine Sulfate 500 MG Cap Take 500 mg by mouth every day with lunch.     • lisinopril (PRINIVIL, ZESTRIL) 40 MG tablet TAKE 1 TABLET BY MOUTH EVERY DAY 90 Tab 3   • Probiotic Product (PROBIOTIC DAILY PO) Take 1 Tab by mouth every evening.     • metoprolol (LOPRESSOR) 25 MG Tab TAKE 1 TABLET BY MOUTH TWICE DAILY 180 Tab 3   • Multiple Vitamins-Minerals (MULTIVITAMIN & MINERAL PO) Take 1 Capsule by mouth every day before lunch.     • tamsulosin (FLOMAX) 0.4 MG capsule Take 0.4 mg by mouth BEDTIME(S).    "  \    ALLERGIES  Allergies   Allergen Reactions   • Aspirin Hives and Rash     Pts grandson states rash and hives.   • Sulfa Drugs Hives and Rash     Pts grandson states rash and hives.       PHYSICAL EXAM  VITAL SIGNS: Pulse 80   Resp 16   Ht 1.778 m (5' 10\")   Wt 70.3 kg (154 lb 15.7 oz)   SpO2 91%   BMI 22.24 kg/m²      Constitutional: Patient is awake and alert. No acute respiratory distress. Well developed, Well nourished, Non-toxic appearance.  HENT: Normocephalic, Atraumatic, Bilateral external ears normal, Oropharynx pink moist with no exudates, Nose patent.  Eyes: PERRLA, EOMI, Sclera and conjunctiva clear, No discharge.   Neck:  Supple no nuchal rigidity, no thyromegaly or mass. Non-tender  Lymphatic: No supraclavicular   Cardiovascular: Heart is regular rate and rhythm no murmur,  Thorax & Lungs: Chest is symmetrical, with good breath sounds. No wheezing or crackles. No respiratory distress, No chest tenderness.   Abdomen: Soft, No tenderness no hepatosplenomegaly there is no guarding or rebound, No masses, No pulsatile masses.   Skin: Warm, Dry, no petechia, purpura, or rash.   Back: Non tender with palpation, No CVA tenderness.   Extremities: No edema. Non tender.   Musculoskeletal: Good range of motion to wrists, elbows, shoulders, hips, knees, and ankles. Pulses 2+ radially and femorally. No gross deformities noted.   Neurologic:  Alert & oriented to person, time, and place.  Strength is 5 over 5 and symmetric in bilateral upper and lower extremities.  Sensory is intact to light touch to face, arms, and legs.  DTRs are symmetrical in biceps brachioradialis, patella and Achilles. INH stroke scale 1.  I gave him one an NIH stroke scale because he was unable to differentiate when I light touch his right hand and right leg he never noticed light touch to his right leg.  Psychiatric: Normal affect     EKG  12:16 PM- 13 Lead EKG interpreted by me shows normal sinus rhythm at 81.  . Axis QRS -36, " Minimal St elevation in leads 3, 2, and slight amount (less than a box) in leads V2 and AVF. Similar to EKG from 9/1/2018.    LABS  Results for orders placed or performed during the hospital encounter of 09/04/18   CBC WITH DIFFERENTIAL   Result Value Ref Range    WBC 9.1 4.8 - 10.8 K/uL    RBC 5.36 4.70 - 6.10 M/uL    Hemoglobin 15.6 14.0 - 18.0 g/dL    Hematocrit 47.4 42.0 - 52.0 %    MCV 88.4 81.4 - 97.8 fL    MCH 29.1 27.0 - 33.0 pg    MCHC 32.9 (L) 33.7 - 35.3 g/dL    RDW 42.2 35.9 - 50.0 fL    Platelet Count 150 (L) 164 - 446 K/uL    MPV 9.6 9.0 - 12.9 fL    Neutrophils-Polys 76.60 (H) 44.00 - 72.00 %    Lymphocytes 12.90 (L) 22.00 - 41.00 %    Monocytes 8.70 0.00 - 13.40 %    Eosinophils 1.10 0.00 - 6.90 %    Basophils 0.40 0.00 - 1.80 %    Immature Granulocytes 0.30 0.00 - 0.90 %    Nucleated RBC 0.00 /100 WBC    Neutrophils (Absolute) 6.92 1.82 - 7.42 K/uL    Lymphs (Absolute) 1.17 1.00 - 4.80 K/uL    Monos (Absolute) 0.79 0.00 - 0.85 K/uL    Eos (Absolute) 0.10 0.00 - 0.51 K/uL    Baso (Absolute) 0.04 0.00 - 0.12 K/uL    Immature Granulocytes (abs) 0.03 0.00 - 0.11 K/uL    NRBC (Absolute) 0.00 K/uL   COMP METABOLIC PANEL   Result Value Ref Range    Sodium 138 135 - 145 mmol/L    Potassium 4.1 3.6 - 5.5 mmol/L    Chloride 103 96 - 112 mmol/L    Co2 26 20 - 33 mmol/L    Anion Gap 9.0 0.0 - 11.9    Glucose 94 65 - 99 mg/dL    Bun 38 (H) 8 - 22 mg/dL    Creatinine 1.51 (H) 0.50 - 1.40 mg/dL    Calcium 9.7 8.5 - 10.5 mg/dL    AST(SGOT) 18 12 - 45 U/L    ALT(SGPT) 17 2 - 50 U/L    Alkaline Phosphatase 56 30 - 99 U/L    Total Bilirubin 0.8 0.1 - 1.5 mg/dL    Albumin 4.1 3.2 - 4.9 g/dL    Total Protein 6.2 6.0 - 8.2 g/dL    Globulin 2.1 1.9 - 3.5 g/dL    A-G Ratio 2.0 g/dL   PROTHROMBIN TIME   Result Value Ref Range    PT 14.2 12.0 - 14.6 sec    INR 1.13 0.87 - 1.13   APTT   Result Value Ref Range    APTT 29.2 24.7 - 36.0 sec   COD (ADULT)   Result Value Ref Range    ABO Grouping Only O     Rh Grouping Only POS      Antibody Screen-Cod NEG    TROPONIN   Result Value Ref Range    Troponin I 0.02 0.00 - 0.04 ng/mL   ESTIMATED GFR   Result Value Ref Range    GFR If  52 (A) >60 mL/min/1.73 m 2    GFR If Non  43 (A) >60 mL/min/1.73 m 2                          All labs reviewed by me.    RADIOLOGY/PROCEDURES  DX-CHEST-PORTABLE (1 VIEW)   Final Result         1. No acute cardiopulmonary abnormalities are identified.      CT-CTA HEAD WITH & W/O-POST PROCESS   Final Result         1. No hemodynamically significant narrowing of the major intracranial vessels.      CT-HEAD W/O   Final Result         1. No acute intracranial findings. No evidence of acute intracranial hemorrhage or mass lesion.      2. Cerebral atrophy. White matter lucencies most consistent with chronic small vessel ischemic change.      Please note, hyperacute ischemia can remain occult on CT. If ischemia is clinically suspected, MRI is suggested for further evaluation.        The radiologist's interpretations of all radiological studies have been reviewed by me.     COURSE & MEDICAL DECISION MAKING  Pertinent Labs & Imaging studies reviewed. (See chart for details)    Differential diagnoses include but are not limited to seizure, stroke, intracranial bleed, meningitis,, TIA, medication side effects, electrolyte imbalances    11:46 AM - Patient seen and examined at bedside. Ordered CBC, CMP, Prothrombin, APTT, COD, Troponin, UA, EKG.  Patient will be medicated with Omnipaque for his symptoms.     11:49 - Dr. Powers, neurologist, consulted on patient's recent admission.     12:23 PM I discussed the patient's case and the above findings with Dr. Ruby (hospitalist) who agreed to admit the patient.     12:26 PM - Patient was notified of discharge plan and is agreeable at this time.    Decision Making  Patient who had an episode today lasted less than an hour of left arm weakness slurred speech now resolved.  He had a workup previously in  the hospital and was just released for this.  He had another episode today.  I discussed the case with neurology.  I discussed the case with the [facility] Hospitalist and they will admitted to the hospital for the care workup and evaluation.  Patient is already anticoagulated as well again at this time he will be admitted for further evaluation care.      DISPOSITION:  Patient will be admitted to  Dr. Zamorano (hospitalist) in guarded condition.      FINAL IMPRESSION  1. History of TIA (transient ischemic attack)        PLAN  1.  [facility]  2.  Continue workup and evaluation of his episodic slurred speech and left arm weakness       I, Ramiro Lott (Scribe), am scribing for, and in the presence of, No att. providers found.    Electronically signed by: Ramiro Lott (Vangie), 9/4/2018    I, No att. providers found personally performed the services described in this documentation, as scribed by Ramiro Lott in my presence, and it is both accurate and complete.    The note accurately reflects work and decisions made by me.  Joao Wilson  9/4/2018  6:37 PM

## 2018-09-04 NOTE — CONSULTS
CC:  LEFT ARM WEAK SPEECH SLURRED    Date of Admission: 9/4/2018    Today's Date: 09/04/18    Consulting Physician: No att. providers found      HPI:    Joe Rashid is a 89 y.o. male sudden severe left arm weakness and mild slurring speech, TLKW 0945 felt tired onset 1045 symptoms lasted 45m approx resolved fully in ED.  No associated symptoms.  No exacerbating or alleviating factors.   .  No other complaints.       ROS:   Constitutional: No fevers or chills.  Eyes: No blurry vision or eye pain.  ENT: No dysphagia or hearing loss.  Respiratory: No cough or shortness of breath.  Cardiovascular: No chest pain or palpitations.  GI: No nausea, vomiting, or diarrhea.  : No urinary incontinence or dysuria.  Musculoskeletal: No joint swelling or arthralgias.  Skin: No skin rashes.  Neuro: See HPI.  Endocrine: No heat or cold intolerance. No polydipsia or polyuria.  Psych: No depression or anxiety.  Heme/Lymph: No easy bruising or swollen lymph nodes.  All other systems were reviewed and were negative.       Past Medical History:   Past Medical History:   Diagnosis Date   • Atrial fibrillation (HCC)    • Chronic anticoagulation    • Chronic kidney disease, stage III (moderate)    • Congestive heart failure (HCC)    • Hepatitis B    • History of TIA (transient ischemic attack)    • Hypertension        Past Surgical History:   Past Surgical History:   Procedure Laterality Date   • HIP HEMIARTHROPLASTY Left 1/27/2016    Procedure: HIP HEMIARTHROPLASTY;  Surgeon: Adis Martínez M.D.;  Location: SURGERY [facility];  Service:    • APPENDECTOMY     • CATARACT EXTRACTION WITH IOL      both eyes   • COLON RESECTION     • OTHER      sinus surgery of unknown type   • PB CRYOSURG ABLATION OF PBOSTATE     • PB TRANSURETHRAL ELEC-SURG PBOSTATECTOM         Social History:   Social History     Social History   • Marital status:      Spouse name: N/A   • Number of children: N/A   • Years of education: N/A  "    Occupational History   • Not on file.     Social History Main Topics   • Smoking status: Never Smoker   • Smokeless tobacco: Never Used   • Alcohol use No   • Drug use: No   • Sexual activity: Not on file     Other Topics Concern   • Not on file     Social History Narrative   • No narrative on file       Family History:   Family History   Problem Relation Age of Onset   • Heart Disease Brother        Allergies:   Allergies   Allergen Reactions   • Aspirin Hives and Rash     Pts grandson states rash and hives.   • Sulfa Drugs Hives and Rash     Pts grandson states rash and hives.         Current Facility-Administered Medications:   •  NS (BOLUS) infusion 1,000 mL, 1,000 mL, Intravenous, Once, Ruth Morrison M.D.  •  atorvastatin (LIPITOR) tablet 80 mg, 80 mg, Oral, Q EVENING, Ruth Morrison M.D.  •  [START ON 9/5/2018] finasteride (PROSCAR) tablet 5 mg, 5 mg, Oral, DAILY WITH LUNCH, Ruth Morrison M.D.  •  tamsulosin (FLOMAX) capsule 0.4 mg, 0.4 mg, Oral, AFTER BREAKFAST, Ruth Morrison M.D.  •  metoprolol (LOPRESSOR) tablet 25 mg, 25 mg, Oral, TWICE DAILY, Ruth Morrison M.D.  •  lisinopril (PRINIVIL, ZESTRIL) TABS 20 mg, 20 mg, Oral, BID, Ruth Morrison M.D.  •  apixaban (ELIQUIS) 2.5mg tablet 2.5 mg, 2.5 mg, Oral, BID, Ruth Morrison M.D.  •  Respiratory Care per Protocol, , Nebulization, Continuous RT, Ruth Morrison M.D.      PHYSICAL EXAM    Vitals:    09/04/18 1144 09/04/18 1212 09/04/18 1230 09/04/18 1346   BP:    (!) 194/108   Pulse:  84 80 82   Resp:   16 18   Temp:    36.4 °C (97.5 °F)   SpO2:  95% 91% 96%   Weight: 70.3 kg (154 lb 15.7 oz)   69.3 kg (152 lb 12.5 oz)   Height: 1.778 m (5' 10\")          Head/Neck: NCAT. no meningismus neg kernig neg brudzinski. No obvious mass or heard bruit. No tender arteries or lost pulses. No rash of head or neck.    Skin: Warm, dry, intact. No rashes observed head/neck or body    Eyes/Funduscopic:  unable    Mental Status: Awake, alert, oriented x 3. Name/repeat/fluent/command " follow intact. No neglect/extinction. Attention and concentration, recent & remote memory, Fund of Knowledge wnl.     Cranial Nerves:  CN II-XII intact. PERRL 2mm.   No afferent pupillary defect. EOM full. VF full. No nystagmus.       Motor:  intact, no abn mvmts.  bulk & tone wnl.      Sensory: symmetric to sharp     Coordination: dysmetria absent    DTR's: intact/sym. no clonus. Toes mute.    Gait/Station: n/a fall concern         Labs:  Recent Labs      09/01/18 1659 09/03/18 0410 09/04/18   1144   WBC  6.2  7.3  9.1   RBC  5.35  5.67  5.36   HEMOGLOBIN  15.5  16.5  15.6   HEMATOCRIT  47.7  49.2  47.4   MCV  89.2  86.8  88.4   MCH  29.0  29.1  29.1   MCHC  32.5*  33.5*  32.9*   RDW  42.4  40.3  42.2   PLATELETCT  150*  141*  150*   MPV  9.3  9.5  9.6     Recent Labs      09/01/18 1659 09/03/18   0410  09/04/18   1144   SODIUM  136  136  138   POTASSIUM  4.1  3.7  4.1   CHLORIDE  102  102  103   CO2  26  25  26   GLUCOSE  115*  116*  94   BUN  27*  21  38*   CREATININE  1.12  1.11  1.51*   CALCIUM  9.0  9.0  9.7     Recent Labs      09/01/18 1659 09/04/18   1144   APTT  28.4  29.2   INR  1.08  1.13         Recent Labs      09/01/18 1659 09/04/18   1144   TROPONINI  <0.01  0.02     Recent Labs      09/02/18   0229   TRIGLYCERIDE  32   HDL  67   LDL  62     Recent Labs      09/01/18 1659 09/03/18   0410  09/04/18   1144   SODIUM  136  136  138   POTASSIUM  4.1  3.7  4.1   CHLORIDE  102  102  103   CO2  26  25  26   GLUCOSE  115*  116*  94   BUN  27*  21  38*     Recent Labs      09/01/18 1659 09/03/18   0410  09/04/18   1144   SODIUM  136  136  138   POTASSIUM  4.1  3.7  4.1   CHLORIDE  102  102  103   CO2  26  25  26   BUN  27*  21  38*   CREATININE  1.12  1.11  1.51*   MAGNESIUM   --   1.9   --    CALCIUM  9.0  9.0  9.7     Recent Labs      09/01/18   1659  09/04/18   1144   APTT  28.4  29.2   INR  1.08  1.13     Results for orders placed or performed during the hospital encounter of 09/01/18    ECHOCARDIOGRAM COMP W/O CONT   Result Value Ref Range    Eject.Frac. MOD BP 65.62     Eject.Frac. MOD 4C 74.04     Eject.Frac. MOD 2C 56.11     Left Ventrical Ejection Fraction 70               Imaging: neuroimaging reviewed and directly visualized by me  DX-CHEST-PORTABLE (1 VIEW)   Final Result         1. No acute cardiopulmonary abnormalities are identified.      CT-CTA HEAD WITH & W/O-POST PROCESS   Final Result         1. No hemodynamically significant narrowing of the major intracranial vessels.      CT-HEAD W/O   Final Result         1. No acute intracranial findings. No evidence of acute intracranial hemorrhage or mass lesion.      2. Cerebral atrophy. White matter lucencies most consistent with chronic small vessel ischemic change.      Please note, hyperacute ischemia can remain occult on CT. If ischemia is clinically suspected, MRI is suggested for further evaluation.        CTA HEAD     Impression       1.  No evidence of intracranial vascular occlusion or aneurysm.    2.  Diminutive right vertebral artery.    3.  Normal variant fetal origin of the right posterior cerebral artery.   Reading Provider Reading Date   Gibran Hernández M.D. Sep 1, 2018     CTA NECK  Impression       CT angiogram of the neck within normal limits.   Reading Provider Reading Date   Abran Mac M.D. Sep 1, 2018         Transthoracic  Echo Report      Echocardiography Laboratory    CONCLUSIONS  Normal left ventricular systolic function.  Left ventricular ejection fraction is visually estimated to be 70%.  Moderate concentric left ventricular hypertrophy.  Moderately dilated right ventricle.  Normal right ventricular systolic function.  Right heart pressures are consistent with severe pulmonary   hypertension.  Aortic sclerosis without stenosis.  Mild aortic insufficiency.  Ascending aorta is dilated with a diameter of 4.1 cm.    YEIMY AGARWAL  Exam Date:         09/02/2018     Assessment/Plan:    TIA ON ELIQUIS &  PLAVIX  AFIB  RECENT ADMISSION, STROKE WORKUP COMPLETED 9/2 [facility] NEG, DCD 9/3    NIHSS score:  1a. LOC:   1b. LOC Questions:   1c. LOC Commands:   2. Best Gaze:   3. Visual Fields:   4. Facial Paresis:   5a. Motor arm left:   5b. Motor arm right:   6a. Motor leg left:   6b. Motor leg right:   7. Limb Ataxia:   8. Sensory:   9. Best Language:    10. Dysarthria:   11. Extinction/Inattention:     Total NIHSS: 0    Decision NOT to give alteplase: IN ED   Contraindication for IVtPA: NIHSS 0    Presumed mechanism by TOAST:  __Large Artery Atherosclerosis  __Small Vessel (Lacunar)  _x_Cardioembolic  __Other (Sickle Cell, Vasculitis, Hypercoagulable)  __Unknown      Stroke Risk factors: AFIB, AGE  Antithrombotic use prior to stroke: YES    CT/CTA VISUALIZED BY ME AT 1206- NO ACUTE    OK START HIGHER DOSE OF APIXABAN FOR AFIB & 2' STROKE PREVENTION; TIA MINOR DEFICITS RESOLVED, NO NEED FOR REPEAT MRI BRAIN MIGHT ONLY SHOW TINY ISCHEMIC INFARCT IF ANY, NEEDS AFIB ISCH STROKE RISK MITIGATION WITH ANTICOAGULATION BENEFITS TO THIS THERAPY OUTWEIGH ANY RISK OF ICH CONSIDERED LOWER    PLAVIX OR ANTIPLATELET ONLY IF CARD REQUIREMENT CAD SEVERE/SYMPTOMATIC    STATIN FULL DOSE CRESTOR OR LIPITOR    ACEI/ARB, HCTZ, AND/OR CCB FOR JNC GOALS     BG MONITOR/CNTRL, DIET & LIFESTYLE/AVOID SMOKING DISCUSSED. Discussed stroke risks & mechanism of stroke injury, all questions answered.    No further neuro workup as inpatient if aforementioned studies WNL & maintains neuro baseline.  Neuro sign off, reconsult PRN.  F/u otpt Neuro MD in coming months.     CRITICAL CARE  Upon my evaluation, this patient had a high probability of imminent or life-threatening deterioration due to cerebrovascular accident which required my direct attention, intervention, and personal management.      I personally provided 35 minutes of total critical care time outside of time spent on separately billable/documented procedures. Time includes: review of laboratory  data, review of radiology studies, discussion with consultants, discussion with family/patient, monitoring for potential decompensation.  Interventions were performed as documented above.       Young Powers M.D.  , Neurohospitalist & Stroke  Clinical Professor, [employer]  Diplomate, Neurology & Neuroimaging

## 2018-09-04 NOTE — ED NOTES
Med rec updated and complete  Allergies reviewed, per historical history  Received medications list from pharmacist, that she received from EMS.

## 2018-09-04 NOTE — ASSESSMENT & PLAN NOTE
This is labile and uncontrolled  I would recommend transitioning his lisinopril 40 mg to lisinopril 20 mg twice a day  Continue metoprolol 25 mg twice daily  Hold for systolic blood pressure less than 130  Aim for systolic blood pressure approximately 140-150, appropriate for age

## 2018-09-04 NOTE — ASSESSMENT & PLAN NOTE
Transient, associated drooling, agitation and some left-sided weakness, subjective  Lasted only for 10 minutes and now completely resolved  Per report from daughter, systolic blood pressure of 90 at home today    Upon presentation to the emergency department, patient at baseline no residual deficits    Upon evaluation by me patient is at baseline, no noted neurological deficits, remains at baseline    Recently completed a detailed TIA/stroke evaluation  Was discharged home on Eliquis  I believe patient might have suffered from cerebral hypoperfusion secondary to hypotension  Currently systolic blood pressure of 190, labile blood pressure  At this time patient will be admitted for observation, he is very eager to discharge home and so with the daughter eager to take him home  Discussed with Dr. oPwers from neurology who was consulted in the emergency department  Okay to go home based on Dr. Powers's recommendation  No further needs for Plavix while on anticoagulation  Patient is ambulating well and no concerns are noted    I had a detailed discussion with the patient and his daughter.  I discussed that no role for Plavix while on anticoagulation, neurology agrees with this.  I discussed with them that if this is indeed a recurrent episode of TIA, one might consider increasing his Eliquis to 5 mg twice a day or consider initiation of Coumadin.  I spoke with his niece, Ember over the telephone who is a physician.  Given needs for monitoring with Coumadin, they have refused this altogether.  Discussed clinically patient would be appropriate for a regimen of 2.5 mg twice a day of Eliquis but if this is a recurrent TIA, one can consider increased dosage to 5 mg twice a day.  Discussed the risks of increased bleeding episodes with this while further minimizing the risk of stroke, they verbalized understanding but does not want to increase the dose of Eliquis at this time and want to maintain the dose of Eliquis at 2.5 mg  twice daily.    Patient will maintain outpatient close follow-up with stroke bridge clinic

## 2018-09-04 NOTE — ASSESSMENT & PLAN NOTE
Recent discharge from the hospital  Maintain risk factor modification  Maintain Eliquis  No need for Plavix  Increase statin therapy to 80 mg a day

## 2018-09-04 NOTE — ASSESSMENT & PLAN NOTE
Monitor renal function / Avoid nephrotoxins and dose medications renally  IVF hydration post contrast today to prevent CARIN

## 2018-09-04 NOTE — DISCHARGE PLANNING
Care Transition Team Assessment    Met with pt at bedside. According to pt he lives with his wife Myranda, independent at baseline, wife ensures he takes his medications, uses walker and cane. Currently being visited by [facility], confirmed with [facility] pt did not need a new referral. Pt said his grandson Harlan will provide transport at discharge. No other needs identified at this time. CM to follow for needs.    Information Source  Orientation : Oriented x 4  Information Given By: Patient    Readmission Evaluation  Is this a readmission?: No    Interdisciplinary Discharge Planning  Does Admitting Nurse Feel This Could be a Complex Discharge?: No  Primary Care Physician: Mervin Pompa MD  Lives with - Patient's Self Care Capacity: Spouse  Patient or legal guardian wants to designate a caregiver (see row info): No  Support Systems: Children, Spouse / Significant Other (Adult daughter Aleksey)  Housing / Facility:  ([facility])  Do You Take your Prescribed Medications Regularly: Yes  Able to Return to Previous ADL's: Yes  Mobility Issues: No  Prior Services: None  Patient Expects to be Discharged to:: Home  Assistance Needed: No  Durable Medical Equipment: Walker, Other - Specify (Cane)    Discharge Preparedness  What are your discharge supports?: Child, Spouse (Adult daughter Aleksey and grandson Harlan)  Prior Functional Level: Ambulatory, Independent with Activities of Daily Living, Needs Assist with Medication Management  Difficulity with ADLs: None (Uses cane and walker)  Difficulity with IADLs: None    Functional Assesment  Prior Functional Level: Ambulatory, Independent with Activities of Daily Living, Needs Assist with Medication Management    Finances  Prescription Coverage: Yes    Discharge Risks or Barriers  Discharge risks or barriers?: No    Anticipated Discharge Information  Anticipated discharge disposition: Home  Discharge Address: [address] [facility]  Discharge Contact Phone  Number: Pro Myranda [phone]

## 2018-09-04 NOTE — PROGRESS NOTES
Received pt from ED.  Pt is A/Ox4.  Respirations are even and unlabored on RA.  Dr. Morrison at bedside.  Monitors applied, Pt has elevated BP (See MAR).  Orders for permissive hypertension.  Will continue to closely monitor. Call light within reach.

## 2018-09-04 NOTE — ASSESSMENT & PLAN NOTE
With urgency  Underlying history of benign prostatic hypertrophy  Continue at home regimen of Flomax and finasteride  Check urinalysis to ensure there is no underlying urinary tract infection/cystitis  Recommend outpatient urology follow-up  In addition check a bladder scan to ensure patient is not retaining

## 2018-09-05 NOTE — DISCHARGE SUMMARY
Discharge Summary    CHIEF COMPLAINT ON ADMISSION  No chief complaint on file.      Reason for Admission  Stroke     Admission Date  9/4/2018    CODE STATUS  DNAR/DNI    HPI & HOSPITAL COURSE  This is a 89 y.o. male here with stroke like symptoms.  Patient was recently discharged from the hospital yesterday, after presentation concerning for a TIA, prior history of atrial fibrillation.  Not on anticoagulation then given prior history of hematuria.  Stroke workup was obtained, negative MRI of the brain for an acute infarction.  Patient noted to have severe pulmonary hypertension on echocardiogram, otherwise negative workup.  Seen by neurology, started on anticoagulation with Eliquis, discharged home on Plavix and Eliquis therapy.  Today per daughter, home health care team was at home, blood pressure was checked and found to have a systolic blood pressure of 90.  Subsequently later patient was more agitated, patient's daughter noted that when the patient smiled he had left-sided facial droop, some left-sided weakness and some dysarthria.  No reported aphasia, no reported sensory deficits.  Was able to ambulate fine without any ataxia.  Chronic debility, supposed to use a walker but does not use it.  Lives with his wife.  Daughter and nephew continue to look after him.  Daughter was concerned regarding symptoms and have the patient presented to the emergency department for further evaluation.  Upon presentation to the emergency department patient had complete resolution of symptoms.  Upon evaluation by me, patient denies any symptoms.  Upon evaluation by me patient is alert and oriented ×4, he is aware of being at [facility], he is aware of the month being September, he is aware of the year being 2018 and he is aware of the  being Bong Veloz.  He does have some hearing loss and mild cognitive impairment related to his age.  Otherwise he is very functional for his advanced age.  No  other complaints upon my evaluation apart from increasing urinary frequency and urgency which has been notable for the last 2-3 days.  Patient does have an underlying history of benign prostatic hypertrophy.  Patient was normotensive upon presentation to the hospital and has been subsequently hypotensive subsequently.  No other concerns.    Patient was admitted to the CDU for observation.  He was seen by neurology.  No further imaging evaluation were recommended by neurology team.  Neurology team did not recommend continuation of Plavix at this time which was discontinued.  Eliquis was continued, age and renal dosed.  Offered to the patient that given his renal dysfunction and advanced age we may consider initiation of Coumadin therapy, patient and family refused to this.  Also given increased risk of bleeding with a higher dose of Eliquis, they did not want to proceed with a higher dose of Eliquis at this point in time.  Discussed potential for increased stroke protection with aggressive anticoagulation though there would be increased bleeding risks with this.  Per patient and family, is reluctant to use his walker and has a high fall risk with associated complications related to bleeding and they do not want to proceed with aggressive anticoagulation though they are agreeable to Eliquis at the current dosage of 2.5 mg twice daily.    Otherwise no abnormalities and CBC, chronic thrombocytopenia.  Chronic renal dysfunction, provide IV fluid hydration after receiving contrast in the emergency department.  Patient with symptoms of ongoing benign prostatic hypertrophy for which he should seek outpatient urology evaluation.  Urinalysis negative for any evidence of urinary tract infection.  The patient has a labile blood pressure with advanced age, hypertensive at home and subsequently hypertensive here.  No neurological deficits, cleared by neurology for discharge at this point in time, ambulating fine and without any  deficits.  He is very eager to discharge home and patient's family is very eager for him to come home.  Establish DNR/DNI CODE STATUS.  Given his issues with blood pressure, and change his lisinopril from 40 mg to lisinopril 20 mg twice daily, advised metoprolol 25 mg twice daily to continue and advised him to use clonidine as needed for systolic blood pressure greater than 160 at home.  Aim for blood pressure control should be systolic blood pressure less than 150-160 in this elderly geriatric population to minimize risks of iatrogenic hypotension.  Discharge planning discussed with the patient and family.  Is discharged home in stable condition once his blood pressure is less than 170 during his hospitalization.  He will need BMP with his primary care physician in 1 week of hospital discharge, his statin therapy has been escalated to atorvastatin 80 mg.  Hospital schedulers have been notified by me for arrangement of outpatient follow-up with neurology team and PCP.  His home health care will resume following discharge, discussed with social workers.         Therefore, he is discharged in good and stable condition to home with close outpatient follow-up.    Discharge Date  09/04/18    FOLLOW UP ITEMS POST DISCHARGE  Follow-up with your primary care physician within 1 week of hospital discharge.  Follow-up in outpatient stroke bridge clinic.  You are noted to have high blood pressure with intermittent episodes of low blood pressure, stop your lisinopril 40 mg and start lisinopril 20 mg twice a day instead and continue your metoprolol.  Monitor your blood pressure at home and if you have a systolic blood pressure greater than 160, take clonidine as needed.  Blood pressure apparatus has been prescribed for you, this is also available over-the-counter.  If this is costly for you can always have your blood pressure checked at the nearest pharmacy.  Continue physical therapy and Occupational Therapy with her home health  team.  Continue to take Eliquis 2.5 mg twice a day, stop your Plavix.  Discuss with your primary care physician, outpatient heart doctors and neurologist to see and discuss further if you would be a candidate for Coumadin or increased dosage of Eliquis.  Increase your atorvastatin to 80 mg daily.  Have your primary care physician check a kidney function panel in 1 week of hospital discharge.  All medications have been electronically prescribed to [id number] - [place], [place] - [address] AT [facility] of [facility],  following discharge.     DISCHARGE DIAGNOSES  Active Problems:    Facial droop POA: Yes    Urinary frequency POA: Yes    Chronic kidney disease, stage III (moderate) POA: Yes    Essential hypertension, benign POA: Yes    Thrombocytopenia (HCC) POA: Yes    Paroxysmal atrial fibrillation (HCC) POA: Yes    Benign prostatic hyperplasia with urinary frequency POA: Yes    History of TIA (transient ischemic attack) POA: Yes  Resolved Problems:    * No resolved hospital problems. *      FOLLOW UP  Future Appointments  Date Time Provider Department Center   9/6/2018 To Be Determined Marga Jara, OT [facility] None   9/7/2018 To Be Determined Ijeoma Roque, PT [facility] None   9/7/2018 To Be Determined Lynne Salinas R.N. [facility] None   9/10/2018 To Be Determined MIKAL Wise.N. [facility] None   9/11/2018 10:30 AM [facility] EXAM 4 [facility] None   9/13/2018 To Be Determined Lynne Salinas R.N. [facility] None   9/17/2018 To Be Determined Lynne Salinas R.N. [facility] None   9/20/2018 To Be Determined Lynne Salinas R.N. [facility] None   9/26/2018 To Be Determined Lynne Salinas R.N. [facility] None   10/3/2018 To Be Determined Lynne Salinas R.N. [facility] None   10/10/2018 To Be Determined Lynen Salinas R.N. [facility] None   10/17/2018 To Be Determined MIKAL Wise.N. [facility] None   10/24/2018 To Be Determined MIKAL Wise.N. [facility] None   10/31/2018 To Be Determined Lynne Salinas R.N. [facility] None   12/20/2018 10:45 AM  Chaz Linder M.D. [facility] None     No follow-up provider specified.    MEDICATIONS ON DISCHARGE     Medication List      START taking these medications      Instructions   Blood Pressure Kit   1 Each by Does not apply route Once for 1 dose.  Dose:  1 Each     cloNIDine 0.1 MG Tabs  Commonly known as:  CATAPRES   Take 1 Tab by mouth 2 times a day as needed (For SBP > 160).  Dose:  0.1 mg        CHANGE how you take these medications      Instructions   atorvastatin 80 MG tablet  What changed:  · medication strength  · how much to take  Commonly known as:  LIPITOR   Take 1 Tab by mouth every evening.  Dose:  80 mg     lisinopril 20 MG Tabs  What changed:  · medication strength  · See the new instructions.  Commonly known as:  PRINIVIL   Take 1 Tab by mouth 2 Times a Day.  Dose:  20 mg        CONTINUE taking these medications      Instructions   acetaminophen 325 MG Tabs  Commonly known as:  TYLENOL   Take 2 Tabs by mouth every 6 hours as needed (Mild Pain; (Pain scale 1-3); Temp greater than 100.5 F).  Dose:  650 mg     apixaban 2.5mg Tabs  Commonly known as:  ELIQUIS   Take 1 Tab by mouth 2 Times a Day.  Dose:  2.5 mg     finasteride 5 MG Tabs  Commonly known as:  PROSCAR   Take 5 mg by mouth every day with lunch.  Dose:  5 mg     FLOMAX 0.4 MG capsule  Generic drug:  tamsulosin   Take 0.4 mg by mouth BEDTIME(S).  Dose:  0.4 mg     glucosamine Sulfate 500 MG Caps   Take 500 mg by mouth every day with lunch.  Dose:  500 mg     metoprolol 25 MG Tabs  Commonly known as:  LOPRESSOR   Doctor's comments:  **Patient requests 90 days supply**  TAKE 1 TABLET BY MOUTH TWICE DAILY     MULTIVITAMIN & MINERAL PO   Take 1 Capsule by mouth every day before lunch.  Dose:  1 Capsule     PROBIOTIC DAILY PO   Take 1 Tab by mouth every evening.  Dose:  1 Tab     vitamin D 1000 UNIT Tabs  Commonly known as:  cholecalciferol   Take 1,000 Units by mouth every bedtime.  Dose:  1000 Units        STOP taking these medications     clopidogrel 75 MG Tabs  Commonly known as:  PLAVIX            Allergies  Allergies   Allergen Reactions   • Aspirin Hives and Rash     Pts grandson states rash and hives.   • Sulfa Drugs Hives and Rash     Pts grandson states rash and hives.       DIET  Orders Placed This Encounter   Procedures   • Diet Order Regular     Standing Status:   Standing     Number of Occurrences:   1     Order Specific Question:   Diet:     Answer:   Regular [1]       ACTIVITY  As tolerated.  Weight bearing as tolerated    CONSULTATIONS  Neurology    PROCEDURES  None    LABORATORY  Lab Results   Component Value Date    SODIUM 138 09/04/2018    POTASSIUM 4.1 09/04/2018    CHLORIDE 103 09/04/2018    CO2 26 09/04/2018    GLUCOSE 94 09/04/2018    BUN 38 (H) 09/04/2018    CREATININE 1.51 (H) 09/04/2018        Lab Results   Component Value Date    WBC 9.1 09/04/2018    HEMOGLOBIN 15.6 09/04/2018    HEMATOCRIT 47.4 09/04/2018    PLATELETCT 150 (L) 09/04/2018

## 2018-09-05 NOTE — PROGRESS NOTES
1945 Patient's VS rechecked. BP: 156/81, HR:70bpm, ID:[id number] RR:19, sPO2:94. Patient AOx4, ambulatory with steady gait. Patient's grandson Lopez notified. Will be on his way to  patient

## 2018-09-05 NOTE — DISCHARGE INSTRUCTIONS
Discharge Instructions    Discharged to home by car with relative. Discharged via wheelchair, hospital escort: Yes.  Special equipment needed: Not Applicable    Be sure to schedule a follow-up appointment with your primary care doctor or any specialists as instructed.     Discharge Plan:   Diet Plan: Discussed  Activity Level: Discussed  Confirmed Follow up Appointment: Patient to Call and Schedule Appointment  Confirmed Symptoms Management: Discussed  Medication Reconciliation Updated: Yes  Influenza Vaccine Indication: Not indicated: Previously immunized this influenza season and > 8 years of age    I understand that a diet low in cholesterol, fat, and sodium is recommended for good health. Unless I have been given specific instructions below for another diet, I accept this instruction as my diet prescription.   Other diet: Heart Healthy    Special Instructions:     · Is patient discharged on Warfarin / Coumadin?   No     FOLLOW UP ITEMS POST DISCHARGE    Follow-up with your primary care physician within 1 week of hospital discharge.  Follow-up in outpatient stroke bridge clinic.  You are noted to have high blood pressure with intermittent episodes of low blood pressure, stop your lisinopril 40 mg and start lisinopril 20 mg twice a day instead and continue your metoprolol.  Monitor your blood pressure at home and if you have a systolic blood pressure greater than 160, take clonidine as needed.  Blood pressure apparatus has been prescribed for you, this is also available over-the-counter.  If this is costly for you can always have your blood pressure checked at the nearest pharmacy.  Continue physical therapy and Occupational Therapy with her home health team.  Continue to take Eliquis 2.5 mg twice a day, stop your Plavix.  Discuss with your primary care physician, outpatient heart doctors and neurologist to see and discuss further if you would be a candidate for Coumadin or increased dosage of Eliquis.  Increase  "your atorvastatin to 80 mg daily.  Have your primary care physician check a kidney function panel in 1 week of hospital discharge.  All medications have been electronically prescribed to [facility] [place] - [address] AT [facility] of [place],  following discharge.         TRANSIENT ISCHEMIC ATTACK      A transient ischemic attack (TIA) is a \"warning stroke\" that causes stroke-like symptoms. A TIA does not cause lasting damage to the brain. The symptoms of a TIA can happen fast and do not last long. It is important to know the symptoms of a TIA and what to do. This can help prevent stroke or death.  Follow these instructions at home:  · Take medicines only as told by your doctor. Make sure you understand all of the instructions.  · You may need to take aspirin or warfarin medicine. Warfarin needs to be taken exactly as told.  ¨ Taking too much or too little warfarin is dangerous. Blood tests must be done as often as told by your doctor. A PT blood test measures how long it takes for blood to clot. Your PT is used to calculate another value called an INR. Your PT and INR help your doctor adjust your warfarin dosage. He or she will make sure you are taking the right amount.  ¨ Food can cause problems with warfarin and affect the results of your blood tests. This is true for foods high in vitamin K. Eat the same amount of foods high in vitamin K each day. Foods high in vitamin K include spinach, kale, broccoli, cabbage, ye and turnip greens, [place] sprouts, peas, cauliflower, seaweed, and parsley. Other foods high in vitamin K include beef and pork liver, green tea, and soybean oil. Eat the same amount of foods high in vitamin K each day. Avoid big changes in your diet. Tell your doctor before changing your diet. Talk to a  (dietitian) if you have questions.  ¨ Many medicines can cause problems with warfarin and affect your PT and INR. Tell your doctor " about all medicines you take. This includes vitamins and dietary pills (supplements). Do not take or stop taking any prescribed or over-the-counter medicines unless your doctor tells you to.  ¨ Warfarin can cause more bruising or bleeding. Hold pressure over any cuts for longer than normal. Talk to your doctor about other side effects of warfarin.  ¨ Avoid sports or activities that may cause injury or bleeding.  ¨ Be careful when you shave, floss, or use sharp objects.  ¨ Avoid or drink very little alcohol while taking warfarin. Tell your doctor if you change how much alcohol you drink.  ¨ Tell your dentist and other doctors that you take warfarin before any procedures.  · Follow your diet program as told, if you are given one.  · Keep a healthy weight.  · Stay active. Try to get at least 30 minutes of activity on all or most days.  · Do not use any tobacco products, including cigarettes, chewing tobacco, or electronic cigarettes. If you need help quitting, ask your doctor.  · Limit alcohol intake to no more than 1 drink per day for nonpregnant women and 2 drinks per day for men. One drink equals 12 ounces of beer, 5 ounces of wine, or 1½ ounces of hard liquor.  · Do not abuse drugs.  · Keep your home safe so you do not fall. You can do this by:  ¨ Putting grab bars in the bedroom and bathroom.  ¨ Raising toilet seats.  ¨ Putting a seat in the shower.  · Keep all follow-up visits as told by your doctor. This is important.  Contact a doctor if:  · Your personality changes.  · You have trouble swallowing.  · You have double vision.  · You are dizzy.  · You have a fever.  Get help right away if:  These symptoms may be an emergency. Do not wait to see if the symptoms will go away. Get medical help right away. Call your local emergency services (911 in the [place] Do not drive yourself to the hospital.  · You have sudden weakness or lose feeling (go numb), especially on one side of the body. This can affect  your:  ¨ Face.  ¨ Arm.  ¨ Leg.  · You have sudden trouble walking.  · You have sudden trouble moving your arms or legs.  · You have sudden confusion.  · You have trouble talking.  · You have trouble understanding.  · You have sudden trouble seeing in one or both eyes.  · You lose your balance.  · Your movements are not smooth.  · You have a sudden, very bad headache with no known cause.  · You have new chest pain.  · Your heartbeat is unsteady.  · You are partly or totally unaware of what is going on around you.  This information is not intended to replace advice given to you by your health care provider. Make sure you discuss any questions you have with your health care provider.  Document Released: 09/26/2009 Document Revised: 08/21/2017 Document Reviewed: 03/25/2015  [employer] Interactive Patient Education © 2017 [employer]      Depression / Suicide Risk    As you are discharged from this [facility] facility, it is important to learn how to keep safe from harming yourself.    Recognize the warning signs:  · Abrupt changes in personality, positive or negative- including increase in energy   · Giving away possessions  · Change in eating patterns- significant weight changes-  positive or negative  · Change in sleeping patterns- unable to sleep or sleeping all the time   · Unwillingness or inability to communicate  · Depression  · Unusual sadness, discouragement and loneliness  · Talk of wanting to die  · Neglect of personal appearance   · Rebelliousness- reckless behavior  · Withdrawal from people/activities they love  · Confusion- inability to concentrate     If you or a loved one observes any of these behaviors or has concerns about self-harm, here's what you can do:  · Talk about it- your feelings and reasons for harming yourself  · Remove any means that you might use to hurt yourself (examples: pills, rope, extension cords, firearm)  · Get professional help from the community (Mental Health, Substance  Abuse, psychological counseling)  · Do not be alone:Call your Safe Contact- someone whom you trust who will be there for you.  · Call your local CRISIS HOTLINE 700-8529 or 201-183-3140  · Call your local Children's Mobile Crisis Response Team [place] (847) 356-5232 or www.[employer]  · Call the toll free National Suicide Prevention Hotlines   · National Suicide Prevention Lifeline 416-556-GQMB (4537)  · National Hope Line Network 800-SUICIDE (216-4160)

## 2018-09-05 NOTE — PROGRESS NOTES
Updated family on plan of care to monitor patient longer, will contact family when patient ready for discharge.

## 2018-09-05 NOTE — TELEPHONE ENCOUNTER
Received referral from [facility]. Medications reviewed against recent discharge summary 9/4/18. No clinically significant interactions noted.     Nazia Page, KaroD

## 2018-09-05 NOTE — PROGRESS NOTES
2015 Patient with discharge orders. IVF and PIV discontinued. Discharge teaching and instructions and given, pt verbalized understanding. Copy provided to patient. All patient belongings sent with patient. Discharged home in stable condition, VS within normal limits. Escorted to lobby via wheelchair transported by CDU tech and patient's family.

## 2018-09-05 NOTE — PROGRESS NOTES
Report from Stephany ORTIZ. Assumed pt care. Pt awaiting to be discharged once SBP is more stable. Patient given dinner tray. Notified pt's grandson Lopez of pt's discharge.

## 2018-09-13 NOTE — TELEPHONE ENCOUNTER
Chaz Linder M.D.  Shania Mackenzie R.N.; Michael J Bloch, M.D.             Agree with Dr Bloch just does adjusted eliquis no plavix.  Ana can you let him know     Thank you      Spoke with patient and his spouse and they verbalize understanding.

## 2018-09-13 NOTE — TELEPHONE ENCOUNTER
Initial anticoag note and d/c summary reviewed.  Patient with afib and recent TIA with chads-vasc = 6  Was admitted on clopidogrel, but neurology recommended change to DOAC - appears that patient was discharged on both.  Given the patients age and renal function, concerned about risk v benefit of this combination.  Pending f/u with pcp and/or cards will d/c clopidogrel and continue eliquis 2.5 mg bid (dose adjusted for age and renal function).    Defer all further management of rhythm, rate, and other cv issues, aside from anticoag to cards.    Michael Bloch, MD  Anticoagulation Clinic    Cc:      CHELSIE Sanchez

## 2018-09-14 NOTE — TELEPHONE ENCOUNTER
[facility]    Unable to reach pt on 9/13/18 to have pt discontinue Plavix.  Called HH and requested them to convey message to the pt.  Note was entered from HH that confirms pt is no longer on Plavix.  Medication list is updated.    Young Garcia, PharmD

## 2018-12-12 NOTE — PROGRESS NOTES
Target end date:Afib, TIA     Indication: Indefinite     Drug: Eliquis 2.5 mg BID     CHADsVASC = CHADSVASC at least 6 (CHF, HTN, Age, TIA)    Health Status Since Last Assessment   Patient denies any new relevant medical problems, ED visits or hospitalizations   Patient denies any embolic events (stroke/tia/systemic embolism)    Adherence with DOAC Therapy   Pt has not missed any doses in the average week, Wife manages medications    Bleeding Risk Assessment     Light Epistaxis has occurred when blowing nose one time (yesterday)   Pt denies any excessive or unusual bleeding/hematomas.  Pt denies any GI bleeds or hematemesis.  Pt denies any concerning daily headache or sub dural hematoma symptoms.     Pt denies any hematuria or abnormal vaginal bleeding.   Latest Hemoglobin 15.1   ETOH overuse denies     Creatinine Clearance/Renal Function     Latest ClCr 34.0 ml/min     Drug Interactions   Platelets: 163   ASA/other antiplatelets unknown   NSAID unknown   Other drug interactions none   XVerified no anticonvulsant or azole therapy, education provided for future use.     Examination     Symptomatic hypotension unknown   Significant gait impairment/imbalance/high risk for falls? Uses a walker    Final Assessment and Recommendations:   Patient appears stable from the anticoagulation staindpoint.     Benefits of continued DOAC therapy outweigh risks for this patient   Recommend pt continue with current DOAC therapy Eliquis 2.5 mg BID (with dose  Adjustment).  PTs Scr is improved since starting the adjusted dose of Eliquis but historically pt does have a Scr greater than 1.5.  Will be obtaining new labs to guide further dosing.    PT has had mild epistaxis and some bruising that does slowly improve.  Recommending patient stay on the 2.5 mg BID dose at this time to protect against a bleeding event but will re-evaluate when renal indices return.       Other Actions: cmp/ cbc hemogram ordered prior to next  visit    Follow up:   Follow up in 3 months, Follow up call in 2 weeks for new renal indices.    Young Garcia PharmD

## 2018-12-20 NOTE — PROGRESS NOTES
Chief Complaint   Patient presents with   • Atrial Fibrillation     F/V: 1 YR       Subjective:   Joe Rashid is a 89 y.o. male who presents today for cardiac care and management of TIA, HTN, HLP.    In the interim, patient has been doing well without having any symptoms. Patient denies having chest pain, dyspnea, palpitation, presyncope, syncope episodes.      Past Medical History:   Diagnosis Date   • Atrial fibrillation (HCC)    • Chronic anticoagulation    • Chronic kidney disease, stage III (moderate) (HCC)    • Congestive heart failure (HCC)    • Hepatitis B    • History of TIA (transient ischemic attack)    • Hypertension      Past Surgical History:   Procedure Laterality Date   • HIP HEMIARTHROPLASTY Left 1/27/2016    Procedure: HIP HEMIARTHROPLASTY;  Surgeon: Adis Martínez M.D.;  Location: SURGERY [facility];  Service:    • APPENDECTOMY     • CATARACT EXTRACTION WITH IOL      both eyes   • COLON RESECTION     • OTHER      sinus surgery of unknown type   • PB CRYOSURG ABLATION OF PBOSTATE     • PB TRANSURETHRAL ELEC-SURG PBOSTATECTOM       Family History   Problem Relation Age of Onset   • Heart Disease Brother      Social History     Social History   • Marital status:      Spouse name: N/A   • Number of children: N/A   • Years of education: N/A     Occupational History   • Not on file.     Social History Main Topics   • Smoking status: Never Smoker   • Smokeless tobacco: Never Used   • Alcohol use No   • Drug use: No   • Sexual activity: Not on file     Other Topics Concern   • Not on file     Social History Narrative   • No narrative on file     Allergies   Allergen Reactions   • Aspirin Hives and Rash     Pts grandson states rash and hives.   • Sulfa Drugs Hives and Rash     Pts grandson states rash and hives.     Outpatient Encounter Prescriptions as of 12/20/2018   Medication Sig Dispense Refill   • cetirizine (ZYRTEC) 10 MG Tab Take 10 mg by mouth every day.     • lisinopril  (PRINIVIL) 20 MG Tab Take 1 Tab by mouth 2 Times a Day. 60 Tab 0   • cloNIDine (CATAPRES) 0.1 MG Tab Take 1 Tab by mouth 2 times a day as needed (For SBP > 160). 30 Tab 0   • atorvastatin (LIPITOR) 80 MG tablet Take 1 Tab by mouth every evening. 30 Tab 0   • apixaban (ELIQUIS) 2.5mg Tab Take 1 Tab by mouth 2 Times a Day. 60 Tab 0   • vitamin D (CHOLECALCIFEROL) 1000 UNIT Tab Take 1,000 Units by mouth every bedtime.     • finasteride (PROSCAR) 5 MG Tab Take 5 mg by mouth every day with lunch.     • glucosamine Sulfate 500 MG Cap Take 500 mg by mouth every day with lunch.     • Probiotic Product (PROBIOTIC DAILY PO) Take 1 Tab by mouth every evening.     • metoprolol (LOPRESSOR) 25 MG Tab TAKE 1 TABLET BY MOUTH TWICE DAILY 180 Tab 3   • Multiple Vitamins-Minerals (MULTIVITAMIN & MINERAL PO) Take 1 Capsule by mouth every day before lunch.     • tamsulosin (FLOMAX) 0.4 MG capsule Take 0.4 mg by mouth BEDTIME(S).     • metoprolol (LOPRESSOR) 25 MG Tab TAKE 1 TABLET BY MOUTH TWICE DAILY 180 Tab 0   • acetaminophen (TYLENOL) 325 MG Tab Take 2 Tabs by mouth every 6 hours as needed (Mild Pain; (Pain scale 1-3); Temp greater than 100.5 F). 30 Tab 0     No facility-administered encounter medications on file as of 12/20/2018.      Review of Systems   Constitutional: Negative for chills, fever, malaise/fatigue and weight loss.   HENT: Negative for ear discharge, ear pain, hearing loss and nosebleeds.    Eyes: Negative for blurred vision, double vision, pain and discharge.   Respiratory: Negative for cough and shortness of breath.    Cardiovascular: Negative for chest pain, palpitations, orthopnea, claudication, leg swelling and PND.   Gastrointestinal: Negative for abdominal pain, blood in stool, melena, nausea and vomiting.   Genitourinary: Negative for dysuria and hematuria.   Musculoskeletal: Negative for falls, joint pain and myalgias.   Skin: Negative for itching and rash.   Neurological: Negative for dizziness, sensory  "change, speech change, loss of consciousness and headaches.   Endo/Heme/Allergies: Negative for environmental allergies. Does not bruise/bleed easily.   Psychiatric/Behavioral: Negative for depression, hallucinations and suicidal ideas.        Objective:   /70 (BP Location: Right arm, Patient Position: Sitting, BP Cuff Size: Adult)   Pulse 84   Ht 1.753 m (5' 9\")   Wt 69.9 kg (154 lb)   SpO2 94%   BMI 22.74 kg/m²     Physical Exam   Constitutional: He is oriented to person, place, and time. No distress.   HENT:   Head: Normocephalic and atraumatic.   Right Ear: External ear normal.   Left Ear: External ear normal.   Eyes: Right eye exhibits no discharge. Left eye exhibits no discharge.   Neck: No JVD present. No thyromegaly present.   Cardiovascular: Normal rate, regular rhythm, normal heart sounds and intact distal pulses.  Exam reveals no gallop and no friction rub.    No murmur heard.  Pulmonary/Chest: Breath sounds normal. No respiratory distress.   Abdominal: Bowel sounds are normal. He exhibits no distension. There is no tenderness.   Musculoskeletal: He exhibits no edema or tenderness.   Neurological: He is alert and oriented to person, place, and time. No cranial nerve deficit.   Skin: Skin is warm and dry. He is not diaphoretic.   Psychiatric: He has a normal mood and affect. His behavior is normal.   Nursing note and vitals reviewed.      Assessment:     1. Paroxysmal atrial fibrillation (HCC)  COMP METABOLIC PANEL    LIPID PANEL   2. Essential hypertension, benign  COMP METABOLIC PANEL    LIPID PANEL   3. History of TIA (transient ischemic attack)  COMP METABOLIC PANEL    LIPID PANEL   4. High risk medication use  COMP METABOLIC PANEL    LIPID PANEL       Medical Decision Making:  Today's Assessment / Status / Plan:   At this time patient is clinically stable in terms of his cardiac standpoint.\  Ok for BP to be slightly higher.  Cont current medications at current dose.     I will see patient " back in clinic with lab tests and studies results in 12 months with Dr. Linder.    I thank you Dr. Coon for referring patient to our Cardiology Clinic today.

## 2018-12-20 NOTE — LETTER
[facility]   [address], [facility] [[email or web address]] [address]  [place] [address]  Phone: [phone]  Fax: [phone]              Joe Rashid  1/7/1920    Encounter Date: 12/20/2018    Leon Pinedo M.D.          PROGRESS NOTE:  Chief Complaint   Patient presents with   • Atrial Fibrillation     F/V: 1 YR       Subjective:   Joe Rashid is a 89 y.o. male who presents today for cardiac care and management of TIA, HTN, HLP.    In the interim, patient has been doing well without having any symptoms. Patient denies having chest pain, dyspnea, palpitation, presyncope, syncope episodes.      Past Medical History:   Diagnosis Date   • Atrial fibrillation (HCC)    • Chronic anticoagulation    • Chronic kidney disease, stage III (moderate) (HCC)    • Congestive heart failure (HCC)    • Hepatitis B    • History of TIA (transient ischemic attack)    • Hypertension      Past Surgical History:   Procedure Laterality Date   • HIP HEMIARTHROPLASTY Left 1/27/2016    Procedure: HIP HEMIARTHROPLASTY;  Surgeon: Adis Martínez M.D.;  Location: SURGERY [facility];  Service:    • APPENDECTOMY     • CATARACT EXTRACTION WITH IOL      both eyes   • COLON RESECTION     • OTHER      sinus surgery of unknown type   • PB CRYOSURG ABLATION OF PBOSTATE     • PB TRANSURETHRAL ELEC-SURG PBOSTATECTOM       Family History   Problem Relation Age of Onset   • Heart Disease Brother      Social History     Social History   • Marital status:      Spouse name: N/A   • Number of children: N/A   • Years of education: N/A     Occupational History   • Not on file.     Social History Main Topics   • Smoking status: Never Smoker   • Smokeless tobacco: Never Used   • Alcohol use No   • Drug use: No   • Sexual activity: Not on file     Other Topics Concern   • Not on file     Social History Narrative   • No narrative on file     Allergies   Allergen Reactions   • Aspirin Hives and Rash     Pts grandson  states rash and hives.   • Sulfa Drugs Hives and Rash     Pts grandson states rash and hives.     Outpatient Encounter Prescriptions as of 12/20/2018   Medication Sig Dispense Refill   • cetirizine (ZYRTEC) 10 MG Tab Take 10 mg by mouth every day.     • lisinopril (PRINIVIL) 20 MG Tab Take 1 Tab by mouth 2 Times a Day. 60 Tab 0   • cloNIDine (CATAPRES) 0.1 MG Tab Take 1 Tab by mouth 2 times a day as needed (For SBP > 160). 30 Tab 0   • atorvastatin (LIPITOR) 80 MG tablet Take 1 Tab by mouth every evening. 30 Tab 0   • apixaban (ELIQUIS) 2.5mg Tab Take 1 Tab by mouth 2 Times a Day. 60 Tab 0   • vitamin D (CHOLECALCIFEROL) 1000 UNIT Tab Take 1,000 Units by mouth every bedtime.     • finasteride (PROSCAR) 5 MG Tab Take 5 mg by mouth every day with lunch.     • glucosamine Sulfate 500 MG Cap Take 500 mg by mouth every day with lunch.     • Probiotic Product (PROBIOTIC DAILY PO) Take 1 Tab by mouth every evening.     • metoprolol (LOPRESSOR) 25 MG Tab TAKE 1 TABLET BY MOUTH TWICE DAILY 180 Tab 3   • Multiple Vitamins-Minerals (MULTIVITAMIN & MINERAL PO) Take 1 Capsule by mouth every day before lunch.     • tamsulosin (FLOMAX) 0.4 MG capsule Take 0.4 mg by mouth BEDTIME(S).     • metoprolol (LOPRESSOR) 25 MG Tab TAKE 1 TABLET BY MOUTH TWICE DAILY 180 Tab 0   • acetaminophen (TYLENOL) 325 MG Tab Take 2 Tabs by mouth every 6 hours as needed (Mild Pain; (Pain scale 1-3); Temp greater than 100.5 F). 30 Tab 0     No facility-administered encounter medications on file as of 12/20/2018.      Review of Systems   Constitutional: Negative for chills, fever, malaise/fatigue and weight loss.   HENT: Negative for ear discharge, ear pain, hearing loss and nosebleeds.    Eyes: Negative for blurred vision, double vision, pain and discharge.   Respiratory: Negative for cough and shortness of breath.    Cardiovascular: Negative for chest pain, palpitations, orthopnea, claudication, leg swelling and PND.   Gastrointestinal: Negative for  "abdominal pain, blood in stool, melena, nausea and vomiting.   Genitourinary: Negative for dysuria and hematuria.   Musculoskeletal: Negative for falls, joint pain and myalgias.   Skin: Negative for itching and rash.   Neurological: Negative for dizziness, sensory change, speech change, loss of consciousness and headaches.   Endo/Heme/Allergies: Negative for environmental allergies. Does not bruise/bleed easily.   Psychiatric/Behavioral: Negative for depression, hallucinations and suicidal ideas.        Objective:   /70 (BP Location: Right arm, Patient Position: Sitting, BP Cuff Size: Adult)   Pulse 84   Ht 1.753 m (5' 9\")   Wt 69.9 kg (154 lb)   SpO2 94%   BMI 22.74 kg/m²      Physical Exam   Constitutional: He is oriented to person, place, and time. No distress.   HENT:   Head: Normocephalic and atraumatic.   Right Ear: External ear normal.   Left Ear: External ear normal.   Eyes: Right eye exhibits no discharge. Left eye exhibits no discharge.   Neck: No JVD present. No thyromegaly present.   Cardiovascular: Normal rate, regular rhythm, normal heart sounds and intact distal pulses.  Exam reveals no gallop and no friction rub.    No murmur heard.  Pulmonary/Chest: Breath sounds normal. No respiratory distress.   Abdominal: Bowel sounds are normal. He exhibits no distension. There is no tenderness.   Musculoskeletal: He exhibits no edema or tenderness.   Neurological: He is alert and oriented to person, place, and time. No cranial nerve deficit.   Skin: Skin is warm and dry. He is not diaphoretic.   Psychiatric: He has a normal mood and affect. His behavior is normal.   Nursing note and vitals reviewed.      Assessment:     1. Paroxysmal atrial fibrillation (HCC)  COMP METABOLIC PANEL    LIPID PANEL   2. Essential hypertension, benign  COMP METABOLIC PANEL    LIPID PANEL   3. History of TIA (transient ischemic attack)  COMP METABOLIC PANEL    LIPID PANEL   4. High risk medication use  COMP METABOLIC " PANEL    LIPID PANEL       Medical Decision Making:  Today's Assessment / Status / Plan:   At this time patient is clinically stable in terms of his cardiac standpoint.\  Ok for BP to be slightly higher.  Cont current medications at current dose.     I will see patient back in clinic with lab tests and studies results in 12 months with Dr. Linder.    I thank you Dr. Coon for referring patient to our Cardiology Clinic today.        Mervin Pompa M.D.  [address]  [place] [address]  VIA Facsimile: [phone]

## 2018-12-27 NOTE — PROGRESS NOTES
[facility]    Appears previous SCr of >1.5 was very limited and likely JCARLOS.  Given new SCr, age, weight, previous stroke, no prior bleeding disposition, suggest 5 mg Eliquis BID over 2.5 mg BID.    Young Garcia, PharmD

## 2019-01-01 ENCOUNTER — HOME CARE VISIT (OUTPATIENT)
Dept: HOSPICE | Facility: HOSPICE | Age: 84
End: 2019-01-01
Payer: MEDICARE

## 2019-01-01 ENCOUNTER — ANTICOAGULATION VISIT (OUTPATIENT)
Dept: VASCULAR LAB | Facility: MEDICAL CENTER | Age: 84
End: 2019-01-01
Attending: INTERNAL MEDICINE
Payer: MEDICARE

## 2019-01-01 ENCOUNTER — ANTICOAGULATION MONITORING (OUTPATIENT)
Dept: VASCULAR LAB | Facility: MEDICAL CENTER | Age: 84
End: 2019-01-01

## 2019-01-01 ENCOUNTER — APPOINTMENT (OUTPATIENT)
Dept: RADIOLOGY | Facility: MEDICAL CENTER | Age: 84
DRG: 308 | End: 2019-01-01
Attending: STUDENT IN AN ORGANIZED HEALTH CARE EDUCATION/TRAINING PROGRAM
Payer: MEDICARE

## 2019-01-01 ENCOUNTER — HOSPITAL ENCOUNTER (OUTPATIENT)
Dept: LAB | Facility: MEDICAL CENTER | Age: 84
End: 2019-01-07
Attending: NURSE PRACTITIONER
Payer: MEDICARE

## 2019-01-01 ENCOUNTER — HOSPITAL ENCOUNTER (EMERGENCY)
Facility: MEDICAL CENTER | Age: 84
DRG: 308 | End: 2019-07-22
Attending: EMERGENCY MEDICINE
Payer: MEDICARE

## 2019-01-01 ENCOUNTER — APPOINTMENT (OUTPATIENT)
Dept: RADIOLOGY | Facility: MEDICAL CENTER | Age: 84
DRG: 308 | End: 2019-01-01
Attending: HOSPITALIST
Payer: MEDICARE

## 2019-01-01 ENCOUNTER — APPOINTMENT (OUTPATIENT)
Dept: RADIOLOGY | Facility: MEDICAL CENTER | Age: 84
DRG: 291 | End: 2019-01-01
Attending: EMERGENCY MEDICINE
Payer: MEDICARE

## 2019-01-01 ENCOUNTER — APPOINTMENT (OUTPATIENT)
Dept: RADIOLOGY | Facility: MEDICAL CENTER | Age: 84
End: 2019-01-01
Attending: EMERGENCY MEDICINE
Payer: MEDICARE

## 2019-01-01 ENCOUNTER — APPOINTMENT (OUTPATIENT)
Dept: RADIOLOGY | Facility: MEDICAL CENTER | Age: 84
DRG: 308 | End: 2019-01-01
Attending: INTERNAL MEDICINE
Payer: MEDICARE

## 2019-01-01 ENCOUNTER — PATIENT OUTREACH (OUTPATIENT)
Dept: HEALTH INFORMATION MANAGEMENT | Facility: OTHER | Age: 84
End: 2019-01-01

## 2019-01-01 ENCOUNTER — APPOINTMENT (OUTPATIENT)
Dept: HOSPICE | Facility: HOSPICE | Age: 84
End: 2019-01-01
Payer: MEDICARE

## 2019-01-01 ENCOUNTER — APPOINTMENT (OUTPATIENT)
Dept: RADIOLOGY | Facility: MEDICAL CENTER | Age: 84
DRG: 193 | End: 2019-01-01
Attending: HOSPITALIST
Payer: MEDICARE

## 2019-01-01 ENCOUNTER — APPOINTMENT (OUTPATIENT)
Dept: RADIOLOGY | Facility: MEDICAL CENTER | Age: 84
DRG: 291 | End: 2019-01-01
Attending: NURSE PRACTITIONER
Payer: MEDICARE

## 2019-01-01 ENCOUNTER — APPOINTMENT (OUTPATIENT)
Dept: RADIOLOGY | Facility: MEDICAL CENTER | Age: 84
DRG: 193 | End: 2019-01-01
Attending: EMERGENCY MEDICINE
Payer: MEDICARE

## 2019-01-01 ENCOUNTER — APPOINTMENT (OUTPATIENT)
Dept: CARDIOLOGY | Facility: MEDICAL CENTER | Age: 84
DRG: 308 | End: 2019-01-01
Attending: STUDENT IN AN ORGANIZED HEALTH CARE EDUCATION/TRAINING PROGRAM
Payer: MEDICARE

## 2019-01-01 ENCOUNTER — APPOINTMENT (OUTPATIENT)
Dept: RADIOLOGY | Facility: MEDICAL CENTER | Age: 84
DRG: 308 | End: 2019-01-01
Attending: EMERGENCY MEDICINE
Payer: MEDICARE

## 2019-01-01 ENCOUNTER — HOSPITAL ENCOUNTER (OUTPATIENT)
Dept: LAB | Facility: MEDICAL CENTER | Age: 84
End: 2019-01-07
Attending: INTERNAL MEDICINE
Payer: MEDICARE

## 2019-01-01 ENCOUNTER — HOSPITAL ENCOUNTER (EMERGENCY)
Facility: MEDICAL CENTER | Age: 84
End: 2019-03-02
Attending: EMERGENCY MEDICINE
Payer: MEDICARE

## 2019-01-01 ENCOUNTER — HOSPICE ADMISSION (OUTPATIENT)
Dept: HOSPICE | Facility: HOSPICE | Age: 84
End: 2019-01-01
Payer: MEDICARE

## 2019-01-01 ENCOUNTER — HOSPITAL ENCOUNTER (INPATIENT)
Facility: MEDICAL CENTER | Age: 84
LOS: 2 days | DRG: 291 | End: 2019-07-08
Attending: EMERGENCY MEDICINE | Admitting: HOSPITALIST
Payer: MEDICARE

## 2019-01-01 ENCOUNTER — HOSPITAL ENCOUNTER (INPATIENT)
Facility: MEDICAL CENTER | Age: 84
LOS: 4 days | DRG: 193 | End: 2019-04-16
Attending: EMERGENCY MEDICINE | Admitting: INTERNAL MEDICINE
Payer: MEDICARE

## 2019-01-01 ENCOUNTER — HOSPITAL ENCOUNTER (INPATIENT)
Facility: MEDICAL CENTER | Age: 84
LOS: 8 days | DRG: 308 | End: 2019-08-01
Attending: EMERGENCY MEDICINE | Admitting: INTERNAL MEDICINE
Payer: MEDICARE

## 2019-01-01 VITALS — DIASTOLIC BLOOD PRESSURE: 80 MMHG | SYSTOLIC BLOOD PRESSURE: 112 MMHG | RESPIRATION RATE: 18 BRPM | HEART RATE: 88 BPM

## 2019-01-01 VITALS
DIASTOLIC BLOOD PRESSURE: 63 MMHG | RESPIRATION RATE: 16 BRPM | DIASTOLIC BLOOD PRESSURE: 63 MMHG | RESPIRATION RATE: 16 BRPM | HEART RATE: 95 BPM | HEART RATE: 95 BPM | SYSTOLIC BLOOD PRESSURE: 99 MMHG | SYSTOLIC BLOOD PRESSURE: 99 MMHG

## 2019-01-01 VITALS — RESPIRATION RATE: 20 BRPM | HEART RATE: 82 BPM | SYSTOLIC BLOOD PRESSURE: 130 MMHG | DIASTOLIC BLOOD PRESSURE: 76 MMHG

## 2019-01-01 VITALS
SYSTOLIC BLOOD PRESSURE: 151 MMHG | HEIGHT: 69 IN | BODY MASS INDEX: 22.53 KG/M2 | HEART RATE: 63 BPM | TEMPERATURE: 97.4 F | DIASTOLIC BLOOD PRESSURE: 98 MMHG | OXYGEN SATURATION: 92 % | WEIGHT: 152.12 LBS | RESPIRATION RATE: 16 BRPM

## 2019-01-01 VITALS
RESPIRATION RATE: 18 BRPM | WEIGHT: 155 LBS | HEIGHT: 69 IN | DIASTOLIC BLOOD PRESSURE: 86 MMHG | HEART RATE: 82 BPM | OXYGEN SATURATION: 98 % | SYSTOLIC BLOOD PRESSURE: 122 MMHG | BODY MASS INDEX: 22.96 KG/M2 | TEMPERATURE: 98.4 F

## 2019-01-01 VITALS
SYSTOLIC BLOOD PRESSURE: 102 MMHG | WEIGHT: 171 LBS | OXYGEN SATURATION: 94 % | DIASTOLIC BLOOD PRESSURE: 64 MMHG | RESPIRATION RATE: 20 BRPM | HEART RATE: 87 BPM | BODY MASS INDEX: 25.25 KG/M2

## 2019-01-01 VITALS
RESPIRATION RATE: 20 BRPM | OXYGEN SATURATION: 94 % | HEART RATE: 79 BPM | HEIGHT: 69 IN | DIASTOLIC BLOOD PRESSURE: 110 MMHG | SYSTOLIC BLOOD PRESSURE: 196 MMHG | TEMPERATURE: 97.7 F | WEIGHT: 154.76 LBS | BODY MASS INDEX: 22.92 KG/M2

## 2019-01-01 VITALS
HEART RATE: 60 BPM | DIASTOLIC BLOOD PRESSURE: 75 MMHG | BODY MASS INDEX: 25.89 KG/M2 | HEIGHT: 69 IN | TEMPERATURE: 98 F | OXYGEN SATURATION: 97 % | SYSTOLIC BLOOD PRESSURE: 130 MMHG | WEIGHT: 174.82 LBS | RESPIRATION RATE: 16 BRPM

## 2019-01-01 VITALS — DIASTOLIC BLOOD PRESSURE: 69 MMHG | SYSTOLIC BLOOD PRESSURE: 157 MMHG

## 2019-01-01 VITALS — RESPIRATION RATE: 20 BRPM | SYSTOLIC BLOOD PRESSURE: 104 MMHG | DIASTOLIC BLOOD PRESSURE: 78 MMHG | HEART RATE: 84 BPM

## 2019-01-01 VITALS — SYSTOLIC BLOOD PRESSURE: 88 MMHG | DIASTOLIC BLOOD PRESSURE: 63 MMHG | HEART RATE: 58 BPM

## 2019-01-01 VITALS — DIASTOLIC BLOOD PRESSURE: 64 MMHG | HEART RATE: 71 BPM | SYSTOLIC BLOOD PRESSURE: 103 MMHG | RESPIRATION RATE: 16 BRPM

## 2019-01-01 VITALS
WEIGHT: 154 LBS | DIASTOLIC BLOOD PRESSURE: 89 MMHG | HEART RATE: 79 BPM | SYSTOLIC BLOOD PRESSURE: 176 MMHG | OXYGEN SATURATION: 92 % | TEMPERATURE: 98.5 F | BODY MASS INDEX: 22.74 KG/M2 | RESPIRATION RATE: 18 BRPM

## 2019-01-01 VITALS — RESPIRATION RATE: 20 BRPM | DIASTOLIC BLOOD PRESSURE: 60 MMHG | SYSTOLIC BLOOD PRESSURE: 105 MMHG | HEART RATE: 82 BPM

## 2019-01-01 VITALS — DIASTOLIC BLOOD PRESSURE: 61 MMHG | SYSTOLIC BLOOD PRESSURE: 103 MMHG | HEART RATE: 71 BPM | RESPIRATION RATE: 16 BRPM

## 2019-01-01 VITALS — SYSTOLIC BLOOD PRESSURE: 112 MMHG | DIASTOLIC BLOOD PRESSURE: 64 MMHG | HEART RATE: 136 BPM | RESPIRATION RATE: 22 BRPM

## 2019-01-01 VITALS — RESPIRATION RATE: 20 BRPM | DIASTOLIC BLOOD PRESSURE: 80 MMHG | HEART RATE: 96 BPM | SYSTOLIC BLOOD PRESSURE: 120 MMHG

## 2019-01-01 VITALS — RESPIRATION RATE: 12 BRPM | HEART RATE: 126 BPM

## 2019-01-01 VITALS — RESPIRATION RATE: 20 BRPM | DIASTOLIC BLOOD PRESSURE: 52 MMHG | SYSTOLIC BLOOD PRESSURE: 88 MMHG | HEART RATE: 60 BPM

## 2019-01-01 VITALS — RESPIRATION RATE: 32 BRPM | SYSTOLIC BLOOD PRESSURE: 118 MMHG | DIASTOLIC BLOOD PRESSURE: 51 MMHG | HEART RATE: 88 BPM

## 2019-01-01 DIAGNOSIS — E86.0 DEHYDRATION: ICD-10-CM

## 2019-01-01 DIAGNOSIS — J18.9 COMMUNITY ACQUIRED PNEUMONIA, UNSPECIFIED LATERALITY: ICD-10-CM

## 2019-01-01 DIAGNOSIS — N18.30 CHRONIC KIDNEY DISEASE, STAGE III (MODERATE) (HCC): ICD-10-CM

## 2019-01-01 DIAGNOSIS — Z79.899 HIGH RISK MEDICATION USE: ICD-10-CM

## 2019-01-01 DIAGNOSIS — R35.0 BENIGN PROSTATIC HYPERPLASIA WITH URINARY FREQUENCY: ICD-10-CM

## 2019-01-01 DIAGNOSIS — I10 ESSENTIAL HYPERTENSION, BENIGN: ICD-10-CM
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
## 2019-01-01 DIAGNOSIS — N18.30 ACUTE RENAL FAILURE WITH ACUTE TUBULAR NECROSIS SUPERIMPOSED ON STAGE 3 CHRONIC KIDNEY DISEASE (HCC): ICD-10-CM

## 2019-01-01 DIAGNOSIS — I48.0 PAROXYSMAL ATRIAL FIBRILLATION (HCC): ICD-10-CM

## 2019-01-01 DIAGNOSIS — I27.20 PULMONARY HYPERTENSION (HCC): ICD-10-CM

## 2019-01-01 DIAGNOSIS — Z79.01 ANTICOAGULATED BY ANTICOAGULATION TREATMENT: ICD-10-CM

## 2019-01-01 DIAGNOSIS — S32.009A LUMBAR TRANSVERSE PROCESS FRACTURE, CLOSED, INITIAL ENCOUNTER (HCC): ICD-10-CM

## 2019-01-01 DIAGNOSIS — I48.91 ATRIAL FIBRILLATION WITH RVR (HCC): ICD-10-CM

## 2019-01-01 DIAGNOSIS — I50.9 ACUTE CONGESTIVE HEART FAILURE, UNSPECIFIED HEART FAILURE TYPE (HCC): ICD-10-CM

## 2019-01-01 DIAGNOSIS — Z86.73 HISTORY OF TIA (TRANSIENT ISCHEMIC ATTACK): ICD-10-CM

## 2019-01-01 DIAGNOSIS — J69.0 ASPIRATION PNEUMONIA OF RIGHT MIDDLE LOBE, UNSPECIFIED ASPIRATION PNEUMONIA TYPE (HCC): ICD-10-CM

## 2019-01-01 DIAGNOSIS — R06.09 DOE (DYSPNEA ON EXERTION): ICD-10-CM

## 2019-01-01 DIAGNOSIS — I50.33 ACUTE ON CHRONIC DIASTOLIC (CONGESTIVE) HEART FAILURE (HCC): ICD-10-CM

## 2019-01-01 DIAGNOSIS — J10.1 INFLUENZA A: ICD-10-CM

## 2019-01-01 DIAGNOSIS — G45.9 TIA (TRANSIENT ISCHEMIC ATTACK): ICD-10-CM

## 2019-01-01 DIAGNOSIS — N40.1 BENIGN PROSTATIC HYPERPLASIA WITH URINARY FREQUENCY: ICD-10-CM

## 2019-01-01 DIAGNOSIS — N39.0 LOWER URINARY TRACT INFECTIOUS DISEASE: ICD-10-CM

## 2019-01-01 DIAGNOSIS — J96.01 ACUTE RESPIRATORY FAILURE WITH HYPOXIA (HCC): ICD-10-CM

## 2019-01-01 DIAGNOSIS — S36.892A TRAUMATIC RETROPERITONEAL HEMATOMA, INITIAL ENCOUNTER: ICD-10-CM

## 2019-01-01 DIAGNOSIS — I50.31 ACUTE DIASTOLIC CHF (CONGESTIVE HEART FAILURE) (HCC): ICD-10-CM

## 2019-01-01 DIAGNOSIS — N17.0 ACUTE RENAL FAILURE WITH ACUTE TUBULAR NECROSIS SUPERIMPOSED ON STAGE 3 CHRONIC KIDNEY DISEASE (HCC): ICD-10-CM

## 2019-01-01 DIAGNOSIS — Z66 DNR (DO NOT RESUSCITATE): ICD-10-CM

## 2019-01-01 LAB
ACTION RANGE TRIGGERED IACRT: NO
ACTION RANGE TRIGGERED IACRT: YES
ALBUMIN SERPL BCP-MCNC: 3.2 G/DL (ref 3.2–4.9)
ALBUMIN SERPL BCP-MCNC: 3.4 G/DL (ref 3.2–4.9)
ALBUMIN SERPL BCP-MCNC: 3.5 G/DL (ref 3.2–4.9)
ALBUMIN SERPL BCP-MCNC: 3.7 G/DL (ref 3.2–4.9)
ALBUMIN SERPL BCP-MCNC: 3.7 G/DL (ref 3.2–4.9)
ALBUMIN SERPL BCP-MCNC: 3.9 G/DL (ref 3.2–4.9)
ALBUMIN SERPL BCP-MCNC: 4 G/DL (ref 3.2–4.9)
ALBUMIN/GLOB SERPL: 1.3 G/DL
ALBUMIN/GLOB SERPL: 1.4 G/DL
ALBUMIN/GLOB SERPL: 1.5 G/DL
ALBUMIN/GLOB SERPL: 1.7 G/DL
ALBUMIN/GLOB SERPL: 1.7 G/DL
ALP SERPL-CCNC: 117 U/L (ref 30–99)
ALP SERPL-CCNC: 434 U/L (ref 30–99)
ALP SERPL-CCNC: 73 U/L (ref 30–99)
ALP SERPL-CCNC: 79 U/L (ref 30–99)
ALP SERPL-CCNC: 86 U/L (ref 30–99)
ALP SERPL-CCNC: 89 U/L (ref 30–99)
ALP SERPL-CCNC: 96 U/L (ref 30–99)
ALT SERPL-CCNC: 129 U/L (ref 2–50)
ALT SERPL-CCNC: 23 U/L (ref 2–50)
ALT SERPL-CCNC: 24 U/L (ref 2–50)
ALT SERPL-CCNC: 26 U/L (ref 2–50)
ALT SERPL-CCNC: 27 U/L (ref 2–50)
ALT SERPL-CCNC: 30 U/L (ref 2–50)
ALT SERPL-CCNC: 72 U/L (ref 2–50)
AMYLASE FLD-CCNC: 10 U/L
ANION GAP SERPL CALC-SCNC: 10 MMOL/L (ref 0–11.9)
ANION GAP SERPL CALC-SCNC: 11 MMOL/L (ref 0–11.9)
ANION GAP SERPL CALC-SCNC: 3 MMOL/L (ref 0–11.9)
ANION GAP SERPL CALC-SCNC: 5 MMOL/L (ref 0–11.9)
ANION GAP SERPL CALC-SCNC: 6 MMOL/L (ref 0–11.9)
ANION GAP SERPL CALC-SCNC: 6 MMOL/L (ref 0–11.9)
ANION GAP SERPL CALC-SCNC: 7 MMOL/L (ref 0–11.9)
ANION GAP SERPL CALC-SCNC: 8 MMOL/L (ref 0–11.9)
ANION GAP SERPL CALC-SCNC: 9 MMOL/L (ref 0–11.9)
APPEARANCE FLD: NORMAL
APPEARANCE UR: ABNORMAL
APPEARANCE UR: CLEAR
AST SERPL-CCNC: 19 U/L (ref 12–45)
AST SERPL-CCNC: 20 U/L (ref 12–45)
AST SERPL-CCNC: 21 U/L (ref 12–45)
AST SERPL-CCNC: 22 U/L (ref 12–45)
AST SERPL-CCNC: 23 U/L (ref 12–45)
AST SERPL-CCNC: 38 U/L (ref 12–45)
AST SERPL-CCNC: 68 U/L (ref 12–45)
BACTERIA #/AREA URNS HPF: ABNORMAL /HPF
BACTERIA #/AREA URNS HPF: NEGATIVE /HPF
BACTERIA BLD CULT: ABNORMAL
BACTERIA BLD CULT: ABNORMAL
BACTERIA BLD CULT: NORMAL
BACTERIA FLD AEROBE CULT: NORMAL
BACTERIA FLD AEROBE CULT: NORMAL
BASE EXCESS BLDA CALC-SCNC: -2 MMOL/L (ref -4–3)
BASE EXCESS BLDA CALC-SCNC: -8 MMOL/L (ref -4–3)
BASOPHILS # BLD AUTO: 0.2 % (ref 0–1.8)
BASOPHILS # BLD AUTO: 0.3 % (ref 0–1.8)
BASOPHILS # BLD AUTO: 0.4 % (ref 0–1.8)
BASOPHILS # BLD AUTO: 0.5 % (ref 0–1.8)
BASOPHILS # BLD AUTO: 0.6 % (ref 0–1.8)
BASOPHILS # BLD: 0.02 K/UL (ref 0–0.12)
BASOPHILS # BLD: 0.03 K/UL (ref 0–0.12)
BASOPHILS # BLD: 0.04 K/UL (ref 0–0.12)
BASOPHILS # BLD: 0.05 K/UL (ref 0–0.12)
BASOPHILS # BLD: 0.05 K/UL (ref 0–0.12)
BILIRUB SERPL-MCNC: 0.6 MG/DL (ref 0.1–1.5)
BILIRUB SERPL-MCNC: 0.6 MG/DL (ref 0.1–1.5)
BILIRUB SERPL-MCNC: 0.7 MG/DL (ref 0.1–1.5)
BILIRUB SERPL-MCNC: 1.1 MG/DL (ref 0.1–1.5)
BILIRUB SERPL-MCNC: 1.2 MG/DL (ref 0.1–1.5)
BILIRUB SERPL-MCNC: 1.4 MG/DL (ref 0.1–1.5)
BILIRUB SERPL-MCNC: 1.5 MG/DL (ref 0.1–1.5)
BILIRUB UR QL STRIP.AUTO: NEGATIVE
BILIRUB UR QL STRIP.AUTO: NEGATIVE
BLOOD CULTURE HOLD CXBCH: NORMAL
BNP SERPL-MCNC: 220 PG/ML (ref 0–100)
BNP SERPL-MCNC: 223 PG/ML (ref 0–100)
BODY FLD TYPE: NORMAL
BODY TEMPERATURE: ABNORMAL DEGREES
BODY TEMPERATURE: ABNORMAL DEGREES
BUN SERPL-MCNC: 22 MG/DL (ref 8–22)
BUN SERPL-MCNC: 23 MG/DL (ref 8–22)
BUN SERPL-MCNC: 24 MG/DL (ref 8–22)
BUN SERPL-MCNC: 26 MG/DL (ref 8–22)
BUN SERPL-MCNC: 27 MG/DL (ref 8–22)
BUN SERPL-MCNC: 30 MG/DL (ref 8–22)
BUN SERPL-MCNC: 31 MG/DL (ref 8–22)
BUN SERPL-MCNC: 31 MG/DL (ref 8–22)
BUN SERPL-MCNC: 32 MG/DL (ref 8–22)
BUN SERPL-MCNC: 33 MG/DL (ref 8–22)
BUN SERPL-MCNC: 33 MG/DL (ref 8–22)
BUN SERPL-MCNC: 38 MG/DL (ref 8–22)
BUN SERPL-MCNC: 39 MG/DL (ref 8–22)
BUN SERPL-MCNC: 40 MG/DL (ref 8–22)
BUN SERPL-MCNC: 40 MG/DL (ref 8–22)
BUN SERPL-MCNC: 42 MG/DL (ref 8–22)
BUN SERPL-MCNC: 44 MG/DL (ref 8–22)
BUN SERPL-MCNC: 45 MG/DL (ref 8–22)
BUN SERPL-MCNC: 46 MG/DL (ref 8–22)
CALCIUM SERPL-MCNC: 8 MG/DL (ref 8.5–10.5)
CALCIUM SERPL-MCNC: 8 MG/DL (ref 8.5–10.5)
CALCIUM SERPL-MCNC: 8.2 MG/DL (ref 8.5–10.5)
CALCIUM SERPL-MCNC: 8.3 MG/DL (ref 8.5–10.5)
CALCIUM SERPL-MCNC: 8.3 MG/DL (ref 8.5–10.5)
CALCIUM SERPL-MCNC: 8.4 MG/DL (ref 8.5–10.5)
CALCIUM SERPL-MCNC: 8.4 MG/DL (ref 8.5–10.5)
CALCIUM SERPL-MCNC: 8.5 MG/DL (ref 8.5–10.5)
CALCIUM SERPL-MCNC: 8.5 MG/DL (ref 8.5–10.5)
CALCIUM SERPL-MCNC: 8.6 MG/DL (ref 8.5–10.5)
CALCIUM SERPL-MCNC: 8.7 MG/DL (ref 8.5–10.5)
CALCIUM SERPL-MCNC: 8.7 MG/DL (ref 8.5–10.5)
CALCIUM SERPL-MCNC: 9.1 MG/DL (ref 8.5–10.5)
CALCIUM SERPL-MCNC: 9.2 MG/DL (ref 8.5–10.5)
CALCIUM SERPL-MCNC: 9.4 MG/DL (ref 8.5–10.5)
CALCIUM SERPL-MCNC: 9.5 MG/DL (ref 8.5–10.5)
CALCIUM SERPL-MCNC: 9.8 MG/DL (ref 8.5–10.5)
CHLORIDE SERPL-SCNC: 101 MMOL/L (ref 96–112)
CHLORIDE SERPL-SCNC: 102 MMOL/L (ref 96–112)
CHLORIDE SERPL-SCNC: 103 MMOL/L (ref 96–112)
CHLORIDE SERPL-SCNC: 103 MMOL/L (ref 96–112)
CHLORIDE SERPL-SCNC: 104 MMOL/L (ref 96–112)
CHLORIDE SERPL-SCNC: 106 MMOL/L (ref 96–112)
CHLORIDE SERPL-SCNC: 107 MMOL/L (ref 96–112)
CHLORIDE SERPL-SCNC: 108 MMOL/L (ref 96–112)
CHLORIDE SERPL-SCNC: 109 MMOL/L (ref 96–112)
CHLORIDE SERPL-SCNC: 98 MMOL/L (ref 96–112)
CHLORIDE SERPL-SCNC: 98 MMOL/L (ref 96–112)
CHLORIDE UR-SCNC: 98 MMOL/L
CHOLEST SERPL-MCNC: 134 MG/DL (ref 100–199)
CO2 BLDA-SCNC: 20 MMOL/L (ref 20–33)
CO2 BLDA-SCNC: 25 MMOL/L (ref 20–33)
CO2 SERPL-SCNC: 19 MMOL/L (ref 20–33)
CO2 SERPL-SCNC: 22 MMOL/L (ref 20–33)
CO2 SERPL-SCNC: 25 MMOL/L (ref 20–33)
CO2 SERPL-SCNC: 25 MMOL/L (ref 20–33)
CO2 SERPL-SCNC: 26 MMOL/L (ref 20–33)
CO2 SERPL-SCNC: 27 MMOL/L (ref 20–33)
CO2 SERPL-SCNC: 27 MMOL/L (ref 20–33)
CO2 SERPL-SCNC: 28 MMOL/L (ref 20–33)
CO2 SERPL-SCNC: 28 MMOL/L (ref 20–33)
CO2 SERPL-SCNC: 29 MMOL/L (ref 20–33)
CO2 SERPL-SCNC: 29 MMOL/L (ref 20–33)
CO2 SERPL-SCNC: 31 MMOL/L (ref 20–33)
CO2 SERPL-SCNC: 32 MMOL/L (ref 20–33)
CO2 SERPL-SCNC: 32 MMOL/L (ref 20–33)
CO2 SERPL-SCNC: 33 MMOL/L (ref 20–33)
CO2 SERPL-SCNC: 34 MMOL/L (ref 20–33)
CO2 SERPL-SCNC: 35 MMOL/L (ref 20–33)
COLOR FLD: YELLOW
COLOR UR: ABNORMAL
COLOR UR: YELLOW
CREAT SERPL-MCNC: 0.93 MG/DL (ref 0.5–1.4)
CREAT SERPL-MCNC: 0.99 MG/DL (ref 0.5–1.4)
CREAT SERPL-MCNC: 1 MG/DL (ref 0.5–1.4)
CREAT SERPL-MCNC: 1.03 MG/DL (ref 0.5–1.4)
CREAT SERPL-MCNC: 1.04 MG/DL (ref 0.5–1.4)
CREAT SERPL-MCNC: 1.06 MG/DL (ref 0.5–1.4)
CREAT SERPL-MCNC: 1.09 MG/DL (ref 0.5–1.4)
CREAT SERPL-MCNC: 1.12 MG/DL (ref 0.5–1.4)
CREAT SERPL-MCNC: 1.12 MG/DL (ref 0.5–1.4)
CREAT SERPL-MCNC: 1.13 MG/DL (ref 0.5–1.4)
CREAT SERPL-MCNC: 1.14 MG/DL (ref 0.5–1.4)
CREAT SERPL-MCNC: 1.19 MG/DL (ref 0.5–1.4)
CREAT SERPL-MCNC: 1.34 MG/DL (ref 0.5–1.4)
CREAT SERPL-MCNC: 1.37 MG/DL (ref 0.5–1.4)
CREAT SERPL-MCNC: 1.37 MG/DL (ref 0.5–1.4)
CREAT SERPL-MCNC: 1.43 MG/DL (ref 0.5–1.4)
CREAT SERPL-MCNC: 1.46 MG/DL (ref 0.5–1.4)
CREAT SERPL-MCNC: 1.49 MG/DL (ref 0.5–1.4)
CREAT SERPL-MCNC: 1.5 MG/DL (ref 0.5–1.4)
CREAT UR-MCNC: 50 MG/DL
CYTOLOGY REG CYTOL: NORMAL
CYTOLOGY REG CYTOL: NORMAL
D DIMER PPP IA.FEU-MCNC: 0.94 UG/ML (FEU) (ref 0–0.5)
DIGOXIN SERPL-MCNC: 1.8 NG/ML (ref 0.8–2)
DIGOXIN SERPL-MCNC: 2 NG/ML (ref 0.8–2)
EKG IMPRESSION: NORMAL
EOSINOPHIL # BLD AUTO: 0 K/UL (ref 0–0.51)
EOSINOPHIL # BLD AUTO: 0.01 K/UL (ref 0–0.51)
EOSINOPHIL # BLD AUTO: 0.03 K/UL (ref 0–0.51)
EOSINOPHIL # BLD AUTO: 0.04 K/UL (ref 0–0.51)
EOSINOPHIL # BLD AUTO: 0.06 K/UL (ref 0–0.51)
EOSINOPHIL # BLD AUTO: 0.08 K/UL (ref 0–0.51)
EOSINOPHIL # BLD AUTO: 0.09 K/UL (ref 0–0.51)
EOSINOPHIL # BLD AUTO: 0.15 K/UL (ref 0–0.51)
EOSINOPHIL # BLD AUTO: 0.2 K/UL (ref 0–0.51)
EOSINOPHIL # BLD AUTO: 0.31 K/UL (ref 0–0.51)
EOSINOPHIL NFR BLD: 0 % (ref 0–6.9)
EOSINOPHIL NFR BLD: 0.1 % (ref 0–6.9)
EOSINOPHIL NFR BLD: 0.4 % (ref 0–6.9)
EOSINOPHIL NFR BLD: 0.5 % (ref 0–6.9)
EOSINOPHIL NFR BLD: 0.6 % (ref 0–6.9)
EOSINOPHIL NFR BLD: 0.7 % (ref 0–6.9)
EOSINOPHIL NFR BLD: 0.7 % (ref 0–6.9)
EOSINOPHIL NFR BLD: 1 % (ref 0–6.9)
EOSINOPHIL NFR BLD: 1.1 % (ref 0–6.9)
EOSINOPHIL NFR BLD: 1.2 % (ref 0–6.9)
EOSINOPHIL NFR BLD: 1.4 % (ref 0–6.9)
EOSINOPHIL NFR BLD: 2.4 % (ref 0–6.9)
EOSINOPHIL NFR BLD: 3.3 % (ref 0–6.9)
EOSINOPHIL NFR BLD: 3.3 % (ref 0–6.9)
EPI CELLS #/AREA URNS HPF: NEGATIVE /HPF
EPI CELLS #/AREA URNS HPF: NEGATIVE /HPF
ERYTHROCYTE [DISTWIDTH] IN BLOOD BY AUTOMATED COUNT: 44.5 FL (ref 35.9–50)
ERYTHROCYTE [DISTWIDTH] IN BLOOD BY AUTOMATED COUNT: 44.8 FL (ref 35.9–50)
ERYTHROCYTE [DISTWIDTH] IN BLOOD BY AUTOMATED COUNT: 45.8 FL (ref 35.9–50)
ERYTHROCYTE [DISTWIDTH] IN BLOOD BY AUTOMATED COUNT: 46.1 FL (ref 35.9–50)
ERYTHROCYTE [DISTWIDTH] IN BLOOD BY AUTOMATED COUNT: 46.5 FL (ref 35.9–50)
ERYTHROCYTE [DISTWIDTH] IN BLOOD BY AUTOMATED COUNT: 46.6 FL (ref 35.9–50)
ERYTHROCYTE [DISTWIDTH] IN BLOOD BY AUTOMATED COUNT: 46.9 FL (ref 35.9–50)
ERYTHROCYTE [DISTWIDTH] IN BLOOD BY AUTOMATED COUNT: 47.1 FL (ref 35.9–50)
ERYTHROCYTE [DISTWIDTH] IN BLOOD BY AUTOMATED COUNT: 47.2 FL (ref 35.9–50)
ERYTHROCYTE [DISTWIDTH] IN BLOOD BY AUTOMATED COUNT: 47.4 FL (ref 35.9–50)
ERYTHROCYTE [DISTWIDTH] IN BLOOD BY AUTOMATED COUNT: 47.5 FL (ref 35.9–50)
ERYTHROCYTE [DISTWIDTH] IN BLOOD BY AUTOMATED COUNT: 47.6 FL (ref 35.9–50)
ERYTHROCYTE [DISTWIDTH] IN BLOOD BY AUTOMATED COUNT: 47.8 FL (ref 35.9–50)
ERYTHROCYTE [DISTWIDTH] IN BLOOD BY AUTOMATED COUNT: 48 FL (ref 35.9–50)
ERYTHROCYTE [DISTWIDTH] IN BLOOD BY AUTOMATED COUNT: 48.1 FL (ref 35.9–50)
ERYTHROCYTE [DISTWIDTH] IN BLOOD BY AUTOMATED COUNT: 48.3 FL (ref 35.9–50)
ERYTHROCYTE [DISTWIDTH] IN BLOOD BY AUTOMATED COUNT: 48.5 FL (ref 35.9–50)
ERYTHROCYTE [DISTWIDTH] IN BLOOD BY AUTOMATED COUNT: 48.7 FL (ref 35.9–50)
ERYTHROCYTE [DISTWIDTH] IN BLOOD BY AUTOMATED COUNT: 48.8 FL (ref 35.9–50)
EST. AVERAGE GLUCOSE BLD GHB EST-MCNC: 134 MG/DL
FASTING STATUS PATIENT QL REPORTED: NORMAL
FLUAV RNA SPEC QL NAA+PROBE: POSITIVE
FLUBV RNA SPEC QL NAA+PROBE: NEGATIVE
FUNGUS SPEC CULT: NORMAL
GLOBULIN SER CALC-MCNC: 2.1 G/DL (ref 1.9–3.5)
GLOBULIN SER CALC-MCNC: 2.3 G/DL (ref 1.9–3.5)
GLOBULIN SER CALC-MCNC: 2.4 G/DL (ref 1.9–3.5)
GLOBULIN SER CALC-MCNC: 2.5 G/DL (ref 1.9–3.5)
GLOBULIN SER CALC-MCNC: 2.7 G/DL (ref 1.9–3.5)
GLUCOSE BLD-MCNC: 132 MG/DL (ref 65–99)
GLUCOSE BLD-MCNC: 240 MG/DL (ref 65–99)
GLUCOSE FLD-MCNC: 174 MG/DL
GLUCOSE SERPL-MCNC: 124 MG/DL (ref 65–99)
GLUCOSE SERPL-MCNC: 126 MG/DL (ref 65–99)
GLUCOSE SERPL-MCNC: 132 MG/DL (ref 65–99)
GLUCOSE SERPL-MCNC: 137 MG/DL (ref 65–99)
GLUCOSE SERPL-MCNC: 139 MG/DL (ref 65–99)
GLUCOSE SERPL-MCNC: 140 MG/DL (ref 65–99)
GLUCOSE SERPL-MCNC: 140 MG/DL (ref 65–99)
GLUCOSE SERPL-MCNC: 145 MG/DL (ref 65–99)
GLUCOSE SERPL-MCNC: 147 MG/DL (ref 65–99)
GLUCOSE SERPL-MCNC: 152 MG/DL (ref 65–99)
GLUCOSE SERPL-MCNC: 155 MG/DL (ref 65–99)
GLUCOSE SERPL-MCNC: 156 MG/DL (ref 65–99)
GLUCOSE SERPL-MCNC: 161 MG/DL (ref 65–99)
GLUCOSE SERPL-MCNC: 162 MG/DL (ref 65–99)
GLUCOSE SERPL-MCNC: 167 MG/DL (ref 65–99)
GLUCOSE SERPL-MCNC: 172 MG/DL (ref 65–99)
GLUCOSE SERPL-MCNC: 195 MG/DL (ref 65–99)
GLUCOSE SERPL-MCNC: 215 MG/DL (ref 65–99)
GLUCOSE SERPL-MCNC: 215 MG/DL (ref 65–99)
GLUCOSE UR STRIP.AUTO-MCNC: 100 MG/DL
GLUCOSE UR STRIP.AUTO-MCNC: NEGATIVE MG/DL
GRAM STN SPEC: NORMAL
HBA1C MFR BLD: 6.3 % (ref 0–5.6)
HCO3 BLDA-SCNC: 18.6 MMOL/L (ref 17–25)
HCO3 BLDA-SCNC: 23.5 MMOL/L (ref 17–25)
HCT VFR BLD AUTO: 38.3 % (ref 42–52)
HCT VFR BLD AUTO: 41.2 % (ref 42–52)
HCT VFR BLD AUTO: 41.7 % (ref 42–52)
HCT VFR BLD AUTO: 42.5 % (ref 42–52)
HCT VFR BLD AUTO: 43 % (ref 42–52)
HCT VFR BLD AUTO: 43.1 % (ref 42–52)
HCT VFR BLD AUTO: 43.1 % (ref 42–52)
HCT VFR BLD AUTO: 43.5 % (ref 42–52)
HCT VFR BLD AUTO: 43.7 % (ref 42–52)
HCT VFR BLD AUTO: 43.8 % (ref 42–52)
HCT VFR BLD AUTO: 43.8 % (ref 42–52)
HCT VFR BLD AUTO: 43.9 % (ref 42–52)
HCT VFR BLD AUTO: 45.4 % (ref 42–52)
HCT VFR BLD AUTO: 46.2 % (ref 42–52)
HCT VFR BLD AUTO: 47.1 % (ref 42–52)
HCT VFR BLD AUTO: 47.4 % (ref 42–52)
HCT VFR BLD AUTO: 47.7 % (ref 42–52)
HCT VFR BLD AUTO: 48.2 % (ref 42–52)
HCT VFR BLD AUTO: 48.3 % (ref 42–52)
HCT VFR BLD AUTO: 49 % (ref 42–52)
HCT VFR BLD AUTO: 49.2 % (ref 42–52)
HDLC SERPL-MCNC: 79 MG/DL
HGB BLD-MCNC: 12.1 G/DL (ref 14–18)
HGB BLD-MCNC: 12.9 G/DL (ref 14–18)
HGB BLD-MCNC: 13 G/DL (ref 14–18)
HGB BLD-MCNC: 13.5 G/DL (ref 14–18)
HGB BLD-MCNC: 13.5 G/DL (ref 14–18)
HGB BLD-MCNC: 13.6 G/DL (ref 14–18)
HGB BLD-MCNC: 13.6 G/DL (ref 14–18)
HGB BLD-MCNC: 13.7 G/DL (ref 14–18)
HGB BLD-MCNC: 13.8 G/DL (ref 14–18)
HGB BLD-MCNC: 13.8 G/DL (ref 14–18)
HGB BLD-MCNC: 13.9 G/DL (ref 14–18)
HGB BLD-MCNC: 13.9 G/DL (ref 14–18)
HGB BLD-MCNC: 14.4 G/DL (ref 14–18)
HGB BLD-MCNC: 14.8 G/DL (ref 14–18)
HGB BLD-MCNC: 15 G/DL (ref 14–18)
HGB BLD-MCNC: 15.3 G/DL (ref 14–18)
HGB BLD-MCNC: 15.6 G/DL (ref 14–18)
HGB BLD-MCNC: 15.6 G/DL (ref 14–18)
HGB BLD-MCNC: 15.7 G/DL (ref 14–18)
HOROWITZ INDEX BLDA+IHG-RTO: 175 MM[HG]
HOROWITZ INDEX BLDA+IHG-RTO: 200 MM[HG]
HYALINE CASTS #/AREA URNS LPF: ABNORMAL /LPF
HYALINE CASTS #/AREA URNS LPF: ABNORMAL /LPF
IMM GRANULOCYTES # BLD AUTO: 0.02 K/UL (ref 0–0.11)
IMM GRANULOCYTES # BLD AUTO: 0.02 K/UL (ref 0–0.11)
IMM GRANULOCYTES # BLD AUTO: 0.04 K/UL (ref 0–0.11)
IMM GRANULOCYTES # BLD AUTO: 0.05 K/UL (ref 0–0.11)
IMM GRANULOCYTES # BLD AUTO: 0.06 K/UL (ref 0–0.11)
IMM GRANULOCYTES # BLD AUTO: 0.07 K/UL (ref 0–0.11)
IMM GRANULOCYTES # BLD AUTO: 0.12 K/UL (ref 0–0.11)
IMM GRANULOCYTES NFR BLD AUTO: 0.3 % (ref 0–0.9)
IMM GRANULOCYTES NFR BLD AUTO: 0.4 % (ref 0–0.9)
IMM GRANULOCYTES NFR BLD AUTO: 0.5 % (ref 0–0.9)
IMM GRANULOCYTES NFR BLD AUTO: 0.6 % (ref 0–0.9)
IMM GRANULOCYTES NFR BLD AUTO: 0.7 % (ref 0–0.9)
IMM GRANULOCYTES NFR BLD AUTO: 0.8 % (ref 0–0.9)
INR PPP: 1.25 (ref 0.87–1.13)
INR PPP: 1.3 (ref 0.87–1.13)
INR PPP: 1.53 (ref 0.87–1.13)
INST. QUALIFIED PATIENT IIQPT: YES
INST. QUALIFIED PATIENT IIQPT: YES
KETONES UR STRIP.AUTO-MCNC: ABNORMAL MG/DL
KETONES UR STRIP.AUTO-MCNC: NEGATIVE MG/DL
LACTATE BLD-SCNC: 0.4 MMOL/L (ref 0.5–2)
LACTATE BLD-SCNC: 1.3 MMOL/L (ref 0.5–2)
LACTATE BLD-SCNC: 1.5 MMOL/L (ref 0.5–2)
LACTATE BLD-SCNC: 1.6 MMOL/L (ref 0.5–2)
LACTATE BLD-SCNC: 1.7 MMOL/L (ref 0.5–2)
LACTATE BLD-SCNC: 2.2 MMOL/L (ref 0.5–2)
LACTATE BLD-SCNC: 2.2 MMOL/L (ref 0.5–2)
LACTATE BLD-SCNC: 2.5 MMOL/L (ref 0.5–2)
LDH FLD L TO P-CCNC: 59 U/L
LDH FLD L TO P-CCNC: 59 U/L
LDH SERPL L TO P-CCNC: 142 U/L (ref 107–266)
LDLC SERPL CALC-MCNC: 49 MG/DL
LEUKOCYTE ESTERASE UR QL STRIP.AUTO: ABNORMAL
LEUKOCYTE ESTERASE UR QL STRIP.AUTO: NEGATIVE
LV EJECT FRACT  99904: 65
LV EJECT FRACT MOD 2C 99903: 76.59
LV EJECT FRACT MOD 4C 99902: 71.13
LV EJECT FRACT MOD BP 99901: 74.63
LYMPHOCYTES # BLD AUTO: 0.23 K/UL (ref 1–4.8)
LYMPHOCYTES # BLD AUTO: 0.4 K/UL (ref 1–4.8)
LYMPHOCYTES # BLD AUTO: 0.63 K/UL (ref 1–4.8)
LYMPHOCYTES # BLD AUTO: 0.67 K/UL (ref 1–4.8)
LYMPHOCYTES # BLD AUTO: 0.67 K/UL (ref 1–4.8)
LYMPHOCYTES # BLD AUTO: 0.8 K/UL (ref 1–4.8)
LYMPHOCYTES # BLD AUTO: 0.82 K/UL (ref 1–4.8)
LYMPHOCYTES # BLD AUTO: 0.84 K/UL (ref 1–4.8)
LYMPHOCYTES # BLD AUTO: 0.85 K/UL (ref 1–4.8)
LYMPHOCYTES # BLD AUTO: 0.86 K/UL (ref 1–4.8)
LYMPHOCYTES # BLD AUTO: 0.87 K/UL (ref 1–4.8)
LYMPHOCYTES # BLD AUTO: 1.04 K/UL (ref 1–4.8)
LYMPHOCYTES # BLD AUTO: 1.07 K/UL (ref 1–4.8)
LYMPHOCYTES # BLD AUTO: 1.11 K/UL (ref 1–4.8)
LYMPHOCYTES # BLD AUTO: 1.17 K/UL (ref 1–4.8)
LYMPHOCYTES # BLD AUTO: 1.25 K/UL (ref 1–4.8)
LYMPHOCYTES NFR BLD: 10.4 % (ref 22–41)
LYMPHOCYTES NFR BLD: 10.7 % (ref 22–41)
LYMPHOCYTES NFR BLD: 11.2 % (ref 22–41)
LYMPHOCYTES NFR BLD: 11.6 % (ref 22–41)
LYMPHOCYTES NFR BLD: 13 % (ref 22–41)
LYMPHOCYTES NFR BLD: 13.2 % (ref 22–41)
LYMPHOCYTES NFR BLD: 13.7 % (ref 22–41)
LYMPHOCYTES NFR BLD: 13.9 % (ref 22–41)
LYMPHOCYTES NFR BLD: 14.5 % (ref 22–41)
LYMPHOCYTES NFR BLD: 17.8 % (ref 22–41)
LYMPHOCYTES NFR BLD: 2.7 % (ref 22–41)
LYMPHOCYTES NFR BLD: 3.1 % (ref 22–41)
LYMPHOCYTES NFR BLD: 4.6 % (ref 22–41)
LYMPHOCYTES NFR BLD: 6.8 % (ref 22–41)
LYMPHOCYTES NFR BLD: 7.2 % (ref 22–41)
LYMPHOCYTES NFR BLD: 9.2 % (ref 22–41)
LYMPHOCYTES NFR FLD: 55 %
MAGNESIUM SERPL-MCNC: 1.6 MG/DL (ref 1.5–2.5)
MAGNESIUM SERPL-MCNC: 1.8 MG/DL (ref 1.5–2.5)
MCH RBC QN AUTO: 28.4 PG (ref 27–33)
MCH RBC QN AUTO: 28.8 PG (ref 27–33)
MCH RBC QN AUTO: 28.8 PG (ref 27–33)
MCH RBC QN AUTO: 29 PG (ref 27–33)
MCH RBC QN AUTO: 29 PG (ref 27–33)
MCH RBC QN AUTO: 29.2 PG (ref 27–33)
MCH RBC QN AUTO: 29.3 PG (ref 27–33)
MCH RBC QN AUTO: 29.3 PG (ref 27–33)
MCH RBC QN AUTO: 29.4 PG (ref 27–33)
MCH RBC QN AUTO: 29.5 PG (ref 27–33)
MCH RBC QN AUTO: 29.5 PG (ref 27–33)
MCH RBC QN AUTO: 29.6 PG (ref 27–33)
MCH RBC QN AUTO: 29.8 PG (ref 27–33)
MCH RBC QN AUTO: 29.9 PG (ref 27–33)
MCH RBC QN AUTO: 30 PG (ref 27–33)
MCH RBC QN AUTO: 30.2 PG (ref 27–33)
MCHC RBC AUTO-ENTMCNC: 31 G/DL (ref 33.7–35.3)
MCHC RBC AUTO-ENTMCNC: 31.2 G/DL (ref 33.7–35.3)
MCHC RBC AUTO-ENTMCNC: 31.3 G/DL (ref 33.7–35.3)
MCHC RBC AUTO-ENTMCNC: 31.3 G/DL (ref 33.7–35.3)
MCHC RBC AUTO-ENTMCNC: 31.4 G/DL (ref 33.7–35.3)
MCHC RBC AUTO-ENTMCNC: 31.5 G/DL (ref 33.7–35.3)
MCHC RBC AUTO-ENTMCNC: 31.6 G/DL (ref 33.7–35.3)
MCHC RBC AUTO-ENTMCNC: 31.7 G/DL (ref 33.7–35.3)
MCHC RBC AUTO-ENTMCNC: 31.7 G/DL (ref 33.7–35.3)
MCHC RBC AUTO-ENTMCNC: 31.8 G/DL (ref 33.7–35.3)
MCHC RBC AUTO-ENTMCNC: 31.9 G/DL (ref 33.7–35.3)
MCHC RBC AUTO-ENTMCNC: 32 G/DL (ref 33.7–35.3)
MCHC RBC AUTO-ENTMCNC: 32.3 G/DL (ref 33.7–35.3)
MCHC RBC AUTO-ENTMCNC: 32.4 G/DL (ref 33.7–35.3)
MCHC RBC AUTO-ENTMCNC: 32.5 G/DL (ref 33.7–35.3)
MCV RBC AUTO: 90.5 FL (ref 81.4–97.8)
MCV RBC AUTO: 90.6 FL (ref 81.4–97.8)
MCV RBC AUTO: 90.8 FL (ref 81.4–97.8)
MCV RBC AUTO: 91.3 FL (ref 81.4–97.8)
MCV RBC AUTO: 91.3 FL (ref 81.4–97.8)
MCV RBC AUTO: 91.6 FL (ref 81.4–97.8)
MCV RBC AUTO: 91.7 FL (ref 81.4–97.8)
MCV RBC AUTO: 92.3 FL (ref 81.4–97.8)
MCV RBC AUTO: 92.4 FL (ref 81.4–97.8)
MCV RBC AUTO: 92.8 FL (ref 81.4–97.8)
MCV RBC AUTO: 93 FL (ref 81.4–97.8)
MCV RBC AUTO: 93.3 FL (ref 81.4–97.8)
MCV RBC AUTO: 93.3 FL (ref 81.4–97.8)
MCV RBC AUTO: 93.4 FL (ref 81.4–97.8)
MCV RBC AUTO: 93.5 FL (ref 81.4–97.8)
MCV RBC AUTO: 93.6 FL (ref 81.4–97.8)
MCV RBC AUTO: 94.2 FL (ref 81.4–97.8)
MCV RBC AUTO: 94.4 FL (ref 81.4–97.8)
MCV RBC AUTO: 94.8 FL (ref 81.4–97.8)
MCV RBC AUTO: 94.8 FL (ref 81.4–97.8)
MCV RBC AUTO: 96.1 FL (ref 81.4–97.8)
MICRO URNS: ABNORMAL
MICRO URNS: ABNORMAL
MONOCYTES # BLD AUTO: 0.54 K/UL (ref 0–0.85)
MONOCYTES # BLD AUTO: 0.55 K/UL (ref 0–0.85)
MONOCYTES # BLD AUTO: 0.69 K/UL (ref 0–0.85)
MONOCYTES # BLD AUTO: 0.73 K/UL (ref 0–0.85)
MONOCYTES # BLD AUTO: 0.74 K/UL (ref 0–0.85)
MONOCYTES # BLD AUTO: 0.76 K/UL (ref 0–0.85)
MONOCYTES # BLD AUTO: 0.77 K/UL (ref 0–0.85)
MONOCYTES # BLD AUTO: 0.8 K/UL (ref 0–0.85)
MONOCYTES # BLD AUTO: 0.81 K/UL (ref 0–0.85)
MONOCYTES # BLD AUTO: 0.87 K/UL (ref 0–0.85)
MONOCYTES # BLD AUTO: 0.9 K/UL (ref 0–0.85)
MONOCYTES # BLD AUTO: 0.92 K/UL (ref 0–0.85)
MONOCYTES # BLD AUTO: 0.92 K/UL (ref 0–0.85)
MONOCYTES # BLD AUTO: 1.29 K/UL (ref 0–0.85)
MONOCYTES # BLD AUTO: 1.36 K/UL (ref 0–0.85)
MONOCYTES # BLD AUTO: 1.39 K/UL (ref 0–0.85)
MONOCYTES NFR BLD AUTO: 10 % (ref 0–13.4)
MONOCYTES NFR BLD AUTO: 10.2 % (ref 0–13.4)
MONOCYTES NFR BLD AUTO: 10.8 % (ref 0–13.4)
MONOCYTES NFR BLD AUTO: 11.5 % (ref 0–13.4)
MONOCYTES NFR BLD AUTO: 12 % (ref 0–13.4)
MONOCYTES NFR BLD AUTO: 12 % (ref 0–13.4)
MONOCYTES NFR BLD AUTO: 12.7 % (ref 0–13.4)
MONOCYTES NFR BLD AUTO: 7.4 % (ref 0–13.4)
MONOCYTES NFR BLD AUTO: 8.1 % (ref 0–13.4)
MONOCYTES NFR BLD AUTO: 8.6 % (ref 0–13.4)
MONOCYTES NFR BLD AUTO: 9 % (ref 0–13.4)
MONOCYTES NFR BLD AUTO: 9.2 % (ref 0–13.4)
MONOCYTES NFR BLD AUTO: 9.3 % (ref 0–13.4)
MONOCYTES NFR BLD AUTO: 9.7 % (ref 0–13.4)
MONOCYTES NFR BLD AUTO: 9.9 % (ref 0–13.4)
MONOCYTES NFR BLD AUTO: 9.9 % (ref 0–13.4)
MONONUC CELLS NFR FLD: 4 %
NEUTROPHILS # BLD AUTO: 13.25 K/UL (ref 1.82–7.42)
NEUTROPHILS # BLD AUTO: 14.86 K/UL (ref 1.82–7.42)
NEUTROPHILS # BLD AUTO: 4.14 K/UL (ref 1.82–7.42)
NEUTROPHILS # BLD AUTO: 4.51 K/UL (ref 1.82–7.42)
NEUTROPHILS # BLD AUTO: 4.64 K/UL (ref 1.82–7.42)
NEUTROPHILS # BLD AUTO: 4.87 K/UL (ref 1.82–7.42)
NEUTROPHILS # BLD AUTO: 5.58 K/UL (ref 1.82–7.42)
NEUTROPHILS # BLD AUTO: 5.63 K/UL (ref 1.82–7.42)
NEUTROPHILS # BLD AUTO: 6 K/UL (ref 1.82–7.42)
NEUTROPHILS # BLD AUTO: 6.16 K/UL (ref 1.82–7.42)
NEUTROPHILS # BLD AUTO: 6.34 K/UL (ref 1.82–7.42)
NEUTROPHILS # BLD AUTO: 6.58 K/UL (ref 1.82–7.42)
NEUTROPHILS # BLD AUTO: 6.7 K/UL (ref 1.82–7.42)
NEUTROPHILS # BLD AUTO: 6.88 K/UL (ref 1.82–7.42)
NEUTROPHILS # BLD AUTO: 7.69 K/UL (ref 1.82–7.42)
NEUTROPHILS # BLD AUTO: 9.45 K/UL (ref 1.82–7.42)
NEUTROPHILS NFR BLD: 69.1 % (ref 44–72)
NEUTROPHILS NFR BLD: 71.3 % (ref 44–72)
NEUTROPHILS NFR BLD: 73 % (ref 44–72)
NEUTROPHILS NFR BLD: 74.2 % (ref 44–72)
NEUTROPHILS NFR BLD: 74.5 % (ref 44–72)
NEUTROPHILS NFR BLD: 74.5 % (ref 44–72)
NEUTROPHILS NFR BLD: 75.6 % (ref 44–72)
NEUTROPHILS NFR BLD: 75.8 % (ref 44–72)
NEUTROPHILS NFR BLD: 75.9 % (ref 44–72)
NEUTROPHILS NFR BLD: 76.7 % (ref 44–72)
NEUTROPHILS NFR BLD: 78.6 % (ref 44–72)
NEUTROPHILS NFR BLD: 79.9 % (ref 44–72)
NEUTROPHILS NFR BLD: 82.5 % (ref 44–72)
NEUTROPHILS NFR BLD: 86.3 % (ref 44–72)
NEUTROPHILS NFR BLD: 88.2 % (ref 44–72)
NEUTROPHILS NFR BLD: 88.4 % (ref 44–72)
NEUTROPHILS NFR FLD: 41 %
NITRITE UR QL STRIP.AUTO: NEGATIVE
NITRITE UR QL STRIP.AUTO: POSITIVE
NRBC # BLD AUTO: 0 K/UL
NRBC BLD-RTO: 0 /100 WBC
NT-PROBNP SERPL IA-MCNC: 7794 PG/ML (ref 0–125)
O2/TOTAL GAS SETTING VFR VENT: 40 %
O2/TOTAL GAS SETTING VFR VENT: 44 %
PCO2 BLDA: 41.2 MMHG (ref 26–37)
PCO2 BLDA: 42.4 MMHG (ref 26–37)
PCO2 TEMP ADJ BLDA: 39.9 MMHG (ref 26–37)
PCO2 TEMP ADJ BLDA: 41.6 MMHG (ref 26–37)
PH BLDA: 7.25 [PH] (ref 7.4–7.5)
PH BLDA: 7.36 [PH] (ref 7.4–7.5)
PH FLD: 7 [PH]
PH FLD: 8 [PH]
PH TEMP ADJ BLDA: 7.26 [PH] (ref 7.4–7.5)
PH TEMP ADJ BLDA: 7.38 [PH] (ref 7.4–7.5)
PH UR STRIP.AUTO: 6.5 [PH]
PH UR STRIP.AUTO: 7 [PH]
PLATELET # BLD AUTO: 107 K/UL (ref 164–446)
PLATELET # BLD AUTO: 112 K/UL (ref 164–446)
PLATELET # BLD AUTO: 114 K/UL (ref 164–446)
PLATELET # BLD AUTO: 115 K/UL (ref 164–446)
PLATELET # BLD AUTO: 116 K/UL (ref 164–446)
PLATELET # BLD AUTO: 118 K/UL (ref 164–446)
PLATELET # BLD AUTO: 118 K/UL (ref 164–446)
PLATELET # BLD AUTO: 120 K/UL (ref 164–446)
PLATELET # BLD AUTO: 122 K/UL (ref 164–446)
PLATELET # BLD AUTO: 124 K/UL (ref 164–446)
PLATELET # BLD AUTO: 125 K/UL (ref 164–446)
PLATELET # BLD AUTO: 129 K/UL (ref 164–446)
PLATELET # BLD AUTO: 129 K/UL (ref 164–446)
PLATELET # BLD AUTO: 131 K/UL (ref 164–446)
PLATELET # BLD AUTO: 134 K/UL (ref 164–446)
PLATELET # BLD AUTO: 157 K/UL (ref 164–446)
PLATELET # BLD AUTO: 162 K/UL (ref 164–446)
PLATELET # BLD AUTO: 164 K/UL (ref 164–446)
PLATELET # BLD AUTO: 176 K/UL (ref 164–446)
PLATELET # BLD AUTO: 176 K/UL (ref 164–446)
PLATELET # BLD AUTO: 182 K/UL (ref 164–446)
PMV BLD AUTO: 10 FL (ref 9–12.9)
PMV BLD AUTO: 10.2 FL (ref 9–12.9)
PMV BLD AUTO: 10.2 FL (ref 9–12.9)
PMV BLD AUTO: 10.3 FL (ref 9–12.9)
PMV BLD AUTO: 10.3 FL (ref 9–12.9)
PMV BLD AUTO: 10.4 FL (ref 9–12.9)
PMV BLD AUTO: 10.4 FL (ref 9–12.9)
PMV BLD AUTO: 10.5 FL (ref 9–12.9)
PMV BLD AUTO: 10.6 FL (ref 9–12.9)
PMV BLD AUTO: 10.6 FL (ref 9–12.9)
PMV BLD AUTO: 10.7 FL (ref 9–12.9)
PMV BLD AUTO: 10.7 FL (ref 9–12.9)
PMV BLD AUTO: 11.2 FL (ref 9–12.9)
PMV BLD AUTO: 9.4 FL (ref 9–12.9)
PMV BLD AUTO: 9.7 FL (ref 9–12.9)
PMV BLD AUTO: 9.8 FL (ref 9–12.9)
PMV BLD AUTO: 9.9 FL (ref 9–12.9)
PO2 BLDA: 77 MMHG (ref 64–87)
PO2 BLDA: 80 MMHG (ref 64–87)
PO2 TEMP ADJ BLDA: 74 MMHG (ref 64–87)
PO2 TEMP ADJ BLDA: 76 MMHG (ref 64–87)
POTASSIUM SERPL-SCNC: 3.3 MMOL/L (ref 3.6–5.5)
POTASSIUM SERPL-SCNC: 3.5 MMOL/L (ref 3.6–5.5)
POTASSIUM SERPL-SCNC: 3.5 MMOL/L (ref 3.6–5.5)
POTASSIUM SERPL-SCNC: 3.6 MMOL/L (ref 3.6–5.5)
POTASSIUM SERPL-SCNC: 3.8 MMOL/L (ref 3.6–5.5)
POTASSIUM SERPL-SCNC: 3.8 MMOL/L (ref 3.6–5.5)
POTASSIUM SERPL-SCNC: 3.9 MMOL/L (ref 3.6–5.5)
POTASSIUM SERPL-SCNC: 3.9 MMOL/L (ref 3.6–5.5)
POTASSIUM SERPL-SCNC: 4 MMOL/L (ref 3.6–5.5)
POTASSIUM SERPL-SCNC: 4.2 MMOL/L (ref 3.6–5.5)
POTASSIUM SERPL-SCNC: 4.3 MMOL/L (ref 3.6–5.5)
POTASSIUM SERPL-SCNC: 4.4 MMOL/L (ref 3.6–5.5)
POTASSIUM UR-SCNC: 46.8 MMOL/L
PROCALCITONIN SERPL-MCNC: 0.05 NG/ML
PROCALCITONIN SERPL-MCNC: 0.12 NG/ML
PROCALCITONIN SERPL-MCNC: 0.22 NG/ML
PROCALCITONIN SERPL-MCNC: <0.05 NG/ML
PROT FLD-MCNC: 1.9 G/DL
PROT SERPL-MCNC: 5.6 G/DL (ref 6–8.2)
PROT SERPL-MCNC: 5.6 G/DL (ref 6–8.2)
PROT SERPL-MCNC: 5.7 G/DL (ref 6–8.2)
PROT SERPL-MCNC: 6.1 G/DL (ref 6–8.2)
PROT SERPL-MCNC: 6.2 G/DL (ref 6–8.2)
PROT SERPL-MCNC: 6.4 G/DL (ref 6–8.2)
PROT SERPL-MCNC: 6.6 G/DL (ref 6–8.2)
PROT UR QL STRIP: 100 MG/DL
PROT UR QL STRIP: >=300 MG/DL
PROT UR-MCNC: 60.1 MG/DL (ref 0–15)
PROTHROMBIN TIME: 15.9 SEC (ref 12–14.6)
PROTHROMBIN TIME: 16.3 SEC (ref 12–14.6)
PROTHROMBIN TIME: 18.9 SEC (ref 12–14.6)
RBC # BLD AUTO: 4.09 M/UL (ref 4.7–6.1)
RBC # BLD AUTO: 4.41 M/UL (ref 4.7–6.1)
RBC # BLD AUTO: 4.52 M/UL (ref 4.7–6.1)
RBC # BLD AUTO: 4.57 M/UL (ref 4.7–6.1)
RBC # BLD AUTO: 4.61 M/UL (ref 4.7–6.1)
RBC # BLD AUTO: 4.61 M/UL (ref 4.7–6.1)
RBC # BLD AUTO: 4.64 M/UL (ref 4.7–6.1)
RBC # BLD AUTO: 4.64 M/UL (ref 4.7–6.1)
RBC # BLD AUTO: 4.69 M/UL (ref 4.7–6.1)
RBC # BLD AUTO: 4.71 M/UL (ref 4.7–6.1)
RBC # BLD AUTO: 4.76 M/UL (ref 4.7–6.1)
RBC # BLD AUTO: 4.8 M/UL (ref 4.7–6.1)
RBC # BLD AUTO: 4.89 M/UL (ref 4.7–6.1)
RBC # BLD AUTO: 4.97 M/UL (ref 4.7–6.1)
RBC # BLD AUTO: 5 M/UL (ref 4.7–6.1)
RBC # BLD AUTO: 5.06 M/UL (ref 4.7–6.1)
RBC # BLD AUTO: 5.11 M/UL (ref 4.7–6.1)
RBC # BLD AUTO: 5.27 M/UL (ref 4.7–6.1)
RBC # BLD AUTO: 5.27 M/UL (ref 4.7–6.1)
RBC # BLD AUTO: 5.31 M/UL (ref 4.7–6.1)
RBC # BLD AUTO: 5.37 M/UL (ref 4.7–6.1)
RBC # FLD: <2000 CELLS/UL
RBC # URNS HPF: >150 /HPF
RBC # URNS HPF: ABNORMAL /HPF
RBC UR QL AUTO: ABNORMAL
RBC UR QL AUTO: ABNORMAL
RHODAMINE-AURAMINE STN SPEC: NORMAL
SAO2 % BLDA: 93 % (ref 93–99)
SAO2 % BLDA: 95 % (ref 93–99)
SIGNIFICANT IND 70042: ABNORMAL
SIGNIFICANT IND 70042: NORMAL
SITE SITE: ABNORMAL
SITE SITE: NORMAL
SODIUM SERPL-SCNC: 135 MMOL/L (ref 135–145)
SODIUM SERPL-SCNC: 136 MMOL/L (ref 135–145)
SODIUM SERPL-SCNC: 136 MMOL/L (ref 135–145)
SODIUM SERPL-SCNC: 137 MMOL/L (ref 135–145)
SODIUM SERPL-SCNC: 138 MMOL/L (ref 135–145)
SODIUM SERPL-SCNC: 138 MMOL/L (ref 135–145)
SODIUM SERPL-SCNC: 139 MMOL/L (ref 135–145)
SODIUM SERPL-SCNC: 140 MMOL/L (ref 135–145)
SODIUM SERPL-SCNC: 141 MMOL/L (ref 135–145)
SODIUM SERPL-SCNC: 142 MMOL/L (ref 135–145)
SODIUM SERPL-SCNC: 143 MMOL/L (ref 135–145)
SODIUM SERPL-SCNC: 145 MMOL/L (ref 135–145)
SODIUM UR-SCNC: 51 MMOL/L
SOURCE SOURCE: ABNORMAL
SOURCE SOURCE: NORMAL
SP GR UR STRIP.AUTO: 1.02
SP GR UR STRIP.AUTO: 1.02
SPECIMEN DRAWN FROM PATIENT: ABNORMAL
SPECIMEN DRAWN FROM PATIENT: ABNORMAL
TRIGL SERPL-MCNC: 29 MG/DL (ref 0–149)
TROPONIN I SERPL-MCNC: 0.02 NG/ML (ref 0–0.04)
TROPONIN I SERPL-MCNC: 0.02 NG/ML (ref 0–0.04)
TROPONIN T SERPL-MCNC: 165 NG/L (ref 6–19)
TROPONIN T SERPL-MCNC: 167 NG/L (ref 6–19)
TROPONIN T SERPL-MCNC: 187 NG/L (ref 6–19)
TROPONIN T SERPL-MCNC: 189 NG/L (ref 6–19)
TSH SERPL DL<=0.005 MIU/L-ACNC: 2.69 UIU/ML (ref 0.38–5.33)
UROBILINOGEN UR STRIP.AUTO-MCNC: 1 MG/DL
UROBILINOGEN UR STRIP.AUTO-MCNC: 1 MG/DL
WBC # BLD AUTO: 11 K/UL (ref 4.8–10.8)
WBC # BLD AUTO: 11.8 K/UL (ref 4.8–10.8)
WBC # BLD AUTO: 15 K/UL (ref 4.8–10.8)
WBC # BLD AUTO: 17.2 K/UL (ref 4.8–10.8)
WBC # BLD AUTO: 6 K/UL (ref 4.8–10.8)
WBC # BLD AUTO: 6.3 K/UL (ref 4.8–10.8)
WBC # BLD AUTO: 6.4 K/UL (ref 4.8–10.8)
WBC # BLD AUTO: 6.4 K/UL (ref 4.8–10.8)
WBC # BLD AUTO: 7.1 K/UL (ref 4.8–10.8)
WBC # BLD AUTO: 7.3 K/UL (ref 4.8–10.8)
WBC # BLD AUTO: 7.4 K/UL (ref 4.8–10.8)
WBC # BLD AUTO: 7.8 K/UL (ref 4.8–10.8)
WBC # BLD AUTO: 8.1 K/UL (ref 4.8–10.8)
WBC # BLD AUTO: 8.4 K/UL (ref 4.8–10.8)
WBC # BLD AUTO: 9 K/UL (ref 4.8–10.8)
WBC # BLD AUTO: 9.3 K/UL (ref 4.8–10.8)
WBC # BLD AUTO: 9.7 K/UL (ref 4.8–10.8)
WBC # FLD: 1165 CELLS/UL
WBC #/AREA URNS HPF: ABNORMAL /HPF
WBC #/AREA URNS HPF: ABNORMAL /HPF

## 2019-01-01 PROCEDURE — 87205 SMEAR GRAM STAIN: CPT

## 2019-01-01 PROCEDURE — 87102 FUNGUS ISOLATION CULTURE: CPT

## 2019-01-01 PROCEDURE — 85027 COMPLETE CBC AUTOMATED: CPT

## 2019-01-01 PROCEDURE — A9270 NON-COVERED ITEM OR SERVICE: HCPCS | Performed by: STUDENT IN AN ORGANIZED HEALTH CARE EDUCATION/TRAINING PROGRAM

## 2019-01-01 PROCEDURE — 700102 HCHG RX REV CODE 250 W/ 637 OVERRIDE(OP): Performed by: HOSPITALIST

## 2019-01-01 PROCEDURE — A9270 NON-COVERED ITEM OR SERVICE: HCPCS | Performed by: NURSE PRACTITIONER

## 2019-01-01 PROCEDURE — G0378 HOSPITAL OBSERVATION PER HR: HCPCS

## 2019-01-01 PROCEDURE — 700111 HCHG RX REV CODE 636 W/ 250 OVERRIDE (IP): Performed by: INTERNAL MEDICINE

## 2019-01-01 PROCEDURE — 85025 COMPLETE CBC W/AUTO DIFF WBC: CPT

## 2019-01-01 PROCEDURE — 99285 EMERGENCY DEPT VISIT HI MDM: CPT | Mod: 25

## 2019-01-01 PROCEDURE — 99231 SBSQ HOSP IP/OBS SF/LOW 25: CPT | Mod: GC | Performed by: INTERNAL MEDICINE

## 2019-01-01 PROCEDURE — G0156 HHCP-SVS OF AIDE,EA 15 MIN: HCPCS

## 2019-01-01 PROCEDURE — 700102 HCHG RX REV CODE 250 W/ 637 OVERRIDE(OP): Performed by: INTERNAL MEDICINE

## 2019-01-01 PROCEDURE — 36415 COLL VENOUS BLD VENIPUNCTURE: CPT

## 2019-01-01 PROCEDURE — S9126 HOSPICE CARE, IN THE HOME, P: HCPCS

## 2019-01-01 PROCEDURE — A9270 NON-COVERED ITEM OR SERVICE: HCPCS | Performed by: INTERNAL MEDICINE

## 2019-01-01 PROCEDURE — 80048 BASIC METABOLIC PNL TOTAL CA: CPT

## 2019-01-01 PROCEDURE — 306637 HCHG MISC ORTHO ITEM RC 0274

## 2019-01-01 PROCEDURE — 82570 ASSAY OF URINE CREATININE: CPT

## 2019-01-01 PROCEDURE — 99291 CRITICAL CARE FIRST HOUR: CPT | Performed by: HOSPITALIST

## 2019-01-01 PROCEDURE — 71045 X-RAY EXAM CHEST 1 VIEW: CPT

## 2019-01-01 PROCEDURE — A9270 NON-COVERED ITEM OR SERVICE: HCPCS | Performed by: HOSPITALIST

## 2019-01-01 PROCEDURE — 87015 SPECIMEN INFECT AGNT CONCNTJ: CPT

## 2019-01-01 PROCEDURE — A9270 NON-COVERED ITEM OR SERVICE: HCPCS | Performed by: EMERGENCY MEDICINE

## 2019-01-01 PROCEDURE — 99284 EMERGENCY DEPT VISIT MOD MDM: CPT

## 2019-01-01 PROCEDURE — 770020 HCHG ROOM/CARE - TELE (206)

## 2019-01-01 PROCEDURE — 92526 ORAL FUNCTION THERAPY: CPT

## 2019-01-01 PROCEDURE — 80053 COMPREHEN METABOLIC PANEL: CPT

## 2019-01-01 PROCEDURE — 84157 ASSAY OF PROTEIN OTHER: CPT

## 2019-01-01 PROCEDURE — 99232 SBSQ HOSP IP/OBS MODERATE 35: CPT | Performed by: HOSPITALIST

## 2019-01-01 PROCEDURE — 83880 ASSAY OF NATRIURETIC PEPTIDE: CPT

## 2019-01-01 PROCEDURE — G0299 HHS/HOSPICE OF RN EA 15 MIN: HCPCS

## 2019-01-01 PROCEDURE — 700105 HCHG RX REV CODE 258: Performed by: INTERNAL MEDICINE

## 2019-01-01 PROCEDURE — 81001 URINALYSIS AUTO W/SCOPE: CPT

## 2019-01-01 PROCEDURE — 88112 CYTOPATH CELL ENHANCE TECH: CPT

## 2019-01-01 PROCEDURE — 99233 SBSQ HOSP IP/OBS HIGH 50: CPT | Performed by: HOSPITALIST

## 2019-01-01 PROCEDURE — 700102 HCHG RX REV CODE 250 W/ 637 OVERRIDE(OP): Performed by: STUDENT IN AN ORGANIZED HEALTH CARE EDUCATION/TRAINING PROGRAM

## 2019-01-01 PROCEDURE — 85610 PROTHROMBIN TIME: CPT

## 2019-01-01 PROCEDURE — 99220 PR INITIAL OBSERVATION CARE,LEVL III: CPT | Performed by: INTERNAL MEDICINE

## 2019-01-01 PROCEDURE — 4410569 US-THORACENTESIS PUNCTURE

## 2019-01-01 PROCEDURE — 99232 SBSQ HOSP IP/OBS MODERATE 35: CPT | Mod: GC | Performed by: INTERNAL MEDICINE

## 2019-01-01 PROCEDURE — 80061 LIPID PANEL: CPT

## 2019-01-01 PROCEDURE — 770022 HCHG ROOM/CARE - ICU (200)

## 2019-01-01 PROCEDURE — 700111 HCHG RX REV CODE 636 W/ 250 OVERRIDE (IP): Performed by: NURSE PRACTITIONER

## 2019-01-01 PROCEDURE — 700111 HCHG RX REV CODE 636 W/ 250 OVERRIDE (IP): Performed by: STUDENT IN AN ORGANIZED HEALTH CARE EDUCATION/TRAINING PROGRAM

## 2019-01-01 PROCEDURE — 84300 ASSAY OF URINE SODIUM: CPT

## 2019-01-01 PROCEDURE — 700105 HCHG RX REV CODE 258: Performed by: STUDENT IN AN ORGANIZED HEALTH CARE EDUCATION/TRAINING PROGRAM

## 2019-01-01 PROCEDURE — 96376 TX/PRO/DX INJ SAME DRUG ADON: CPT

## 2019-01-01 PROCEDURE — 700111 HCHG RX REV CODE 636 W/ 250 OVERRIDE (IP): Performed by: HOSPITALIST

## 2019-01-01 PROCEDURE — 92612 ENDOSCOPY SWALLOW (FEES) VID: CPT

## 2019-01-01 PROCEDURE — 84484 ASSAY OF TROPONIN QUANT: CPT

## 2019-01-01 PROCEDURE — 700105 HCHG RX REV CODE 258: Performed by: HOSPITALIST

## 2019-01-01 PROCEDURE — 83615 LACTATE (LD) (LDH) ENZYME: CPT

## 2019-01-01 PROCEDURE — 83735 ASSAY OF MAGNESIUM: CPT

## 2019-01-01 PROCEDURE — 83986 ASSAY PH BODY FLUID NOS: CPT

## 2019-01-01 PROCEDURE — 71250 CT THORAX DX C-: CPT

## 2019-01-01 PROCEDURE — 94760 N-INVAS EAR/PLS OXIMETRY 1: CPT

## 2019-01-01 PROCEDURE — 93971 EXTREMITY STUDY: CPT | Mod: RT

## 2019-01-01 PROCEDURE — 88305 TISSUE EXAM BY PATHOLOGIST: CPT

## 2019-01-01 PROCEDURE — 700102 HCHG RX REV CODE 250 W/ 637 OVERRIDE(OP): Performed by: EMERGENCY MEDICINE

## 2019-01-01 PROCEDURE — 80162 ASSAY OF DIGOXIN TOTAL: CPT

## 2019-01-01 PROCEDURE — 99497 ADVNCD CARE PLAN 30 MIN: CPT | Performed by: INTERNAL MEDICINE

## 2019-01-01 PROCEDURE — 304561 HCHG STAT O2

## 2019-01-01 PROCEDURE — 700105 HCHG RX REV CODE 258

## 2019-01-01 PROCEDURE — 87502 INFLUENZA DNA AMP PROBE: CPT

## 2019-01-01 PROCEDURE — 85379 FIBRIN DEGRADATION QUANT: CPT

## 2019-01-01 PROCEDURE — 99220 PR INITIAL OBSERVATION CARE,LEVL III: CPT | Performed by: HOSPITALIST

## 2019-01-01 PROCEDURE — 87206 SMEAR FLUORESCENT/ACID STAI: CPT

## 2019-01-01 PROCEDURE — 6650990 HC HOSPICE AND HOME CARE RX REV CODE 0250

## 2019-01-01 PROCEDURE — 99233 SBSQ HOSP IP/OBS HIGH 50: CPT | Performed by: INTERNAL MEDICINE

## 2019-01-01 PROCEDURE — 700102 HCHG RX REV CODE 250 W/ 637 OVERRIDE(OP): Performed by: NURSE PRACTITIONER

## 2019-01-01 PROCEDURE — 700111 HCHG RX REV CODE 636 W/ 250 OVERRIDE (IP): Performed by: EMERGENCY MEDICINE

## 2019-01-01 PROCEDURE — 0W9B3ZZ DRAINAGE OF LEFT PLEURAL CAVITY, PERCUTANEOUS APPROACH: ICD-10-PCS | Performed by: RADIOLOGY

## 2019-01-01 PROCEDURE — 84133 ASSAY OF URINE POTASSIUM: CPT

## 2019-01-01 PROCEDURE — G0155 HHCP-SVS OF CSW,EA 15 MIN: HCPCS

## 2019-01-01 PROCEDURE — 94002 VENT MGMT INPAT INIT DAY: CPT

## 2019-01-01 PROCEDURE — 97162 PT EVAL MOD COMPLEX 30 MIN: CPT

## 2019-01-01 PROCEDURE — 82962 GLUCOSE BLOOD TEST: CPT

## 2019-01-01 PROCEDURE — 99212 OFFICE O/P EST SF 10 MIN: CPT | Performed by: NURSE PRACTITIONER

## 2019-01-01 PROCEDURE — 93005 ELECTROCARDIOGRAM TRACING: CPT

## 2019-01-01 PROCEDURE — 84145 PROCALCITONIN (PCT): CPT

## 2019-01-01 PROCEDURE — 51798 US URINE CAPACITY MEASURE: CPT

## 2019-01-01 PROCEDURE — 97161 PT EVAL LOW COMPLEX 20 MIN: CPT

## 2019-01-01 PROCEDURE — 87070 CULTURE OTHR SPECIMN AEROBIC: CPT

## 2019-01-01 PROCEDURE — 92610 EVALUATE SWALLOWING FUNCTION: CPT

## 2019-01-01 PROCEDURE — 87116 MYCOBACTERIA CULTURE: CPT

## 2019-01-01 PROCEDURE — 99233 SBSQ HOSP IP/OBS HIGH 50: CPT | Mod: GC | Performed by: INTERNAL MEDICINE

## 2019-01-01 PROCEDURE — 99291 CRITICAL CARE FIRST HOUR: CPT | Performed by: INTERNAL MEDICINE

## 2019-01-01 PROCEDURE — 99226 PR SUBSEQUENT OBSERVATION CARE,LEVEL III: CPT | Performed by: HOSPITALIST

## 2019-01-01 PROCEDURE — 82436 ASSAY OF URINE CHLORIDE: CPT

## 2019-01-01 PROCEDURE — 83605 ASSAY OF LACTIC ACID: CPT

## 2019-01-01 PROCEDURE — 82945 GLUCOSE OTHER FLUID: CPT

## 2019-01-01 PROCEDURE — 99223 1ST HOSP IP/OBS HIGH 75: CPT | Mod: AI | Performed by: INTERNAL MEDICINE

## 2019-01-01 PROCEDURE — 99233 SBSQ HOSP IP/OBS HIGH 50: CPT | Mod: 25 | Performed by: INTERNAL MEDICINE

## 2019-01-01 PROCEDURE — 93005 ELECTROCARDIOGRAM TRACING: CPT | Performed by: EMERGENCY MEDICINE

## 2019-01-01 PROCEDURE — 96375 TX/PRO/DX INJ NEW DRUG ADDON: CPT

## 2019-01-01 PROCEDURE — 72131 CT LUMBAR SPINE W/O DYE: CPT

## 2019-01-01 PROCEDURE — 96374 THER/PROPH/DIAG INJ IV PUSH: CPT

## 2019-01-01 PROCEDURE — 0W993ZZ DRAINAGE OF RIGHT PLEURAL CAVITY, PERCUTANEOUS APPROACH: ICD-10-PCS | Performed by: RADIOLOGY

## 2019-01-01 PROCEDURE — 99239 HOSP IP/OBS DSCHRG MGMT >30: CPT | Performed by: HOSPITALIST

## 2019-01-01 PROCEDURE — 36600 WITHDRAWAL OF ARTERIAL BLOOD: CPT

## 2019-01-01 PROCEDURE — 99292 CRITICAL CARE ADDL 30 MIN: CPT | Performed by: INTERNAL MEDICINE

## 2019-01-01 PROCEDURE — 93970 EXTREMITY STUDY: CPT | Mod: 26 | Performed by: INTERNAL MEDICINE

## 2019-01-01 PROCEDURE — 99222 1ST HOSP IP/OBS MODERATE 55: CPT | Mod: GC | Performed by: INTERNAL MEDICINE

## 2019-01-01 PROCEDURE — 99232 SBSQ HOSP IP/OBS MODERATE 35: CPT | Performed by: INTERNAL MEDICINE

## 2019-01-01 PROCEDURE — 99285 EMERGENCY DEPT VISIT HI MDM: CPT

## 2019-01-01 PROCEDURE — 83036 HEMOGLOBIN GLYCOSYLATED A1C: CPT

## 2019-01-01 PROCEDURE — 87040 BLOOD CULTURE FOR BACTERIA: CPT | Mod: 91

## 2019-01-01 PROCEDURE — 84443 ASSAY THYROID STIM HORMONE: CPT

## 2019-01-01 PROCEDURE — 72192 CT PELVIS W/O DYE: CPT

## 2019-01-01 PROCEDURE — 97165 OT EVAL LOW COMPLEX 30 MIN: CPT

## 2019-01-01 PROCEDURE — 83605 ASSAY OF LACTIC ACID: CPT | Mod: 91

## 2019-01-01 PROCEDURE — 93306 TTE W/DOPPLER COMPLETE: CPT | Mod: 26 | Performed by: INTERNAL MEDICINE

## 2019-01-01 PROCEDURE — 700105 HCHG RX REV CODE 258: Performed by: EMERGENCY MEDICINE

## 2019-01-01 PROCEDURE — 93970 EXTREMITY STUDY: CPT

## 2019-01-01 PROCEDURE — 93306 TTE W/DOPPLER COMPLETE: CPT

## 2019-01-01 PROCEDURE — 93971 EXTREMITY STUDY: CPT | Mod: 26 | Performed by: SURGERY

## 2019-01-01 PROCEDURE — 84156 ASSAY OF PROTEIN URINE: CPT

## 2019-01-01 PROCEDURE — 306588 SLEEVE,VASO CALF MED: Performed by: INTERNAL MEDICINE

## 2019-01-01 PROCEDURE — 97530 THERAPEUTIC ACTIVITIES: CPT

## 2019-01-01 PROCEDURE — 87040 BLOOD CULTURE FOR BACTERIA: CPT

## 2019-01-01 PROCEDURE — 82803 BLOOD GASES ANY COMBINATION: CPT

## 2019-01-01 PROCEDURE — 89051 BODY FLUID CELL COUNT: CPT

## 2019-01-01 PROCEDURE — 82150 ASSAY OF AMYLASE: CPT

## 2019-01-01 PROCEDURE — 665036 HSPC NOTICE OF ELECTION NOE

## 2019-01-01 PROCEDURE — 99239 HOSP IP/OBS DSCHRG MGMT >30: CPT | Performed by: FAMILY MEDICINE

## 2019-01-01 RX ORDER — CLONIDINE HYDROCHLORIDE 0.1 MG/1
0.1 TABLET ORAL 2 TIMES DAILY PRN
Qty: 30 TAB | Refills: 0 | Status: ON HOLD | OUTPATIENT
Start: 2019-01-01 | End: 2019-01-01

## 2019-01-01 RX ORDER — SODIUM CHLORIDE 9 MG/ML
INJECTION, SOLUTION INTRAVENOUS CONTINUOUS
Status: DISCONTINUED | OUTPATIENT
Start: 2019-01-01 | End: 2019-01-01

## 2019-01-01 RX ORDER — POLYETHYLENE GLYCOL 3350 17 G/17G
1 POWDER, FOR SOLUTION ORAL
Status: DISCONTINUED | OUTPATIENT
Start: 2019-01-01 | End: 2019-01-01 | Stop reason: HOSPADM

## 2019-01-01 RX ORDER — OSELTAMIVIR PHOSPHATE 75 MG/1
75 CAPSULE ORAL EVERY 12 HOURS
Status: DISCONTINUED | OUTPATIENT
Start: 2019-01-01 | End: 2019-01-01

## 2019-01-01 RX ORDER — CEFAZOLIN SODIUM 2 G/100ML
2 INJECTION, SOLUTION INTRAVENOUS ONCE
Status: DISCONTINUED | OUTPATIENT
Start: 2019-01-01 | End: 2019-01-01

## 2019-01-01 RX ORDER — HYDROCODONE BITARTRATE AND ACETAMINOPHEN 5; 325 MG/1; MG/1
1 TABLET ORAL EVERY 8 HOURS PRN
Qty: 12 TAB | Refills: 0 | Status: SHIPPED | OUTPATIENT
Start: 2019-01-01 | End: 2019-01-01

## 2019-01-01 RX ORDER — ENALAPRILAT 1.25 MG/ML
1.25 INJECTION INTRAVENOUS EVERY 6 HOURS PRN
Status: DISCONTINUED | OUTPATIENT
Start: 2019-01-01 | End: 2019-01-01 | Stop reason: HOSPADM

## 2019-01-01 RX ORDER — ACETAMINOPHEN 325 MG/1
650 TABLET ORAL EVERY 6 HOURS PRN
Status: DISCONTINUED | OUTPATIENT
Start: 2019-01-01 | End: 2019-01-01

## 2019-01-01 RX ORDER — HYDRALAZINE HYDROCHLORIDE 10 MG/1
10 TABLET, FILM COATED ORAL 3 TIMES DAILY
COMMUNITY
End: 2019-01-01

## 2019-01-01 RX ORDER — DILTIAZEM HYDROCHLORIDE 5 MG/ML
10 INJECTION INTRAVENOUS EVERY 4 HOURS PRN
Status: DISCONTINUED | OUTPATIENT
Start: 2019-01-01 | End: 2019-01-01

## 2019-01-01 RX ORDER — AMOXICILLIN 250 MG
2 CAPSULE ORAL 2 TIMES DAILY
Status: DISCONTINUED | OUTPATIENT
Start: 2019-01-01 | End: 2019-01-01 | Stop reason: HOSPADM

## 2019-01-01 RX ORDER — METOPROLOL TARTRATE 50 MG/1
50 TABLET, FILM COATED ORAL 2 TIMES DAILY
Status: DISCONTINUED | OUTPATIENT
Start: 2019-01-01 | End: 2019-01-01

## 2019-01-01 RX ORDER — ATORVASTATIN CALCIUM 40 MG/1
40 TABLET, FILM COATED ORAL EVERY EVENING
Refills: 2 | Status: ON HOLD | COMMUNITY
End: 2019-01-01

## 2019-01-01 RX ORDER — DILTIAZEM HYDROCHLORIDE 5 MG/ML
10 INJECTION INTRAVENOUS ONCE
Status: COMPLETED | OUTPATIENT
Start: 2019-01-01 | End: 2019-01-01

## 2019-01-01 RX ORDER — HYDRALAZINE HYDROCHLORIDE 10 MG/1
10 TABLET, FILM COATED ORAL EVERY 8 HOURS
Qty: 90 TAB | Refills: 1 | Status: SHIPPED | OUTPATIENT
Start: 2019-01-01 | End: 2019-01-01

## 2019-01-01 RX ORDER — AMOXICILLIN AND CLAVULANATE POTASSIUM 875; 125 MG/1; MG/1
1 TABLET, FILM COATED ORAL EVERY 12 HOURS
Qty: 6 TAB | Refills: 0 | Status: SHIPPED | OUTPATIENT
Start: 2019-01-01 | End: 2019-01-01

## 2019-01-01 RX ORDER — FUROSEMIDE 10 MG/ML
40 INJECTION INTRAMUSCULAR; INTRAVENOUS ONCE
Status: COMPLETED | OUTPATIENT
Start: 2019-01-01 | End: 2019-01-01

## 2019-01-01 RX ORDER — CLONIDINE HYDROCHLORIDE 0.1 MG/1
0.1 TABLET ORAL 2 TIMES DAILY PRN
Status: DISCONTINUED | OUTPATIENT
Start: 2019-01-01 | End: 2019-01-01 | Stop reason: HOSPADM

## 2019-01-01 RX ORDER — HYDRALAZINE HYDROCHLORIDE 20 MG/ML
10 INJECTION INTRAMUSCULAR; INTRAVENOUS EVERY 6 HOURS PRN
Status: DISCONTINUED | OUTPATIENT
Start: 2019-01-01 | End: 2019-01-01 | Stop reason: HOSPADM

## 2019-01-01 RX ORDER — FUROSEMIDE 40 MG/1
40 TABLET ORAL
Status: DISCONTINUED | OUTPATIENT
Start: 2019-01-01 | End: 2019-01-01

## 2019-01-01 RX ORDER — FINASTERIDE 5 MG/1
5 TABLET, FILM COATED ORAL DAILY
Status: DISCONTINUED | OUTPATIENT
Start: 2019-01-01 | End: 2019-01-01 | Stop reason: HOSPADM

## 2019-01-01 RX ORDER — FUROSEMIDE 40 MG/1
40 TABLET ORAL
Status: DISCONTINUED | OUTPATIENT
Start: 2019-01-01 | End: 2019-01-01 | Stop reason: HOSPADM

## 2019-01-01 RX ORDER — LEVALBUTEROL INHALATION SOLUTION 0.63 MG/3ML
0.63 SOLUTION RESPIRATORY (INHALATION)
Status: DISCONTINUED | OUTPATIENT
Start: 2019-01-01 | End: 2019-01-01 | Stop reason: HOSPADM

## 2019-01-01 RX ORDER — BISACODYL 10 MG
10 SUPPOSITORY, RECTAL RECTAL
Status: DISCONTINUED | OUTPATIENT
Start: 2019-01-01 | End: 2019-01-01 | Stop reason: HOSPADM

## 2019-01-01 RX ORDER — ACETAMINOPHEN 325 MG/1
650 TABLET ORAL ONCE
Status: COMPLETED | OUTPATIENT
Start: 2019-01-01 | End: 2019-01-01

## 2019-01-01 RX ORDER — TAMSULOSIN HYDROCHLORIDE 0.4 MG/1
0.4 CAPSULE ORAL DAILY
Status: DISCONTINUED | OUTPATIENT
Start: 2019-01-01 | End: 2019-01-01 | Stop reason: HOSPADM

## 2019-01-01 RX ORDER — FUROSEMIDE 10 MG/ML
10 INJECTION INTRAMUSCULAR; INTRAVENOUS ONCE
Status: COMPLETED | OUTPATIENT
Start: 2019-01-01 | End: 2019-01-01

## 2019-01-01 RX ORDER — DOXYCYCLINE 100 MG/1
100 TABLET ORAL EVERY 12 HOURS
Status: DISCONTINUED | OUTPATIENT
Start: 2019-01-01 | End: 2019-01-01 | Stop reason: HOSPADM

## 2019-01-01 RX ORDER — OSELTAMIVIR PHOSPHATE 75 MG/1
75 CAPSULE ORAL ONCE
Status: COMPLETED | OUTPATIENT
Start: 2019-01-01 | End: 2019-01-01

## 2019-01-01 RX ORDER — SODIUM CHLORIDE, SODIUM LACTATE, POTASSIUM CHLORIDE, CALCIUM CHLORIDE 600; 310; 30; 20 MG/100ML; MG/100ML; MG/100ML; MG/100ML
1000 INJECTION, SOLUTION INTRAVENOUS CONTINUOUS
Status: DISCONTINUED | OUTPATIENT
Start: 2019-01-01 | End: 2019-01-01

## 2019-01-01 RX ORDER — SODIUM PHOSPHATE,MONO-DIBASIC 19G-7G/118
500 ENEMA (ML) RECTAL
Status: DISCONTINUED | OUTPATIENT
Start: 2019-01-01 | End: 2019-01-01 | Stop reason: HOSPADM

## 2019-01-01 RX ORDER — OMEPRAZOLE 20 MG/1
20 CAPSULE, DELAYED RELEASE ORAL DAILY
Qty: 30 CAP | Refills: 1 | Status: SHIPPED | OUTPATIENT
Start: 2019-01-01

## 2019-01-01 RX ORDER — GUAIFENESIN 600 MG/1
600 TABLET, EXTENDED RELEASE ORAL EVERY 12 HOURS
Status: DISCONTINUED | OUTPATIENT
Start: 2019-01-01 | End: 2019-01-01

## 2019-01-01 RX ORDER — DIGOXIN 125 MCG
125 TABLET ORAL
Status: DISCONTINUED | OUTPATIENT
Start: 2019-01-01 | End: 2019-01-01

## 2019-01-01 RX ORDER — ACETAMINOPHEN 325 MG/1
650 TABLET ORAL EVERY 6 HOURS PRN
Status: DISCONTINUED | OUTPATIENT
Start: 2019-01-01 | End: 2019-01-01 | Stop reason: HOSPADM

## 2019-01-01 RX ORDER — LISINOPRIL 20 MG/1
20 TABLET ORAL
Status: DISCONTINUED | OUTPATIENT
Start: 2019-01-01 | End: 2019-01-01 | Stop reason: HOSPADM

## 2019-01-01 RX ORDER — FUROSEMIDE 20 MG/1
20 TABLET ORAL DAILY
Qty: 10 TAB | Refills: 0 | Status: SHIPPED | OUTPATIENT
Start: 2019-01-01 | End: 2019-01-01

## 2019-01-01 RX ORDER — HYDRALAZINE HYDROCHLORIDE 10 MG/1
10 TABLET, FILM COATED ORAL EVERY 8 HOURS
Status: DISCONTINUED | OUTPATIENT
Start: 2019-01-01 | End: 2019-01-01 | Stop reason: HOSPADM

## 2019-01-01 RX ORDER — DOXYCYCLINE 100 MG/1
100 TABLET ORAL EVERY 12 HOURS
Qty: 6 TAB | Refills: 0 | Status: SHIPPED | OUTPATIENT
Start: 2019-01-01 | End: 2019-01-01

## 2019-01-01 RX ORDER — LACTOBACILLUS RHAMNOSUS GG 10B CELL
1 CAPSULE ORAL EVERY EVENING
Status: DISCONTINUED | OUTPATIENT
Start: 2019-01-01 | End: 2019-01-01 | Stop reason: HOSPADM

## 2019-01-01 RX ORDER — OSELTAMIVIR PHOSPHATE 30 MG/1
30 CAPSULE ORAL EVERY 12 HOURS
Status: DISCONTINUED | OUTPATIENT
Start: 2019-01-01 | End: 2019-01-01 | Stop reason: HOSPADM

## 2019-01-01 RX ORDER — METOPROLOL TARTRATE 50 MG/1
50 TABLET, FILM COATED ORAL 2 TIMES DAILY
Status: DISCONTINUED | OUTPATIENT
Start: 2019-01-01 | End: 2019-01-01 | Stop reason: HOSPADM

## 2019-01-01 RX ORDER — ATORVASTATIN CALCIUM 40 MG/1
40 TABLET, FILM COATED ORAL EVERY EVENING
Status: DISCONTINUED | OUTPATIENT
Start: 2019-01-01 | End: 2019-01-01

## 2019-01-01 RX ORDER — CEPHALEXIN 500 MG/1
500 CAPSULE ORAL 2 TIMES DAILY
Status: DISCONTINUED | OUTPATIENT
Start: 2019-01-01 | End: 2019-01-01

## 2019-01-01 RX ORDER — METOPROLOL TARTRATE 50 MG/1
100 TABLET, FILM COATED ORAL 2 TIMES DAILY
Status: DISCONTINUED | OUTPATIENT
Start: 2019-01-01 | End: 2019-01-01

## 2019-01-01 RX ORDER — METHOCARBAMOL 500 MG/1
750 TABLET, FILM COATED ORAL EVERY 6 HOURS PRN
Qty: 28 TAB | Refills: 1 | Status: SHIPPED | OUTPATIENT
Start: 2019-01-01 | End: 2019-01-01

## 2019-01-01 RX ORDER — DILTIAZEM HYDROCHLORIDE 240 MG/1
240 CAPSULE, COATED, EXTENDED RELEASE ORAL DAILY
Qty: 30 CAP | Refills: 2 | Status: SHIPPED | OUTPATIENT
Start: 2019-01-01 | End: 2019-01-01

## 2019-01-01 RX ORDER — FUROSEMIDE 10 MG/ML
40 INJECTION INTRAMUSCULAR; INTRAVENOUS
Status: DISCONTINUED | OUTPATIENT
Start: 2019-01-01 | End: 2019-01-01 | Stop reason: HOSPADM

## 2019-01-01 RX ORDER — POTASSIUM CHLORIDE 20 MEQ/1
20 TABLET, EXTENDED RELEASE ORAL DAILY
Qty: 30 TAB | Refills: 2 | Status: SHIPPED | OUTPATIENT
Start: 2019-01-01

## 2019-01-01 RX ORDER — DIGOXIN 0.25 MG/ML
250 INJECTION INTRAMUSCULAR; INTRAVENOUS ONCE
Status: COMPLETED | OUTPATIENT
Start: 2019-01-01 | End: 2019-01-01

## 2019-01-01 RX ORDER — LISINOPRIL 40 MG/1
20 TABLET ORAL DAILY
COMMUNITY
Start: 2019-01-01

## 2019-01-01 RX ORDER — OMEPRAZOLE 20 MG/1
20 CAPSULE, DELAYED RELEASE ORAL DAILY
Status: DISCONTINUED | OUTPATIENT
Start: 2019-01-01 | End: 2019-01-01 | Stop reason: HOSPADM

## 2019-01-01 RX ORDER — LISINOPRIL 20 MG/1
20 TABLET ORAL 2 TIMES DAILY
Qty: 180 TAB | Refills: 3 | Status: SHIPPED | OUTPATIENT
Start: 2019-01-01 | End: 2019-01-01

## 2019-01-01 RX ORDER — SODIUM CHLORIDE 9 MG/ML
500 INJECTION, SOLUTION INTRAVENOUS
Status: DISCONTINUED | OUTPATIENT
Start: 2019-01-01 | End: 2019-01-01 | Stop reason: HOSPADM

## 2019-01-01 RX ORDER — POTASSIUM CHLORIDE 20 MEQ/1
20 TABLET, EXTENDED RELEASE ORAL DAILY
Qty: 10 TAB | Refills: 0 | Status: SHIPPED | OUTPATIENT
Start: 2019-01-01 | End: 2019-01-01 | Stop reason: SDUPTHER

## 2019-01-01 RX ORDER — SODIUM CHLORIDE 9 MG/ML
INJECTION, SOLUTION INTRAVENOUS
Status: COMPLETED
Start: 2019-01-01 | End: 2019-01-01

## 2019-01-01 RX ORDER — LISINOPRIL 20 MG/1
20 TABLET ORAL 2 TIMES DAILY
Qty: 180 TAB | Refills: 3 | Status: SHIPPED | OUTPATIENT
Start: 2019-01-01 | End: 2019-01-01 | Stop reason: SDUPTHER

## 2019-01-01 RX ORDER — AMOXICILLIN 250 MG
2 CAPSULE ORAL 2 TIMES DAILY PRN
Qty: 30 TAB | Refills: 1 | Status: SHIPPED | OUTPATIENT
Start: 2019-01-01

## 2019-01-01 RX ORDER — FINASTERIDE 5 MG/1
5 TABLET, FILM COATED ORAL
Status: DISCONTINUED | OUTPATIENT
Start: 2019-01-01 | End: 2019-01-01 | Stop reason: HOSPADM

## 2019-01-01 RX ORDER — AMOXICILLIN AND CLAVULANATE POTASSIUM 875; 125 MG/1; MG/1
1 TABLET, FILM COATED ORAL EVERY 12 HOURS
Status: DISCONTINUED | OUTPATIENT
Start: 2019-01-01 | End: 2019-01-01 | Stop reason: HOSPADM

## 2019-01-01 RX ORDER — LISINOPRIL 20 MG/1
40 TABLET ORAL DAILY
Status: DISCONTINUED | OUTPATIENT
Start: 2019-01-01 | End: 2019-01-01 | Stop reason: HOSPADM

## 2019-01-01 RX ORDER — LEVALBUTEROL INHALATION SOLUTION 1.25 MG/3ML
1.25 SOLUTION RESPIRATORY (INHALATION) ONCE
Status: DISPENSED | OUTPATIENT
Start: 2019-01-01 | End: 2019-01-01

## 2019-01-01 RX ORDER — NITROFURANTOIN 25; 75 MG/1; MG/1
100 CAPSULE ORAL EVERY 12 HOURS
Status: DISCONTINUED | OUTPATIENT
Start: 2019-01-01 | End: 2019-01-01

## 2019-01-01 RX ORDER — M-VIT,TX,IRON,MINS/CALC/FOLIC 27MG-0.4MG
1 TABLET ORAL DAILY
COMMUNITY
End: 2019-01-01 | Stop reason: ALTCHOICE

## 2019-01-01 RX ORDER — FUROSEMIDE 10 MG/ML
20 INJECTION INTRAMUSCULAR; INTRAVENOUS ONCE
Status: DISCONTINUED | OUTPATIENT
Start: 2019-01-01 | End: 2019-01-01

## 2019-01-01 RX ORDER — ATORVASTATIN CALCIUM 20 MG/1
80 TABLET, FILM COATED ORAL EVERY EVENING
Status: DISCONTINUED | OUTPATIENT
Start: 2019-01-01 | End: 2019-01-01 | Stop reason: HOSPADM

## 2019-01-01 RX ORDER — SODIUM CHLORIDE 9 MG/ML
INJECTION, SOLUTION INTRAVENOUS
Status: ACTIVE
Start: 2019-01-01 | End: 2019-01-01

## 2019-01-01 RX ORDER — FUROSEMIDE 40 MG/1
40 TABLET ORAL EVERY MORNING
Status: ON HOLD | COMMUNITY
End: 2019-01-01

## 2019-01-01 RX ORDER — POTASSIUM CHLORIDE 20 MEQ/1
20 TABLET, EXTENDED RELEASE ORAL DAILY
Status: DISCONTINUED | OUTPATIENT
Start: 2019-01-01 | End: 2019-01-01 | Stop reason: HOSPADM

## 2019-01-01 RX ORDER — ATORVASTATIN CALCIUM 80 MG/1
80 TABLET, FILM COATED ORAL EVERY EVENING
Status: DISCONTINUED | OUTPATIENT
Start: 2019-01-01 | End: 2019-01-01 | Stop reason: HOSPADM

## 2019-01-01 RX ORDER — DIGOXIN 125 MCG
125 TABLET ORAL DAILY
Status: DISCONTINUED | OUTPATIENT
Start: 2019-01-01 | End: 2019-01-01

## 2019-01-01 RX ORDER — DIGOXIN 0.25 MG/ML
125 INJECTION INTRAMUSCULAR; INTRAVENOUS ONCE
Status: COMPLETED | OUTPATIENT
Start: 2019-01-01 | End: 2019-01-01

## 2019-01-01 RX ORDER — CETIRIZINE HYDROCHLORIDE 10 MG/1
10 TABLET ORAL DAILY
Status: DISCONTINUED | OUTPATIENT
Start: 2019-01-01 | End: 2019-01-01 | Stop reason: HOSPADM

## 2019-01-01 RX ORDER — METOPROLOL TARTRATE 50 MG/1
50 TABLET, FILM COATED ORAL 2 TIMES DAILY
Qty: 60 TAB | Refills: 1 | Status: SHIPPED | OUTPATIENT
Start: 2019-01-01

## 2019-01-01 RX ORDER — OSELTAMIVIR PHOSPHATE 30 MG/1
30 CAPSULE ORAL EVERY 12 HOURS
Qty: 2 CAP | Refills: 0 | Status: SHIPPED | OUTPATIENT
Start: 2019-01-01 | End: 2019-01-01

## 2019-01-01 RX ORDER — FUROSEMIDE 20 MG/1
20 TABLET ORAL DAILY
Qty: 7 TAB | Refills: 0 | Status: SHIPPED | OUTPATIENT
Start: 2019-01-01 | End: 2019-01-01

## 2019-01-01 RX ORDER — FUROSEMIDE 20 MG/1
40 TABLET ORAL DAILY
Qty: 30 TAB | Refills: 2 | Status: SHIPPED | OUTPATIENT
Start: 2019-01-01 | End: 2019-01-01

## 2019-01-01 RX ORDER — SODIUM CHLORIDE 9 MG/ML
1000 INJECTION, SOLUTION INTRAVENOUS ONCE
Status: COMPLETED | OUTPATIENT
Start: 2019-01-01 | End: 2019-01-01

## 2019-01-01 RX ORDER — TAMSULOSIN HYDROCHLORIDE 0.4 MG/1
0.4 CAPSULE ORAL
Status: DISCONTINUED | OUTPATIENT
Start: 2019-01-01 | End: 2019-01-01 | Stop reason: HOSPADM

## 2019-01-01 RX ORDER — POTASSIUM CHLORIDE 20 MEQ/1
20 TABLET, EXTENDED RELEASE ORAL DAILY
Qty: 30 TAB | Refills: 0 | Status: ON HOLD | OUTPATIENT
Start: 2019-01-01 | End: 2019-01-01

## 2019-01-01 RX ORDER — ZINC OXIDE 13 %
2 CREAM (GRAM) TOPICAL EVERY EVENING
Qty: 20 CAP | Refills: 0 | Status: SHIPPED | OUTPATIENT
Start: 2019-01-01 | End: 2019-01-01

## 2019-01-01 RX ADMIN — METOPROLOL TARTRATE 50 MG: 50 TABLET ORAL at 17:07

## 2019-01-01 RX ADMIN — DILTIAZEM HYDROCHLORIDE 60 MG: 60 TABLET, FILM COATED ORAL at 22:32

## 2019-01-01 RX ADMIN — ATORVASTATIN CALCIUM 80 MG: 20 TABLET, FILM COATED ORAL at 18:48

## 2019-01-01 RX ADMIN — AMOXICILLIN AND CLAVULANATE POTASSIUM 1 TABLET: 875; 125 TABLET, FILM COATED ORAL at 06:19

## 2019-01-01 RX ADMIN — TAMSULOSIN HYDROCHLORIDE 0.4 MG: 0.4 CAPSULE ORAL at 21:48

## 2019-01-01 RX ADMIN — FUROSEMIDE 40 MG: 10 INJECTION, SOLUTION INTRAMUSCULAR; INTRAVENOUS at 06:03

## 2019-01-01 RX ADMIN — PIPERACILLIN AND TAZOBACTAM 3.38 G: 3; .375 INJECTION, POWDER, LYOPHILIZED, FOR SOLUTION INTRAVENOUS; PARENTERAL at 21:08

## 2019-01-01 RX ADMIN — OMEPRAZOLE 20 MG: 20 CAPSULE, DELAYED RELEASE ORAL at 06:00

## 2019-01-01 RX ADMIN — PIPERACILLIN AND TAZOBACTAM 3.38 G: 3; .375 INJECTION, POWDER, LYOPHILIZED, FOR SOLUTION INTRAVENOUS; PARENTERAL at 13:39

## 2019-01-01 RX ADMIN — FUROSEMIDE 40 MG: 40 TABLET ORAL at 05:37

## 2019-01-01 RX ADMIN — CLONIDINE HYDROCHLORIDE 0.1 MG: 0.1 TABLET ORAL at 12:40

## 2019-01-01 RX ADMIN — DILTIAZEM HYDROCHLORIDE 60 MG: 60 TABLET, FILM COATED ORAL at 05:37

## 2019-01-01 RX ADMIN — ATORVASTATIN CALCIUM 80 MG: 20 TABLET, FILM COATED ORAL at 18:09

## 2019-01-01 RX ADMIN — DILTIAZEM HYDROCHLORIDE 30 MG: 30 TABLET, FILM COATED ORAL at 14:57

## 2019-01-01 RX ADMIN — SENNOSIDES, DOCUSATE SODIUM 2 TABLET: 50; 8.6 TABLET, FILM COATED ORAL at 16:55

## 2019-01-01 RX ADMIN — METOPROLOL TARTRATE 50 MG: 50 TABLET ORAL at 05:08

## 2019-01-01 RX ADMIN — VITAMIN D, TAB 1000IU (100/BT) 1000 UNITS: 25 TAB at 21:06

## 2019-01-01 RX ADMIN — DILTIAZEM HYDROCHLORIDE 2 MG/HR: 5 INJECTION INTRAVENOUS at 06:21

## 2019-01-01 RX ADMIN — POTASSIUM CHLORIDE 20 MEQ: 20 TABLET, EXTENDED RELEASE ORAL at 06:03

## 2019-01-01 RX ADMIN — HYDRALAZINE HYDROCHLORIDE 10 MG: 10 TABLET, FILM COATED ORAL at 06:03

## 2019-01-01 RX ADMIN — APIXABAN 2.5 MG: 2.5 TABLET, FILM COATED ORAL at 06:20

## 2019-01-01 RX ADMIN — SODIUM CHLORIDE: 9 INJECTION, SOLUTION INTRAVENOUS at 14:54

## 2019-01-01 RX ADMIN — TAMSULOSIN HYDROCHLORIDE 0.4 MG: 0.4 CAPSULE ORAL at 22:31

## 2019-01-01 RX ADMIN — OSELTAMIVIR PHOSPHATE 30 MG: 30 CAPSULE ORAL at 17:18

## 2019-01-01 RX ADMIN — PIPERACILLIN AND TAZOBACTAM 3.38 G: 3; .375 INJECTION, POWDER, LYOPHILIZED, FOR SOLUTION INTRAVENOUS; PARENTERAL at 21:38

## 2019-01-01 RX ADMIN — SENNOSIDES,DOCUSATE SODIUM 2 TABLET: 8.6; 5 TABLET, FILM COATED ORAL at 05:29

## 2019-01-01 RX ADMIN — OMEPRAZOLE 20 MG: 20 CAPSULE, DELAYED RELEASE ORAL at 05:34

## 2019-01-01 RX ADMIN — APIXABAN 2.5 MG: 2.5 TABLET, FILM COATED ORAL at 04:57

## 2019-01-01 RX ADMIN — CEFTRIAXONE SODIUM 1 G: 1 INJECTION, POWDER, FOR SOLUTION INTRAMUSCULAR; INTRAVENOUS at 20:45

## 2019-01-01 RX ADMIN — GUAIFENESIN 600 MG: 600 TABLET, EXTENDED RELEASE ORAL at 21:48

## 2019-01-01 RX ADMIN — AMPICILLIN SODIUM AND SULBACTAM SODIUM 3 G: 2; 1 INJECTION, POWDER, FOR SOLUTION INTRAMUSCULAR; INTRAVENOUS at 00:28

## 2019-01-01 RX ADMIN — METOPROLOL TARTRATE 25 MG: 25 TABLET, FILM COATED ORAL at 05:11

## 2019-01-01 RX ADMIN — ATORVASTATIN CALCIUM 80 MG: 80 TABLET, FILM COATED ORAL at 17:18

## 2019-01-01 RX ADMIN — CETIRIZINE HYDROCHLORIDE 10 MG: 10 TABLET, FILM COATED ORAL at 04:57

## 2019-01-01 RX ADMIN — DIGOXIN 125 MCG: 125 TABLET ORAL at 16:55

## 2019-01-01 RX ADMIN — LISINOPRIL 40 MG: 20 TABLET ORAL at 06:03

## 2019-01-01 RX ADMIN — APIXABAN 2.5 MG: 2.5 TABLET, FILM COATED ORAL at 18:09

## 2019-01-01 RX ADMIN — APIXABAN 2.5 MG: 2.5 TABLET, FILM COATED ORAL at 18:07

## 2019-01-01 RX ADMIN — METOPROLOL TARTRATE 25 MG: 25 TABLET ORAL at 17:53

## 2019-01-01 RX ADMIN — CLONIDINE HYDROCHLORIDE 0.1 MG: 0.1 TABLET ORAL at 01:05

## 2019-01-01 RX ADMIN — METOPROLOL TARTRATE 50 MG: 50 TABLET ORAL at 16:54

## 2019-01-01 RX ADMIN — METOPROLOL TARTRATE 25 MG: 25 TABLET ORAL at 18:46

## 2019-01-01 RX ADMIN — DILTIAZEM HYDROCHLORIDE 10 MG: 5 INJECTION INTRAVENOUS at 02:52

## 2019-01-01 RX ADMIN — ATORVASTATIN CALCIUM 80 MG: 80 TABLET, FILM COATED ORAL at 18:46

## 2019-01-01 RX ADMIN — PIPERACILLIN AND TAZOBACTAM 3.38 G: 3; .375 INJECTION, POWDER, LYOPHILIZED, FOR SOLUTION INTRAVENOUS; PARENTERAL at 13:02

## 2019-01-01 RX ADMIN — AMPICILLIN SODIUM AND SULBACTAM SODIUM 3 G: 2; 1 INJECTION, POWDER, FOR SOLUTION INTRAMUSCULAR; INTRAVENOUS at 17:46

## 2019-01-01 RX ADMIN — APIXABAN 2.5 MG: 2.5 TABLET, FILM COATED ORAL at 18:46

## 2019-01-01 RX ADMIN — APIXABAN 2.5 MG: 2.5 TABLET, FILM COATED ORAL at 21:45

## 2019-01-01 RX ADMIN — OSELTAMIVIR PHOSPHATE 75 MG: 75 CAPSULE ORAL at 17:25

## 2019-01-01 RX ADMIN — SODIUM CHLORIDE 1000 ML: 9 INJECTION, SOLUTION INTRAVENOUS at 11:17

## 2019-01-01 RX ADMIN — METOPROLOL TARTRATE 50 MG: 50 TABLET ORAL at 06:12

## 2019-01-01 RX ADMIN — ATORVASTATIN CALCIUM 80 MG: 20 TABLET, FILM COATED ORAL at 22:40

## 2019-01-01 RX ADMIN — AMOXICILLIN AND CLAVULANATE POTASSIUM 1 TABLET: 875; 125 TABLET, FILM COATED ORAL at 17:18

## 2019-01-01 RX ADMIN — FINASTERIDE 5 MG: 5 TABLET, FILM COATED ORAL at 05:48

## 2019-01-01 RX ADMIN — METOPROLOL TARTRATE 50 MG: 50 TABLET ORAL at 05:48

## 2019-01-01 RX ADMIN — METOPROLOL TARTRATE 50 MG: 50 TABLET ORAL at 05:55

## 2019-01-01 RX ADMIN — AMPICILLIN SODIUM AND SULBACTAM SODIUM 3 G: 2; 1 INJECTION, POWDER, FOR SOLUTION INTRAMUSCULAR; INTRAVENOUS at 17:25

## 2019-01-01 RX ADMIN — METOPROLOL TARTRATE 50 MG: 50 TABLET ORAL at 21:45

## 2019-01-01 RX ADMIN — SODIUM CHLORIDE: 9 INJECTION, SOLUTION INTRAVENOUS at 22:15

## 2019-01-01 RX ADMIN — APIXABAN 2.5 MG: 2.5 TABLET, FILM COATED ORAL at 10:25

## 2019-01-01 RX ADMIN — FUROSEMIDE 40 MG: 10 INJECTION, SOLUTION INTRAMUSCULAR; INTRAVENOUS at 11:42

## 2019-01-01 RX ADMIN — CLONIDINE HYDROCHLORIDE 0.1 MG: 0.1 TABLET ORAL at 16:32

## 2019-01-01 RX ADMIN — APIXABAN 2.5 MG: 2.5 TABLET, FILM COATED ORAL at 17:22

## 2019-01-01 RX ADMIN — HYDRALAZINE HYDROCHLORIDE 10 MG: 20 INJECTION INTRAMUSCULAR; INTRAVENOUS at 10:06

## 2019-01-01 RX ADMIN — CLONIDINE HYDROCHLORIDE 0.1 MG: 0.1 TABLET ORAL at 18:20

## 2019-01-01 RX ADMIN — LISINOPRIL 40 MG: 20 TABLET ORAL at 06:05

## 2019-01-01 RX ADMIN — FUROSEMIDE 40 MG: 10 INJECTION, SOLUTION INTRAMUSCULAR; INTRAVENOUS at 05:50

## 2019-01-01 RX ADMIN — POTASSIUM CHLORIDE 20 MEQ: 20 TABLET, EXTENDED RELEASE ORAL at 05:49

## 2019-01-01 RX ADMIN — HYDRALAZINE HYDROCHLORIDE 10 MG: 10 TABLET, FILM COATED ORAL at 09:55

## 2019-01-01 RX ADMIN — DOXYCYCLINE 100 MG: 100 TABLET, FILM COATED ORAL at 06:20

## 2019-01-01 RX ADMIN — FINASTERIDE 5 MG: 5 TABLET, FILM COATED ORAL at 06:03

## 2019-01-01 RX ADMIN — OSELTAMIVIR PHOSPHATE 75 MG: 75 CAPSULE ORAL at 05:14

## 2019-01-01 RX ADMIN — METOPROLOL TARTRATE 25 MG: 25 TABLET ORAL at 06:19

## 2019-01-01 RX ADMIN — DIGOXIN 250 MCG: 0.25 INJECTION INTRAMUSCULAR; INTRAVENOUS at 11:00

## 2019-01-01 RX ADMIN — SODIUM CHLORIDE, POTASSIUM CHLORIDE, SODIUM LACTATE AND CALCIUM CHLORIDE 1000 ML: 600; 310; 30; 20 INJECTION, SOLUTION INTRAVENOUS at 17:28

## 2019-01-01 RX ADMIN — FUROSEMIDE 40 MG: 10 INJECTION, SOLUTION INTRAMUSCULAR; INTRAVENOUS at 05:11

## 2019-01-01 RX ADMIN — DIGOXIN 125 MCG: 125 TABLET ORAL at 17:22

## 2019-01-01 RX ADMIN — OMEPRAZOLE 20 MG: 20 CAPSULE, DELAYED RELEASE ORAL at 05:08

## 2019-01-01 RX ADMIN — APIXABAN 2.5 MG: 2.5 TABLET, FILM COATED ORAL at 05:10

## 2019-01-01 RX ADMIN — ATORVASTATIN CALCIUM 80 MG: 20 TABLET, FILM COATED ORAL at 19:41

## 2019-01-01 RX ADMIN — CETIRIZINE HYDROCHLORIDE 10 MG: 10 TABLET, FILM COATED ORAL at 05:30

## 2019-01-01 RX ADMIN — OSELTAMIVIR PHOSPHATE 75 MG: 75 CAPSULE ORAL at 16:09

## 2019-01-01 RX ADMIN — HYDRALAZINE HYDROCHLORIDE 10 MG: 10 TABLET, FILM COATED ORAL at 14:02

## 2019-01-01 RX ADMIN — METOPROLOL TARTRATE 75 MG: 25 TABLET, FILM COATED ORAL at 04:37

## 2019-01-01 RX ADMIN — OMEPRAZOLE 20 MG: 20 CAPSULE, DELAYED RELEASE ORAL at 16:59

## 2019-01-01 RX ADMIN — FINASTERIDE 5 MG: 5 TABLET, FILM COATED ORAL at 05:37

## 2019-01-01 RX ADMIN — METOPROLOL TARTRATE 50 MG: 50 TABLET ORAL at 05:34

## 2019-01-01 RX ADMIN — FUROSEMIDE 10 MG: 10 INJECTION, SOLUTION INTRAMUSCULAR; INTRAVENOUS at 15:00

## 2019-01-01 RX ADMIN — AMPICILLIN SODIUM AND SULBACTAM SODIUM 3 G: 2; 1 INJECTION, POWDER, FOR SOLUTION INTRAMUSCULAR; INTRAVENOUS at 04:57

## 2019-01-01 RX ADMIN — TAMSULOSIN HYDROCHLORIDE 0.4 MG: 0.4 CAPSULE ORAL at 21:38

## 2019-01-01 RX ADMIN — APIXABAN 2.5 MG: 2.5 TABLET, FILM COATED ORAL at 06:00

## 2019-01-01 RX ADMIN — DILTIAZEM HYDROCHLORIDE 60 MG: 60 TABLET, FILM COATED ORAL at 05:34

## 2019-01-01 RX ADMIN — Medication 500 MG: at 11:40

## 2019-01-01 RX ADMIN — FUROSEMIDE 40 MG: 10 INJECTION, SOLUTION INTRAMUSCULAR; INTRAVENOUS at 06:01

## 2019-01-01 RX ADMIN — GUAIFENESIN 600 MG: 600 TABLET, EXTENDED RELEASE ORAL at 05:11

## 2019-01-01 RX ADMIN — ATORVASTATIN CALCIUM 80 MG: 80 TABLET, FILM COATED ORAL at 17:52

## 2019-01-01 RX ADMIN — METOPROLOL TARTRATE 25 MG: 25 TABLET, FILM COATED ORAL at 05:49

## 2019-01-01 RX ADMIN — METOPROLOL TARTRATE 50 MG: 50 TABLET ORAL at 16:59

## 2019-01-01 RX ADMIN — Medication 1 CAPSULE: at 17:18

## 2019-01-01 RX ADMIN — METOPROLOL TARTRATE 25 MG: 25 TABLET ORAL at 04:57

## 2019-01-01 RX ADMIN — LISINOPRIL 20 MG: 20 TABLET ORAL at 18:45

## 2019-01-01 RX ADMIN — DILTIAZEM HYDROCHLORIDE 60 MG: 60 TABLET, FILM COATED ORAL at 13:02

## 2019-01-01 RX ADMIN — ATORVASTATIN CALCIUM 40 MG: 40 TABLET, FILM COATED ORAL at 17:09

## 2019-01-01 RX ADMIN — APIXABAN 2.5 MG: 2.5 TABLET, FILM COATED ORAL at 17:12

## 2019-01-01 RX ADMIN — SODIUM CHLORIDE: 9 INJECTION, SOLUTION INTRAVENOUS at 00:14

## 2019-01-01 RX ADMIN — METOPROLOL TARTRATE 75 MG: 25 TABLET, FILM COATED ORAL at 17:09

## 2019-01-01 RX ADMIN — METOPROLOL TARTRATE 25 MG: 25 TABLET, FILM COATED ORAL at 18:49

## 2019-01-01 RX ADMIN — TAMSULOSIN HYDROCHLORIDE 0.4 MG: 0.4 CAPSULE ORAL at 17:53

## 2019-01-01 RX ADMIN — APIXABAN 2.5 MG: 2.5 TABLET, FILM COATED ORAL at 18:52

## 2019-01-01 RX ADMIN — APIXABAN 2.5 MG: 2.5 TABLET, FILM COATED ORAL at 05:49

## 2019-01-01 RX ADMIN — METOPROLOL TARTRATE 25 MG: 25 TABLET, FILM COATED ORAL at 09:04

## 2019-01-01 RX ADMIN — VITAMIN D, TAB 1000IU (100/BT) 1000 UNITS: 25 TAB at 22:02

## 2019-01-01 RX ADMIN — DIGOXIN 125 MCG: 125 TABLET ORAL at 21:51

## 2019-01-01 RX ADMIN — METOPROLOL TARTRATE 50 MG: 50 TABLET ORAL at 18:07

## 2019-01-01 RX ADMIN — CETIRIZINE HYDROCHLORIDE 10 MG: 10 TABLET, FILM COATED ORAL at 20:42

## 2019-01-01 RX ADMIN — PIPERACILLIN AND TAZOBACTAM 3.38 G: 3; .375 INJECTION, POWDER, LYOPHILIZED, FOR SOLUTION INTRAVENOUS; PARENTERAL at 05:08

## 2019-01-01 RX ADMIN — DILTIAZEM HYDROCHLORIDE 15 MG/HR: 5 INJECTION INTRAVENOUS at 16:45

## 2019-01-01 RX ADMIN — DILTIAZEM HYDROCHLORIDE 60 MG: 60 TABLET, FILM COATED ORAL at 15:00

## 2019-01-01 RX ADMIN — SENNOSIDES,DOCUSATE SODIUM 2 TABLET: 8.6; 5 TABLET, FILM COATED ORAL at 06:19

## 2019-01-01 RX ADMIN — DILTIAZEM HYDROCHLORIDE 60 MG: 60 TABLET, FILM COATED ORAL at 05:07

## 2019-01-01 RX ADMIN — DILTIAZEM HYDROCHLORIDE 60 MG: 60 TABLET, FILM COATED ORAL at 14:22

## 2019-01-01 RX ADMIN — APIXABAN 2.5 MG: 2.5 TABLET, FILM COATED ORAL at 17:07

## 2019-01-01 RX ADMIN — PIPERACILLIN AND TAZOBACTAM 3.38 G: 3; .375 INJECTION, POWDER, LYOPHILIZED, FOR SOLUTION INTRAVENOUS; PARENTERAL at 21:22

## 2019-01-01 RX ADMIN — FINASTERIDE 5 MG: 5 TABLET, FILM COATED ORAL at 11:39

## 2019-01-01 RX ADMIN — DILTIAZEM HYDROCHLORIDE 60 MG: 60 TABLET, FILM COATED ORAL at 21:32

## 2019-01-01 RX ADMIN — FINASTERIDE 5 MG: 5 TABLET, FILM COATED ORAL at 12:14

## 2019-01-01 RX ADMIN — APIXABAN 2.5 MG: 2.5 TABLET, FILM COATED ORAL at 17:09

## 2019-01-01 RX ADMIN — Medication 500 MG: at 12:13

## 2019-01-01 RX ADMIN — PIPERACILLIN AND TAZOBACTAM 3.38 G: 3; .375 INJECTION, POWDER, LYOPHILIZED, FOR SOLUTION INTRAVENOUS; PARENTERAL at 13:06

## 2019-01-01 RX ADMIN — APIXABAN 2.5 MG: 2.5 TABLET, FILM COATED ORAL at 22:38

## 2019-01-01 RX ADMIN — DILTIAZEM HYDROCHLORIDE 60 MG: 60 TABLET, FILM COATED ORAL at 21:38

## 2019-01-01 RX ADMIN — DILTIAZEM HYDROCHLORIDE 10 MG: 5 INJECTION INTRAVENOUS at 14:57

## 2019-01-01 RX ADMIN — DILTIAZEM HYDROCHLORIDE 60 MG: 60 TABLET, FILM COATED ORAL at 21:22

## 2019-01-01 RX ADMIN — AMPICILLIN SODIUM AND SULBACTAM SODIUM 3 G: 2; 1 INJECTION, POWDER, FOR SOLUTION INTRAMUSCULAR; INTRAVENOUS at 23:17

## 2019-01-01 RX ADMIN — OSELTAMIVIR PHOSPHATE 30 MG: 30 CAPSULE ORAL at 06:19

## 2019-01-01 RX ADMIN — METOPROLOL TARTRATE 25 MG: 25 TABLET, FILM COATED ORAL at 22:40

## 2019-01-01 RX ADMIN — SENNOSIDES,DOCUSATE SODIUM 2 TABLET: 8.6; 5 TABLET, FILM COATED ORAL at 18:46

## 2019-01-01 RX ADMIN — METOPROLOL TARTRATE 25 MG: 25 TABLET, FILM COATED ORAL at 06:03

## 2019-01-01 RX ADMIN — DILTIAZEM HYDROCHLORIDE 10 MG: 5 INJECTION INTRAVENOUS at 06:19

## 2019-01-01 RX ADMIN — METOPROLOL TARTRATE 50 MG: 50 TABLET ORAL at 18:33

## 2019-01-01 RX ADMIN — FINASTERIDE 5 MG: 5 TABLET, FILM COATED ORAL at 05:49

## 2019-01-01 RX ADMIN — METOPROLOL TARTRATE 50 MG: 50 TABLET ORAL at 06:00

## 2019-01-01 RX ADMIN — Medication 1 CAPSULE: at 18:45

## 2019-01-01 RX ADMIN — TAMSULOSIN HYDROCHLORIDE 0.4 MG: 0.4 CAPSULE ORAL at 21:08

## 2019-01-01 RX ADMIN — FINASTERIDE 5 MG: 5 TABLET, FILM COATED ORAL at 12:20

## 2019-01-01 RX ADMIN — DOXYCYCLINE 100 MG: 100 TABLET, FILM COATED ORAL at 05:17

## 2019-01-01 RX ADMIN — DILTIAZEM HYDROCHLORIDE 10 MG: 5 INJECTION INTRAVENOUS at 18:48

## 2019-01-01 RX ADMIN — DOXYCYCLINE 100 MG: 100 TABLET, FILM COATED ORAL at 17:24

## 2019-01-01 RX ADMIN — HYDRALAZINE HYDROCHLORIDE 10 MG: 10 TABLET, FILM COATED ORAL at 16:32

## 2019-01-01 RX ADMIN — METOPROLOL TARTRATE 25 MG: 25 TABLET, FILM COATED ORAL at 06:05

## 2019-01-01 RX ADMIN — OMEPRAZOLE 20 MG: 20 CAPSULE, DELAYED RELEASE ORAL at 05:37

## 2019-01-01 RX ADMIN — APIXABAN 2.5 MG: 2.5 TABLET, FILM COATED ORAL at 06:03

## 2019-01-01 RX ADMIN — FINASTERIDE 5 MG: 5 TABLET, FILM COATED ORAL at 05:34

## 2019-01-01 RX ADMIN — FINASTERIDE 5 MG: 5 TABLET, FILM COATED ORAL at 06:05

## 2019-01-01 RX ADMIN — METOPROLOL TARTRATE 50 MG: 50 TABLET ORAL at 17:12

## 2019-01-01 RX ADMIN — APIXABAN 2.5 MG: 2.5 TABLET, FILM COATED ORAL at 06:05

## 2019-01-01 RX ADMIN — PIPERACILLIN AND TAZOBACTAM 3.38 G: 3; .375 INJECTION, POWDER, LYOPHILIZED, FOR SOLUTION INTRAVENOUS; PARENTERAL at 21:50

## 2019-01-01 RX ADMIN — SODIUM CHLORIDE: 9 INJECTION, SOLUTION INTRAVENOUS at 18:49

## 2019-01-01 RX ADMIN — DILTIAZEM HYDROCHLORIDE 60 MG: 60 TABLET, FILM COATED ORAL at 13:05

## 2019-01-01 RX ADMIN — APIXABAN 2.5 MG: 2.5 TABLET, FILM COATED ORAL at 18:33

## 2019-01-01 RX ADMIN — DILTIAZEM HYDROCHLORIDE 60 MG: 60 TABLET, FILM COATED ORAL at 21:08

## 2019-01-01 RX ADMIN — OMEPRAZOLE 20 MG: 20 CAPSULE, DELAYED RELEASE ORAL at 05:49

## 2019-01-01 RX ADMIN — MORPHINE SULFATE 10 MG: 100 SOLUTION ORAL at 12:30

## 2019-01-01 RX ADMIN — AMPICILLIN SODIUM AND SULBACTAM SODIUM 3 G: 2; 1 INJECTION, POWDER, FOR SOLUTION INTRAMUSCULAR; INTRAVENOUS at 18:46

## 2019-01-01 RX ADMIN — FUROSEMIDE 40 MG: 10 INJECTION, SOLUTION INTRAMUSCULAR; INTRAVENOUS at 06:04

## 2019-01-01 RX ADMIN — DILTIAZEM HYDROCHLORIDE 30 MG: 30 TABLET, FILM COATED ORAL at 06:11

## 2019-01-01 RX ADMIN — AMPICILLIN SODIUM AND SULBACTAM SODIUM 3 G: 2; 1 INJECTION, POWDER, FOR SOLUTION INTRAMUSCULAR; INTRAVENOUS at 05:20

## 2019-01-01 RX ADMIN — FUROSEMIDE 40 MG: 10 INJECTION, SOLUTION INTRAMUSCULAR; INTRAVENOUS at 19:56

## 2019-01-01 RX ADMIN — GUAIFENESIN 600 MG: 600 TABLET, EXTENDED RELEASE ORAL at 05:07

## 2019-01-01 RX ADMIN — TAMSULOSIN HYDROCHLORIDE 0.4 MG: 0.4 CAPSULE ORAL at 20:41

## 2019-01-01 RX ADMIN — LISINOPRIL 40 MG: 20 TABLET ORAL at 05:49

## 2019-01-01 RX ADMIN — SENNOSIDES, DOCUSATE SODIUM 2 TABLET: 50; 8.6 TABLET, FILM COATED ORAL at 18:23

## 2019-01-01 RX ADMIN — METOPROLOL TARTRATE 25 MG: 25 TABLET ORAL at 05:18

## 2019-01-01 RX ADMIN — FINASTERIDE 5 MG: 5 TABLET, FILM COATED ORAL at 05:07

## 2019-01-01 RX ADMIN — METOPROLOL TARTRATE 50 MG: 50 TABLET ORAL at 17:23

## 2019-01-01 RX ADMIN — DILTIAZEM HYDROCHLORIDE 60 MG: 60 TABLET, FILM COATED ORAL at 13:39

## 2019-01-01 RX ADMIN — APIXABAN 2.5 MG: 2.5 TABLET, FILM COATED ORAL at 16:59

## 2019-01-01 RX ADMIN — TAMSULOSIN HYDROCHLORIDE 0.4 MG: 0.4 CAPSULE ORAL at 17:17

## 2019-01-01 RX ADMIN — Medication 1 CAPSULE: at 17:46

## 2019-01-01 RX ADMIN — APIXABAN 2.5 MG: 2.5 TABLET, FILM COATED ORAL at 05:11

## 2019-01-01 RX ADMIN — GUAIFENESIN 600 MG: 600 TABLET, EXTENDED RELEASE ORAL at 16:54

## 2019-01-01 RX ADMIN — HYDRALAZINE HYDROCHLORIDE 10 MG: 20 INJECTION INTRAMUSCULAR; INTRAVENOUS at 04:12

## 2019-01-01 RX ADMIN — CLONIDINE HYDROCHLORIDE 0.1 MG: 0.1 TABLET ORAL at 05:28

## 2019-01-01 RX ADMIN — VITAMIN D, TAB 1000IU (100/BT) 1000 UNITS: 25 TAB at 20:42

## 2019-01-01 RX ADMIN — APIXABAN 2.5 MG: 2.5 TABLET, FILM COATED ORAL at 05:17

## 2019-01-01 RX ADMIN — FINASTERIDE 5 MG: 5 TABLET, FILM COATED ORAL at 06:30

## 2019-01-01 RX ADMIN — DOXYCYCLINE 100 MG: 100 TABLET, FILM COATED ORAL at 04:57

## 2019-01-01 RX ADMIN — HYDRALAZINE HYDROCHLORIDE 10 MG: 10 TABLET, FILM COATED ORAL at 06:05

## 2019-01-01 RX ADMIN — APIXABAN 2.5 MG: 2.5 TABLET, FILM COATED ORAL at 17:50

## 2019-01-01 RX ADMIN — OSELTAMIVIR PHOSPHATE 75 MG: 75 CAPSULE ORAL at 05:30

## 2019-01-01 RX ADMIN — Medication 500 MG: at 11:43

## 2019-01-01 RX ADMIN — LISINOPRIL 40 MG: 20 TABLET ORAL at 05:10

## 2019-01-01 RX ADMIN — DOXYCYCLINE 100 MG: 100 TABLET, FILM COATED ORAL at 05:29

## 2019-01-01 RX ADMIN — PIPERACILLIN AND TAZOBACTAM 3.38 G: 3; .375 INJECTION, POWDER, LYOPHILIZED, FOR SOLUTION INTRAVENOUS; PARENTERAL at 04:37

## 2019-01-01 RX ADMIN — FINASTERIDE 5 MG: 5 TABLET, FILM COATED ORAL at 11:43

## 2019-01-01 RX ADMIN — DOXYCYCLINE 100 MG: 100 TABLET, FILM COATED ORAL at 18:46

## 2019-01-01 RX ADMIN — FINASTERIDE 5 MG: 5 TABLET, FILM COATED ORAL at 05:13

## 2019-01-01 RX ADMIN — PIPERACILLIN AND TAZOBACTAM 3.38 G: 3; .375 INJECTION, POWDER, LYOPHILIZED, FOR SOLUTION INTRAVENOUS; PARENTERAL at 04:42

## 2019-01-01 RX ADMIN — DIGOXIN 250 MCG: 0.25 INJECTION INTRAMUSCULAR; INTRAVENOUS at 14:22

## 2019-01-01 RX ADMIN — AMPICILLIN SODIUM AND SULBACTAM SODIUM 3 G: 2; 1 INJECTION, POWDER, FOR SOLUTION INTRAMUSCULAR; INTRAVENOUS at 05:30

## 2019-01-01 RX ADMIN — PIPERACILLIN AND TAZOBACTAM 3.38 G: 3; .375 INJECTION, POWDER, LYOPHILIZED, FOR SOLUTION INTRAVENOUS; PARENTERAL at 05:48

## 2019-01-01 RX ADMIN — LISINOPRIL 20 MG: 20 TABLET ORAL at 17:18

## 2019-01-01 RX ADMIN — FUROSEMIDE 40 MG: 10 INJECTION, SOLUTION INTRAMUSCULAR; INTRAVENOUS at 21:18

## 2019-01-01 RX ADMIN — CETIRIZINE HYDROCHLORIDE 10 MG: 10 TABLET, FILM COATED ORAL at 06:20

## 2019-01-01 RX ADMIN — APIXABAN 2.5 MG: 2.5 TABLET, FILM COATED ORAL at 16:54

## 2019-01-01 RX ADMIN — SODIUM CHLORIDE 500 ML: 9 INJECTION, SOLUTION INTRAVENOUS at 05:20

## 2019-01-01 RX ADMIN — TAMSULOSIN HYDROCHLORIDE 0.4 MG: 0.4 CAPSULE ORAL at 20:57

## 2019-01-01 RX ADMIN — DILTIAZEM HYDROCHLORIDE 5 MG/HR: 5 INJECTION INTRAVENOUS at 04:38

## 2019-01-01 RX ADMIN — METOPROLOL TARTRATE 25 MG: 25 TABLET, FILM COATED ORAL at 12:06

## 2019-01-01 RX ADMIN — APIXABAN 2.5 MG: 2.5 TABLET, FILM COATED ORAL at 17:18

## 2019-01-01 RX ADMIN — GUAIFENESIN 600 MG: 600 TABLET, EXTENDED RELEASE ORAL at 17:22

## 2019-01-01 RX ADMIN — HYDRALAZINE HYDROCHLORIDE 10 MG: 10 TABLET, FILM COATED ORAL at 00:12

## 2019-01-01 RX ADMIN — DILTIAZEM HYDROCHLORIDE 60 MG: 60 TABLET, FILM COATED ORAL at 05:09

## 2019-01-01 RX ADMIN — TAMSULOSIN HYDROCHLORIDE 0.4 MG: 0.4 CAPSULE ORAL at 22:15

## 2019-01-01 RX ADMIN — SODIUM CHLORIDE: 9 INJECTION, SOLUTION INTRAVENOUS at 21:07

## 2019-01-01 RX ADMIN — CETIRIZINE HYDROCHLORIDE 10 MG: 10 TABLET, FILM COATED ORAL at 05:19

## 2019-01-01 RX ADMIN — APIXABAN 2.5 MG: 2.5 TABLET, FILM COATED ORAL at 05:30

## 2019-01-01 RX ADMIN — DILTIAZEM HYDROCHLORIDE 60 MG: 60 TABLET, FILM COATED ORAL at 15:11

## 2019-01-01 RX ADMIN — DILTIAZEM HYDROCHLORIDE 10 MG: 5 INJECTION INTRAVENOUS at 04:48

## 2019-01-01 RX ADMIN — APIXABAN 2.5 MG: 2.5 TABLET, FILM COATED ORAL at 05:37

## 2019-01-01 RX ADMIN — METOPROLOL TARTRATE 25 MG: 25 TABLET, FILM COATED ORAL at 19:41

## 2019-01-01 RX ADMIN — HYDRALAZINE HYDROCHLORIDE 10 MG: 20 INJECTION INTRAMUSCULAR; INTRAVENOUS at 00:59

## 2019-01-01 RX ADMIN — LISINOPRIL 20 MG: 20 TABLET ORAL at 17:23

## 2019-01-01 RX ADMIN — OSELTAMIVIR PHOSPHATE 75 MG: 75 CAPSULE ORAL at 17:51

## 2019-01-01 RX ADMIN — POTASSIUM CHLORIDE 20 MEQ: 20 TABLET, EXTENDED RELEASE ORAL at 06:05

## 2019-01-01 RX ADMIN — FUROSEMIDE 40 MG: 40 TABLET ORAL at 05:34

## 2019-01-01 RX ADMIN — METOPROLOL TARTRATE 25 MG: 25 TABLET, FILM COATED ORAL at 18:09

## 2019-01-01 RX ADMIN — AMPICILLIN SODIUM AND SULBACTAM SODIUM 3 G: 2; 1 INJECTION, POWDER, FOR SOLUTION INTRAMUSCULAR; INTRAVENOUS at 12:25

## 2019-01-01 RX ADMIN — FINASTERIDE 5 MG: 5 TABLET, FILM COATED ORAL at 06:12

## 2019-01-01 RX ADMIN — APIXABAN 2.5 MG: 2.5 TABLET, FILM COATED ORAL at 06:30

## 2019-01-01 RX ADMIN — AMPICILLIN SODIUM AND SULBACTAM SODIUM 3 G: 2; 1 INJECTION, POWDER, FOR SOLUTION INTRAMUSCULAR; INTRAVENOUS at 11:43

## 2019-01-01 RX ADMIN — APIXABAN 2.5 MG: 2.5 TABLET, FILM COATED ORAL at 05:34

## 2019-01-01 RX ADMIN — PIPERACILLIN AND TAZOBACTAM 3.38 G: 3; .375 INJECTION, POWDER, LYOPHILIZED, FOR SOLUTION INTRAVENOUS; PARENTERAL at 02:40

## 2019-01-01 RX ADMIN — METOPROLOL TARTRATE 25 MG: 25 TABLET ORAL at 05:29

## 2019-01-01 RX ADMIN — TAMSULOSIN HYDROCHLORIDE 0.4 MG: 0.4 CAPSULE ORAL at 17:23

## 2019-01-01 RX ADMIN — ATORVASTATIN CALCIUM 40 MG: 40 TABLET, FILM COATED ORAL at 22:15

## 2019-01-01 RX ADMIN — TAMSULOSIN HYDROCHLORIDE 0.4 MG: 0.4 CAPSULE ORAL at 21:32

## 2019-01-01 RX ADMIN — HYDRALAZINE HYDROCHLORIDE 10 MG: 10 TABLET, FILM COATED ORAL at 23:15

## 2019-01-01 RX ADMIN — POTASSIUM CHLORIDE 20 MEQ: 20 TABLET, EXTENDED RELEASE ORAL at 05:11

## 2019-01-01 RX ADMIN — ATORVASTATIN CALCIUM 80 MG: 80 TABLET, FILM COATED ORAL at 17:46

## 2019-01-01 RX ADMIN — METOPROLOL TARTRATE 25 MG: 25 TABLET ORAL at 17:22

## 2019-01-01 RX ADMIN — CLONIDINE HYDROCHLORIDE 0.1 MG: 0.1 TABLET ORAL at 21:06

## 2019-01-01 RX ADMIN — FINASTERIDE 5 MG: 5 TABLET, FILM COATED ORAL at 05:55

## 2019-01-01 RX ADMIN — DILTIAZEM HYDROCHLORIDE 30 MG: 30 TABLET, FILM COATED ORAL at 21:08

## 2019-01-01 RX ADMIN — DIGOXIN 125 MCG: 0.25 INJECTION INTRAMUSCULAR; INTRAVENOUS at 16:46

## 2019-01-01 RX ADMIN — DILTIAZEM HYDROCHLORIDE 60 MG: 60 TABLET, FILM COATED ORAL at 21:48

## 2019-01-01 RX ADMIN — SENNOSIDES,DOCUSATE SODIUM 2 TABLET: 8.6; 5 TABLET, FILM COATED ORAL at 04:57

## 2019-01-01 RX ADMIN — VITAMIN D, TAB 1000IU (100/BT) 1000 UNITS: 25 TAB at 21:49

## 2019-01-01 RX ADMIN — SODIUM CHLORIDE 500 ML: 9 INJECTION, SOLUTION INTRAVENOUS at 21:33

## 2019-01-01 RX ADMIN — ACETAMINOPHEN 650 MG: 325 TABLET, FILM COATED ORAL at 16:09

## 2019-01-01 RX ADMIN — Medication 500 MG: at 12:20

## 2019-01-01 RX ADMIN — FINASTERIDE 5 MG: 5 TABLET, FILM COATED ORAL at 05:10

## 2019-01-01 RX ADMIN — DOXYCYCLINE 100 MG: 100 TABLET, FILM COATED ORAL at 17:48

## 2019-01-01 RX ADMIN — APIXABAN 2.5 MG: 2.5 TABLET, FILM COATED ORAL at 19:41

## 2019-01-01 RX ADMIN — SODIUM CHLORIDE: 9 INJECTION, SOLUTION INTRAVENOUS at 15:34

## 2019-01-01 RX ADMIN — OSELTAMIVIR PHOSPHATE 75 MG: 75 CAPSULE ORAL at 05:28

## 2019-01-01 RX ADMIN — TAMSULOSIN HYDROCHLORIDE 0.4 MG: 0.4 CAPSULE ORAL at 21:22

## 2019-01-01 RX ADMIN — DOXYCYCLINE 100 MG: 100 TABLET, FILM COATED ORAL at 17:18

## 2019-01-01 RX ADMIN — AMPICILLIN SODIUM AND SULBACTAM SODIUM 3 G: 2; 1 INJECTION, POWDER, FOR SOLUTION INTRAMUSCULAR; INTRAVENOUS at 00:56

## 2019-01-01 RX ADMIN — PIPERACILLIN AND TAZOBACTAM 3.38 G: 3; .375 INJECTION, POWDER, LYOPHILIZED, FOR SOLUTION INTRAVENOUS; PARENTERAL at 14:21

## 2019-01-01 RX ADMIN — LISINOPRIL 20 MG: 20 TABLET ORAL at 17:52

## 2019-01-01 RX ADMIN — Medication 1 CAPSULE: at 17:47

## 2019-01-01 RX ADMIN — METOPROLOL TARTRATE 50 MG: 50 TABLET ORAL at 05:11

## 2019-01-01 RX ADMIN — DILTIAZEM HYDROCHLORIDE 60 MG: 60 TABLET, FILM COATED ORAL at 05:48

## 2019-01-01 RX ADMIN — METOPROLOL TARTRATE 25 MG: 25 TABLET ORAL at 17:18

## 2019-01-01 SDOH — ECONOMIC STABILITY: HOUSING INSECURITY
HOME SAFETY: PATIENT GOES BACK AND FORTH HIS BEDROOM AND LIVING ROOM. LONG OXYGEN TUBING POSES INCREASED RISK FOR FALLS FOR BOTH PATIENT AND HIS WIFE. EXTRA OXYGEN CONCENTRATOR ORDERED.

## 2019-01-01 SDOH — ECONOMIC STABILITY: HOUSING INSECURITY: EVIDENCE OF SMOKING MATERIAL: 0

## 2019-01-01 ASSESSMENT — ENCOUNTER SYMPTOMS
DEPRESSION: 0
WHEEZING: 0
SHORTNESS OF BREATH: 0
FEVER: 0
FLANK PAIN: 0
GASTROINTESTINAL NEGATIVE: 1
SHORTNESS OF BREATH: 1
DYSPNEA ON EXERTION: 1
MUSCULOSKELETAL NEGATIVE: 1
DIZZINESS: 0
DIARRHEA: 0
SLEEP QUALITY: ADEQUATE
HEADACHES: 0
MYALGIAS: 0
WEAKNESS: 1
CONSTIPATION: 0
LOWER EXTREMITY EDEMA: 1
WEAKNESS: 0
EYE DISCHARGE: 0
BACK PAIN: 0
WEIGHT LOSS: 0
NAUSEA: 0
DEPRESSED MOOD: 1
VOMITING: 0
SINUS PAIN: 0
SHORTNESS OF BREATH: 0
GASTROINTESTINAL NEGATIVE: 1
AGITATION: 1
CARDIOVASCULAR NEGATIVE: 1
STOOL FREQUENCY: LESS THAN DAILY
DIZZINESS: 0
SORE THROAT: 0
BACK PAIN: 0
BOWEL PATTERN NORMAL: 1
SHORTNESS OF BREATH: 1
SPUTUM PRODUCTION: 1
SEIZURES: 0
EYES NEGATIVE: 1
SPUTUM PRODUCTION: 0
WHEEZING: 0
TINGLING: 0
CHILLS: 0
NECK PAIN: 0
PALPITATIONS: 1
FEVER: 0
DOUBLE VISION: 0
DYSPNEA ACTIVITY LEVEL: AFTER AMBULATING 10 - 20 FT
DOUBLE VISION: 0
SPEECH CHANGE: 0
DYSPNEA ON EXERTION: 1
PHOTOPHOBIA: 0
SLEEP QUALITY: ADEQUATE
PALPITATIONS: 1
MUSCULOSKELETAL NEGATIVE: 1
FEVER: 0
COUGH: 0
DIZZINESS: 0
FEVER: 0
DIZZINESS: 0
NAUSEA: 0
PHOTOPHOBIA: 0
FOCAL WEAKNESS: 0
FATIGUES EASILY: 1
WHEEZING: 1
LAST BOWEL MOVEMENT: 65230
SPUTUM PRODUCTION: 0
BRUISES/BLEEDS EASILY: 0
DIAPHORESIS: 0
FLANK PAIN: 0
FEVER: 0
FATIGUES EASILY: 1
SHORTNESS OF BREATH: 1
HEARTBURN: 0
ABDOMINAL PAIN: 0
PALPITATIONS: 0
COUGH: 1
ABDOMINAL PAIN: 0
EYE REDNESS: 0
LAST BOWEL MOVEMENT: 65248
BACK PAIN: 0
EYE PAIN: 0
SLEEP QUALITY: ADEQUATE
DEPRESSION: 0
FEVER: 0
DYSPNEA ACTIVITY LEVEL: AT REST
MUSCULOSKELETAL NEGATIVE: 1
BACK PAIN: 1
CHILLS: 0
DIZZINESS: 1
HYPOTENSION: 1
SHORTNESS OF BREATH: 1
FEVER: 0
ABDOMINAL PAIN: 0
STOOL FREQUENCY: LESS THAN DAILY
CHILLS: 0
DIAPHORESIS: 1
TINGLING: 0
HALLUCINATIONS: 0
CONSTITUTIONAL NEGATIVE: 1
SENSORY CHANGE: 0
NAUSEA: 0
BOWEL PATTERN NORMAL: 1
ABDOMINAL PAIN: 0
RESPIRATORY NEGATIVE: 1
CHILLS: 0
BLURRED VISION: 0
BACK PAIN: 0
DYSPNEA ON EXERTION: 1
SHORTNESS OF BREATH: 1
BOWEL PATTERN NORMAL: 1
EYES NEGATIVE: 1
STRIDOR: 0
BOWEL PATTERN NORMAL: 1
FATIGUE: 1
HYPOTENSION: 1
DIAPHORESIS: 0
NECK PAIN: 0
STRIDOR: 0
SLEEP QUALITY: FAIR
DEPRESSION: 0
PND: 0
HEADACHES: 0
FATIGUES EASILY: 1
SHORTNESS OF BREATH: 1
DYSPNEA ACTIVITY LEVEL: AFTER AMBULATING 10 - 20 FT
COUGH CHARACTERISTICS: NON-PRODUCTIVE
DIZZINESS: 0
ABDOMINAL PAIN: 0
EYE PAIN: 0
SLEEP QUALITY: FAIR
WEAKNESS: 1
SHORTNESS OF BREATH: 1
SLEEP QUALITY: POOR
VOMITING: 0
FLANK PAIN: 0
NECK PAIN: 0
PALPITATIONS: 0
FEVER: 0
DEPRESSION: 0
BOWEL PATTERN NORMAL: 1
PND: 0
DIZZINESS: 0
CHILLS: 0
WHEEZING: 0
DEPRESSION: 0
NAUSEA: 0
FORGETFULNESS: 1
PALPITATIONS: 0
PALPITATIONS: 0
SORE THROAT: 0
EYE REDNESS: 0
SORE THROAT: 0
VOMITING: 0
BACK PAIN: 0
VOMITING: 0
EYE REDNESS: 0
SHORTNESS OF BREATH: 1
LAST BOWEL MOVEMENT: 65239
STOOL FREQUENCY: LESS THAN DAILY
DYSPNEA ON EXERTION: 1
LAST BOWEL MOVEMENT: 65230
SPUTUM PRODUCTION: 1
HEADACHES: 0
DYSPNEA ACTIVITY LEVEL: AFTER AMBULATING 10 - 20 FT
COUGH: 1
ABDOMINAL PAIN: 0
SHORTNESS OF BREATH: 1
HEADACHES: 0
FOCAL WEAKNESS: 0
DIZZINESS: 1
NAUSEA: 0
FATIGUE: 1
DRY SKIN: 1
DIZZINESS: 0
PALPITATIONS: 0
BLURRED VISION: 0
STOOL FREQUENCY: LESS THAN DAILY
SLEEP QUALITY: ADEQUATE
BLURRED VISION: 0
HEARTBURN: 0
HYPERTENSION: 1
SHORTNESS OF BREATH: 1
NECK PAIN: 0
FATIGUE: 1
SORE THROAT: 0
SPEECH CHANGE: 0
WEAKNESS: 1
PALPITATIONS: 0
DRY SKIN: 1
DIARRHEA: 0
FOCAL WEAKNESS: 0
FATIGUES EASILY: 1
FLANK PAIN: 0
ABDOMINAL PAIN: 0
LAST BOWEL MOVEMENT: 65225
MYALGIAS: 0
WEAKNESS: 1
HEMOPTYSIS: 0
FEVER: 0
FATIGUE: 1
DYSPNEA ACTIVITY LEVEL: AFTER AMBULATING 10 - 20 FT
CHILLS: 0
MEMORY LOSS: 1
FATIGUE: 1
WEAKNESS: 1
COUGH: 1
DIAPHORESIS: 0
TREMORS: 0
ABDOMINAL PAIN: 0
EYES NEGATIVE: 1
ORTHOPNEA: 0
FEVER: 0
DIZZINESS: 0
BOWEL PATTERN NORMAL: 1
NECK PAIN: 0
PND: 0
FOCAL WEAKNESS: 0
SPUTUM PRODUCTION: 0
FALLS: 0
DYSPNEA ON EXERTION: 1
MYALGIAS: 0
WEAKNESS: 0
DEPRESSION: 0
LAST BOWEL MOVEMENT: 65230
STOOL FREQUENCY: LESS THAN DAILY
VOMITING: 0
DEPRESSION: 0
LAST BOWEL MOVEMENT: 65255
WEAKNESS: 1
WHEEZING: 0
ABDOMINAL PAIN: 0
DRY SKIN: 1
FATIGUES EASILY: 1
DRY SKIN: 1
SLEEP QUALITY: FAIR
SHORTNESS OF BREATH: 1
SORE THROAT: 0
POLYDIPSIA: 0
DIARRHEA: 1
SOMNOLENCE: 1
SLEEP QUALITY: ADEQUATE
STOOL FREQUENCY: LESS THAN DAILY
SENSORY CHANGE: 0
COUGH: 0
COUGH: 1
ORTHOPNEA: 0
DIZZINESS: 1
NAUSEA: 0
SHORTNESS OF BREATH: 1
SPUTUM PRODUCTION: 0
VOMITING: 0
HEADACHES: 0
FOCAL WEAKNESS: 0
LAST BOWEL MOVEMENT: 65227
LOWER EXTREMITY EDEMA: 1
SHORTNESS OF BREATH: 1
COUGH: 1
FATIGUE: 1
CHILLS: 0
ABDOMINAL PAIN: 0
CONSTIPATION: 0
EYE PAIN: 0
COUGH: 0
DIARRHEA: 1
WEAKNESS: 1
WEIGHT LOSS: 0
EYE PAIN: 0
DRY SKIN: 1
HEADACHES: 0
MYALGIAS: 0
FEVER: 0
HALLUCINATIONS: 0
DYSPNEA ACTIVITY LEVEL: AT REST
DYSPNEA ON EXERTION: 1
EYE DISCHARGE: 0
RESPIRATORY NEGATIVE: 1
DEPRESSED MOOD: 1
HEADACHES: 0
STOOL FREQUENCY: LESS THAN DAILY
NAUSEA: 0
STOOL FREQUENCY: LESS THAN DAILY
FATIGUE: 1
HEARTBURN: 0
MUSCULOSKELETAL NEGATIVE: 1
CHILLS: 0
SHORTNESS OF BREATH: 1
SHORTNESS OF BREATH: 0
CHILLS: 0
STOOL FREQUENCY: LESS THAN DAILY
BOWEL PATTERN NORMAL: 1
DIAPHORESIS: 0
CHILLS: 0
TREMORS: 0
FEVER: 0
DIARRHEA: 0
MUSCULOSKELETAL NEGATIVE: 1
FEVER: 0
DIARRHEA: 0
FATIGUES EASILY: 1
SHORTNESS OF BREATH: 1
VOMITING: 0
SHORTNESS OF BREATH: 0
CONSTITUTIONAL NEGATIVE: 1
COUGH: 0
CHILLS: 0
FALLS: 0
EYES NEGATIVE: 1
DEPRESSION: 0
COUGH: 1
VOMITING: 0
PALPITATIONS: 0
FEVER: 0
SLEEP QUALITY: ADEQUATE
CONSTIPATION: 0
EYE DISCHARGE: 0
LOSS OF CONSCIOUSNESS: 0
VOMITING: 0
FLANK PAIN: 0
HEMOPTYSIS: 0
FATIGUES EASILY: 1
FATIGUES EASILY: 1
VOMITING: 0
DEPRESSION: 0
CHILLS: 0
DIZZINESS: 0
MEMORY LOSS: 1
PALPITATIONS: 0
STOOL FREQUENCY: LESS THAN DAILY
RESPIRATORY NEGATIVE: 1
FALLS: 0
FEVER: 0
DIARRHEA: 0
DYSPNEA ACTIVITY LEVEL: AFTER AMBULATING 10 - 20 FT
BLURRED VISION: 0
BRUISES/BLEEDS EASILY: 0
NAUSEA: 0
SLEEP QUALITY: FAIR
DRY SKIN: 1
DIARRHEA: 0
DYSPNEA ACTIVITY LEVEL: AFTER AMBULATING 10 - 20 FT
FOCAL WEAKNESS: 0
SHORTNESS OF BREATH: 1
SHORTNESS OF BREATH: 1
NECK PAIN: 0
ABDOMINAL PAIN: 0
MYALGIAS: 0
VOMITING: 0
VOMITING: 0
CONSTIPATION: 0
SLEEP QUALITY: FAIR
SORE THROAT: 0
FALLS: 1
SHORTNESS OF BREATH: 1
LAST BOWEL MOVEMENT: 65252
BOWEL PATTERN NORMAL: 1
SHORTNESS OF BREATH: 1
CHILLS: 0
SHORTNESS OF BREATH: 0
SHORTNESS OF BREATH: 0
BLURRED VISION: 0
WHEEZING: 0
LOWER EXTREMITY EDEMA: 1
DIZZINESS: 0
SPUTUM PRODUCTION: 0
SHORTNESS OF BREATH: 0
HEARTBURN: 0
WHEEZING: 0
FLANK PAIN: 0
EYE PAIN: 0
COUGH: 1
COUGH: 0
EYE DISCHARGE: 0
HEADACHES: 0
PALPITATIONS: 1
SPUTUM PRODUCTION: 1
STOOL FREQUENCY: LESS THAN DAILY
CHILLS: 0
FEVER: 0
WEIGHT LOSS: 0
COUGH: 0
SHORTNESS OF BREATH: 0
LOWER EXTREMITY EDEMA: 1
CLAUDICATION: 0
SPEECH CHANGE: 0
CHILLS: 0
DEPRESSION: 0
COUGH: 0
NAUSEA: 0
SPUTUM PRODUCTION: 0
VOMITING: 0
BLOOD IN STOOL: 0
SORE THROAT: 0
ABDOMINAL PAIN: 0
DRY SKIN: 1
COUGH: 1
ABDOMINAL PAIN: 0
BOWEL PATTERN NORMAL: 1
WEAKNESS: 1
LAST BOWEL MOVEMENT: 65255
SORE THROAT: 0
BOWEL PATTERN NORMAL: 1
EYE PAIN: 0
COUGH: 0
PND: 0
ABDOMINAL PAIN: 0
ABDOMINAL PAIN: 0
DIZZINESS: 0
CHILLS: 0
STRIDOR: 0
VOMITING: 0
BACK PAIN: 0
LOSS OF CONSCIOUSNESS: 0
TREMORS: 0
NAUSEA: 0
NAUSEA: 0
WEIGHT LOSS: 0
VOMITING: 0
NAUSEA: 0
DESCRIPTION OF MEMORY LOSS: SHORT TERM
DEPRESSION: 0
TREMORS: 0
COUGH: 1
FATIGUE: 1
COUGH: 1
NAUSEA: 0
DEPRESSION: 0
BACK PAIN: 0
PALPITATIONS: 0
PALPITATIONS: 0
BACK PAIN: 0
SPUTUM PRODUCTION: 0
DIAPHORESIS: 0
NAUSEA: 0
FEVER: 0
EYE DISCHARGE: 0
WEIGHT LOSS: 0
EYES NEGATIVE: 1
DEPRESSION: 0
SLEEP QUALITY: ADEQUATE
NECK PAIN: 0
HEADACHES: 0
MYALGIAS: 0
TINGLING: 0
GASTROINTESTINAL NEGATIVE: 1
BACK PAIN: 0
SLEEP QUALITY: ADEQUATE
VOMITING: 0
HYPOTENSION: 1
NAUSEA: 0
DIARRHEA: 0
SHORTNESS OF BREATH: 1
ORTHOPNEA: 0
SHORTNESS OF BREATH: 1
VOMITING: 0
DYSPNEA ON EXERTION: 1
HEADACHES: 0
EYE REDNESS: 0
CHILLS: 0
FATIGUE: 1
SLEEP QUALITY: ADEQUATE
TREMORS: 0
HEADACHES: 0
HEADACHES: 0
HEMOPTYSIS: 0
ABDOMINAL PAIN: 0
WEAKNESS: 1
DYSPNEA ACTIVITY LEVEL: AFTER AMBULATING 10 - 20 FT
WEIGHT LOSS: 0
FOCAL WEAKNESS: 0
MYALGIAS: 0
MEMORY LOSS: 1
EYE PAIN: 0
SORE THROAT: 0
BOWEL PATTERN NORMAL: 1
CONSTIPATION: 0
SPUTUM PRODUCTION: 0
FATIGUE: 1
DIZZINESS: 0
LAST BOWEL MOVEMENT: 65238
HEADACHES: 0
FOCAL WEAKNESS: 0
TREMORS: 0
COUGH: 0
BLURRED VISION: 0

## 2019-01-01 ASSESSMENT — COGNITIVE AND FUNCTIONAL STATUS - GENERAL
SUGGESTED CMS G CODE MODIFIER DAILY ACTIVITY: CJ
WALKING IN HOSPITAL ROOM: A LITTLE
MOVING FROM LYING ON BACK TO SITTING ON SIDE OF FLAT BED: A LITTLE
MOBILITY SCORE: 20
DRESSING REGULAR LOWER BODY CLOTHING: A LOT
STANDING UP FROM CHAIR USING ARMS: A LITTLE
HELP NEEDED FOR BATHING: A LITTLE
STANDING UP FROM CHAIR USING ARMS: A LITTLE
CLIMB 3 TO 5 STEPS WITH RAILING: A LITTLE
DAILY ACTIVITIY SCORE: 23
SUGGESTED CMS G CODE MODIFIER MOBILITY: CK
MOBILITY SCORE: 20
DAILY ACTIVITIY SCORE: 21
DAILY ACTIVITIY SCORE: 17
CLIMB 3 TO 5 STEPS WITH RAILING: A LITTLE
SUGGESTED CMS G CODE MODIFIER MOBILITY: CJ
CLIMB 3 TO 5 STEPS WITH RAILING: A LITTLE
HELP NEEDED FOR BATHING: A LITTLE
HELP NEEDED FOR BATHING: A LITTLE
SUGGESTED CMS G CODE MODIFIER DAILY ACTIVITY: CI
MOBILITY SCORE: 18
MOVING TO AND FROM BED TO CHAIR: A LITTLE
SUGGESTED CMS G CODE MODIFIER MOBILITY: CJ
HELP NEEDED FOR BATHING: A LITTLE
MOVING FROM LYING ON BACK TO SITTING ON SIDE OF FLAT BED: A LITTLE
WALKING IN HOSPITAL ROOM: A LITTLE
CLIMB 3 TO 5 STEPS WITH RAILING: A LITTLE
WALKING IN HOSPITAL ROOM: A LITTLE
STANDING UP FROM CHAIR USING ARMS: A LITTLE
SUGGESTED CMS G CODE MODIFIER MOBILITY: CJ
MOBILITY SCORE: 21
SUGGESTED CMS G CODE MODIFIER DAILY ACTIVITY: CH
SUGGESTED CMS G CODE MODIFIER MOBILITY: CJ
EATING MEALS: A LITTLE
PERSONAL GROOMING: A LITTLE
PERSONAL GROOMING: A LITTLE
MOVING TO AND FROM BED TO CHAIR: A LITTLE
DAILY ACTIVITIY SCORE: 24
WALKING IN HOSPITAL ROOM: A LITTLE
STANDING UP FROM CHAIR USING ARMS: A LITTLE
TOILETING: A LITTLE
CLIMB 3 TO 5 STEPS WITH RAILING: A LOT
SUGGESTED CMS G CODE MODIFIER DAILY ACTIVITY: CK
TOILETING: A LITTLE
DAILY ACTIVITIY SCORE: 23
WALKING IN HOSPITAL ROOM: A LITTLE
SUGGESTED CMS G CODE MODIFIER DAILY ACTIVITY: CI
DRESSING REGULAR UPPER BODY CLOTHING: A LITTLE
MOBILITY SCORE: 22

## 2019-01-01 ASSESSMENT — SOCIAL DETERMINANTS OF HEALTH (SDOH)
ACTIVE STRESSOR - HEALTH CHANGES: 1
ACTIVE STRESSOR - HEALTH CHANGES: 1
ACTIVE STRESSOR - EXHAUSTION: 1
ACTIVE STRESSOR - EXHAUSTION: 1
ACTIVE STRESSOR - HEALTH CHANGES: 1
ACTIVE STRESSOR - LOSS OF CONTROL: 1
ACTIVE STRESSOR - LOSS OF CONTROL: 1
ACTIVE STRESSOR - HEALTH CHANGES: 1
ACTIVE STRESSOR - EXHAUSTION: 1
ACTIVE STRESSOR - HEALTH CHANGES: 1
ACTIVE STRESSOR - EXHAUSTION: 1
ACTIVE STRESSOR - HEALTH CHANGES: 1
ACTIVE STRESSOR - LOSS OF CONTROL: 1
ACTIVE STRESSOR - HEALTH CHANGES: 1
ACTIVE STRESSOR - NO STRESS FACTORS: 1
ACTIVE STRESSOR - LOSS OF CONTROL: 1

## 2019-01-01 ASSESSMENT — PATIENT HEALTH QUESTIONNAIRE - PHQ9
SUM OF ALL RESPONSES TO PHQ9 QUESTIONS 1 AND 2: 0
1. LITTLE INTEREST OR PLEASURE IN DOING THINGS: NOT AT ALL
SUM OF ALL RESPONSES TO PHQ9 QUESTIONS 1 AND 2: 0
SUM OF ALL RESPONSES TO PHQ9 QUESTIONS 1 AND 2: 0
2. FEELING DOWN, DEPRESSED, IRRITABLE, OR HOPELESS: NOT AT ALL
1. LITTLE INTEREST OR PLEASURE IN DOING THINGS: NOT AT ALL
1. LITTLE INTEREST OR PLEASURE IN DOING THINGS: NOT AT ALL
2. FEELING DOWN, DEPRESSED, IRRITABLE, OR HOPELESS: NOT AT ALL
2. FEELING DOWN, DEPRESSED, IRRITABLE, OR HOPELESS: NOT AT ALL
SUM OF ALL RESPONSES TO PHQ9 QUESTIONS 1 AND 2: 0
1. LITTLE INTEREST OR PLEASURE IN DOING THINGS: NOT AT ALL
2. FEELING DOWN, DEPRESSED, IRRITABLE, OR HOPELESS: NOT AT ALL
1. LITTLE INTEREST OR PLEASURE IN DOING THINGS: NOT AT ALL
1. LITTLE INTEREST OR PLEASURE IN DOING THINGS: NOT AT ALL
SUM OF ALL RESPONSES TO PHQ9 QUESTIONS 1 AND 2: 0
1. LITTLE INTEREST OR PLEASURE IN DOING THINGS: NOT AT ALL
2. FEELING DOWN, DEPRESSED, IRRITABLE, OR HOPELESS: NOT AT ALL
CLINICAL INTERPRETATION OF PHQ2 SCORE: 0
2. FEELING DOWN, DEPRESSED, IRRITABLE, OR HOPELESS: NOT AT ALL
2. FEELING DOWN, DEPRESSED, IRRITABLE, OR HOPELESS: NOT AT ALL

## 2019-01-01 ASSESSMENT — ACTIVITIES OF DAILY LIVING (ADL)
MONEY MANAGEMENT (EXPENSES/BILLS): NEEDS ASSISTANCE
MONEY MANAGEMENT (EXPENSES/BILLS): TOTALLY DEPENDENT
DRESSING_REQUIRES_ASSISTANCE: 1
MONEY MANAGEMENT (EXPENSES/BILLS): NEEDS ASSISTANCE
CONTINENCE_REQUIRES_ASSISTANCE: 1
BATHING_REQUIRES_ASSISTANCE: 1
AMBULATION_REQUIRES_ASSISTANCE: 1
BATHING_REQUIRES_ASSISTANCE: 1
TOILETING: INDEPENDENT
DRESSING_REQUIRES_ASSISTANCE: 1
TOILETING: INDEPENDENT
MONEY MANAGEMENT (EXPENSES/BILLS): NEEDS ASSISTANCE
PHYSICAL_TRANSFER_REQUIRES_ASSISTANCE: 1
TOILETING: INDEPENDENT

## 2019-01-01 ASSESSMENT — LIFESTYLE VARIABLES
SUBSTANCE_ABUSE: 0
EVER_SMOKED: NEVER
SUBSTANCE_ABUSE: 0
SUBSTANCE_ABUSE: 0
EVER_SMOKED: NEVER
SUBSTANCE_ABUSE: 0
SUBSTANCE_ABUSE: 0
DO YOU DRINK ALCOHOL: NO
SUBSTANCE_ABUSE: 0
ALCOHOL_USE: NO
DO YOU DRINK ALCOHOL: NO

## 2019-01-01 ASSESSMENT — CHA2DS2 SCORE
CHA2DS2 VASC SCORE: 5
SEX: MALE
HYPERTENSION: YES
VASCULAR DISEASE: NO
PRIOR STROKE OR TIA OR THROMBOEMBOLISM: YES
SEX: MALE
VASCULAR DISEASE: NO
DIABETES: NO
AGE 75 OR GREATER: YES
CHF OR LEFT VENTRICULAR DYSFUNCTION: NO
DIABETES: NO
CHF OR LEFT VENTRICULAR DYSFUNCTION: YES
AGE 65 TO 74: NO
HYPERTENSION: YES
CHA2DS2 VASC SCORE: 6
DIABETES: NO
AGE 75 OR GREATER: YES
CHF OR LEFT VENTRICULAR DYSFUNCTION: YES
PRIOR STROKE OR TIA OR THROMBOEMBOLISM: YES
AGE 65 TO 74: NO
PRIOR STROKE OR TIA OR THROMBOEMBOLISM: YES
CHA2DS2 VASC SCORE: 6
SEX: MALE
HYPERTENSION: YES
AGE 65 TO 74: NO
AGE 75 OR GREATER: YES
VASCULAR DISEASE: NO

## 2019-01-01 ASSESSMENT — GAIT ASSESSMENTS
ASSISTIVE DEVICE: FRONT WHEEL WALKER
DISTANCE (FEET): 250
GAIT LEVEL OF ASSIST: STAND BY ASSIST
GAIT LEVEL OF ASSIST: SUPERVISED
DEVIATION: BRADYKINETIC
GAIT LEVEL OF ASSIST: SUPERVISED
ASSISTIVE DEVICE: FRONT WHEEL WALKER
DEVIATION: BRADYKINETIC;DECREASED BASE OF SUPPORT
ASSISTIVE DEVICE: FRONT WHEEL WALKER
DISTANCE (FEET): 60
DISTANCE (FEET): 150

## 2019-01-01 ASSESSMENT — PAIN SCALES - WONG BAKER
WONGBAKER_NUMERICALRESPONSE: DOESN'T HURT AT ALL

## 2019-01-01 ASSESSMENT — NEW YORK HEART ASSOCIATION (NYHA) CLASSIFICATION: PATIENT MEETS NYHA AND SIGNIFICANT SYMPTOMS CRITERIA: 1

## 2019-01-01 ASSESSMENT — PULMONARY FUNCTION TESTS: EPAP_CMH2O: 8

## 2019-01-28 ENCOUNTER — APPOINTMENT (RX ONLY)
Dept: URBAN - METROPOLITAN AREA CLINIC 4 | Facility: CLINIC | Age: 84
Setting detail: DERMATOLOGY
End: 2019-01-28

## 2019-01-28 DIAGNOSIS — Z85.828 PERSONAL HISTORY OF OTHER MALIGNANT NEOPLASM OF SKIN: ICD-10-CM

## 2019-01-28 DIAGNOSIS — L57.0 ACTINIC KERATOSIS: ICD-10-CM

## 2019-01-28 PROBLEM — D48.5 NEOPLASM OF UNCERTAIN BEHAVIOR OF SKIN: Status: ACTIVE | Noted: 2019-01-28

## 2019-01-28 PROCEDURE — ? COUNSELING

## 2019-01-28 PROCEDURE — 17000 DESTRUCT PREMALG LESION: CPT

## 2019-01-28 PROCEDURE — ? LIQUID NITROGEN

## 2019-01-28 PROCEDURE — 99213 OFFICE O/P EST LOW 20 MIN: CPT | Mod: 25

## 2019-01-28 PROCEDURE — 17280 DSTR MAL LS F/E/E/N/L/M .5/<: CPT | Mod: 59

## 2019-01-28 PROCEDURE — ? BIOPSY BY SHAVE METHOD AND DESTRUCTION

## 2019-01-28 ASSESSMENT — LOCATION ZONE DERM
LOCATION ZONE: FACE
LOCATION ZONE: LIP

## 2019-01-28 ASSESSMENT — LOCATION DETAILED DESCRIPTION DERM
LOCATION DETAILED: RIGHT SUPERIOR PREAURICULAR CHEEK
LOCATION DETAILED: RIGHT CENTRAL TEMPLE
LOCATION DETAILED: LEFT UPPER CUTANEOUS LIP
LOCATION DETAILED: LEFT LATERAL BUCCAL CHEEK

## 2019-01-28 ASSESSMENT — LOCATION SIMPLE DESCRIPTION DERM
LOCATION SIMPLE: LEFT LIP
LOCATION SIMPLE: LEFT CHEEK
LOCATION SIMPLE: RIGHT CHEEK
LOCATION SIMPLE: RIGHT TEMPLE

## 2019-01-28 NOTE — PROCEDURE: LIQUID NITROGEN
Detail Level: Detailed
Render Post-Care Instructions In Note?: no
Number Of Freeze-Thaw Cycles: 2 freeze-thaw cycles
Consent: The patient's consent was obtained including but not limited to risks of blistering, scarring, darker or lighter pigmentary change, and recurrence.
Duration Of Freeze Thaw-Cycle (Seconds): 0
Post-Care Instructions: I reviewed with the patient in detail post-care instructions. Patient is to wear sunprotection, and avoid picking at any of the treated lesions. Pt may apply Vaseline to crusted or scabbing areas.

## 2019-01-28 NOTE — PROCEDURE: BIOPSY BY SHAVE METHOD AND DESTRUCTION
Detail Level: Detailed
Wound Care: Petrolatum
Consent: Written consent was obtained and risks were reviewed including but not limited to scarring, infection, bleeding, scabbing, incomplete removal, nerve damage and allergy to anesthesia.
Hemostasis: Drysol
Destruction Type: electrodesiccation
Billing Type: Third-Party Bill
Lab: [id number]
Size Of Lesion In Cm (Optional): 0
Bill As?: Malignant Destruction
Bill For Surgical Tray: no
Biopsy Type: H and E
Anesthesia Type: 1% lidocaine with epinephrine
Anesthesia Volume In Cc: 0.5
Notification Instructions: Patient will be notified of biopsy results. However, patient instructed to call the office if not contacted within 2 weeks.
Number Of Curettages: 2
Lab Facility: [id number]
Post-Care Instructions: I reviewed with the patient in detail post-care instructions. Patient is to keep the biopsy site dry overnight, and then apply bacitracin twice daily until healed. Patient may apply hydrogen peroxide soaks to remove any crusting.

## 2019-03-02 NOTE — ECG
Pt repositioned in bed, pt provided additional blankets.  No needs verbalized.  Will continue to monitor.

## 2019-03-02 NOTE — ED TRIAGE NOTES
Chief Complaint   Patient presents with   • Fall     Pt had GLF on Saturday, pt states unsteady on feet.  Pt fell striking right lower back and buttocks on ground.  Pt has discolorations noted to lower back and upper buttocks on right.  Pt states fell again last night due to pain in right lower back.  Pt A&Ox4.  No LOC on Saturday fall or 3/1/19.  + blood thinners.  Pt denies hitting head.  Pt denies pain at this time.  Pt given 600mg motrin and 500mg tylenol while in route.

## 2019-03-02 NOTE — ED NOTES
Assisting nurse note:  Contacted patient daughter for ride home.  ETA in 5 min.  Updated patient primary nurse.  Patient prepared for D/C home.

## 2019-03-02 NOTE — DISCHARGE INSTRUCTIONS
It is recommended, that you stop taking your Eliquis, your anti-coagulant, for the next 2 days secondary to the retroperitoneal bleeding.

## 2019-03-02 NOTE — ED NOTES
Pt discharged to home.  IV removed and bandaged.  No noted swelling or redness.  Pt tolerated well.  Catheter intact.  Pt given discharge paperwork and all applicable prescriptions written by provider.  Pt to follow with PCP, when indicated.  Pt verbalizes understanding of discharge teaching and will return to ED if condition worsens.  Pt return verbalizes not to drink ETOH, drive, or take additional Tylenol/acetaminophen while on RX narcotic medications.

## 2019-03-02 NOTE — ED NOTES
Patient and family updated on plan of care.  Pt continues to deny pain at this time.  Will continue to monitor.

## 2019-03-02 NOTE — ED PROVIDER NOTES
ED Provider Note    ED Provider Note    Primary care provider: Mervin Pompa M.D.  Means of arrival: EMS  History obtained from: Patient  History limited by: None    CHIEF COMPLAINT  Chief Complaint   Patient presents with   • Fall       HPI  Joe Rashid is a 89 y.o. male who presents to the Emergency Department Via EMS from the [facility] where he lives with a chief complaint of right hip pain and back pain.  Patient states he fell on Tuesday.  He fell onto his right back, buttock and hip.  He states he did not feel too bad but he did develop some bruising to the area.  However, over the last few days, pain has increased.  He was walking back from the cafeteria yesterday and he states his right knee gave out on him secondary to pain which caused him to go down to his knees but he states he stood right back up.  He denies ever hitting his head.  No headache.  No loss of consciousness.  No neck pain.  He did sustain an abrasion to his right elbow on Tuesday but no other injuries reported.  He is anticoagulated for atrial fibrillation.  He cannot recollect what anticoagulant he is on.  He has not had systemic symptoms such as chest pain or shortness of breath.  No cough or URI symptoms.  No nausea or vomiting.    REVIEW OF SYSTEMS  Review of Systems   Constitutional: Negative for chills and fever.   HENT: Negative for congestion.    Respiratory: Negative for shortness of breath.    Cardiovascular: Negative for chest pain.   Gastrointestinal: Negative for nausea and vomiting.   Genitourinary: Negative for flank pain and hematuria.   Musculoskeletal: Positive for back pain and falls. Negative for neck pain.   Neurological: Negative for dizziness, focal weakness, loss of consciousness and headaches.   All other systems reviewed and are negative.      PAST MEDICAL HISTORY   has a past medical history of Atrial fibrillation (HCC); Chronic anticoagulation; Chronic kidney disease, stage III (moderate) (HCC);  Congestive heart failure (HCC); Hepatitis B; History of TIA (transient ischemic attack); and Hypertension.    SURGICAL HISTORY   has a past surgical history that includes other; appendectomy; colon resection; transurethral elec-surg prostatectom; cryosurg ablation of prostate; cataract extraction with iol; and hip hemiarthroplasty (Left, 1/27/2016).    SOCIAL HISTORY  Social History   Substance Use Topics   • Smoking status: Never Smoker   • Smokeless tobacco: Never Used   • Alcohol use No      History   Drug Use No       FAMILY HISTORY  Family History   Problem Relation Age of Onset   • Heart Disease Brother        CURRENT MEDICATIONS  Home Medications    **Home medications have not yet been reviewed for this encounter**         ALLERGIES  Allergies   Allergen Reactions   • Aspirin Hives and Rash     Pts grandson states rash and hives.   • Sulfa Drugs Hives and Rash     Pts grandson states rash and hives.       PHYSICAL EXAM  VITAL SIGNS: BP (!) 176/89   Pulse 79   Temp 36.9 °C (98.5 °F) (Temporal)   Resp 20   Wt 69.9 kg (154 lb)   SpO2 94%   BMI 22.74 kg/m²   Vitals reviewed.  Vitals reviewed.  Constitutional: Patient is oriented to person, place, and time. Appears well-developed and well-nourished. Mild distress.    Head: Normocephalic and atraumatic.   Ears: Normal external ears bilaterally. No hemotympanum.  Mouth/Throat: Oropharynx is clear and moist, no exudates. No nasal septal hematoma.  No malocclusion.  Eyes: Conjunctivae are normal, no subconjunctival hemorrhage. Pupils are equal, round, and reactive to light. EOMs intact.  Neck: Normal range of motion. Neck supple. No tracheal deviation present. No midline tenderness or step-offs.  Cardiovascular: Normal rate, regular rhythm and normal heart sounds. Normal peripheral pulses.  Pulmonary/Chest: Effort normal and breath sounds normal. No respiratory distress, no wheezes, rhonchi, or rales. No chest wall tenderness.  No pain with compression of the  thorax.  No pain or deformity over the clavicles.  Abdominal: Soft. Bowel sounds are normal. There is no tenderness, rebound or guarding, or peritoneal signs, no masses. No CVA tenderness.  Pelvis stable.  Back: No midline tenderness or step-offs.  There is ecchymosis to the right paralumbar area extending slightly to the midline and left side.  Musculoskeletal: No edema and no tenderness, except as noted. No obvious deformities.  Normal range of motion of bilateral upper extremities.  Left lower extremity, has normal range of motion without pain.  Right lower extremity patient has normal range of motion of ankle and knee but pain with range of motion of the right hip.  There is no shortening or rotation.  Lymphadenopathy: No cervical adenopathy.   Neurological: Patient is alert and oriented to person, place, and time. No cranial nerve deficits. Normal motor and sensory exam. No focal deficits.   Skin: Skin is warm and dry. No erythema. No pallor. No abrasions or ecchymosis.  Healing scab, abrasion to right elbow/forearm, medially  Psychiatric: Patient has a normal mood and affect.     LABS  Results for orders placed or performed during the hospital encounter of 03/02/19   Blood Culture,Hold   Result Value Ref Range    Blood Culture Hold Collected    CBC WITH DIFFERENTIAL   Result Value Ref Range    WBC 9.3 4.8 - 10.8 K/uL    RBC 4.09 (L) 4.70 - 6.10 M/uL    Hemoglobin 12.1 (L) 14.0 - 18.0 g/dL    Hematocrit 38.3 (L) 42.0 - 52.0 %    MCV 93.6 81.4 - 97.8 fL    MCH 29.6 27.0 - 33.0 pg    MCHC 31.6 (L) 33.7 - 35.3 g/dL    RDW 48.1 35.9 - 50.0 fL    Platelet Count 182 164 - 446 K/uL    MPV 10.3 9.0 - 12.9 fL    Neutrophils-Polys 74.50 (H) 44.00 - 72.00 %    Lymphocytes 11.20 (L) 22.00 - 41.00 %    Monocytes 9.90 0.00 - 13.40 %    Eosinophils 3.30 0.00 - 6.90 %    Basophils 0.50 0.00 - 1.80 %    Immature Granulocytes 0.60 0.00 - 0.90 %    Nucleated RBC 0.00 /100 WBC    Neutrophils (Absolute) 6.88 1.82 - 7.42 K/uL     Lymphs (Absolute) 1.04 1.00 - 4.80 K/uL    Monos (Absolute) 0.92 (H) 0.00 - 0.85 K/uL    Eos (Absolute) 0.31 0.00 - 0.51 K/uL    Baso (Absolute) 0.05 0.00 - 0.12 K/uL    Immature Granulocytes (abs) 0.06 0.00 - 0.11 K/uL    NRBC (Absolute) 0.00 K/uL   Prothrombin Time   Result Value Ref Range    PT 15.9 (H) 12.0 - 14.6 sec    INR 1.25 (H) 0.87 - 1.13   Basic Metabolic Panel   Result Value Ref Range    Sodium 138 135 - 145 mmol/L    Potassium 4.2 3.6 - 5.5 mmol/L    Chloride 104 96 - 112 mmol/L    Co2 28 20 - 33 mmol/L    Glucose 145 (H) 65 - 99 mg/dL    Bun 32 (H) 8 - 22 mg/dL    Creatinine 1.13 0.50 - 1.40 mg/dL    Calcium 9.8 8.5 - 10.5 mg/dL    Anion Gap 6.0 0.0 - 11.9   ESTIMATED GFR   Result Value Ref Range    GFR If African American >60 >60 mL/min/1.73 m 2    GFR If Non African American 60 >60 mL/min/1.73 m 2       All labs reviewed by me.    RADIOLOGY  CT-LSPINE W/O PLUS RECONS   Final Result         1. Acute mildly displaced fractures of the right transverse processes of L2 and L3.      2. Partially visualized acute right psoas muscle hematoma.         CT-PELVIS W/O PLUS RECONS   Final Result         Acute right retroperitoneal hematoma involving the right psoas muscle.      No pelvic bone fractures seen.        The radiologist's interpretation of all radiological studies have been reviewed by me.    COURSE & MEDICAL DECISION MAKING  Pertinent Labs & Imaging studies reviewed. (See chart for details)    8:37 AM - Patient seen and examined at bedside.  This is a pleasant and overall well-appearing 89-year-old male who presents after a fall 5 days ago and a lesser fall, yesterday.  He has some bruising and ecchymosis with pain to the right back and right hip since this fall.  This will be imaged to assess for possible fracture.  I coagulated.  He otherwise has a positive of systemic symptoms.  No headache.  No nausea or vomiting.  No shortness of breath.  No other extremity injuries.      Differential diagnosis  includes but not limited to: Hip fracture, lumbar spine fracture versus contusion versus muscle strain, hip strain, femur fracture.      10:38 AM trauma paged. Ct reviewd.    11:00 AM discussed with Dr. Hudson, on-call for trauma surgery who states that there is no intervention indicated for this patient's findings on CT.  He would recommend that he hold his Eliquis for 2 days. Spine paged.     11:27 AM discussed with Dr. Armstrong, on-call for spine.  He was able to review the patient's CT scan and states there is no intervention necessary.    11:47 AM, patient reevaluated the bedside.  Wife is at the bedside.  We discussed CT findings including a retroperitoneal hemorrhage and transverse process fractures of L2 and 3.  Of advised the patient as well as his wife, that I spoken with trauma surgery as well as neurosurgery and there is no need will trial ambulation and if the patient is able to safely do this, I do believe he can safely be discharged home.    Patient reevaluated at the bedside.  He ambulated with his walker with staff without difficulty.  At this time, I feel he can safely be discharged home.  He sitting up at the bedside.  No distress.  He will be discharged home with a short course of pain medication.  He is given strict return precautions.  He is overall well-appearing and nontoxic.  He will be discharged home in stable condition.      FINAL IMPRESSION  1. Fall, initial encounter    2. Traumatic retroperitoneal hematoma, initial encounter    3. Anticoagulated by anticoagulation treatment    4. Lumbar transverse process fracture, closed, initial encounter ([facility])

## 2019-03-14 NOTE — PROGRESS NOTES
Diagnosis: AF, TIA  Drug: Eliquis 2.5 mg BID  CHADSVASC at least 6 (chf, htn, age, tia)  HAS-BLED = 1 (age)  Indefinite therapy    Health Status Since Last Assessment  Pt started back on Eliquis 2.5 mg BID (from 5 mg BID) because he broke out in a systemic rash. He spoke with his PCP about this, and he was placed back on 2.5 mg BID  I let him know the recommended dose is 5 mg BID based on cr, age and weight, however, he feels more comfortable on 2.5 mg BID.    He had a fall and went to the ER on 3/2/19. Noted to have bruising to his right back and hip - retroperioneal hematoma. Lumbar transverse process fx noted. Discharged home.    No problems since then    Adherence with DOAC Therapy - no missed doses    Bleeding Risk Assessment  None of the following other than the above mentioned:  Severe epistaxis   Hemoptysis    GIB / melena / BRBPR / hematemesis    Hematuriaematoma symptoms    Decreasing hemoglobin or new anemia    Latest hemoglobin:     Lab Results   Component Value Date/Time    WBC 9.3 03/02/2019 07:43 AM    RBC 4.09 (L) 03/02/2019 07:43 AM    HEMOGLOBIN 12.1 (L) 03/02/2019 07:43 AM    HEMATOCRIT 38.3 (L) 03/02/2019 07:43 AM    MCV 93.6 03/02/2019 07:43 AM    MCH 29.6 03/02/2019 07:43 AM    MCHC 31.6 (L) 03/02/2019 07:43 AM    MPV 10.3 03/02/2019 07:43 AM    NEUTSPOLYS 74.50 (H) 03/02/2019 07:43 AM    LYMPHOCYTES 11.20 (L) 03/02/2019 07:43 AM    MONOCYTES 9.90 03/02/2019 07:43 AM    EOSINOPHILS 3.30 03/02/2019 07:43 AM    BASOPHILS 0.50 03/02/2019 07:43 AM    HYPOCHROMIA 1+ 08/29/2013 04:18 PM    ANISOCYTOSIS 1+ 09/17/2015 04:09 AM        EtOH overuse  - no  Falls, presyncope, syncope, or seizures  - fall as mentioned  Uncontrolled hypertension - no  CREATININE CLEARANCE /    Creatinine Clearance/Renal Function  Latest eGFR / creatinine:  Lab Results   Component Value Date/Time    SODIUM 138 03/02/2019 07:43 AM    POTASSIUM 4.2 03/02/2019 07:43 AM    CHLORIDE 104 03/02/2019 07:43 AM    CO2 28 03/02/2019  07:43 AM    GLUCOSE 145 (H) 03/02/2019 07:43 AM    BUN 32 (H) 03/02/2019 07:43 AM    CREATININE 1.13 03/02/2019 07:43 AM      • Is eGFR less than 50ml/min - yes  Cr cl 35 ml/min  If YES, calculate CrCl (see back)  Any recent dehydrating illness or medications added/changed? i.e. diuretics    Drug Interactions  ASA / other antiplatelets - avoids  NSAID - avoids  Other drug interactions   (Review med list / OTCs;)  Current Outpatient Prescriptions on File Prior to Visit   Medication Sig Dispense Refill   • lisinopril (PRINIVIL) 20 MG Tab Take 1 Tab by mouth 2 Times a Day. 180 Tab 3   • apixaban (ELIQUIS) 5mg Tab Take 1 Tab by mouth 2 Times a Day. 180 Tab 1   • cetirizine (ZYRTEC) 10 MG Tab Take 10 mg by mouth every day.     • metoprolol (LOPRESSOR) 25 MG Tab TAKE 1 TABLET BY MOUTH TWICE DAILY 180 Tab 0   • cloNIDine (CATAPRES) 0.1 MG Tab Take 1 Tab by mouth 2 times a day as needed (For SBP > 160). 30 Tab 0   • atorvastatin (LIPITOR) 80 MG tablet Take 1 Tab by mouth every evening. 30 Tab 0   • acetaminophen (TYLENOL) 325 MG Tab Take 2 Tabs by mouth every 6 hours as needed (Mild Pain; (Pain scale 1-3); Temp greater than 100.5 F). 30 Tab 0   • vitamin D (CHOLECALCIFEROL) 1000 UNIT Tab Take 1,000 Units by mouth every bedtime.     • finasteride (PROSCAR) 5 MG Tab Take 5 mg by mouth every day with lunch.     • glucosamine Sulfate 500 MG Cap Take 500 mg by mouth every day with lunch.     • Probiotic Product (PROBIOTIC DAILY PO) Take 1 Tab by mouth every evening.     • metoprolol (LOPRESSOR) 25 MG Tab TAKE 1 TABLET BY MOUTH TWICE DAILY 180 Tab 3   • Multiple Vitamins-Minerals (MULTIVITAMIN & MINERAL PO) Take 1 Capsule by mouth every day before lunch.     • tamsulosin (FLOMAX) 0.4 MG capsule Take 0.4 mg by mouth BEDTIME(S).       No current facility-administered medications on file prior to visit.        Examination  Blood pressure:  157/69  • Elevated BP  Somewhat elevated (sBP greater than 160 mmHg)  • Symptomatic  hypotension  - no  Significant gait impairment / imbalance / high risk for falls  - age is a risk, uses a walker    Final Assessment and Recommendations:  Patient appears stable from the anticoagulation standpoint  Benefits of continued DOAC therapy outweigh risks for this patient  Recommend continue current DOAC at same dose    Continue Eliquis 2.5 mg by mouth twice daily  Let all your providers know you take Eliquis  Seek immediate medical attention for any signs/symptoms of bleeding or stroke    Other Actions:   The rationale for continued DOAC therapy  The potential for minor, major or life-threatening bleeding  Dosing instructions, adherence, risks of non-adherence, handling missed doses  Avoiding OTC ASA & NSAIDs & minimizing EtOH to reduce bleeding risks  Dosing around upcoming procedure / surgery if applicable (see back)    Follow up:  Will follow up with patient in 3 months with labs     Next Appointment: Tuesday, June 11, 2019 at 10:30 am.     [address]    Cc:  Dr. Pompa

## 2019-04-12 PROBLEM — J10.1 INFLUENZA A: Status: ACTIVE | Noted: 2019-01-01

## 2019-04-12 PROBLEM — J18.9 CAP (COMMUNITY ACQUIRED PNEUMONIA): Status: ACTIVE | Noted: 2019-01-01

## 2019-04-12 PROBLEM — J96.01 ACUTE RESPIRATORY FAILURE WITH HYPOXIA (HCC): Status: ACTIVE | Noted: 2019-01-01

## 2019-04-12 PROBLEM — R73.9 HYPERGLYCEMIA: Status: ACTIVE | Noted: 2019-01-01

## 2019-04-12 NOTE — ED NOTES
Incontinent of urine. Complete linen change and bed bath. Repositioned in bed. Medicated by assist RN.

## 2019-04-12 NOTE — ED NOTES
Med rec complete per pt's family @ [phone]  Pt reports his wife manages all of his medications for him and instructed to call her  Pt reports he thinks he has not taken his medication in 2 days  Allergies reviewed  No ABX in last month

## 2019-04-12 NOTE — ED TRIAGE NOTES
Joe Rashid  Chief Complaint   Patient presents with   • Weakness     x 1 day   • Nausea   • Cough     non productive     Pt biba from assisted living facility with above complaint, on set  today.  VSS, no acute distress noted. RA noted at 88% by EMS, placed on 4L NC,  Maintains O2 sats on 2L at this time. EKG done.  ERP at bedside.    Call light in reach.    PIV placed and given 4mg IV zofran PTA

## 2019-04-12 NOTE — ED PROVIDER NOTES
ED Provider Note    CHIEF COMPLAINT  Chief Complaint   Patient presents with   • Weakness     x 1 day   • Nausea   • Cough     non productive       HPI  Joe Rashid is a 89 y.o. male who presents for evaluation of generalized weakness cough malaise not feeling well.  The patient has a complex medical history including atrial fibrillation chronic kidney disease.  He is 89 years old and is at assisted living.  They report that he has had increased malaise with cough and he was noted to be hypoxic and placed on oxygen by paramedics.  He was recently evaluated here around 4-5 weeks ago with a compression fracture and a small psoas hematoma for which she was observed.  He denies any leg swelling or hemoptysis.  No report of new trauma no headache neck stiffness or rash.    REVIEW OF SYSTEMS  See HPI for further details.  Positive for cough malaise fatigue all other systems are negative.     PAST MEDICAL HISTORY  Past Medical History:   Diagnosis Date   • Atrial fibrillation (HCC)    • Chronic anticoagulation    • Chronic kidney disease, stage III (moderate) (HCC)    • Congestive heart failure (HCC)    • Hepatitis B    • History of TIA (transient ischemic attack)    • Hypertension        FAMILY HISTORY  Noncontributory    SOCIAL HISTORY  Social History     Social History   • Marital status:      Spouse name: N/A   • Number of children: N/A   • Years of education: N/A     Social History Main Topics   • Smoking status: Never Smoker   • Smokeless tobacco: Never Used   • Alcohol use No   • Drug use: No   • Sexual activity: Not on file     Other Topics Concern   • Not on file     Social History Narrative   • No narrative on file     Non-smoker  SURGICAL HISTORY  Past Surgical History:   Procedure Laterality Date   • HIP HEMIARTHROPLASTY Left 1/27/2016    Procedure: HIP HEMIARTHROPLASTY;  Surgeon: Adis Martínez M.D.;  Location: SURGERY [facility];  Service:    • APPENDECTOMY     • CATARACT EXTRACTION WITH  "IOL      both eyes   • COLON RESECTION     • OTHER      sinus surgery of unknown type   • PB CRYOSURG ABLATION OF PBOSTATE     • PB TRANSURETHRAL ELEC-SURG PBOSTATECTOM         CURRENT MEDICATIONS  Home Medications     Reviewed by Leonila Perez R.N. (Registered Nurse) on 04/12/19 at 1427  Med List Status: Not Addressed   Medication Last Dose Status   acetaminophen (TYLENOL) 325 MG Tab  Active   apixaban (ELIQUIS) 2.5mg Tab  Active   atorvastatin (LIPITOR) 80 MG tablet  Active   cetirizine (ZYRTEC) 10 MG Tab  Active   cloNIDine (CATAPRES) 0.1 MG Tab  Active   finasteride (PROSCAR) 5 MG Tab  Active   glucosamine Sulfate 500 MG Cap  Active   lisinopril (PRINIVIL) 20 MG Tab  Active   metoprolol (LOPRESSOR) 25 MG Tab  Active   metoprolol (LOPRESSOR) 25 MG Tab  Active   Multiple Vitamins-Minerals (MULTIVITAMIN & MINERAL PO)  Active   Probiotic Product (PROBIOTIC DAILY PO)  Active   tamsulosin (FLOMAX) 0.4 MG capsule  Active   vitamin D (CHOLECALCIFEROL) 1000 UNIT Tab  Active                ALLERGIES  Allergies   Allergen Reactions   • Aspirin Hives and Rash     Pts grandson states rash and hives.   • Sulfa Drugs Hives and Rash     Pts grandson states rash and hives.       PHYSICAL EXAM  VITAL SIGNS: BP (!) 171/89   Pulse 84   Temp 37.4 °C (99.3 °F) (Temporal)   Resp 18   Ht 1.753 m (5' 9\")   Wt 68 kg (149 lb 14.6 oz)   SpO2 97%   BMI 22.14 kg/m²  Room air O2: 88    Constitutional: Elderly cachectic  HENT: Normocephalic, Atraumatic, Bilateral external ears normal, Oropharynx moist, No oral exudates, Nose normal.   Eyes: PERRLA, EOMI, Conjunctiva normal, No discharge.   Neck: Normal range of motion, No tenderness, Supple, No stridor.   Cardiovascular: Normal heart rate, Normal rhythm, No murmurs, No rubs, No gallops.   Thorax & Lungs: Bilateral scattered rhonchi with right basilar rales   abdomen: Bowel sounds normal, Soft, No tenderness, No masses, No pulsatile masses.   Skin: Warm, Dry, No erythema, No rash. "   Back: No tenderness, No CVA tenderness.   Extremities: Intact distal pulses, No edema, No tenderness, No cyanosis, No clubbing.   Neurologic: Alert & oriented x 3, Normal motor function, Normal sensory function, No focal deficits noted.   Psychiatric: Sluggish.       RADIOLOGY/PROCEDURES  DX-CHEST-PORTABLE (1 VIEW)   Final Result      Enlarged cardiac silhouette.      Exam limited by patient rotation with possible left basilar atelectasis or infiltrate and possible left pleural fluid.        Results for orders placed or performed during the hospital encounter of 04/12/19   LACTIC ACID   Result Value Ref Range    Lactic Acid 1.7 0.5 - 2.0 mmol/L   CBC WITH DIFFERENTIAL   Result Value Ref Range    WBC 7.4 4.8 - 10.8 K/uL    RBC 4.97 4.70 - 6.10 M/uL    Hemoglobin 15.0 14.0 - 18.0 g/dL    Hematocrit 47.1 42.0 - 52.0 %    MCV 94.8 81.4 - 97.8 fL    MCH 30.2 27.0 - 33.0 pg    MCHC 31.8 (L) 33.7 - 35.3 g/dL    RDW 47.6 35.9 - 50.0 fL    Platelet Count 129 (L) 164 - 446 K/uL    MPV 9.8 9.0 - 12.9 fL    Neutrophils-Polys 88.40 (H) 44.00 - 72.00 %    Lymphocytes 3.10 (L) 22.00 - 41.00 %    Monocytes 7.40 0.00 - 13.40 %    Eosinophils 0.10 0.00 - 6.90 %    Basophils 0.30 0.00 - 1.80 %    Immature Granulocytes 0.70 0.00 - 0.90 %    Nucleated RBC 0.00 /100 WBC    Neutrophils (Absolute) 6.58 1.82 - 7.42 K/uL    Lymphs (Absolute) 0.23 (L) 1.00 - 4.80 K/uL    Monos (Absolute) 0.55 0.00 - 0.85 K/uL    Eos (Absolute) 0.01 0.00 - 0.51 K/uL    Baso (Absolute) 0.02 0.00 - 0.12 K/uL    Immature Granulocytes (abs) 0.05 0.00 - 0.11 K/uL    NRBC (Absolute) 0.00 K/uL   Comp Metabolic Panel   Result Value Ref Range    Sodium 136 135 - 145 mmol/L    Potassium 3.3 (L) 3.6 - 5.5 mmol/L    Chloride 98 96 - 112 mmol/L    Co2 31 20 - 33 mmol/L    Anion Gap 7.0 0.0 - 11.9    Glucose 215 (H) 65 - 99 mg/dL    Bun 24 (H) 8 - 22 mg/dL    Creatinine 1.03 0.50 - 1.40 mg/dL    Calcium 8.7 8.5 - 10.5 mg/dL    AST(SGOT) 21 12 - 45 U/L    ALT(SGPT) 26 2 -  50 U/L    Alkaline Phosphatase 117 (H) 30 - 99 U/L    Total Bilirubin 1.5 0.1 - 1.5 mg/dL    Albumin 4.0 3.2 - 4.9 g/dL    Total Protein 6.4 6.0 - 8.2 g/dL    Globulin 2.4 1.9 - 3.5 g/dL    A-G Ratio 1.7 g/dL   TROPONIN   Result Value Ref Range    Troponin I 0.02 0.00 - 0.04 ng/mL   Prothrombin Time   Result Value Ref Range    PT 16.3 (H) 12.0 - 14.6 sec    INR 1.30 (H) 0.87 - 1.13   D-DIMER   Result Value Ref Range    D-Dimer Screen 0.94 (H) 0.00 - 0.50 ug/mL (FEU)   Influenza A/B By PCR (Adult - Flu Only)   Result Value Ref Range    Influenza virus A RNA POSITIVE (A) Negative    Influenza virus B, PCR Negative Negative   ESTIMATED GFR   Result Value Ref Range    GFR If African American >60 >60 mL/min/1.73 m 2    GFR If Non African American >60 >60 mL/min/1.73 m 2   EKG (NOW)   Result Value Ref Range    Report       [facility] Emergency Dept.    Test Date:  2019  Pt Name:    YEIMY AGARWAL                   Department: ER  MRN:        [id number]                      Room:       BL 21 H  Gender:     Male                         Technician: [id number]  :        1920                   Requested By:SAMUEL MCCANN  Order #:    [id number]                    Reading MD:    Measurements  Intervals                                Axis  Rate:       87                           P:          3  CA:         153                          QRS:        -30  QRSD:       103                          T:          60  QT:         374  QTc:        450    Interpretive Statements  Sinus rhythm  Left axis deviation  Abnormal R-wave progression, late transition  Compared to ECG 2018 12:16:04  No significant changes        EKG interpretation by me rate 87 left axis deviation poor R wave progression no acute ST segment elevation or depression no pathological T wave inversions  COURSE & MEDICAL DECISION MAKING  Pertinent Labs & Imaging studies reviewed. (See chart for details)  Patient presents here with cough  congestion hypoxia.  Differential diagnosis was extensive including heart failure pneumonia influenza.  I actually accidentally ordered a d-dimer.  The patient is already anticoagulated and I feel that CT angiogram is not indicated.  Technically age-adjusted d-dimer interpretation indicates that he is actually below the threshold considering he is 89.  We also have an alternative diagnosis which is influenza A which fits with his symptomatology.  The patient was given Tylenol, Tamiflu and given his age and hypoxia will be admitted for observation    FINAL IMPRESSION  1.  Acute influenza A pneumonitis with hypoxia    Admission         Electronically signed by: Jesús Escobar, 4/12/2019 2:48 PM

## 2019-04-13 NOTE — H&P
Hospital Medicine History & Physical Note    Date of Service  4/12/2019    Primary Care Physician  Mervin Pompa M.D.    Consultants      Code Status  DNR/DNI per EDP    Chief Complaint  Weakness (x 1 day); Nausea; and Cough (non productive)      History of Presenting Illness  89 y.o. male who presented 4/12/2019 with Weakness (x 1 day); Nausea; and Cough (non productive)  He has cognitive deficits, poor historian  He walks with walker at home (unclear if he meant assisted living) and lives with wife.  He felt weak and dizzy for the past few days. He started coughing as well. He was found to be hypoxic by EMTs  He has history of TIAs  He has history of Afib  Denies having COPD or asthma.  Denies smoking and alcohol.  At the ED, afebrile, hemodynamically stable.   CXR possible L pleural fluid vs infiltrate  No leukocytosis. Hyperglycemia. Normal lactic acid  Flu positive  When I saw him at the ED, he is in no acute distress. Cognitive deficits. Diminished at bases. Elderly.    Review of Systems  Review of Systems   Unable to perform ROS: Mental acuity       Past Medical History   has a past medical history of Atrial fibrillation (HCC); Chronic anticoagulation; Chronic kidney disease, stage III (moderate) (HCC); Congestive heart failure (HCC); Hepatitis B; History of TIA (transient ischemic attack); and Hypertension. He also has no past medical history of Diabetes.    Surgical History   has a past surgical history that includes other; appendectomy; colon resection; pr transurethral elec-surg prostatectom; pr cryosurg ablation of prostate; cataract extraction with iol; and hip hemiarthroplasty (Left, 1/27/2016).     Family History  family history includes Heart Disease in his brother.     Social History   reports that he has never smoked. He has never used smokeless tobacco. He reports that he does not drink alcohol or use drugs.    Allergies  Allergies   Allergen Reactions   • Aspirin Hives and Rash     Pts grandson  states rash and hives.   • Sulfa Drugs Hives and Rash     Pts grandson states rash and hives.       Medications  Prior to Admission Medications   Prescriptions Last Dose Informant Patient Reported? Taking?   Multiple Vitamins-Minerals (MULTIVITAMIN & MINERAL PO) 4/10/2019 at unknown Family Member Yes No   Sig: Take 1 Capsule by mouth every day before lunch.   POTASSIUM PO 4/10/2019 at am  Yes Yes   Sig: Take 1 Tab by mouth every day. OTC   apixaban (ELIQUIS) 2.5mg Tab 4/10/2019 at pm Family Member Yes No   Sig: Take 2.5 mg by mouth 2 Times a Day. Takes at noon and in evening   atorvastatin (LIPITOR) 80 MG tablet 4/10/2019 at pm Family Member No No   Sig: Take 1 Tab by mouth every evening.   finasteride (PROSCAR) 5 MG Tab 4/10/2019 at unknown Family Member Yes No   Sig: Take 5 mg by mouth every day.   furosemide (LASIX) 40 MG Tab 4/10/2019 at am  Yes Yes   Sig: Take 40 mg by mouth every morning.   glucosamine Sulfate 500 MG Cap 4/10/2019 at unknown Family Member Yes No   Sig: Take 500 mg by mouth every day with lunch.   lisinopril (PRINIVIL) 20 MG Tab 4/10/2019 at pm Family Member No No   Sig: Take 1 Tab by mouth 2 Times a Day.   metoprolol (LOPRESSOR) 25 MG Tab 4/10/2019 at pm Family Member No No   Sig: TAKE 1 TABLET BY MOUTH TWICE DAILY   tamsulosin (FLOMAX) 0.4 MG capsule 4/10/2019 at pm Family Member Yes No   Sig: Take 0.4 mg by mouth every bedtime.      Facility-Administered Medications: None       Physical Exam  Temp:  [37.4 °C (99.3 °F)] 37.4 °C (99.3 °F)  Pulse:  [] 89  Resp:  [12-22] 22  BP: (171)/(89) 171/89  SpO2:  [93 %-99 %] 93 %    Physical Exam   Constitutional: He appears well-developed.   Elderly, thin   HENT:   Head: Normocephalic and atraumatic.   Eyes: Conjunctivae are normal. No scleral icterus.   Neck: Normal range of motion. Neck supple.   Cardiovascular: Normal rate and regular rhythm.  Exam reveals no gallop and no friction rub.    No murmur heard.  Pulmonary/Chest: Effort normal and  breath sounds normal. No respiratory distress. He has no wheezes. He has no rales.   Diminished at bases   Abdominal: Soft. Bowel sounds are normal. He exhibits no distension. There is no tenderness. There is no rebound and no guarding.   Musculoskeletal: He exhibits no edema or tenderness.   Neurological: He is alert.   Generalized weakness  Cognitive deficits   Skin: Skin is warm.   Psychiatric: He has a normal mood and affect. His behavior is normal.       Laboratory:  Recent Labs      04/12/19   1435   WBC  7.4   RBC  4.97   HEMOGLOBIN  15.0   HEMATOCRIT  47.1   MCV  94.8   MCH  30.2   MCHC  31.8*   RDW  47.6   PLATELETCT  129*   MPV  9.8     Recent Labs      04/12/19   1435   SODIUM  136   POTASSIUM  3.3*   CHLORIDE  98   CO2  31   GLUCOSE  215*   BUN  24*   CREATININE  1.03   CALCIUM  8.7     Recent Labs      04/12/19   1435   ALTSGPT  26   ASTSGOT  21   ALKPHOSPHAT  117*   TBILIRUBIN  1.5   GLUCOSE  215*     Recent Labs      04/12/19   1435   INR  1.30*     Recent Labs      04/12/19   1435   BNPBTYPENAT  223*         Recent Labs      04/12/19   1435   TROPONINI  0.02       Urinalysis:    No results found     Imaging:  DX-CHEST-PORTABLE (1 VIEW)   Final Result      Enlarged cardiac silhouette.      Exam limited by patient rotation with possible left basilar atelectasis or infiltrate and possible left pleural fluid.            Assessment/Plan:  I anticipate this patient will require at least two midnights for appropriate medical management, necessitating inpatient admission.    * Acute respiratory failure with hypoxia with pneumonitis and influenza A (HCC)   Assessment & Plan    Resp and O2 per protocol  Treat underlying problems.     Hyperglycemia   Assessment & Plan    Ordered A1c and Accuchecks x 6 occurrences     CAP (community acquired pneumonia)   Assessment & Plan    Ordered IV antibiotics  Resp and O2 per protocol     Influenza A   Assessment & Plan    Ordered Tamiflu     Chronic anticoagulation-  (present on admission)   Assessment & Plan    FOr par Afib  COntinue Eliquis  History psoas hematoma, monitor for now.     Paroxysmal atrial fibrillation (HCC)- (present on admission)   Assessment & Plan    COntinue metoprolol and Eliquis     Thrombocytopenia (HCC)- (present on admission)   Assessment & Plan    Mild     Essential hypertension, benign- (present on admission)   Assessment & Plan    Stable   Continue his lisinopril and metoprolol         VTE prophylaxis: Eliquis  Reviewed vitals, labs, imaging, staff notes.  Discussed assessment and plan with Joe Rashid  Discussed with ED physician.

## 2019-04-13 NOTE — ASSESSMENT & PLAN NOTE
Due to influenza and pneumonitis  Continue supportive care  Treat underlying problems  Titrate as tolerated

## 2019-04-13 NOTE — PROGRESS NOTES
Hospital Medicine Daily Progress Note    Date of Service  4/13/2019    Chief Complaint  89 y.o. male admitted 4/12/2019 with weakness, nausea and cough    Hospital Course    Patient is a pleasant 89 year old gentleman with h/o afib on chronic anticoagulation, ckd stage 3, hepatitis B, h/o Tia, and htn who presented to the ER after one to two days of generalized malaise, cough and nausea.  He was found to influenza with suspected pneumonitis      Interval Problem Update  He states he is feeling better, he is a poor historian  Denies cough or sob this am, denies dizziness  No cp  Denies n/v this am - eating breakfast    Consultants/Specialty      Code Status  Dnr/ dni    Disposition  tbd    Review of Systems  Review of Systems   Constitutional: Negative for chills, diaphoresis, fever, malaise/fatigue and weight loss.   HENT: Negative.  Negative for sore throat.    Eyes: Negative.  Negative for blurred vision.   Respiratory: Negative.  Negative for cough and shortness of breath.    Cardiovascular: Negative.  Negative for chest pain, palpitations and leg swelling.   Gastrointestinal: Negative.  Negative for abdominal pain, nausea and vomiting.   Genitourinary: Negative.  Negative for dysuria.   Musculoskeletal: Negative.  Negative for myalgias.   Skin: Negative.  Negative for itching and rash.   Neurological: Positive for weakness. Negative for dizziness, focal weakness and headaches.   Endo/Heme/Allergies: Negative.  Does not bruise/bleed easily.   Psychiatric/Behavioral: Positive for memory loss. Negative for depression, substance abuse and suicidal ideas.   All other systems reviewed and are negative.       Physical Exam  Temp:  [36.7 °C (98 °F)-37.4 °C (99.3 °F)] 36.8 °C (98.2 °F)  Pulse:  [] 86  Resp:  [12-22] 18  BP: (100-171)/() 100/53  SpO2:  [93 %-99 %] 94 %    Physical Exam   Constitutional: He appears well-developed. No distress.   HENT:   Head: Normocephalic and atraumatic.   Right Ear: External  ear normal.   Left Ear: External ear normal.   Nose: Nose normal.   Mouth/Throat: Oropharynx is clear and moist. No oropharyngeal exudate.   Eyes: Pupils are equal, round, and reactive to light. Conjunctivae and EOM are normal. Right eye exhibits no discharge. Left eye exhibits no discharge. No scleral icterus.   Neck: Normal range of motion. Neck supple. No JVD present. No tracheal deviation present. No thyromegaly present.   Cardiovascular: Normal rate, regular rhythm, normal heart sounds and intact distal pulses.  Exam reveals no gallop and no friction rub.    No murmur heard.  Pulmonary/Chest: Effort normal and breath sounds normal. No stridor. No respiratory distress. He has no wheezes. He has no rales. He exhibits no tenderness.   Abdominal: Soft. Bowel sounds are normal. He exhibits no distension and no mass. There is no tenderness. There is no rebound and no guarding.   Musculoskeletal: Normal range of motion. He exhibits no edema, tenderness or deformity.   Lymphadenopathy:     He has no cervical adenopathy.   Neurological: He is alert. He has normal reflexes. No cranial nerve deficit. He exhibits normal muscle tone. Coordination normal.   Axox2, poor memory   Skin: Skin is warm and dry. No rash noted. He is not diaphoretic. No erythema. No pallor.   Psychiatric: He has a normal mood and affect. His behavior is normal. Judgment and thought content normal.   Nursing note and vitals reviewed.      Fluids    Intake/Output Summary (Last 24 hours) at 04/13/19 0916  Last data filed at 04/13/19 0900   Gross per 24 hour   Intake              280 ml   Output              300 ml   Net              -20 ml       Laboratory  Recent Labs      04/12/19   1435  04/13/19   0034   WBC  7.4  9.3   RBC  4.97  5.27   HEMOGLOBIN  15.0  15.3   HEMATOCRIT  47.1  49.0   MCV  94.8  93.0   MCH  30.2  29.0   MCHC  31.8*  31.2*   RDW  47.6  46.5   PLATELETCT  129*  124*   MPV  9.8  9.8     Recent Labs      04/12/19   1435  04/13/19    0034   SODIUM  136  141   POTASSIUM  3.3*  3.6   CHLORIDE  98  98   CO2  31  32   GLUCOSE  215*  172*   BUN  24*  23*   CREATININE  1.03  1.00   CALCIUM  8.7  8.7     Recent Labs      04/12/19   1435   INR  1.30*     Recent Labs      04/12/19   1435   BNPBTYPENAT  223*           Imaging  DX-CHEST-PORTABLE (1 VIEW)   Final Result      Enlarged cardiac silhouette.      Exam limited by patient rotation with possible left basilar atelectasis or infiltrate and possible left pleural fluid.              Assessment/Plan  * Acute respiratory failure with hypoxia with pneumonitis and influenza A (HCC)   Assessment & Plan    Due to influenza and pneumonitis  Continue supportive care  Treat underlying problems  Titrate as tolerated     Hyperglycemia   Assessment & Plan    Improving  Accuchecks x 6 occurrences  A1c pending     CAP (community acquired pneumonia)   Assessment & Plan    Continue antibiotics  Resp and O2 per protocol     Influenza A   Assessment & Plan    continueTamiflu     Chronic anticoagulation- (present on admission)   Assessment & Plan    Chronic due to afib Afib  Continue Eliquis  History of a psoas hematoma     Paroxysmal atrial fibrillation (HCC)- (present on admission)   Assessment & Plan    Continue metoprolol and Eliquis as tolerated     Thrombocytopenia (HCC)- (present on admission)   Assessment & Plan    Mild  Following     Essential hypertension, benign- (present on admission)   Assessment & Plan    following  Continue his lisinopril and metoprolol          VTE prophylaxis: eliquis

## 2019-04-13 NOTE — RESPIRATORY CARE
COPD EDUCATION by COPD CLINICAL EDUCATOR  4/13/2019 at 7:22 AM by Garima Trevino     Patient reviewed by COPD education team. Patient does not qualify for COPD program.

## 2019-04-13 NOTE — ED NOTES
Bedside report to Tele RN. Updated on positive influenza A, pt mentation, VS, and POC. All questions addressed.

## 2019-04-13 NOTE — PROGRESS NOTES
Patient arrived to the floor at this time from the ER, slideboard from rney to bed. On 2L 02, denies pain, no SOB. On monitor. Bed locked and in lowest position, whiteboard updated, call bell in hand, bed alarm on

## 2019-04-13 NOTE — PROGRESS NOTES
Bedside report received.  Patient A&O x 4.  POC discussed with patient.  Patient verbalized understanding.  Call light and belongings with in reach.  Bed locked and in lowest position, alarm and fall precautions in place.  All needs are met at this time.

## 2019-04-13 NOTE — PROGRESS NOTES
Assumed care of patient at bedside report from NOC RN. Updated on POC. Patient currently A & O x 4; on 2 L O2 nasal cannula; up x1 assist with a front wheel walker; without complaints of acute pain. Call light within reach. Whiteboard updated. Fall precautions in place. Bed locked and in lowest position. All questions answered. No other needs indicated at this time.

## 2019-04-13 NOTE — PROGRESS NOTES
2 RN SKIN CHECK WITH DANNIE RN     Scattered bruising to bilateral arms    Coccyx pink and blanching

## 2019-04-13 NOTE — ASSESSMENT & PLAN NOTE
I have changed his unasyn to augmentin. Cont doxy and tamiflu for total of 5 days  Resp and O2 per protocol

## 2019-04-13 NOTE — CARE PLAN
Problem: Communication  Goal: The ability to communicate needs accurately and effectively will improve  Outcome: PROGRESSING AS EXPECTED    Intervention: [place] patient and significant other/support system to call light to alert staff of needs  Pt oriented to unit and to call light system. Verbalized understanding  Intervention: Educate patient and significant other/support system about the plan of care, procedures, treatments, medications and allow for questions  Discussed POC with pt. Verbalized understanding.

## 2019-04-14 NOTE — CARE PLAN
Problem: Safety  Goal: Will remain free from falls    Intervention: Assess risk factors for falls  Fall precautions in place. Bed in lowest position. Non-skid socks in place. Personal possessions within reach. Mobility sign on door. Bed-alarm on. Call light within reach. Pt educated regarding fall prevention and states understanding.       Problem: Knowledge Deficit  Goal: Knowledge of disease process/condition, treatment plan, diagnostic tests, and medications will improve    Intervention: Explain information regarding disease process/condition, treatment plan, diagnostic tests, and medications and document in education  Pt educated regarding plan of care and medications. All questions answered.

## 2019-04-14 NOTE — PROGRESS NOTES
MONITOR SUMMARY:    Sinus Rhythm 61-83, dropped to 35  1.4-1.8 second pause, frequent PACs, rare PVCs  0.18/0.08/0.40

## 2019-04-14 NOTE — PROGRESS NOTES
Hospital Medicine Daily Progress Note    Date of Service  4/14/2019    Chief Complaint  89 y.o. male admitted 4/12/2019 with weakness, nausea and cough    Hospital Course    Patient is a pleasant 89 year old gentleman with h/o afib on chronic anticoagulation, ckd stage 3, hepatitis B, h/o Tia, and htn who presented to the ER after one to two days of generalized malaise, cough and nausea.  He was found to influenza with suspected pneumonitis      Interval Problem Update  Still requiring supplemental oxygen  On unasyn, doxy and tamiflu  Has isolated RLE swelling, I will check DVT ultrasound    Consultants/Specialty  None    Code Status  Dnr/ dni    Disposition  PT rec     Review of Systems  Review of Systems   Constitutional: Negative for chills, diaphoresis, fever, malaise/fatigue and weight loss.   Respiratory: Positive for cough and sputum production. Negative for shortness of breath.    Cardiovascular: Negative for chest pain, palpitations and leg swelling.   Gastrointestinal: Negative.  Negative for abdominal pain, nausea and vomiting.   Skin: Negative for itching and rash.   Neurological: Positive for weakness. Negative for dizziness, focal weakness and headaches.   Psychiatric/Behavioral: Positive for memory loss. Negative for depression, substance abuse and suicidal ideas.   All other systems reviewed and are negative.       Physical Exam  Temp:  [36.3 °C (97.3 °F)-37.2 °C (98.9 °F)] 37.1 °C (98.7 °F)  Pulse:  [50-70] 62  Resp:  [15-18] 16  BP: (110-150)/(69-93) 150/93  SpO2:  [92 %-97 %] 97 %    Physical Exam   Constitutional: He appears well-developed. No distress.   HENT:   Head: Normocephalic and atraumatic.   Eyes: Conjunctivae are normal. Right eye exhibits no discharge. Left eye exhibits no discharge. No scleral icterus.   Cardiovascular: Normal rate, regular rhythm, normal heart sounds and intact distal pulses.  Exam reveals no gallop and no friction rub.    No murmur heard.  Pulmonary/Chest: Effort  normal and breath sounds normal. No respiratory distress. He has no wheezes. He has no rales. He exhibits no tenderness.   Abdominal: Soft. Bowel sounds are normal. He exhibits no distension. There is no tenderness.   Musculoskeletal: Normal range of motion. He exhibits edema (RLE).   Neurological: He is alert.   Axox2, poor memory   Skin: Skin is warm and dry. He is not diaphoretic. No erythema.   Psychiatric: He has a normal mood and affect. His behavior is normal. Judgment and thought content normal.   Nursing note and vitals reviewed.      Fluids  No intake or output data in the 24 hours ending 04/14/19 1506    Laboratory  Recent Labs      04/12/19   1435  04/13/19   0034  04/14/19   0352   WBC  7.4  9.3  7.8   RBC  4.97  5.27  5.00   HEMOGLOBIN  15.0  15.3  14.8   HEMATOCRIT  47.1  49.0  47.4   MCV  94.8  93.0  94.8   MCH  30.2  29.0  29.6   MCHC  31.8*  31.2*  31.2*   RDW  47.6  46.5  48.5   PLATELETCT  129*  124*  118*   MPV  9.8  9.8  9.9     Recent Labs      04/12/19   1435  04/13/19   0034   SODIUM  136  141   POTASSIUM  3.3*  3.6   CHLORIDE  98  98   CO2  31  32   GLUCOSE  215*  172*   BUN  24*  23*   CREATININE  1.03  1.00   CALCIUM  8.7  8.7     Recent Labs      04/12/19   1435   INR  1.30*     Recent Labs      04/12/19   1435   BNPBTYPENAT  223*           Imaging  DX-CHEST-PORTABLE (1 VIEW)   Final Result      Enlarged cardiac silhouette.      Exam limited by patient rotation with possible left basilar atelectasis or infiltrate and possible left pleural fluid.      US-EXTREMITY VENOUS LOWER UNILAT RIGHT    (Results Pending)           Assessment/Plan  * Acute respiratory failure with hypoxia with pneumonitis and influenza A (HCC)   Assessment & Plan    Due to influenza and pneumonitis  Continue supportive care  Treat underlying problems  Titrate as tolerated     Hyperglycemia   Assessment & Plan    Improving  Accuchecks x 6 occurrences  A1c 6.3     CAP (community acquired pneumonia)   Assessment & Plan     Continue antibiotics  Resp and O2 per protocol     Influenza A   Assessment & Plan    continueTamiflu     Chronic anticoagulation- (present on admission)   Assessment & Plan    Chronic due to afib  Continue Eliquis  History of a psoas hematoma     Paroxysmal atrial fibrillation (HCC)- (present on admission)   Assessment & Plan    Continue metoprolol and Eliquis as tolerated     Thrombocytopenia (HCC)- (present on admission)   Assessment & Plan    Mild  Following     Essential hypertension, benign- (present on admission)   Assessment & Plan    following  Continue his lisinopril and metoprolol          VTE prophylaxis: eliquis

## 2019-04-14 NOTE — THERAPY
"Physical Therapy Evaluation completed.   Bed Mobility:  Supine to Sit: Modified Independent (raised HOB; increasd time)  Transfers: Sit to Stand: Supervised (with FWW)  Gait: Level Of Assist: Stand by Assist with Front-Wheel Walker       Plan of Care: Will benefit from Physical Therapy 3 times per week  Discharge Recommendations: Equipment: Will Continue to Assess for Equipment Needs; anticipate none    Pt presents with impaired activity tolerance associated with c/c of cough found to have flu. Pt mobilizing very well for age and dx; anticipate with continued in hospital mobility/medical management pt will be able to return home with home health; will follow.    See \"Rehab Therapy-Acute\" Patient Summary Report for complete documentation.     "

## 2019-04-15 NOTE — DISCHARGE PLANNING
ASSESSMENT  LCSW met with pt at bedside and obtained the information used in this assessment.Pt is hard of hearing. Pt verified accuracy of facesheet. Pt lives at home with his wife, Myranda. Pt has a good support system including other family. Pt uses [facility] on [address]. Pt denies any hx of substance use and denies any dx of mh.    Care Transition Team Assessment    Information Source  Orientation : Oriented x 4  Information Given By: Patient  Informant's Name: Joe         Elopement Risk  Legal Hold: No  Ambulatory or Self Mobile in Wheelchair: Yes  Disoriented: No  Psychiatric Symptoms: None  History of Wandering: No  Elopement this Admit: No  Vocalizing Wanting to Leave: No  Displays Behaviors, Body Language Wanting to Leave: No-Not at Risk for Elopement  Elopement Risk: Not at Risk for Elopement  Wanderguard On: Unavailable    Interdisciplinary Discharge Planning  Lives with - Patient's Self Care Capacity: Spouse  Patient or legal guardian wants to designate a caregiver (see row info): No  Housing / Facility: Assisted Living Residence  Prior Services: None    Discharge Preparedness  What is your plan after discharge?: Home with help, Home health care         Finances  Financial Barriers to Discharge: No  Prescription Coverage: Yes    Vision / Hearing Impairment  Vision Impairment : No  Hearing Impairment : Yes  Hearing Impairment: Both Ears, Hearing Device Not Available         Advance Directive  Advance Directive?: DPOA for Health Care  Durable Power of  Name and Contact :  (Myranda Rashid)    Domestic Abuse  Have you ever been the victim of abuse or violence?: No  Physical Abuse or Sexual Abuse: No  Verbal Abuse or Emotional Abuse: No  Possible Abuse Reported to:: Not Applicable    Psychological Assessment  History of Substance Abuse: None  History of Psychiatric Problems: No  Non-compliant with Treatment: No    Discharge Risks or Barriers  Discharge risks or barriers?: No    Anticipated  Discharge Information  Anticipated discharge disposition: Home  Discharge Address: [address]

## 2019-04-15 NOTE — PROGRESS NOTES
Received report from day shift RN. Assumed care of pt. Pt reports no pain at this time. Updated pt on plan of care. Pt resting comfortably in bed. Bed alarm in place. Educated on use of call light. Hourly rounding and continuous monitoring in place.

## 2019-04-15 NOTE — DISCHARGE PLANNING
Anticipated Discharge Disposition: Home with HH    Action: LCSW rosalva texted MD for a HH order. MD put in a HH order and LCSW provided a choice form for HH to pt. LCSW faxed choice to [facility].    Barriers to Discharge: Medical Clearance and HH acceptance.    Plan: LCSW will continue to follow and assess for needs.

## 2019-04-15 NOTE — DISCHARGE PLANNING
Agency/Facility Name: [facility]  Spoke To: Spike   Outcome: Referral received, under review.

## 2019-04-15 NOTE — DISCHARGE PLANNING
Received Choice form at 1320  Agency/Facility Name: [facility]  Referral sent per Choice form at 1330    2nd- [facility]  3rd- [facility]   4th- [facility]

## 2019-04-15 NOTE — DISCHARGE PLANNING
Agency/Facility Name: [facility]  Spoke To: Fax Transmission  Outcome: Patient accepted.

## 2019-04-15 NOTE — PROGRESS NOTES
Hospital Medicine Daily Progress Note    Date of Service  4/15/2019    Chief Complaint  89 y.o. male admitted 4/12/2019 with weakness, nausea and cough    Hospital Course    Patient is a pleasant 89 year old gentleman with h/o afib on chronic anticoagulation, ckd stage 3, hepatitis B, h/o Tia, and htn who presented to the ER after one to two days of generalized malaise, cough and nausea.  He was found to influenza with suspected pneumonitis      Interval Problem Update  DVT ultrasound still pending  HH orders placed  Home oxygen eval done yesterday with lowest O2 sats of 90% on ambulation. Discussed with RN about taking off oxygen if not needed  Pt seen resting comfortably without any complaints    Consultants/Specialty  None    Code Status  Dnr/ dni    Disposition  Home health    Review of Systems  Review of Systems   Constitutional: Negative for chills, diaphoresis, fever, malaise/fatigue and weight loss.   Respiratory: Positive for cough and sputum production. Negative for shortness of breath.    Cardiovascular: Negative for chest pain, palpitations and leg swelling.   Gastrointestinal: Negative.  Negative for abdominal pain, nausea and vomiting.   Skin: Negative for itching and rash.   Neurological: Positive for weakness. Negative for dizziness, focal weakness and headaches.   Psychiatric/Behavioral: Positive for memory loss. Negative for depression, substance abuse and suicidal ideas.   All other systems reviewed and are negative.       Physical Exam  Temp:  [36.4 °C (97.6 °F)-37 °C (98.6 °F)] 36.4 °C (97.6 °F)  Pulse:  [55-69] 68  Resp:  [15-16] 16  BP: (116-174)/(64-96) 143/96  SpO2:  [91 %-97 %] 91 %    Physical Exam   Constitutional: He appears well-developed. No distress.   HENT:   Head: Normocephalic and atraumatic.   Eyes: Conjunctivae are normal. Right eye exhibits no discharge. Left eye exhibits no discharge. No scleral icterus.   Cardiovascular: Normal rate, regular rhythm, normal heart sounds and  intact distal pulses.  Exam reveals no gallop and no friction rub.    No murmur heard.  Pulmonary/Chest: Effort normal and breath sounds normal. No respiratory distress. He has no wheezes. He has no rales. He exhibits no tenderness.   Abdominal: Soft. Bowel sounds are normal. He exhibits no distension. There is no tenderness.   Musculoskeletal: Normal range of motion. He exhibits edema (RLE).   Neurological: He is alert.   Axox2, poor memory   Skin: Skin is warm and dry. He is not diaphoretic. No erythema.   Psychiatric: He has a normal mood and affect. His behavior is normal. Judgment and thought content normal.   Nursing note and vitals reviewed.      Fluids  No intake or output data in the 24 hours ending 04/15/19 1409    Laboratory  Recent Labs      04/13/19   0034  04/14/19   0352  04/15/19   0049   WBC  9.3  7.8  6.3   RBC  5.27  5.00  4.57*   HEMOGLOBIN  15.3  14.8  13.6*   HEMATOCRIT  49.0  47.4  43.9   MCV  93.0  94.8  96.1   MCH  29.0  29.6  29.8   MCHC  31.2*  31.2*  31.0*   RDW  46.5  48.5  48.0   PLATELETCT  124*  118*  112*   MPV  9.8  9.9  9.9     Recent Labs      04/12/19   1435  04/13/19   0034   SODIUM  136  141   POTASSIUM  3.3*  3.6   CHLORIDE  98  98   CO2  31  32   GLUCOSE  215*  172*   BUN  24*  23*   CREATININE  1.03  1.00   CALCIUM  8.7  8.7     Recent Labs      04/12/19   1435   INR  1.30*     Recent Labs      04/12/19   1435   BNPBTYPENAT  223*           Imaging  US-EXTREMITY VENOUS LOWER UNILAT RIGHT         DX-CHEST-PORTABLE (1 VIEW)   Final Result      Enlarged cardiac silhouette.      Exam limited by patient rotation with possible left basilar atelectasis or infiltrate and possible left pleural fluid.              Assessment/Plan  * Acute respiratory failure with hypoxia with pneumonitis and influenza A (HCC)   Assessment & Plan    Due to influenza and pneumonitis  Continue supportive care  Treat underlying problems  Titrate as tolerated     Hyperglycemia   Assessment & Plan     Improving  Accuchecks x 6 occurrences  A1c 6.3     CAP (community acquired pneumonia)   Assessment & Plan    I have changed his unasyn to augmentin. Cont doxy and tamiflu for total of 5 days  Resp and O2 per protocol     Influenza A   Assessment & Plan    continueTamiflu     Chronic anticoagulation- (present on admission)   Assessment & Plan    Chronic due to afib  Continue Eliquis  History of a psoas hematoma     Paroxysmal atrial fibrillation (HCC)- (present on admission)   Assessment & Plan    Continue metoprolol and Eliquis as tolerated     Thrombocytopenia (HCC)- (present on admission)   Assessment & Plan    Mild  Following     Essential hypertension, benign- (present on admission)   Assessment & Plan    following  Continue his lisinopril and metoprolol          VTE prophylaxis: eliquis

## 2019-04-15 NOTE — PROGRESS NOTES
Bedside report received. Patient resting in bed. NO needs voiced. Call bell within reach. Bed alarm on and in place. Will continue to monitor.

## 2019-04-15 NOTE — THERAPY
"Occupational Therapy Evaluation completed.   Functional Status: Supv supine > EOB. supv transfers with FWW, supv standing grooming, supv toileting, supv LB dressing   Plan of Care: Patient with no further skilled OT needs in the acute care setting at this time  Discharge Recommendations:  Equipment: No Equipment Needed. Post-acute therapy Discharge to home with home health for additional skilled therapy services.    See \"Rehab Therapy-Acute\" Patient Summary Report for complete documentation.    89 y.o. male with h/o a-fib, CKDIII, hep B, TIA who presented with malaise, cough. Pt dx with PNA and influenza A. Seen now for OT eval. Pt is completing basic ADL and transfers with no more than SBA/supv. He resides in [facility] with 99 y.o. Spouse. [facility] provides housekeeping and meals. Spouse completes laundry. Pt reports urinary urgency, admits to rushing to the bathroom. Educated pt on recommendation for adult briefs, having urinals available to avoid hasty mobilization which could contribute to fall risk. Pt open to recommendations. Pt appears to be at or near baseline function from OT standpoint, but may benefit from HH OT on DC to maximize functional independence and safety in the home.      "

## 2019-04-15 NOTE — FACE TO FACE
Face to Face Supporting Documentation - Home Health    The encounter with this patient was in whole or in part the primary reason for home health admission.    Date of encounter:   Patient:                    MRN:                       YOB: 2019  Joe Rashid  [id number]  1/7/1920     Home health to see patient for:  Physical Therapy evaluation and treatment and Occupational therapy evaluation and treatment    Skilled need for:  New Onset Medical Diagnosis Pneumonia, influenza    Skilled nursing interventions to include:  Comment: none    Homebound evidenced status by:  Needs the assistance of another person in order to leave the home. Leaving home must require a considerable and taxing effort. There must exist a normal inability to leave the home.    Community Physician to provide follow up care: Mervin Pompa M.D.     Optional Interventions    Wound information & treatment:    Home Infusion Therapy orders:    Line/Drain/Airway:    I certify the face to face encounter for this home care referral meets the CMS requirements and the encounter/clinical assessment with the patient was, in whole, or in part, for the medical condition(s) listed above, which is the primary reason for home health care. Based on my clinical findings: the service(s) are medically necessary, support the need for home health care, and the homebound criteria are met.  I certify that this patient has had a face to face encounter by myself.  Rocael Garvin M.D. - NPI: [id number]    *Debility, frailty and advanced age in the absence of an acute deterioration or exacerbation of a condition do not qualify a patient for home health.

## 2019-04-16 PROBLEM — R73.9 HYPERGLYCEMIA: Status: RESOLVED | Noted: 2019-01-01 | Resolved: 2019-01-01

## 2019-04-16 PROBLEM — J96.01 ACUTE RESPIRATORY FAILURE WITH HYPOXIA (HCC): Status: RESOLVED | Noted: 2019-01-01 | Resolved: 2019-01-01

## 2019-04-16 NOTE — DISCHARGE INSTRUCTIONS
"Discharge Instructions    Discharged to home by car with relative. Discharged via wheelchair, hospital escort: Yes.  Special equipment needed: Not Applicable    Be sure to schedule a follow-up appointment with your primary care doctor or any specialists as instructed.     Discharge Plan:   Influenza Vaccine Indication: Not indicated: Previously immunized this influenza season and > 8 years of age    I understand that a diet low in cholesterol, fat, and sodium is recommended for good health. Unless I have been given specific instructions below for another diet, I accept this instruction as my diet prescription.   Other diet: Regular    Special Instructions: None    · Is patient discharged on Warfarin / Coumadin?   No     Influenza, Adult  Influenza (“the flu\") is an infection in the lungs, nose, and throat (respiratory tract). It is caused by a virus. The flu causes many common cold symptoms, as well as a high fever and body aches. It can make you feel very sick.  The flu spreads easily from person to person (is contagious). Getting a flu shot (influenza vaccination) every year is the best way to prevent the flu.  Follow these instructions at home:  · Take over-the-counter and prescription medicines only as told by your doctor.  · Use a cool mist humidifier to add moisture (humidity) to the air in your home. This can make it easier to breathe.  · Rest as needed.  · Drink enough fluid to keep your pee (urine) clear or pale yellow.  · Cover your mouth and nose when you cough or sneeze.  · Wash your hands with soap and water often, especially after you cough or sneeze. If you cannot use soap and water, use hand .  · Stay home from work or school as told by your doctor. Unless you are visiting your doctor, try to avoid leaving home until your fever has been gone for 24 hours without the use of medicine.  · Keep all follow-up visits as told by your doctor. This is important.  How is this prevented?  · Getting a " yearly (annual) flu shot is the best way to avoid getting the flu. You may get the flu shot in late summer, fall, or winter. Ask your doctor when you should get your flu shot.  · Wash your hands often or use hand  often.  · Avoid contact with people who are sick during cold and flu season.  · Eat healthy foods.  · Drink plenty of fluids.  · Get enough sleep.  · Exercise regularly.  Contact a doctor if:  · You get new symptoms.  · You have:  ¨ Chest pain.  ¨ Watery poop (diarrhea).  ¨ A fever.  · Your cough gets worse.  · You start to have more mucus.  · You feel sick to your stomach (nauseous).  · You throw up (vomit).  Get help right away if:  · You start to be short of breath or have trouble breathing.  · Your skin or nails turn a bluish color.  · You have very bad pain or stiffness in your neck.  · You get a sudden headache.  · You get sudden pain in your face or ear.  · You cannot stop throwing up.  This information is not intended to replace advice given to you by your health care provider. Make sure you discuss any questions you have with your health care provider.  Document Released: 09/26/2009 Document Revised: 05/25/2017 Document Reviewed: 10/11/2016  [employer] Interactive Patient Education © 2017 [employer]      Community-Acquired Pneumonia, Adult  Introduction  Pneumonia is an infection of the lungs. One type of pneumonia can happen while a person is in a hospital. A different type can happen when a person is not in a hospital (community-acquired pneumonia). It is easy for this kind to spread from person to person. It can spread to you if you breathe near an infected person who coughs or sneezes. Some symptoms include:  · A dry cough.  · A wet (productive) cough.  · Fever.  · Sweating.  · Chest pain.  Follow these instructions at home:  · Take over-the-counter and prescription medicines only as told by your doctor.  ¨ Only take cough medicine if you are losing sleep.  ¨ If you were prescribed  an antibiotic medicine, take it as told by your doctor. Do not stop taking the antibiotic even if you start to feel better.  · Sleep with your head and neck raised (elevated). You can do this by putting a few pillows under your head, or you can sleep in a recliner.  · Do not use tobacco products. These include cigarettes, chewing tobacco, and e-cigarettes. If you need help quitting, ask your doctor.  · Drink enough water to keep your pee (urine) clear or pale yellow.  A shot (vaccine) can help prevent pneumonia. Shots are often suggested for:  · People older than 65 years of age.  · People older than 19 years of age:  ¨ Who are having cancer treatment.  ¨ Who have long-term (chronic) lung disease.  ¨ Who have problems with their body's defense system (immune system).  You may also prevent pneumonia if you take these actions:  · Get the flu (influenza) shot every year.  · Go to the dentist as often as told.  · Wash your hands often. If soap and water are not available, use hand .  Contact a doctor if:  · You have a fever.  · You lose sleep because your cough medicine does not help.  Get help right away if:  · You are short of breath and it gets worse.  · You have more chest pain.  · Your sickness gets worse. This is very serious if:  ¨ You are an older adult.  ¨ Your body's defense system is weak.  · You cough up blood.  This information is not intended to replace advice given to you by your health care provider. Make sure you discuss any questions you have with your health care provider.  Document Released: 06/05/2009 Document Revised: 05/25/2017 Document Reviewed: 04/13/2016  © 2017 Elsevier  Amoxicillin; Clavulanic Acid tablets  What is this medicine?  AMOXICILLIN; CLAVULANIC ACID (a mox i ALISE in; KYLE ferguson ic AS id) is a penicillin antibiotic. It is used to treat certain kinds of bacterial infections. It will not work for colds, flu, or other viral infections.  This medicine may be used for other  purposes; ask your health care provider or pharmacist if you have questions.  COMMON BRAND NAME(S): Augmentin  What should I tell my health care provider before I take this medicine?  They need to know if you have any of these conditions:  -bowel disease, like colitis  -kidney disease  -liver disease  -mononucleosis  -an unusual or allergic reaction to amoxicillin, penicillin, cephalosporin, other antibiotics, clavulanic acid, other medicines, foods, dyes, or preservatives  -pregnant or trying to get pregnant  -breast-feeding  How should I use this medicine?  Take this medicine by mouth with a full glass of water. Follow the directions on the prescription label. Take at the start of a meal. Do not crush or chew. If the tablet has a score line, you may cut it in half at the score line for easier swallowing. Take your medicine at regular intervals. Do not take your medicine more often than directed. Take all of your medicine as directed even if you think you are better. Do not skip doses or stop your medicine early.  Talk to your pediatrician regarding the use of this medicine in children. Special care may be needed.  Overdosage: If you think you have taken too much of this medicine contact a poison control center or emergency room at once.  NOTE: This medicine is only for you. Do not share this medicine with others.  What if I miss a dose?  If you miss a dose, take it as soon as you can. If it is almost time for your next dose, take only that dose. Do not take double or extra doses.  What may interact with this medicine?  -allopurinol  -anticoagulants  -birth control pills  -methotrexate  -probenecid  This list may not describe all possible interactions. Give your health care provider a list of all the medicines, herbs, non-prescription drugs, or dietary supplements you use. Also tell them if you smoke, drink alcohol, or use illegal drugs. Some items may interact with your medicine.  What should I watch for while  using this medicine?  Tell your doctor or health care professional if your symptoms do not improve.  Do not treat diarrhea with over the counter products. Contact your doctor if you have diarrhea that lasts more than 2 days or if it is severe and watery.  If you have diabetes, you may get a false-positive result for sugar in your urine. Check with your doctor or health care professional.  Birth control pills may not work properly while you are taking this medicine. Talk to your doctor about using an extra method of birth control.  What side effects may I notice from receiving this medicine?  Side effects that you should report to your doctor or health care professional as soon as possible:  -allergic reactions like skin rash, itching or hives, swelling of the face, lips, or tongue  -breathing problems  -dark urine  -fever or chills, sore throat  -redness, blistering, peeling or loosening of the skin, including inside the mouth  -seizures  -trouble passing urine or change in the amount of urine  -unusual bleeding, bruising  -unusually weak or tired  -white patches or sores in the mouth or throat  Side effects that usually do not require medical attention (report to your doctor or health care professional if they continue or are bothersome):  -diarrhea  -dizziness  -headache  -nausea, vomiting  -stomach upset  -vaginal or anal irritation  This list may not describe all possible side effects. Call your doctor for medical advice about side effects. You may report side effects to FDA at 9-044-FDA-2446.  Where should I keep my medicine?  Keep out of the reach of children.  Store at room temperature below 25 degrees C (77 degrees F). Keep container tightly closed. Throw away any unused medicine after the expiration date.  NOTE: This sheet is a summary. It may not cover all possible information. If you have questions about this medicine, talk to your doctor, pharmacist, or health care provider.  © 2018 [employer]vier[employer]  Standard (2009-03-12 12:04:30)  Doxycycline tablets or capsules  What is this medicine?  DOXYCYCLINE (dox abdulaziz peterson) is a tetracycline antibiotic. It kills certain bacteria or stops their growth. It is used to treat many kinds of infections, like dental, skin, respiratory, and urinary tract infections. It also treats acne, Lyme disease, malaria, and certain sexually transmitted infections.  This medicine may be used for other purposes; ask your health care provider or pharmacist if you have questions.  COMMON BRAND NAME(S): Acticlate, Adoxa, Adoxa CK, Adoxa Dov, Adoxa TT, Alodox, Avidoxy, Doxal, Mondoxyne NL, Monodox, Morgidox 1x, Morgidox 1x Kit, Morgidox 2x, Morgidox 2x Kit, NutriDox, Ocudox, TARGADOX, Vibra-Tabs, Vibramycin  What should I tell my health care provider before I take this medicine?  They need to know if you have any of these conditions:  -liver disease  -long exposure to sunlight like working outdoors  -stomach problems like colitis  -an unusual or allergic reaction to doxycycline, tetracycline antibiotics, other medicines, foods, dyes, or preservatives  -pregnant or trying to get pregnant  -breast-feeding  How should I use this medicine?  Take this medicine by mouth with a full glass of water. Follow the directions on the prescription label. It is best to take this medicine without food, but if it upsets your stomach take it with food. Take your medicine at regular intervals. Do not take your medicine more often than directed. Take all of your medicine as directed even if you think you are better. Do not skip doses or stop your medicine early.  Talk to your pediatrician regarding the use of this medicine in children. While this drug may be prescribed for selected conditions, precautions do apply.  Overdosage: If you think you have taken too much of this medicine contact a poison control center or emergency room at once.  NOTE: This medicine is only for you. Do not share this medicine with  others.  What if I miss a dose?  If you miss a dose, take it as soon as you can. If it is almost time for your next dose, take only that dose. Do not take double or extra doses.  What may interact with this medicine?  -antacids  -barbiturates  -birth control pills  -bismuth subsalicylate  -carbamazepine  -methoxyflurane  -other antibiotics  -phenytoin  -vitamins that contain iron  -warfarin  This list may not describe all possible interactions. Give your health care provider a list of all the medicines, herbs, non-prescription drugs, or dietary supplements you use. Also tell them if you smoke, drink alcohol, or use illegal drugs. Some items may interact with your medicine.  What should I watch for while using this medicine?  Tell your doctor or health care professional if your symptoms do not improve.  Do not treat diarrhea with over the counter products. Contact your doctor if you have diarrhea that lasts more than 2 days or if it is severe and watery.  Do not take this medicine just before going to bed. It may not dissolve properly when you lay down and can cause pain in your throat. Drink plenty of fluids while taking this medicine to also help reduce irritation in your throat.  This medicine can make you more sensitive to the sun. Keep out of the sun. If you cannot avoid being in the sun, wear protective clothing and use sunscreen. Do not use sun lamps or tanning beds/booths.  Birth control pills may not work properly while you are taking this medicine. Talk to your doctor about using an extra method of birth control.  If you are being treated for a sexually transmitted infection, avoid sexual contact until you have finished your treatment. Your sexual partner may also need treatment.  Avoid antacids, aluminum, calcium, magnesium, and iron products for 4 hours before and 2 hours after taking a dose of this medicine.  If you are using this medicine to prevent malaria, you should still protect yourself from  contact with mosquitos. Stay in screened-in areas, use mosquito nets, keep your body covered, and use an insect repellent.  What side effects may I notice from receiving this medicine?  Side effects that you should report to your doctor or health care professional as soon as possible:  -allergic reactions like skin rash, itching or hives, swelling of the face, lips, or tongue  -difficulty breathing  -fever  -itching in the rectal or genital area  -pain on swallowing  -redness, blistering, peeling or loosening of the skin, including inside the mouth  -severe stomach pain or cramps  -unusual bleeding or bruising  -unusually weak or tired  -yellowing of the eyes or skin  Side effects that usually do not require medical attention (report to your doctor or health care professional if they continue or are bothersome):  -diarrhea  -loss of appetite  -nausea, vomiting  This list may not describe all possible side effects. Call your doctor for medical advice about side effects. You may report side effects to FDA at 7-357-FDA-7621.  Where should I keep my medicine?  Keep out of the reach of children.  Store at room temperature, below 30 degrees C (86 degrees F). Protect from light. Keep container tightly closed. Throw away any unused medicine after the expiration date. Taking this medicine after the expiration date can make you seriously ill.  NOTE: This sheet is a summary. It may not cover all possible information. If you have questions about this medicine, talk to your doctor, pharmacist, or health care provider.  © 2018 [employer] Standard (2017-01-18 17:11:22)  Oseltamivir capsules  What is this medicine?  OSELTAMIVIR (os el RAIN i vir) is an antiviral medicine. It is used to prevent and to treat some kinds of influenza or the flu. It will not work for colds or other viral infections.  This medicine may be used for other purposes; ask your health care provider or pharmacist if you have questions.  COMMON BRAND NAME(S):  Tamiflu  What should I tell my health care provider before I take this medicine?  They need to know if you have any of the following conditions:  -heart disease  -immune system problems  -kidney disease  -liver disease  -lung disease  -an unusual or allergic reaction to oseltamivir, other medicines, foods, dyes, or preservatives  -pregnant or trying to get pregnant  -breast-feeding  How should I use this medicine?  Take this medicine by mouth with a glass of water. Follow the directions on the prescription label. Start this medicine at the first sign of flu symptoms. You can take it with or without food. If it upsets your stomach, take it with food. Take your medicine at regular intervals. Do not take your medicine more often than directed. Take all of your medicine as directed even if you think you are better. Do not skip doses or stop your medicine early.  Talk to your pediatrician regarding the use of this medicine in children. While this drug may be prescribed for children as young as 14 days for selected conditions, precautions do apply.  Overdosage: If you think you have taken too much of this medicine contact a poison control center or emergency room at once.  NOTE: This medicine is only for you. Do not share this medicine with others.  What if I miss a dose?  If you miss a dose, take it as soon as you remember. If it is almost time for your next dose (within 2 hours), take only that dose. Do not take double or extra doses.  What may interact with this medicine?  Interactions are not expected.  This list may not describe all possible interactions. Give your health care provider a list of all the medicines, herbs, non-prescription drugs, or dietary supplements you use. Also tell them if you smoke, drink alcohol, or use illegal drugs. Some items may interact with your medicine.  What should I watch for while using this medicine?  Visit your doctor or health care professional for regular check ups. Tell your  doctor if your symptoms do not start to get better or if they get worse.  If you have the flu, you may be at an increased risk of developing seizures, confusion, or abnormal behavior. This occurs early in the illness, and more frequently in children and teens. These events are not common, but may result in accidental injury to the patient. Families and caregivers of patients should watch for signs of unusual behavior and contact a doctor or health care professional right away if the patient shows signs of unusual behavior.  This medicine is not a substitute for the flu shot. Talk to your doctor each year about an annual flu shot.  What side effects may I notice from receiving this medicine?  Side effects that you should report to your doctor or health care professional as soon as possible:  -allergic reactions like skin rash, itching or hives, swelling of the face, lips, or tongue  -anxiety, confusion, unusual behavior  -breathing problems  -hallucination, loss of contact with reality  -redness, blistering, peeling or loosening of the skin, including inside the mouth  -seizures  Side effects that usually do not require medical attention (report to your doctor or health care professional if they continue or are bothersome):  -diarrhea  -headache  -nausea, vomiting  -pain  This list may not describe all possible side effects. Call your doctor for medical advice about side effects. You may report side effects to FDA at 7-112-FDA-2385.  Where should I keep my medicine?  Keep out of the reach of children.  Store at room temperature between 15 and 30 degrees C (59 and 86 degrees F). Throw away any unused medicine after the expiration date.  NOTE: This sheet is a summary. It may not cover all possible information. If you have questions about this medicine, talk to your doctor, pharmacist, or health care provider.  © 2018 [employer] Standard (2016-06-22 10:50:39)    Depression / Suicide Risk    As you are discharged from  this [facility] facility, it is important to learn how to keep safe from harming yourself.    Recognize the warning signs:  · Abrupt changes in personality, positive or negative- including increase in energy   · Giving away possessions  · Change in eating patterns- significant weight changes-  positive or negative  · Change in sleeping patterns- unable to sleep or sleeping all the time   · Unwillingness or inability to communicate  · Depression  · Unusual sadness, discouragement and loneliness  · Talk of wanting to die  · Neglect of personal appearance   · Rebelliousness- reckless behavior  · Withdrawal from people/activities they love  · Confusion- inability to concentrate     If you or a loved one observes any of these behaviors or has concerns about self-harm, here's what you can do:  · Talk about it- your feelings and reasons for harming yourself  · Remove any means that you might use to hurt yourself (examples: pills, rope, extension cords, firearm)  · Get professional help from the community (Mental Health, Substance Abuse, psychological counseling)  · Do not be alone:Call your Safe Contact- someone whom you trust who will be there for you.  · Call your local CRISIS HOTLINE 452-1792 or 332-195-5896  · Call your local Children's Mobile Crisis Response Team [place] (143) 615-3322 or www.[employer]  · Call the toll free National Suicide Prevention Hotlines   · National Suicide Prevention Lifeline 195-560-KXAS (5600)  · National Hope Line Network 800-SUICIDE (886-7681)

## 2019-04-16 NOTE — PROGRESS NOTES
Received report from day shift RN. Assumed care of pt. Pt reports no at this time. Updated pt on plan of care. Pt resting comfortably bed. Bed alarm in place. Educated on use of call light. Hourly rounding and continuous monitoring in place.

## 2019-04-16 NOTE — CARE PLAN
Problem: Communication  Goal: The ability to communicate needs accurately and effectively will improve  Outcome: PROGRESSING AS EXPECTED    Intervention: Educate patient and significant other/support system about the plan of care, procedures, treatments, medications and allow for questions  Whiteboard updated. Discussed POC with pt. Pt verbalized understanding of plan.       Problem: Pain Management  Goal: Pain level will decrease to patient's comfort goal  Outcome: PROGRESSING AS EXPECTED  Pt currently without complaint of discomfort. Pt currently at comfort function goal

## 2019-04-16 NOTE — PROGRESS NOTES
Patient discharged home with family. All personal belongings collected. IV access removed. Monitor removed, monitor room notified. Discharge instructions discussed. Medications reviewed; new medication education provided. Follow up appointments discussed and confirmed. Patient escorted off unit via wheelchair with all personal belongings and family without incident.

## 2019-04-16 NOTE — DISCHARGE PLANNING
Anticipated Discharge Disposition: Home with HH    Action: LCSW rosalva texted the MD to identify when the pt will be medically cleared for D/C. Pt was accepted to HH with [facility].    Barriers to Discharge: Medical Clearance    Plan: LCSW will f/u with MD and continue to follow and assess for any further needs.

## 2019-04-16 NOTE — PROGRESS NOTES
Assumed care of patient at bedside report from NOC RN. Updated on POC. Patient currently A & O x 4; on room air; up x1 assist with a front wheel walker; without complaints of acute pain. Call light within reach. Whiteboard updated. Pt ambulated to bathroom. Fall precautions in place. Bed locked and in lowest position. All questions answered. No other needs indicated at this time.

## 2019-04-16 NOTE — PROGRESS NOTES
Pt w/ elevated bp of 170/100. Pt already received catapres 0.1mg at 2100. Dr Ruiz paged to receive new orders. No new orders received, per Dr Ruiz only treat for SBP above 180. Will continue to monitor.

## 2019-04-16 NOTE — DISCHARGE PLANNING
Anticipated Discharge Disposition: Home w/ HH    Action: LCSW discussed pt in IDT rounds. Pt is medically clear to d/c today.    Barriers to Discharge: None at this time.    Plan: LCSW will continue to follow and assess for further d/c needs.

## 2019-04-17 NOTE — DISCHARGE SUMMARY
Discharge Summary    CHIEF COMPLAINT ON ADMISSION  Chief Complaint   Patient presents with   • Weakness     x 1 day   • Nausea   • Cough     non productive       Reason for Admission  EMS     Admission Date  4/12/2019    CODE STATUS  Prior    HPI & HOSPITAL COURSE  This is a 89 y.o. male who has past medical history of A. fib on Eliquis, history of chronic kidney disease stage III, history of congestive heart failure, history of cognitive impairment comes in with shortness of breath and generalized weakness.  Was found to have positive influenza and can be acquired pneumonia.  Started on Tamiflu along with oral antibiotics.  Was in acute respiratory failure which resolved over hospital course.  Has been hemodynamically and clinically stable.  Evaluated by physical therapy and Occupational Therapy recommended home health.  There was arranged for the patient.  His respiratory status gradually improved over hospital course.  He was afebrile.  He was tolerating p.o. fairly.  He was cleared for discharge from medical standpoint.       Therefore, he is discharged in guarded and stable condition to home with close outpatient follow-up.    The patient met 2-midnight criteria for an inpatient stay at the time of discharge.    Discharge Date  4/16/2019    FOLLOW UP ITEMS POST DISCHARGE  Follow-up with PCP in 1 week    DISCHARGE DIAGNOSES  Principal Problem (Resolved):    Acute respiratory failure with hypoxia with pneumonitis and influenza A (HCC) POA: Unknown  Active Problems:    Essential hypertension, benign POA: Yes    Thrombocytopenia (HCC) POA: Yes    Paroxysmal atrial fibrillation (HCC) POA: Yes    Chronic anticoagulation (Chronic) POA: Yes    Influenza A POA: Unknown    CAP (community acquired pneumonia) POA: Unknown  Resolved Problems:    Hyperglycemia POA: Unknown      FOLLOW UP  Future Appointments  Date Time Provider Department Center   6/11/2019 10:30 AM [facility] EXAM 4 VMED None   12/17/2019 10:00 AM [facility]  [facility]. [facility] None     Mervin Pompa M.D.  [address]  [address]  [facility] [address]  [phone]      [facility]  called office and left a voice mail requesting office to call to schedule your appointment. If you do not hear from them please call to schedule. Thank you       MEDICATIONS ON DISCHARGE     Medication List      START taking these medications      Instructions   amoxicillin-clavulanate 875-125 MG Tabs  Commonly known as:  AUGMENTIN   Take 1 Tab by mouth every 12 hours for 3 days.  Dose:  1 Tab     doxycycline monohydrate 100 MG tablet  Commonly known as:  ADOXA   Take 1 Tab by mouth every 12 hours for 3 days.  Dose:  100 mg     oseltamivir 30 MG Caps  Commonly known as:  TAMIFLU   Take 1 Cap by mouth every 12 hours for 1 day.  Dose:  30 mg     potassium chloride SA 20 MEQ Tbcr  Commonly known as:  Kdur   Doctor's comments:  Take with lasix only.  Take 1 Tab by mouth every day.  Dose:  20 mEq        CHANGE how you take these medications      Instructions   PROBIOTIC DAILY Caps  What changed:  how much to take   Take 2 Caps by mouth every evening for 10 days.  Dose:  2 Cap        CONTINUE taking these medications      Instructions   atorvastatin 80 MG tablet  Commonly known as:  LIPITOR   Take 1 Tab by mouth every evening.  Dose:  80 mg     cloNIDine 0.1 MG Tabs  Commonly known as:  CATAPRES   Take 1 Tab by mouth 2 times a day as needed (For SBP > 160).  Dose:  0.1 mg     ELIQUIS 2.5mg Tabs  Generic drug:  apixaban   Take 2.5 mg by mouth 2 Times a Day. Takes at noon and in evening  Dose:  2.5 mg     finasteride 5 MG Tabs  Commonly known as:  PROSCAR   Take 5 mg by mouth every day.  Dose:  5 mg     FLOMAX 0.4 MG capsule  Generic drug:  tamsulosin   Take 0.4 mg by mouth every bedtime.  Dose:  0.4 mg     furosemide 40 MG Tabs  Commonly known as:  LASIX   Take 40 mg by mouth every morning.  Dose:  40 mg     glucosamine Sulfate 500 MG Caps   Take 500 mg by mouth every day with lunch.  Dose:  500 mg      lisinopril 20 MG Tabs  Commonly known as:  PRINIVIL   Take 1 Tab by mouth 2 Times a Day.  Dose:  20 mg     metoprolol 25 MG Tabs  Commonly known as:  LOPRESSOR   Doctor's comments:  **Patient requests 90 days supply**  TAKE 1 TABLET BY MOUTH TWICE DAILY     MULTIVITAMIN & MINERAL PO   Take 1 Capsule by mouth every day before lunch.  Dose:  1 Capsule        STOP taking these medications    POTASSIUM PO            Allergies  Allergies   Allergen Reactions   • Aspirin Hives and Rash     Pts grandson states rash and hives.   • Sulfa Drugs Hives and Rash     Pts grandson states rash and hives.       DIET  No orders of the defined types were placed in this encounter.      ACTIVITY  As tolerated.  Weight bearing as tolerated    CONSULTATIONS  None    PROCEDURES  None    LABORATORY  Lab Results   Component Value Date    SODIUM 141 04/13/2019    POTASSIUM 3.6 04/13/2019    CHLORIDE 98 04/13/2019    CO2 32 04/13/2019    GLUCOSE 172 (H) 04/13/2019    BUN 23 (H) 04/13/2019    CREATININE 1.00 04/13/2019        Lab Results   Component Value Date    WBC 6.0 04/16/2019    HEMOGLOBIN 13.9 (L) 04/16/2019    HEMATOCRIT 43.8 04/16/2019    PLATELETCT 114 (L) 04/16/2019        Total time of the discharge process exceeds 40 minutes.

## 2019-07-04 PROBLEM — E78.5 HLD (HYPERLIPIDEMIA): Status: ACTIVE | Noted: 2019-01-01

## 2019-07-04 PROBLEM — I50.33 ACUTE ON CHRONIC DIASTOLIC (CONGESTIVE) HEART FAILURE (HCC): Status: ACTIVE | Noted: 2019-01-01

## 2019-07-05 NOTE — ED NOTES
Pt taken to CDU on monitor, taken by Leonila RN (ACLS RN).   NAD. Chart and belongings with patient.

## 2019-07-05 NOTE — ED NOTES
Lab called stating they need another green top for troponin. Repeat green top drawn and tubed to lab.

## 2019-07-05 NOTE — ASSESSMENT & PLAN NOTE
Blood pressure has been gradually trending up, resume home Catapres and Prinivil.    PRN medication required  Oral hydralazine initiated   Will monitor closely overnight

## 2019-07-05 NOTE — ED PROVIDER NOTES
ED Provider Note      HPI: Patient is a 89-year-old male who presented to the emergency department July 4, 2019 at 5:17 PM with a chief complaint of shortness of breath.    Patient lives in a skilled nursing home.  He has been short of breath for several weeks but became acutely worse today and he became extremely dyspneic with exertion today.  The patient stopped taking Lasix a month ago.  He has had no chest pain nausea vomiting dizziness headache or fever.  He does note some mild lower extremity swelling bilaterally.  No significant leg pain.  No chills no change in bladder or bowel habits.  No other somatic complaint    Review of Systems: Positive for shortness of breath/dyspnea on exertion bilateral lower extremity swelling negative for chills change in bladder bowel habits chest pain nausea vomiting dizziness headache fever.  Review of systems reviewed with patient, all other systems negative    Past medical/surgical history: Hypertension CHF hepatitis B chronic anticoagulation atrial fibrillation TIA stage III kidney disease appendectomy TURP hip arthroplasty    Medications: Lopressor Catapres K-Dur Eliquis lisinopril Lipitor Proscar    Allergies: Aspirin sulfa    Social History: No history of smoking no alcohol use      Physical exam: Constitutional: Elderly male awake alert  Vital signs: Temperature 98.3 blood pressure 147/102 pulse 61 respirations 18 pulse oximetry 96% on 3 L  EYES: PERRL, EOMI, Conjunctivae and sclera normal, eyelids normal bilaterally.  Neck: Trachea midline. No cervical masses seen or palpated. Normal range of motion, supple. No meningeal signs elicited.  Cardiac: Mildly bradycardic.  Regular rate and rhythm. S1-S2 present. No S3 or S4 present. No murmurs, rubs, or gallops heard.  2+ edema both ankles.  This appeared to be symmetric.   Lungs: Markedly diminished in both bases.  No wheezes, rales, or rhonchi heard. Patient's chest wall moved symmetrically with each respiratory effort. Patient  was not making use of accessory muscles of respiration in breathing.  Abdomen: Soft nontender to palpation. No rebound or guarding elicited. No organomegaly identified. No pulsatile abdominal masses identified.   Musculoskeletal:  no  pain with palpitation or movement of muscle, bone or joint , no obvious musculoskeletal deformities identified.  Neurologic: alert and awake answers questions appropriately. Moves all four extremities independently, no gross focal abnormalities identified. Normal strength and motor.  Skin: no rash or lesion seen, no palpable dermatologic lesions identified.  Psychiatric: not anxious, delusional, or hallucinating.    Medical decision making: Chest x-ray obtained; patchy opacifications are noted in both bases and small pleural effusions are noted bilaterally.  There is no evidence of consolidation or pneumothorax.  No change in the size of the cardiac silhouette.  Interpreted as an abnormal EKG probably indicating congestive heart failure.    Stable cardiopericardial silhouette.    Patient given 40 mg of Lasix IV    Laboratory studies obtained (please see lab sheet for all results) significant findings included unremarkable CBC.  Blood sugar slightly elevated 140.  Patient appears to be somewhat dehydrated with a BUN of 31.  Troponin normal at 0.02    EKG obtained (interpretation) twelve-lead EKG sinus rhythm rate 56.  Morphology P waves QRS complexes T waves unremarkable.  Patient does have somewhat late transition noted.  This was present on a previous EKG however.  There is no evidence of ST elevation or depression.    Interpreted as an abnormal EKG due to the late transition and bradycardia but not indicating acute ischemia    Patient will be admitted by the hospitalist service.  We will diurese him.  He does not appear to be acutely ischemic at this time and has no signs or symptoms of an infectious process.  Further care and hospital course per attending physician  summary    Impression #1) DANIEL  2) CHF

## 2019-07-05 NOTE — CARE PLAN
Problem: Safety  Goal: Will remain free from injury  Outcome: PROGRESSING AS EXPECTED  Anticipate patient's needs. Keep belongings within reach. Fall precautions in place    Problem: Fluid Volume:  Goal: Will maintain balanced intake and output  Outcome: PROGRESSING AS EXPECTED  Monitor intake and output.

## 2019-07-05 NOTE — ED TRIAGE NOTES
Pt coming from [facility] c/o SOB x 1 month that became worse today. Pt unable to walk w/o SOB today. Pt stopped taking lasix 1 month ago. Denies CP, NVD, dizziness. Hx HTN. Received nitro en route.

## 2019-07-05 NOTE — PROGRESS NOTES
Unable to complete med rec, pt does not know the meds he's taking, called the wife, she cannot hear.

## 2019-07-05 NOTE — ED NOTES
Pt medicated with Lasix (see MAR). Given urinal, instructed not to get up with out staff. Call light within reach.

## 2019-07-05 NOTE — ASSESSMENT & PLAN NOTE
Known history of diastolic CHF  Decided to take himself off of lasix   Elevated BNP  Leg swelling- improving   SOB  IV diuresis, transition back to po dosing

## 2019-07-05 NOTE — PROGRESS NOTES
Bedside report received 0710. POC discussed with pt; Pt denies SOB; BP elevated; NP updated; No overnight cardiac events. all questions answered at this time.

## 2019-07-05 NOTE — H&P
Hospital Medicine History & Physical Note    Date of Service  7/4/2019    Primary Care Physician  Mervin Pompa M.D.    Consultants  None    Code Status  DNR, confirmed with patient at bedside    Chief Complaint  Chief Complaint   Patient presents with   • Shortness of Breath       History of Presenting Illness  89 y.o. male who presented on 7/4/2019 with shortness of breath.  This is a very pleasant 89-year-old gentleman who manages his own medication and completes all of his own ADLs at home.  He is mentally sharp and very aware.  The patient states that he has been taking Lasix however a month ago, he decided to stop this medication as he was going to the bathroom very frequently.  He found that he was needing to get up in the middle of meals to use the restroom.  He did not inform his primary care provider and states that this is the first time he has made any adjustments to his home medications on his own.  He was well for the first 1 to 2 weeks, but shortly after this, he developed shortness of breath.  This has progressively worsened and he has also begun to note lower extremity swelling with dyspnea on exertion.  He denies any orthopnea.  Because of this, he presented himself to the hospital for further evaluation.  Other than this, the patient states that he is been in his usual state of health, no fevers, chills, headache, chest pain, abdominal pain, diarrhea or dysuria.    Review of Systems  Review of Systems   Constitutional: Negative for chills and fever.   HENT: Negative for congestion and sore throat.    Eyes: Negative for photophobia.   Respiratory: Positive for shortness of breath. Negative for cough and wheezing.    Cardiovascular: Positive for leg swelling. Negative for chest pain and palpitations.   Gastrointestinal: Negative for abdominal pain, diarrhea, nausea and vomiting.   Genitourinary: Negative for dysuria.   Musculoskeletal: Negative for myalgias.   Skin: Negative.    Neurological:  Negative for dizziness, tingling, focal weakness and headaches.   Psychiatric/Behavioral: Negative for depression and suicidal ideas.       Past Medical History  Past Medical History:   Diagnosis Date   • Atrial fibrillation (HCC)    • Chronic anticoagulation    • Chronic kidney disease, stage III (moderate) (HCC)    • Congestive heart failure (HCC)    • Hepatitis B    • History of TIA (transient ischemic attack)    • Hypertension        Surgical History  Past Surgical History:   Procedure Laterality Date   • HIP HEMIARTHROPLASTY Left 1/27/2016    Procedure: HIP HEMIARTHROPLASTY;  Surgeon: Adis Martínez M.D.;  Location: SURGERY [facility];  Service:    • APPENDECTOMY     • CATARACT EXTRACTION WITH IOL      both eyes   • COLON RESECTION     • OTHER      sinus surgery of unknown type   • PB CRYOSURG ABLATION OF PBOSTATE     • PB TRANSURETHRAL ELEC-SURG PBOSTATECTOM         Family History  Family History   Problem Relation Age of Onset   • Heart Disease Brother        Social History  Social History   Substance Use Topics   • Smoking status: Never Smoker   • Smokeless tobacco: Never Used   • Alcohol use No       Allergies  Allergies   Allergen Reactions   • Aspirin Hives and Rash     Pts grandson states rash and hives.   • Sulfa Drugs Hives and Rash     Pts grandson states rash and hives.       Medications  No current facility-administered medications on file prior to encounter.      Current Outpatient Prescriptions on File Prior to Encounter   Medication Sig Dispense Refill   • metoprolol (LOPRESSOR) 25 MG Tab TAKE 1 TABLET BY MOUTH TWICE DAILY 90 Tab 2   • cloNIDine (CATAPRES) 0.1 MG Tab Take 1 Tab by mouth 2 times a day as needed (For SBP > 160). 30 Tab 0   • potassium chloride SA (KDUR) 20 MEQ Tab CR Take 1 Tab by mouth every day. 30 Tab 0   • furosemide (LASIX) 40 MG Tab Take 40 mg by mouth every morning.     • apixaban (ELIQUIS) 2.5mg Tab Take 2.5 mg by mouth 2 Times a Day. Takes at noon and in evening      • atorvastatin (LIPITOR) 80 MG tablet Take 1 Tab by mouth every evening. 30 Tab 0   • finasteride (PROSCAR) 5 MG Tab Take 5 mg by mouth every day.     • Multiple Vitamins-Minerals (MULTIVITAMIN & MINERAL PO) Take 1 Capsule by mouth every day before lunch.     • tamsulosin (FLOMAX) 0.4 MG capsule Take 0.4 mg by mouth every bedtime.         Physical Exam  Hemodynamics  No data recorded.      Pulse  Av.5  Min: 61  Max: 68 Heart Rate (Monitored): 68  Blood Pressure : 147/102, NIBP: (!) 174/106      Respiratory      Respiration: 17, Pulse Oximetry: 98 %        RLL Breath Sounds: Expiratory Wheezes, LLL Breath Sounds: Expiratory Wheezes    Physical Exam   Constitutional: He is oriented to person, place, and time. No distress.   HENT:   Head: Normocephalic and atraumatic.   Right Ear: External ear normal.   Left Ear: External ear normal.   Eyes: EOM are normal. Right eye exhibits no discharge. Left eye exhibits no discharge.   Neck: Neck supple. No JVD present.   Cardiovascular: Normal rate, regular rhythm and normal heart sounds.    Pulmonary/Chest: Effort normal and breath sounds normal. No respiratory distress. He exhibits no tenderness.   Diminished breath sounds, no clear crackles heard   Abdominal: Soft. Bowel sounds are normal. He exhibits no distension. There is no tenderness.   Musculoskeletal: He exhibits edema.   Neurological: He is alert and oriented to person, place, and time. No cranial nerve deficit.   Skin: Skin is dry. He is not diaphoretic. No erythema.   Psychiatric: He has a normal mood and affect. His behavior is normal.   Nursing note and vitals reviewed.    Capillary refill less than 3 seconds, distal pulses intact    Laboratory:  Recent Labs      19   1739   WBC  8.1   RBC  4.89   HEMOGLOBIN  14.4   HEMATOCRIT  45.4   MCV  92.8   MCH  29.4   MCHC  31.7*   RDW  48.7   PLATELETCT  164   MPV  9.9     Recent Labs      19   1850   SODIUM  137   POTASSIUM  4.3   CHLORIDE  108   CO2  19*    GLUCOSE  140*   BUN  31*   CREATININE  1.06   CALCIUM  8.2*     Recent Labs      07/04/19   1850   ALTSGPT  30   ASTSGOT  23   ALKPHOSPHAT  86   TBILIRUBIN  0.7   GLUCOSE  140*                 Lab Results   Component Value Date    TROPONINI 0.02 04/12/2019       Imaging  Dx-chest-portable (1 View)    Result Date: 7/4/2019 7/4/2019 6:10 PM HISTORY/REASON FOR EXAM:  Chest Pain Short of breath. TECHNIQUE/EXAM DESCRIPTION AND NUMBER OF VIEWS: Single portable view of the chest. COMPARISON: 4/12/2019 FINDINGS: Low lung volume. Patchy bibasilar opacities. Small bilateral pleural effusions, left more than right. No pneumothorax. Stable cardiopericardial silhouette.     Low lung volumes with hypoventilatory change. Small bilateral pleural effusions with bibasilar atelectasis.        Assessment/Plan:  Anticipate that patient will need less than 2 midnights for management of the discussed medical issues.    * Acute on chronic diastolic (congestive) heart failure (HCC)   Assessment & Plan    Patient with a known history of diastolic congestive heart failure previously on Lasix.  His last echocardiogram showed severe pulmonary hypertension and preserved left ventricular ejection fraction.  I am resuming his home Lasix but I will place him on IV for tonight and monitor strict intake and output.  He will also be on fluid and sodium restrictions.  Once he is euvolemic, we will transition him back to his oral medications for discharge home.  He and I have discussed the need for medical consultation if he decides to stop any medications.  He understandably has interference in his lifestyle with his full dose of Lasix with frequent urination however the sudden discontinuation of his medication should not be attempted by himself alone in the future.  He is agreeable to following up with his primary care provider to discuss slow downward titration of his diuretics in the future.     HLD (hyperlipidemia)   Assessment & Plan    This is  chronic and stable, no chest pain.  Continue home Lipitor.     Paroxysmal atrial fibrillation (HCC)- (present on admission)   Assessment & Plan    This is chronic, heart rate is currently well controlled.  Continue home metoprolol and Eliquis.     Essential hypertension, benign- (present on admission)   Assessment & Plan    Blood pressure has been gradually trending up, resume home Catapres and Prinivil.  Expect improvement with initiation of IV Lasix.  Continue to monitor and adjust home medications as needed.         Prophylaxis: Sequential compression devices for DVT prophylaxis, no PPI indicated, bowel protocol as needed

## 2019-07-05 NOTE — PROGRESS NOTES
Hospital Medicine Daily Progress Note    Date of Service  7/5/2019    Chief Complaint  89 y.o. male admitted 7/4/2019 with shortness of breath.    Hospital Course    Patient is a 89-year-old male who presented 7/4/2019 with shortness of breath.  Patient has known medical history of atrial fibrillation, on chronic anticoagulation Eliquis, chronic kidney disease stage III, congestive heart failure, hepatitis B, history of TIA and uncontrolled hypertension.  Patient currently lives independently and is able to manage all of his own ADLs and medication administration.  Patient had been prescribed Lasix however approximately a month ago he decided to stop taking this medication as he felt he was going to the bathroom too often.  Patient did not notify his primary care provider that he had made this adjustment.  After approximately 2 weeks he developed shortness of breath that is progressively worsened and started to note swelling to his lower extremities and dyspnea on exertion.  Patient presented to the emergency room for further evaluation.  Patient was admitted to CDU for further work-up and monitoring.  Patient was placed on telemetry monitoring with no acute cardiac events noted.  Patient was reinitiated on IV Lasix therapy, with intent to transition back to his p.o. dosage for home.       Interval Problem Update  7/5- Patient states SOB has improved, right lower extremity appears more swollen than left, US lower extremity duplex obtained which showed no evidence of DVT. Patient was noted to have elevated BP and PRN hydralazine was administered with significant improvement. Patient slightly lightheaded with normal blood pressure and will watch overnight to transition to po diuretics for discharge .     Consultants/Specialty  None     Code Status  DNR    Disposition  Likely home in am     Review of Systems  Review of Systems   Constitutional: Positive for malaise/fatigue. Negative for chills and fever.   HENT:  Negative for congestion and sore throat.    Eyes: Negative for pain and discharge.   Respiratory: Positive for shortness of breath. Negative for cough.    Cardiovascular: Positive for leg swelling. Negative for chest pain.   Gastrointestinal: Negative for abdominal pain, nausea and vomiting.   Genitourinary: Negative for dysuria, frequency and urgency.   Musculoskeletal: Negative for back pain and myalgias.   Skin: Negative for rash.   Neurological: Negative for dizziness and headaches.   Psychiatric/Behavioral: Negative for depression.        Physical Exam  Temp:  [36.2 °C (97.1 °F)-36.6 °C (97.8 °F)] 36.2 °C (97.1 °F)  Pulse:  [46-87] 60  Resp:  [16-20] 20  BP: (110-206)/() 114/60  SpO2:  [94 %-99 %] 94 %    Physical Exam   Constitutional: He is oriented to person, place, and time. He appears well-developed. He is cooperative. No distress. Nasal cannula in place.   Patient resting in bed in no acute distress    HENT:   Head: Normocephalic.   Mouth/Throat: Oropharynx is clear and moist.   Eyes: Conjunctivae and lids are normal.   Neck: Neck supple. No tracheal tenderness present.   Cardiovascular: Normal rate and normal heart sounds.  Exam reveals no gallop.    Pulmonary/Chest: Effort normal. No respiratory distress. He has decreased breath sounds. He has no wheezes.   Abdominal: Soft. Normal appearance and bowel sounds are normal. He exhibits no distension. There is no tenderness. There is no guarding.   Musculoskeletal: He exhibits edema.   FREEMAN    Neurological: He is alert and oriented to person, place, and time.   Skin: Skin is warm and dry. He is not diaphoretic.   Psychiatric: He has a normal mood and affect. His speech is normal and behavior is normal.   Nursing note and vitals reviewed.      Fluids    Intake/Output Summary (Last 24 hours) at 07/05/19 1400  Last data filed at 07/05/19 1036   Gross per 24 hour   Intake              170 ml   Output             4850 ml   Net            -4680 ml        Laboratory  Recent Labs      07/04/19   1739  07/05/19   0328   WBC  8.1  11.0*   RBC  4.89  5.11   HEMOGLOBIN  14.4  15.0   HEMATOCRIT  45.4  47.7   MCV  92.8  93.3   MCH  29.4  29.4   MCHC  31.7*  31.4*   RDW  48.7  48.8   PLATELETCT  164  157*   MPV  9.9  9.7     Recent Labs      07/04/19   1850  07/05/19   0328   SODIUM  137  145   POTASSIUM  4.3  3.6   CHLORIDE  108  109   CO2  19*  29   GLUCOSE  140*  126*   BUN  31*  27*   CREATININE  1.06  0.93   CALCIUM  8.2*  8.0*                   Imaging  US-EXTREMITY VENOUS LOWER BILAT   Final Result      DX-CHEST-PORTABLE (1 VIEW)   Final Result         Low lung volumes with hypoventilatory change.      Small bilateral pleural effusions with bibasilar atelectasis.           Assessment/Plan  * Acute on chronic diastolic (congestive) heart failure (HCC)   Assessment & Plan    Known history of diastolic CHF  Decided to take himself off of lasix   Elevated BNP  Leg swelling  SOB  IV diuresis, transition back to po dosing         HLD (hyperlipidemia)   Assessment & Plan    This is chronic and stable, no chest pain.  Continue home Lipitor.     Paroxysmal atrial fibrillation (HCC)- (present on admission)   Assessment & Plan    This is chronic, heart rate is currently well controlled.  Continue home metoprolol and Eliquis.     Essential hypertension, benign- (present on admission)   Assessment & Plan    Blood pressure has been gradually trending up, resume home Catapres and Prinivil.    PRN medication required  Will monitor closely           VTE prophylaxis: Eliquis

## 2019-07-05 NOTE — PROGRESS NOTES
Received in bed, aox4, sr with pac on monitor. Assessment as per CDU. Call light within reach. Needs attended. Plan of care discussed and understood.

## 2019-07-05 NOTE — ED NOTES
Pt's grandson updated on POC per pt okay.   Pt's grandson's information is: Harlan Rashid [phone]

## 2019-07-06 NOTE — CARE PLAN
Problem: Medication  Goal: Compliance with prescribed medication will improve  Outcome: PROGRESSING AS EXPECTED  Pt educated on lasix use, verbalized understanding    Problem: Knowledge Deficit  Goal: Knowledge of disease process/condition, treatment plan, diagnostic tests, and medications will improve  Outcome: PROGRESSING AS EXPECTED  POC discussed, verbalized understanding

## 2019-07-06 NOTE — PROGRESS NOTES
Hospital Medicine Daily Progress Note    Date of Service  7/6/2019    Chief Complaint  89 y.o. male admitted 7/4/2019 with shortness of breath.    Hospital Course    Patient is a 89-year-old male who presented 7/4/2019 with shortness of breath.  Patient has known medical history of atrial fibrillation, on chronic anticoagulation Eliquis, chronic kidney disease stage III, congestive heart failure, hepatitis B, history of TIA and uncontrolled hypertension.  Patient currently lives independently and is able to manage all of his own ADLs and medication administration.  Patient had been prescribed Lasix however approximately a month ago he decided to stop taking this medication as he felt he was going to the bathroom too often.  Patient did not notify his primary care provider that he had made this adjustment.  After approximately 2 weeks he developed shortness of breath that is progressively worsened and started to note swelling to his lower extremities and dyspnea on exertion.  Patient presented to the emergency room for further evaluation.  Patient was admitted to CDU for further work-up and monitoring.  Patient was placed on telemetry monitoring with no acute cardiac events noted.  Patient was reinitiated on IV Lasix therapy, with intent to transition back to his p.o. dosage for home.       Interval Problem Update  7/5- Patient states SOB has improved, right lower extremity appears more swollen than left, US lower extremity duplex obtained which showed no evidence of DVT. Patient was noted to have elevated BP and PRN hydralazine was administered with significant improvement. Patient slightly lightheaded with normal blood pressure and will watch overnight to transition to po diuretics for discharge .   7/6- Patient continues  to have elevated blood pressure overnight. Have added oral hydralazine, and will monitor overnight for stability. Patient continues to have oxygen needs, attempting to wean as tolerated, provided  patient with IS. Encouraged patient to be up and out of bed. If blood pressure remains stable will discharge in am with close follow up on additional antihypertensive.     Consultants/Specialty  None     Code Status  DNR    Disposition  Likely home in am if blood pressure remains stable and weaned from O2    Review of Systems  Review of Systems   Constitutional: Negative for chills, fever and malaise/fatigue.   HENT: Negative for congestion and sore throat.    Eyes: Negative for pain and discharge.   Respiratory: Positive for shortness of breath (improved ). Negative for cough.    Cardiovascular: Positive for leg swelling (improved ). Negative for chest pain.   Gastrointestinal: Negative for abdominal pain, nausea and vomiting.   Genitourinary: Negative for dysuria, frequency and urgency.   Musculoskeletal: Negative for back pain and myalgias.   Skin: Negative for rash.   Neurological: Negative for dizziness and headaches.   Psychiatric/Behavioral: Negative for depression.        Physical Exam  Temp:  [36.3 °C (97.4 °F)-37.1 °C (98.7 °F)] 36.6 °C (97.8 °F)  Pulse:  [] 79  Resp:  [16-20] 18  BP: (123-197)/(67-95) 147/67  SpO2:  [92 %-96 %] 95 %    Physical Exam   Constitutional: He is oriented to person, place, and time. He appears well-developed. He is cooperative. No distress. Nasal cannula in place.   Patient resting in bed in no acute distress    HENT:   Head: Normocephalic.   Mouth/Throat: Oropharynx is clear and moist.   Eyes: Conjunctivae and lids are normal.   Neck: Neck supple. No tracheal tenderness present.   Cardiovascular: Normal rate and normal heart sounds.  An irregular rhythm present. Exam reveals no gallop.    Pulmonary/Chest: Effort normal. No accessory muscle usage. No respiratory distress. He has decreased breath sounds (improving ). He has no wheezes.   Abdominal: Soft. Normal appearance and bowel sounds are normal. He exhibits no distension. There is no tenderness. There is no guarding.    Musculoskeletal: He exhibits edema (improving).   FREEMAN    Neurological: He is alert and oriented to person, place, and time.   Skin: Skin is warm and dry. He is not diaphoretic.   Psychiatric: He has a normal mood and affect. His speech is normal and behavior is normal.   Nursing note and vitals reviewed.      Fluids    Intake/Output Summary (Last 24 hours) at 07/06/19 1322  Last data filed at 07/06/19 1234   Gross per 24 hour   Intake              550 ml   Output             2275 ml   Net            -1725 ml       Laboratory  Recent Labs      07/04/19   1739  07/05/19   0328  07/06/19   0100   WBC  8.1  11.0*  11.8*   RBC  4.89  5.11  5.31   HEMOGLOBIN  14.4  15.0  15.6   HEMATOCRIT  45.4  47.7  48.2   MCV  92.8  93.3  90.8   MCH  29.4  29.4  29.4   MCHC  31.7*  31.4*  32.4*   RDW  48.7  48.8  47.4   PLATELETCT  164  157*  176   MPV  9.9  9.7  9.8     Recent Labs      07/04/19   1850  07/05/19   0328  07/06/19   0100   SODIUM  137  145  143   POTASSIUM  4.3  3.6  3.9   CHLORIDE  108  109  102   CO2  19*  29  33   GLUCOSE  140*  126*  167*   BUN  31*  27*  33*   CREATININE  1.06  0.93  1.14   CALCIUM  8.2*  8.0*  9.2                   Imaging  US-EXTREMITY VENOUS LOWER BILAT   Final Result      DX-CHEST-PORTABLE (1 VIEW)   Final Result         Low lung volumes with hypoventilatory change.      Small bilateral pleural effusions with bibasilar atelectasis.           Assessment/Plan  * Acute on chronic diastolic (congestive) heart failure (HCC)   Assessment & Plan    Known history of diastolic CHF  Decided to take himself off of lasix   Elevated BNP  Leg swelling- improving   SOB  IV diuresis, transition back to po dosing         HLD (hyperlipidemia)   Assessment & Plan    This is chronic and stable, no chest pain.  Continue home Lipitor.     Paroxysmal atrial fibrillation (HCC)- (present on admission)   Assessment & Plan    This is chronic, heart rate is currently well controlled.  Continue home metoprolol and  Eliquis.     Essential hypertension, benign- (present on admission)   Assessment & Plan    Blood pressure has been gradually trending up, resume home Catapres and Prinivil.    PRN medication required  Oral hydralazine initiated   Will monitor closely overnight           VTE prophylaxis: Eliquis

## 2019-07-06 NOTE — PROGRESS NOTES
Rounding: Pt sleeping, calm, unlabored and even breathing. In no signs of pain or distress. Will continue to monitor

## 2019-07-06 NOTE — PROGRESS NOTES
Pt attempted to get out of bed. Slightly confused but Aox4. Reoriented assisted back to bed. Pt now sleeping comfortably

## 2019-07-06 NOTE — PROGRESS NOTES
Bedside report received at 1900. Assessment done. VSS. Telemonitored, SR. AOx4, no deficits. Unlabored and even breathing, 2LNC02, normal sat. Expiratory wheezes noted. No pain at this time, will monitor. Skin intact. IV CDI flushed. 25% dinner finished. Condom cath in use. SCDs on. Bed alarm on. Pt x1 assist with walker. Hourly patient rounding done. Fall precautions in place.  Call light in place, bed locked and in lowest position. White board updated. Reviewed POC. All questions answered at this time.

## 2019-07-07 PROBLEM — R09.02 HYPOXIA: Status: ACTIVE | Noted: 2019-01-01

## 2019-07-07 PROBLEM — I50.33 ACUTE ON CHRONIC DIASTOLIC (CONGESTIVE) HEART FAILURE (HCC): Status: RESOLVED | Noted: 2019-01-01 | Resolved: 2019-01-01

## 2019-07-07 NOTE — DISCHARGE PLANNING
Agency/Facility Name: [facility]  Spoke To: Karan  Outcome: Referral received. Pending insurance review, insurance office is closed due to the holiday weekend. Per karan insurance auth will have to be completed Monday 7/8.    ANGEL Baez notified.

## 2019-07-07 NOTE — PROGRESS NOTES
Attempted to wean pt off O2, during rest keeps desating on RA, lowest noted 83%. Placed on 2LNC normal saturation. Will monitor

## 2019-07-07 NOTE — ASSESSMENT & PLAN NOTE
Likely related to immobility and chf exacerbation  Continuing lasix  Wean O2 as tolerated  IS encouraged   RT protocol

## 2019-07-07 NOTE — PROGRESS NOTES
Bedside report received at 1900. Assessment done. /107 otherwise VSS . Telemonitored, SR. AOx4, calm demeanor no neuro deficits. Unlabored and even breathing, titrated down to RA from 1 L normal saturation will monitor. No pain at this time. Skin intact. IV CDI flushed. 75% dinner finished. Condom cath in use, clear yellow UO. SCDs and bed alarm on. Hourly patient rounding done. Fall precautions in place.  Call light in place, bed locked and in lowest position. White board updated. Reviewed POC. All questions answered at this time.

## 2019-07-07 NOTE — FACE TO FACE
Face to Face Supporting Documentation - Home Health    The encounter with this patient was in whole or in part the primary reason for home health admission.    Date of encounter:   Patient:                    MRN:                       YOB: 2019  Joe Rashid  [id number]  1/7/1920     Home health to see patient for:  Skilled Nursing care for assessment, interventions & education, Physical Therapy evaluation and treatment and Occupational therapy evaluation and treatment    Skilled need for:  Exacerbation of Chronic Disease State heart failure    Skilled nursing interventions to include:  Comment: medication management     Homebound status evidenced by:  Needs the assistance of another person in order to leave the home. Leaving home requires a considerable and taxing effort. There is a normal inability to leave the home.    Community Physician to provide follow up care: Mervin Pompa M.D.     Optional Interventions? No      I certify the face to face encounter for this home health care referral meets the CMS requirements and the encounter/clinical assessment with the patient was, in whole, or in part, for the medical condition(s) listed above, which is the primary reason for home health care. Based on my clinical findings: the service(s) are medically necessary, support the need for home health care, and the homebound criteria are met.  I certify that this patient has had a face to face encounter by myself.  Mayra Myrick A.P.R.N. - NPI: [id number]

## 2019-07-07 NOTE — CARE PLAN
Problem: Respiratory:  Goal: Respiratory status will improve  Outcome: PROGRESSING AS EXPECTED  Pt titrated from 1LNC to RA, normal saturation. Unlabored and even breathing will monitor    Problem: Knowledge Deficit  Goal: Knowledge of disease process/condition, treatment plan, diagnostic tests, and medications will improve  Outcome: PROGRESSING AS EXPECTED  POC discussed, BP management discussed

## 2019-07-07 NOTE — FACE TO FACE
"Face to Face Note  -  Durable Medical Equipment    WIL Conrad - NPI: [id number]  I certify that this patient is under my care and that they had a durable medical equipment(DME)face to face encounter by myself that meets the physician DME face-to-face encounter requirements with this patient on:    Date of encounter:   Patient:                    MRN:                       YOB: 2019  Joe Rashid  [id number]  1/7/1920     The encounter with the patient was in whole, or in part, for the following medical condition, which is the primary reason for durable medical equipment:  CHF and Other - hypoxia    I certify that, based on my findings, the following durable medical equipment is medically necessary:  Oxygen.    HOME O2 Saturation Measurements:(Values must be present for Home Oxygen orders)  Room air sat at rest: 88  Room air sat with amb: 75   , O2 sat at rest with O2: 91   , O2 sat with amb with O2 : 92  Is the patient mobile?: Yes    My Clinical findings support the need for the above equipment due to:  Hypoxia, Other - CHF    Supporting Symptoms: The patient requires supplemental oxygen, as the following interventions have been tried with limited or no improvement: \"Bronchodilators and/or steroid inhalers, \"Ambulation with oximetry and \"Incentive spirometry    "

## 2019-07-07 NOTE — PROGRESS NOTES
Hospital Medicine Daily Progress Note    Date of Service  7/7/2019    Chief Complaint  89 y.o. male admitted 7/4/2019 with shortness of breath.    Hospital Course    Patient is a 89-year-old male who presented 7/4/2019 with shortness of breath.  Patient has known medical history of atrial fibrillation, on chronic anticoagulation Eliquis, chronic kidney disease stage III, congestive heart failure, hepatitis B, history of TIA and uncontrolled hypertension.  Patient currently lives independently and is able to manage all of his own ADLs and medication administration.  Patient had been prescribed Lasix however approximately a month ago he decided to stop taking this medication as he felt he was going to the bathroom too often.  Patient did not notify his primary care provider that he had made this adjustment.  After approximately 2 weeks he developed shortness of breath that is progressively worsened and started to note swelling to his lower extremities and dyspnea on exertion.  Patient presented to the emergency room for further evaluation.  Patient was admitted to CDU for further work-up and monitoring.  Patient was placed on telemetry monitoring with no acute cardiac events noted.  Patient was reinitiated on IV Lasix therapy, with intent to transition back to his p.o. dosage for home.       Interval Problem Update  7/5- Patient states SOB has improved, right lower extremity appears more swollen than left, US lower extremity duplex obtained which showed no evidence of DVT. Patient was noted to have elevated BP and PRN hydralazine was administered with significant improvement. Patient slightly lightheaded with normal blood pressure and will watch overnight to transition to po diuretics for discharge .   7/6- Patient continues  to have elevated blood pressure overnight. Have added oral hydralazine, and will monitor overnight for stability. Patient continues to have oxygen needs, attempting to wean as tolerated, provided  patient with IS. Encouraged patient to be up and out of bed. If blood pressure remains stable will discharge in am with close follow up on additional antihypertensive.   7/7- Patient resting in bed, states he feels well and wants to get home to his wife. Patient remains slightly diminished on auscultation but improving. Tested pulse oximetry with ambulation and patient requiring 2L of O2 to maintain sats. Oxygen ordered. Patient encouraged to continue to utilize IS. Patient's Blood pressure controlled with addition of po Hydralazine.  Unable to obtain home oxygen today.     Consultants/Specialty  None     Code Status  DNR    Disposition  Likely home in am if blood pressure remains stable and able to obtain home oxygen     Review of Systems  Review of Systems   Constitutional: Negative for chills, fever and malaise/fatigue.   HENT: Negative for congestion and sore throat.    Eyes: Negative for pain and discharge.   Respiratory: Positive for shortness of breath (improving ). Negative for cough.    Cardiovascular: Positive for leg swelling (improved ). Negative for chest pain.   Gastrointestinal: Negative for abdominal pain, nausea and vomiting.   Genitourinary: Negative for dysuria, frequency and urgency.   Musculoskeletal: Negative for back pain and myalgias.   Skin: Negative for rash.   Neurological: Negative for dizziness and headaches.   Psychiatric/Behavioral: Negative for depression.        Physical Exam  Temp:  [36.2 °C (97.1 °F)-36.8 °C (98.2 °F)] 36.8 °C (98.2 °F)  Pulse:  [59-80] 64  Resp:  [16-20] 18  BP: (137-212)/() 141/82  SpO2:  [83 %-97 %] 95 %    Physical Exam   Constitutional: He is oriented to person, place, and time. He appears well-developed. He is cooperative. No distress. Nasal cannula in place.   Patient resting in bed in no acute distress    HENT:   Head: Normocephalic.   Mouth/Throat: Oropharynx is clear and moist.   Eyes: Conjunctivae and lids are normal.   Neck: Neck supple. No  tracheal tenderness present.   Cardiovascular: Normal rate and normal heart sounds.  An irregular rhythm present. Exam reveals no gallop.    Pulmonary/Chest: Effort normal. No respiratory distress. He has decreased breath sounds (improving ). He has no wheezes. He has no rhonchi.   Abdominal: Soft. Normal appearance and bowel sounds are normal. He exhibits no distension. There is no tenderness. There is no guarding.   Musculoskeletal: He exhibits edema (improving).   FREEMAN    Neurological: He is alert and oriented to person, place, and time.   Skin: Skin is warm and dry. He is not diaphoretic.   Psychiatric: He has a normal mood and affect. His speech is normal and behavior is normal.   Nursing note and vitals reviewed.      Fluids    Intake/Output Summary (Last 24 hours) at 07/07/19 1556  Last data filed at 07/07/19 1027   Gross per 24 hour   Intake              400 ml   Output             1300 ml   Net             -900 ml       Laboratory  Recent Labs      07/05/19   0328  07/06/19   0100  07/07/19   0406   WBC  11.0*  11.8*  9.7   RBC  5.11  5.31  5.37   HEMOGLOBIN  15.0  15.6  15.7   HEMATOCRIT  47.7  48.2  49.2   MCV  93.3  90.8  91.6   MCH  29.4  29.4  29.2   MCHC  31.4*  32.4*  31.9*   RDW  48.8  47.4  47.8   PLATELETCT  157*  176  162*   MPV  9.7  9.8  9.4     Recent Labs      07/05/19   0328  07/06/19   0100  07/07/19   0406   SODIUM  145  143  141   POTASSIUM  3.6  3.9  3.5*   CHLORIDE  109  102  103   CO2  29  33  35*   GLUCOSE  126*  167*  147*   BUN  27*  33*  33*   CREATININE  0.93  1.14  1.09   CALCIUM  8.0*  9.2  9.4         Recent Labs      07/07/19   0406   BNPBTYPENAT  220*           Imaging  US-EXTREMITY VENOUS LOWER BILAT   Final Result      DX-CHEST-PORTABLE (1 VIEW)   Final Result         Low lung volumes with hypoventilatory change.      Small bilateral pleural effusions with bibasilar atelectasis.           Assessment/Plan  * Essential hypertension, benign- (present on admission)   Assessment  & Plan    Blood pressure has been gradually trending up, resume home Catapres and Prinivil.    PRN medication required  Oral hydralazine initiated   Will monitor closely overnight      Hypoxia   Assessment & Plan    Likely related to immobility and chf exacerbation  Continuing lasix  Wean O2 as tolerated  IS encouraged   RT protocol     HLD (hyperlipidemia)   Assessment & Plan    This is chronic and stable, no chest pain.  Continue home Lipitor.     Paroxysmal atrial fibrillation (HCC)- (present on admission)   Assessment & Plan    This is chronic, heart rate is currently well controlled.  Continue home metoprolol and Eliquis.          VTE prophylaxis: Eliquis

## 2019-07-07 NOTE — DISCHARGE PLANNING
CM met with pt to discuss choice for home oxygen, [facility] selected and choice faxed to [phone]. Original placed in hard chart.    Call to granddejan Claros (via pt's cell phone) to let him know of dc plan and he will pick pt up later after oxygen has been delivered.

## 2019-07-07 NOTE — DISCHARGE PLANNING
"Met with pt @ bedside with choice form for HH, he states that he has had   the service before and that \"it didn't help much and was a waste of their energy\". Form left in chart in case pt changes mind, updated NP/MD.  "

## 2019-07-07 NOTE — DISCHARGE PLANNING
Per Kell the eligibility person with [facility] is not working today due to the holiday weekend and case will be reviewed in the morning. Inquired about sending to other companies but all companies have similar protocols for approval. Pt will need to stay until oxygen is approved tomorrow morning. Alessio ORTIZ updated.

## 2019-07-08 PROBLEM — R09.02 HYPOXIA: Status: RESOLVED | Noted: 2019-01-01 | Resolved: 2019-01-01

## 2019-07-08 NOTE — PROGRESS NOTES
Bedside report received at 1900. Assessment done. BP drop from 192/100 to 105/59 after bp mgt, pt asymptomatic will monitor. Telemonitored, SR occasional PACs and PVCs. AOx4, no deficits pleasant. Unlabored and even breathing, 1LNC 93-95% saturation. Pulls 500 on IS, needs further teaching. No pain at this time. Skin intact, no BLE edema noted, SCDs on. IV flushed CDI. 50% dinner finished. Hourly patient rounding done. Fall precautions in place.  Call light in place, bed locked and in lowest position. White board updated. Reviewed POC. All questions answered at this time.

## 2019-07-08 NOTE — DISCHARGE PLANNING
Agency/Facility Name: [facility]  Spoke To: Dot  Outcome: Patient is accepted, however, they needed the order, sent order of o2.

## 2019-07-08 NOTE — DISCHARGE SUMMARY
Discharge Summary    CHIEF COMPLAINT ON ADMISSION  Chief Complaint   Patient presents with   • Shortness of Breath       Reason for Admission  EMS     Admission Date  7/4/2019    CODE STATUS  DNAR/DNI    HPI & HOSPITAL COURSE      Patient is a 89-year-old male who presented 7/4/2019 with shortness of breath.  Patient has known medical history of atrial fibrillation, on chronic anticoagulation Eliquis, chronic kidney disease stage III, congestive heart failure, hepatitis B, history of TIA and uncontrolled hypertension.  Patient currently lives independently and is able to manage all of his own ADLs and medication administration.  Patient had been prescribed Lasix however approximately a month ago he decided to stop taking this medication as he felt he was going to the bathroom too often.  Patient did not notify his primary care provider that he had made this adjustment.  After approximately 2 weeks he developed shortness of breath that is progressively worsened and started to note swelling to his lower extremities and dyspnea on exertion.  Patient presented to the emergency room for further evaluation.  Patient was admitted to CDU for further work-up and monitoring.  Patient was placed on telemetry monitoring with no acute cardiac events noted.  Patient was reinitiated on IV Lasix therapy, with intent to transition back to his p.o. dosage for home.  Patient initially was able to be weaned down off oxygen therapy however with exertion requires 2 L.  Oxygen DME orders placed.  Patient was noted to have significant lower extremity edema however right it appeared more swollen than left and lower extremity duplex ultrasound was obtained.  Ultrasound showed no evidence of DVT.  Patient's blood pressure continued to be significantly elevated and PRN hydralazine was initiated with significant improvement.  Patient was initiated on oral scheduled hydralazine for better antihypertensive control.  Patient's blood pressure  stable with current regimen pressures maintaining between 135 and 150 systolic.  Offered patient home health therapy to help with discharge however patient states he previously had services and felt they were a waste of both patient and services time.  Patient currently resides at the [facility] which is an assisted living center with his 89-year-old wife.  Patient was given incentive spirometer with instructions to use.  Patient at this time states he feels very well and wants to return home to his wife.  We are currently awaiting oxygen to be delivered and patient has been instructed he must use oxygen continuously while at home until follow-up with PCP.  Patient to go back to the [facility] assisted living with family as transport.  Patient's vital signs are stable.  Patient states he feels significantly improved.  Patient understands he needs to use his oxygen and continue with new antihypertensive regimen.     Therefore, he is discharged in good and stable condition to home with organized home healthcare and close outpatient follow-up.    The patient met 2-midnight criteria for an inpatient stay at the time of discharge.    Discharge Date  7/8/2019    FOLLOW UP ITEMS POST DISCHARGE  Please follow up with PCP     DISCHARGE DIAGNOSES  Principal Problem:    Essential hypertension, benign POA: Yes  Active Problems:    Paroxysmal atrial fibrillation (HCC) POA: Yes    HLD (hyperlipidemia) POA: Unknown    Hypoxia POA: Unknown  Resolved Problems:    Acute on chronic diastolic (congestive) heart failure (HCC) POA: Unknown      FOLLOW UP  Future Appointments  Date Time Provider Department Center   12/17/2019 10:00 AM Chaz Linder M.D. [facility] None     Mervin Pompa M.D.  [address] [facility] [address]  [phone]      [facility]  left a message with your physician regarding need to establish a follow up appointment. Please call their office directly if you do not hear from them with in 2 days. Thank  you      MEDICATIONS ON DISCHARGE     Medication List      START taking these medications      Instructions   hydrALAZINE 10 MG Tabs  Commonly known as:  APRESOLINE   Take 1 Tab by mouth every 8 hours.  Dose:  10 mg        CONTINUE taking these medications      Instructions   atorvastatin 80 MG tablet  Commonly known as:  LIPITOR   Take 1 Tab by mouth every evening.  Dose:  80 mg     cloNIDine 0.1 MG Tabs  Commonly known as:  CATAPRES   Take 1 Tab by mouth 2 times a day as needed (For SBP > 160).  Dose:  0.1 mg     ELIQUIS 2.5mg Tabs  Generic drug:  apixaban   Take 2.5 mg by mouth 2 Times a Day. Takes at noon and in evening  Dose:  2.5 mg     finasteride 5 MG Tabs  Commonly known as:  PROSCAR   Take 5 mg by mouth every day.  Dose:  5 mg     FLOMAX 0.4 MG capsule  Generic drug:  tamsulosin   Take 0.4 mg by mouth every bedtime.  Dose:  0.4 mg     furosemide 40 MG Tabs  Commonly known as:  LASIX   Take 40 mg by mouth every morning.  Dose:  40 mg     lisinopril 40 MG tablet  Commonly known as:  PRINIVIL, ZESTRIL   Take 40 mg by mouth every day.  Dose:  40 mg     metoprolol 25 MG Tabs  Commonly known as:  LOPRESSOR   TAKE 1 TABLET BY MOUTH TWICE DAILY     MULTIVITAMIN & MINERAL PO   Take 1 Capsule by mouth every day before lunch.  Dose:  1 Capsule     potassium chloride SA 20 MEQ Tbcr  Commonly known as:  Kdur   Doctor's comments:  Take with lasix only.  Take 1 Tab by mouth every day.  Dose:  20 mEq            Allergies  Allergies   Allergen Reactions   • Aspirin Hives and Rash     Pts grandson states rash and hives.   • Sulfa Drugs Hives and Rash     Pts grandson states rash and hives.       DIET  Orders Placed This Encounter   Procedures   • Diet Order Regular     Standing Status:   Standing     Number of Occurrences:   1     Order Specific Question:   Diet:     Answer:   Regular [1]     Order Specific Question:   Electrolyte modifications:     Answer:   No Added Salt [2]     Order Specific Question:    Consistency/Fluid modifications:     Answer:   1500 ml Fluid Restriction [9]       ACTIVITY  As tolerated.  Weight bearing as tolerated    CONSULTATIONS  None     PROCEDURES  None      LABORATORY  Lab Results   Component Value Date    SODIUM 140 07/08/2019    POTASSIUM 3.6 07/08/2019    CHLORIDE 101 07/08/2019    CO2 32 07/08/2019    GLUCOSE 140 (H) 07/08/2019    BUN 40 (H) 07/08/2019    CREATININE 1.19 07/08/2019        Lab Results   Component Value Date    WBC 9.7 07/07/2019    HEMOGLOBIN 15.7 07/07/2019    HEMATOCRIT 49.2 07/07/2019    PLATELETCT 162 (L) 07/07/2019      US-EXTREMITY VENOUS LOWER BILAT   Final Result      DX-CHEST-PORTABLE (1 VIEW)   Final Result         Low lung volumes with hypoventilatory change.      Small bilateral pleural effusions with bibasilar atelectasis.          Total time of the discharge process exceeds 38 minutes.

## 2019-07-08 NOTE — DISCHARGE PLANNING
Agency/Facility Name: [facility]  Spoke To: Dot  Outcome: Patient accepted, however, there needs LT flow on order.

## 2019-07-08 NOTE — PROGRESS NOTES
Pt woke up confused trying to get out of bed. Thought it was morning. Reoriented, pt back to bed resting comfortably

## 2019-07-08 NOTE — DISCHARGE PLANNING
Agency/Facility Name: [facility]  Spoke To: VM  Outcome: Attempted to get status on o2, however, there was no answer, left message.

## 2019-07-08 NOTE — RESPIRATORY CARE
COPD EDUCATION by COPD CLINICAL EDUCATOR  7/8/2019 at 8:08 AM by Lauren Gregory     Patient reviewed by COPD education team. Patient does not have a history or diagnosis of COPD and is a non-smoker, therefore does not qualify for the COPD program.

## 2019-07-08 NOTE — DISCHARGE INSTRUCTIONS
Discharge Instructions    Discharged to home by car with relative. Discharged via wheelchair, hospital escort: Yes.  Special equipment needed: Not Applicable    Be sure to schedule a follow-up appointment with your primary care doctor or any specialists as instructed.     Discharge Plan:   Diet Plan: Discussed  Activity Level: Discussed  Confirmed Follow up Appointment: Patient to Call and Schedule Appointment  Medication Reconciliation Updated: Yes  Influenza Vaccine Indication: Not indicated: Previously immunized this influenza season and > 8 years of age    I understand that a diet low in cholesterol, fat, and sodium is recommended for good health. Unless I have been given specific instructions below for another diet, I accept this instruction as my diet prescription.   Other diet: regular    Special Instructions: None    · Is patient discharged on Warfarin / Coumadin?   No     Depression / Suicide Risk    As you are discharged from this [facility] facility, it is important to learn how to keep safe from harming yourself.    Recognize the warning signs:  · Abrupt changes in personality, positive or negative- including increase in energy   · Giving away possessions  · Change in eating patterns- significant weight changes-  positive or negative  · Change in sleeping patterns- unable to sleep or sleeping all the time   · Unwillingness or inability to communicate  · Depression  · Unusual sadness, discouragement and loneliness  · Talk of wanting to die  · Neglect of personal appearance   · Rebelliousness- reckless behavior  · Withdrawal from people/activities they love  · Confusion- inability to concentrate     If you or a loved one observes any of these behaviors or has concerns about self-harm, here's what you can do:  · Talk about it- your feelings and reasons for harming yourself  · Remove any means that you might use to hurt yourself (examples: pills, rope, extension cords, firearm)  · Get professional help  from the community (Mental Health, Substance Abuse, psychological counseling)  · Do not be alone:Call your Safe Contact- someone whom you trust who will be there for you.  · Call your local CRISIS HOTLINE 244-5972 or 302-909-9472  · Call your local Children's Mobile Crisis Response Team [place] (629) 164-1871 or www.[employer]  · Call the toll free National Suicide Prevention Hotlines   · National Suicide Prevention Lifeline 542-640-NTID (0714)  · National Hope Line Network 800-SUICIDE (931-0891)

## 2019-07-08 NOTE — DISCHARGE PLANNING
Agency/Facility Name: [facility]  Spoke To: Callie  Outcome: Patient accepted and o2 will be delivered to bedside shortly.

## 2019-07-08 NOTE — HEART FAILURE PROGRAM
On 7/6/19, patient was still being diagnosed with acute on chronic diastolic heart failure. This has since been dropped off of notes.    Notes indicate that patient has had hypertension and that patient has chronic rate controlled AF managed c metoprolol and apixaban.    Echo shows EF of 70% with moderate, concentric LVH and dil RV. I have asked hospital schedulers to arrange f/u with HF clinic for patient.    Emma NEWMAN RN, [employer] ext. [phone] M-F    HF Measures:  1. Documentation of LV systolic function (echo or cath) PTA, during this hospitalization, or plan to assess post discharge or reason for not assessing documented.  2. ACE-I, ARNI or ARB prescribed on discharge for LVEF <40%  3. For HF patients with LVEF less than or equal to 40% evidence based beta blocker must be prescribed upon discharge one of the following: carvedilol, bisoprolol, Toprol XL  4. For EF less than or equal to 35% aldosterone blockade prescribed upon discharge  5. Nutrition consult for diet education  6. HF education documented daily  7. Screening for and administering immunizations as long as no contraindications: Pneumonia and Influenza  8. Written discharge instructions include:  ? Daily weights  ? Record weight on tracker  ? Bring tracker to appointments  ? Call MD for weight gain of 3lb /day or 5lb/week  ? HF medication teaching  ? Low sodium diet  ? Follow up appointment within seven calendar days of d/c must include: date, time and location  ? Activity  ? Worsening symptoms  What if any of the above HF measures are contraindicated?  ? Request that the discharging provider document the medication/intervention and the contraindication specifically in a progress note  ? For example: “no CHF meds due to hypotension” is not enough. It needs to say: “No ACE-I, ARNI, ARB due to hypotension”; “No Beta Blockade due to bradycardia”…

## 2019-07-08 NOTE — PROGRESS NOTES
.Patient d/c per MD order.  Pt states understanding of d.c instructions.  Pt given copies of instructions.  Belongings with pt.  Pt to lobby by w/c

## 2019-07-08 NOTE — CARE PLAN
Problem: Respiratory:  Goal: Respiratory status will improve  Outcome: PROGRESSING SLOWER THAN EXPECTED  IS education, pulls 500, further coaching needed. 93% on 1LNC    Problem: Discharge Barriers/Planning  Goal: Patient's continuum of care needs will be met  Outcome: PROGRESSING AS EXPECTED  Awaiting home O2 set up before patient is able to be D/Thor

## 2019-07-08 NOTE — PROGRESS NOTES
.Patient d/c per MD order.  Pt states understanding of d.c instructions.  Pt given copies of instructions.  Belongings with pt.  VSS. Pt to lobby with 2 techs by w/c

## 2019-07-23 NOTE — ED NOTES
Pt's grandson, Harlan, called to come  pt as he is discharged ([phone]). No answer, left message. Will continue to monitor pt while awaiting his ride.

## 2019-07-23 NOTE — ED NOTES
Pt's grandson states that he is on his way to pick pt up, but he won't be here for about 45 min. Will continue to monitor pt while awaiting his arrival and will notify charge nurse.

## 2019-07-23 NOTE — ED NOTES
Pt educated regarding discharge instructions and demonstrated understanding. Pt ambulatory upon discharge and does not appear to be in any distress at this time. Pt's discharge paperwork and belongings sent home with pt.

## 2019-07-23 NOTE — ED NOTES
Asked Dr. Atkinson about antibiotics ordered and he states that he did not place those orders, they may be on the wrong chart, and not to administer them until he speaks with Urologist. Will continue to monitor pt while awaiting further orders.

## 2019-07-23 NOTE — ED PROVIDER NOTES
ED Provider Note      HPI: Patient is a 89-year-old male who presented to the emergency department by ambulance transfer July 22, 2019 at 4:30 PM with a chief complaint of no urine output.    Patient states he has had no urge to urinate today.  He is not uncomfortable.  He said no chest pain no shortness of breath no nausea no vomiting or dizziness.  No change in bowel habits.  No abdominal distention.  No other somatic complaint    Review of Systems: Positive for no urine output today negative for chest pain shortness of breath nausea vomiting dizziness change in bowel habits abdominal distention.  Review of systems reviewed with patient, all other systems negative    Past medical/surgical history: Hypertension CHF hepatitis B chronic anticoagulation TIA atrial fibrillation appendectomy colon resection    Medications: Hydralazine lisinopril Lopressor Catapres potassium chloride Lasix Eliquis Lipitor Proscar Flomax    Allergies: Aspirin sulfa    Social History: Patient does not smoke no alcohol use      Physical exam: Constitutional: Pleasant male awake alert appeared comfortable  Vital signs: Blood pressure 99/63 pulse 52 respiration 17 pulse oximetry 99% temperature 98.4  EYES: PERRL, EOMI, Conjunctivae and sclera normal, eyelids normal bilaterally.  Neck: Trachea midline. No cervical masses seen or palpated. Normal range of motion, supple. No meningeal signs elicited.  Cardiac: Regular rate and rhythm. S1-S2 present. No S3 or S4 present. No murmurs, rubs, or gallops heard. No edema or varicosities were seen.   Lungs: Clear to auscultation with good aeration throughout. No wheezes, rales, or rhonchi heard. Patient's chest wall moved symmetrically with each respiratory effort. Patient was not making use of accessory muscles of respiration in breathing.  Abdomen: Soft nontender to palpation. No rebound or guarding elicited. No organomegaly identified. No pulsatile abdominal masses identified.   Musculoskeletal:  no   pain with palpitation or movement of muscle, bone or joint , no obvious musculoskeletal deformities identified.  Neurologic: alert and awake answers questions appropriately. Moves all four extremities independently, no gross focal abnormalities identified. Normal strength and motor.  Skin: no rash or lesion seen, no palpable dermatologic lesions identified.  Psychiatric: not anxious, delusional, or hallucinating.    Medical decision making: Bladder scan obtained; 260 cc of urine present in bladder.    Urology consulted.  They felt that given the patient's age this was probably not a significant finding.  They said to be glad to follow the patient if needed.    Laboratory studies obtained (please see lab sheet for all results) significant findings included unremarkable CBC.  Dehydration appears to be present with BUN of 45 and a creatinine 1.49.  This is somewhat increased over July 8 of this year when his BUN was 40 his creatinine was 1.19.    Patient given several glasses of orange juice or chocolate milkshake and water in the department.  He initially did not feel the urge to void but eventually did void 125 cc of clear urine.    I suspect patient had decreased urine output to dehydration.  He is counseled to push fluids.  He will call his primary care provider tomorrow to schedule follow-up.  The patient is given discharge instructions for dehydration.  The patient is carefully counseled return to ED immediately for feeling of urinary retention fever vomiting or any other problems    Patient verbalized understanding of these instructions and states he will comply    Impression dehydration

## 2019-07-23 NOTE — ED NOTES
Received report from ANGEL Wiggins. Upon shift change pt is sitting up on side of bed, eating food and drinking chocolate milkshake provided. Pt has even and unlabored breaths and no distress is noted at this time. Will continue to monitor while awaiting further orders.

## 2019-07-23 NOTE — OR SURGEON
OP Note    PreOp Diagnosis: Acute urinary retention, inability to Place Place Pino catheter, gross hematuria    PostOp Diagnosis: Same as above    Procedure: Cystoscopy difficult catheter placement over a wire      Surgeon Pako Curtis    Anesthesiologist/Type of Anesthesia:  Anesthesia staff cannot be found from this context./* No surgery found *    Surgical Staff:  * Surgery not found *    Specimens removed if any:  Urine culture and urinalysis from catheter    Estimated Blood Loss 0    Findings: Patient has a false passage in the posterior bulbar urethral area at 12:00 this is what was catching up from the catheter.  He had some traumatic Pino removal which was causing bleeding to this calm down by the time urology at the bedside.  The bladder fluid was somewhat bloody and thus the bladder cannot be evaluated the prostate has a bilobar hypertrophy which certainly could be causing his retention but other causes need to be ruled out.    Complications: None    Procedure was carried out in the following fashion.  After the patient was properly identified, the labs reviewed, the H&P updated, the plan plan discussed with the patient and the operative team, patient expressed verbal understanding of the procedures are proceed the risks and benefits potential complications and desire to proceed.  The risks include bleeding infection trauma inability to pass Pino and need for suprapubic tube.  The benefits include diagnostic procedure as well as placing catheter to relieve his urinary retention.  A surgical timeout was done at the bedside the proper patient site and procedure were identified all in the room agreed to proceed.  We began the procedure after the patient was prepped and draped in usual sterile fashion with 2% Xylocaine jelly at 30 cc per urethra we then scope the patient with findings as mentioned above and went into the bladder and placed a wire we then placed a 22 [place] tip catheter over  the wire into good position the bladder was irrigated there was just maroon-colored urine but no clots and there were no clots seen in the bladder.  Patient tolerated procedure well the catheter was placed to gravity drainage with 10 cc of sterile water in the bladder and the patient's care was turned back over to the ER team.    We will do a full consult in the morning after a further work-up is done and further history is gathered to try to understand the etiology of the patient's a urinary return.          7/22/2019 6:46 PM Pako Curtis M.D.

## 2019-07-24 PROBLEM — Z66 DNR (DO NOT RESUSCITATE): Status: ACTIVE | Noted: 2019-01-01

## 2019-07-24 NOTE — CARE PLAN
Problem: Communication  Goal: The ability to communicate needs accurately and effectively will improve  Outcome: PROGRESSING AS EXPECTED  Patient oriented to call light    Problem: Safety  Goal: Will remain free from injury  Outcome: PROGRESSING AS EXPECTED  Bed alarm on    Problem: Venous Thromboembolism (VTW)/Deep Vein Thrombosis (DVT) Prevention:  Goal: Patient will participate in Venous Thrombosis (VTE)/Deep Vein Thrombosis (DVT)Prevention Measures  Outcome: PROGRESSING AS EXPECTED  eliquis ordered    Problem: Knowledge Deficit  Goal: Knowledge of disease process/condition, treatment plan, diagnostic tests, and medications will improve  Outcome: PROGRESSING AS EXPECTED  Patient updated on the POC

## 2019-07-24 NOTE — PROGRESS NOTES
2 RN skin check completed.  Devices in place: oxygen tubing.  Skin assessed under devices: left ear slightly red.  Confirmed pressure ulcers found on Coccyx.    The following interventions in place: picture taken and uploaded to chart. Mepilex placed.     Small excoriations to bilateral legs. Generalized dry, flaky skin.

## 2019-07-24 NOTE — PROGRESS NOTES
Bedside report received, assumed pt care. Pt resting in bed, no signs of distress and no complaints of pain. Pt updated on POC and questions answered. RN assisted pt in standing to use urinal. Pt educated on fall precautions and use of call light. Pt verbalized understanding yet remains impulsive. Tele monitor on, VSS, safety precautions in place, and call light in reach.

## 2019-07-24 NOTE — ED TRIAGE NOTES
Chief Complaint   Patient presents with   • Weakness   • Dehydration     Patient bib EMS from home; patient reportedly DC'd from ED yesterday with diagnosis of dehydration - caretaker called EMS d/t above comlaints, patient denies pain or medical complaint.    PTA PIV placed, FSBS 261; patient on 2L home O2.      Patient A&O on arrival; chart up for ERP.

## 2019-07-24 NOTE — ASSESSMENT & PLAN NOTE
AFIB with RVR-improved control.  Continue diltiazem, metoprolol for rate control.  Cardiology input  Eliquis for stroke risk reduction -  consider DC due to bleed risk with transition to hospice.

## 2019-07-24 NOTE — ED PROVIDER NOTES
ED Provider Note    HPI: Patient is a 89-year-old male who presented to the emergency department by ambulance transfer July 23, 2019 at 5:59 PM with a chief complaint of tachycardia.    Patient was seen by myself here yesterday.  At that time the patient was dehydrated and after oral replenishment was able to urinate and went home feeling well today.  He was checked by home health care nurse today who noted the patient was tachycardic and felt he was dehydrated again.  He was sent to the emergency department. on arrival here the patient states his been urinating normally.  No chest pain no shortness of breath no fever no chills no cough.  He was not aware that his heart rate was rapid.  He has had no nausea or vomiting.  No other somatic complaints.    Review of Systems: Positive for tachycardia the patient was not aware of negative chest pain shortness of breath fever chills cough decreased urination.  Review of systems reviewed with patient, all other systems negative    Past medical/surgical history: Atrial fibrillation elevated cholesterol hypertension chronic anticoagulation BPH    Medications: Lipitor Lopressor lisinopril Catapres potassium chloride Lasix Eliquis Proscar Flomax    Allergies: Aspirin sulfa    Social History: No history of smoking no alcohol use      Physical exam: Constitutional: Pleasant male awake alert appears comfortable  Vital signs: Temperature 98.0 blood pressure 122/85 pulse 88 respirations 18 pulse oximetry 98%  EYES: PERRL, EOMI, Conjunctivae and sclera normal, eyelids normal bilaterally.  Neck: Trachea midline. No cervical masses seen or palpated. Normal range of motion, supple. No meningeal signs elicited.  Cardiac: Tachycardic irregular rate and rhythm. S1-S2 present. No S3 or S4 present. No murmurs, rubs, or gallops heard. No edema or varicosities were seen.   Lungs: Clear to auscultation with good aeration throughout. No wheezes, rales, or rhonchi heard. Patient's chest wall  moved symmetrically with each respiratory effort. Patient was not making use of accessory muscles of respiration in breathing.  Abdomen: Soft nontender to palpation. No rebound or guarding elicited. No organomegaly identified. No pulsatile abdominal masses identified.   Musculoskeletal:  no  pain with palpitation or movement of muscle, bone or joint , no obvious musculoskeletal deformities identified.  Neurologic: alert and awake answers questions appropriately. Moves all four extremities independently, no gross focal abnormalities identified. Normal strength and motor.  Skin: no rash or lesion seen, no palpable dermatologic lesions identified.  Psychiatric: not anxious, delusional, or hallucinating.    Medical decision making: EKG obtained on arrival (interpretation) twelve-lead EKG atrial fibrillation rate approximately 132.  No P waves are present.  Morphology QRS complexes T waves unremarkable.  The patient does appear to have LVH by voltage however.  There is no evidence of ST elevation or depression.  R wave progression was normal.  Interpreted as an abnormal EKG indicating acute dysrhythmia but not indicating ischemia    Patient given IV dose of diltiazem, repeat pulse 74.    Laboratory studies obtained (please see lab sheet for all results) significant findings included unremarkable CBC.  BUN and creatinine are both slightly elevated at 46 and 1.50.  This is essentially unchanged from yesterday.  Given his age I felt these were essentially normal values.  Troponin elevated at 167.  This could indicate some degree of cardiac strain.  TSH was normal    Chest x-ray obtained; stable cardiac silhouette enlargement was noted with moderate left pleural fluid.    Patient admitted by hospitalist service.  He will be kept on the monitor to make sure he does not reexperience atrial fibrillation with rapid response and make sure his troponin tends downward.  He is not dehydrated at this time.    Further care and  hospital course per attending physician summary    Impression 1) atrial fibrillation with rapid ventricular response  2) elevated troponin  3) left pleural effusion

## 2019-07-24 NOTE — CARE PLAN
Problem: Safety  Goal: Will remain free from falls  Outcome: PROGRESSING AS EXPECTED    Intervention: Implement fall precautions   07/24/19 4365   OTHER   Environmental Precautions Treaded Slipper Socks on Patient;Personal Belongings, Wastebasket, Call Bell etc. in Easy Reach;Transferred to Stronger Side;Communication Sign for Patients & Families;Bed in Low Position;Report Given to Other Health Care Providers Regarding Fall Risk;Mobility Assessed & Appropriate Sign Placed         Problem: Knowledge Deficit  Goal: Knowledge of the prescribed therapeutic regimen will improve  Outcome: PROGRESSING AS EXPECTED    Intervention: Discuss information regarding therpeutic regimen and document in education  Pt updated on POC and pt questions answered. Pt verbalized clear understanding to current treatment plan and medication changes.

## 2019-07-24 NOTE — ED NOTES
Lab called with critical result of troponin 167 at 2000. Critical lab result read back to lab.   Dr. Atkinson notified of critical lab result at 2000.  Critical lab result read back by Dr. Atkinson.

## 2019-07-24 NOTE — RESPIRATORY CARE
COPD EDUCATION by COPD CLINICAL EDUCATOR  7/24/2019 at 11:32 AM by Lauren Gregory     Patient reviewed by COPD education team. Patient does not have a history or diagnosis of COPD and is a non-smoker, therefore does not qualify for the COPD program.

## 2019-07-24 NOTE — H&P
Hospital Medicine History & Physical Note    Date of Service  7/23/2019    Primary Care Physician  Mervin Pompa M.D.    Consultants  None    Code Status  DNR    Chief Complaint  Atrial fibrillation with rapid ventricular rate    History of Presenting Illness  89 y.o. male who presented 7/23/2019 with atrial fibrillation with rapid ventricular rate.  Patient was apparently here last night due to decreased urine output, was noted to be dehydrated, given fluids and needed urine patient states he has continued to urinate.  Today his home health nurse thought him, thought he was dehydrated recommended another hospital visit.  Patient was then noted to have atrial fibrillation with rapid ventricular rate.  Patient was given IV diltiazem.  Patient states he feels okay, has no complaints, denies any fever, chills, nausea, vomiting, palpitations or shortness of breath.  I did discuss the case including labs and imaging with the ER physician.    Review of Systems  Review of Systems   Constitutional: Negative for chills, fever and malaise/fatigue.   HENT: Negative for congestion.    Respiratory: Negative for cough, sputum production, shortness of breath and stridor.    Cardiovascular: Negative for chest pain, palpitations and leg swelling.   Gastrointestinal: Negative for abdominal pain, constipation, diarrhea, nausea and vomiting.   Genitourinary: Negative for dysuria and urgency.   Musculoskeletal: Negative for falls and myalgias.   Neurological: Negative for dizziness, tingling, loss of consciousness, weakness and headaches.   Psychiatric/Behavioral: Negative for depression and suicidal ideas.   All other systems reviewed and are negative.      Past Medical History   has a past medical history of Atrial fibrillation (HCC); Chronic anticoagulation; Chronic kidney disease, stage III (moderate) (HCC); Congestive heart failure (HCC); Hepatitis B; History of TIA (transient ischemic attack); and Hypertension. He also has no past  medical history of Diabetes.    Surgical History   has a past surgical history that includes other; appendectomy; colon resection; pr transurethral elec-surg prostatectom; pr cryosurg ablation of prostate; cataract extraction with iol; and hip hemiarthroplasty (Left, 1/27/2016).     Family History  family history includes Heart Disease in his brother.     Social History   reports that he has never smoked. He has never used smokeless tobacco. He reports that he does not drink alcohol or use drugs.    Allergies  Allergies   Allergen Reactions   • Aspirin Hives and Rash     Pts grandson states rash and hives.   • Sulfa Drugs Hives and Rash     Pts grandson states rash and hives.       Medications  Prior to Admission Medications   Prescriptions Last Dose Informant Patient Reported? Taking?   apixaban (ELIQUIS) 2.5mg Tab 7/23/2019 at Unknown time Family Member Yes No   Sig: Take 2.5 mg by mouth 2 Times a Day. Takes at noon and in evening   atorvastatin (LIPITOR) 40 MG Tab 7/22/2019 at Unknown time Family Member Yes No   Sig: Take 40 mg by mouth every evening.   cloNIDine (CATAPRES) 0.1 MG Tab prn Family Member No No   Sig: Take 1 Tab by mouth 2 times a day as needed (For SBP > 160).   finasteride (PROSCAR) 5 MG Tab 7/23/2019 at Unknown time Family Member Yes No   Sig: Take 5 mg by mouth every day.   furosemide (LASIX) 40 MG Tab 7/23/2019 at Unknown time Family Member Yes No   Sig: Take 40 mg by mouth every morning.   lisinopril (PRINIVIL, ZESTRIL) 40 MG tablet 7/23/2019 at Unknown time Family Member Yes No   Sig: Take 40 mg by mouth every day.   metoprolol (LOPRESSOR) 25 MG Tab 7/23/2019 at Unknown time Family Member Yes No   Sig: Take 25 mg by mouth 2 times a day.   potassium chloride SA (KDUR) 20 MEQ Tab CR 7/23/2019 at Unknown time Family Member No No   Sig: Take 1 Tab by mouth every day.   tamsulosin (FLOMAX) 0.4 MG capsule 7/23/2019 at Unknown time Family Member Yes No   Sig: Take 0.4 mg by mouth every bedtime.    therapeutic multivitamin-minerals (THERAGRAN-M) Tab  Family Member Yes No   Sig: Take 1 Tab by mouth every day.      Facility-Administered Medications: None       Physical Exam  Temp:  [36.7 °C (98 °F)] 36.7 °C (98 °F)  Pulse:  [] 111  Resp:  [16-18] 18  BP: (122-130)/(82-86) 122/85  SpO2:  [85 %-100 %] 85 %    Physical Exam   Constitutional: He is oriented to person, place, and time. He appears well-developed and well-nourished.  Non-toxic appearance. No distress.   HENT:   Head: Normocephalic and atraumatic. Not macrocephalic and not microcephalic. Head is without raccoon's eyes and without Escobar's sign.   Right Ear: External ear normal.   Left Ear: External ear normal.   Mouth/Throat: Mucous membranes are dry. No oropharyngeal exudate.   Eyes: Conjunctivae are normal. Right eye exhibits no discharge. Left eye exhibits no discharge. No scleral icterus.   Neck: Normal range of motion. Neck supple. No tracheal deviation, no edema and no erythema present.   Cardiovascular: Normal rate, normal heart sounds and intact distal pulses.  An irregularly irregular rhythm present. Exam reveals no gallop, no friction rub and no decreased pulses.    No murmur heard.  Pulmonary/Chest: Effort normal and breath sounds normal. No stridor. No respiratory distress. He has no decreased breath sounds. He has no wheezes. He has no rhonchi. He has no rales. He exhibits no tenderness.   Abdominal: Soft. Bowel sounds are normal. He exhibits no distension. There is no splenomegaly or hepatomegaly. There is no tenderness. There is no rebound and no guarding.   Musculoskeletal: Normal range of motion. He exhibits no edema, tenderness or deformity.   Lymphadenopathy:     He has no cervical adenopathy.   Neurological: He is alert and oriented to person, place, and time. No cranial nerve deficit. Coordination normal.   Skin: Skin is warm and dry. No rash noted. He is not diaphoretic. No cyanosis or erythema. No pallor. Nails show no  clubbing.   Psychiatric: He has a normal mood and affect. His speech is normal and behavior is normal. Judgment and thought content normal. Cognition and memory are normal.   Nursing note and vitals reviewed.      Laboratory:  Recent Labs      07/22/19   1725  07/23/19   1830   WBC  8.4  9.0   RBC  4.61*  4.80   HEMOGLOBIN  13.8*  13.8*   HEMATOCRIT  43.1  43.8   MCV  93.5  91.3   MCH  29.9  28.8   MCHC  32.0*  31.5*   RDW  46.9  46.5   PLATELETCT  131*  134*   MPV  10.4  10.5     Recent Labs      07/22/19   1725   SODIUM  138   POTASSIUM  4.2   CHLORIDE  104   CO2  28   GLUCOSE  195*   BUN  45*   CREATININE  1.49*   CALCIUM  8.6     Recent Labs      07/22/19   1725   ALTSGPT  23   ASTSGOT  19   ALKPHOSPHAT  79   TBILIRUBIN  0.6   GLUCOSE  195*         No results for input(s): NTPROBNP in the last 72 hours.      No results for input(s): TROPONINT in the last 72 hours.    Urinalysis:    No results found     Imaging:  DX-CHEST-LIMITED (1 VIEW)   Final Result      Improved right basilar atelectasis and probable edema      Stable cardiac silhouette enlargement, moderate left pleural fluid and basilar atelectasis. Consolidation is not excluded            Assessment/Plan:  I anticipate this patient is appropriate for observation status at this time.    Atrial fibrillation with RVR (HCC)- (present on admission)   Assessment & Plan    -Improved with IV diltiazem  -Continue home metoprolol  -Possibly due to dehydration  -Start IV fluids     HLD (hyperlipidemia)- (present on admission)   Assessment & Plan    -Continue home statin     Benign prostatic hyperplasia with urinary frequency- (present on admission)   Assessment & Plan    -Continue home Flomax and Proscar     Essential hypertension, benign- (present on admission)   Assessment & Plan    -Continue home metoprolol  -Place PRN enalapril     Acute on chronic renal failure (HCC)- (present on admission)   Assessment & Plan    -Due to dehydration  -Start IV fluids  -Repeat  BMP in the morning         VTE prophylaxis: Eliquis

## 2019-07-24 NOTE — ED NOTES
Report received, assumed care of patient. Patient resting in gurney, chest rising and falling. No needs at this time, will continue to monitor.

## 2019-07-24 NOTE — PROGRESS NOTES
Resumed care of patient. Patient transferred to 734 bed 1. Patient has no new complaints at this time.

## 2019-07-24 NOTE — ASSESSMENT & PLAN NOTE
Mirna and Proscar  Had urinary retention-Pino in place-plan  Anticipate will discharge with Pino to hospice

## 2019-07-25 NOTE — CONSULTS
Reason for Consult:  Asked by Dr Pamela Mays M.D. to see this patient with  Afib with RVR  Patient's PCP: Mervin Pompa M.D.    CC:   Chief Complaint   Patient presents with   • Weakness   • Dehydration       HPI:   This is a 89 yo M with PMH of Afib and HTN presented with dehydration and was sent to the ED from group home. The patient has been staying with his wife who is also 100 years old. The patient was not eating and drinking well for the past few days. He is complaint with his medications and was taking lasix for pleural effusion. Her lasix was stopped on admission. He also presented with Afib with RVR on admission. We were consulted for the management of Afib with RVR.    Past Medical History:   Diagnosis Date   • Atrial fibrillation (HCC)    • Chronic anticoagulation    • Chronic kidney disease, stage III (moderate) (HCC)    • Congestive heart failure (HCC)    • Hepatitis B    • History of TIA (transient ischemic attack)    • Hypertension        Past Surgical History:   Procedure Laterality Date   • HIP HEMIARTHROPLASTY Left 1/27/2016    Procedure: HIP HEMIARTHROPLASTY;  Surgeon: Adis Martínez M.D.;  Location: SURGERY [facility];  Service:    • APPENDECTOMY     • CATARACT EXTRACTION WITH IOL      both eyes   • COLON RESECTION     • OTHER      sinus surgery of unknown type   • PB CRYOSURG ABLATION OF PBOSTATE     • PB TRANSURETHRAL ELEC-SURG PBOSTATECTOM         Family History   Problem Relation Age of Onset   • Heart Disease Brother      Patient family history was personally reviewed, no pertinent family history to current presentation    Social History     Social History   • Marital status:      Spouse name: N/A   • Number of children: N/A   • Years of education: N/A     Occupational History   • Not on file.     Social History Main Topics   • Smoking status: Never Smoker   • Smokeless tobacco: Never Used   • Alcohol use No   • Drug use: No   • Sexual activity: Not on file     Other Topics  Concern   • Not on file     Social History Narrative   • No narrative on file       ALLERGIES:  Allergies   Allergen Reactions   • Aspirin Hives and Rash     Pts grandson states rash and hives.   • Sulfa Drugs Hives and Rash     Pts grandson states rash and hives.       Review of systems:  Review of Systems   Constitutional: Negative.  Negative for chills and fever.   HENT: Negative.  Negative for ear pain and sore throat.    Eyes: Negative.  Negative for pain and redness.   Respiratory: Negative.  Negative for cough and shortness of breath.    Cardiovascular: Positive for palpitations. Negative for chest pain and leg swelling.   Gastrointestinal: Positive for diarrhea. Negative for abdominal pain, nausea and vomiting.   Genitourinary: Negative.  Negative for dysuria, flank pain and hematuria.   Musculoskeletal: Negative.  Negative for back pain, joint pain and neck pain.   Skin: Negative.  Negative for itching and rash.   All other systems reviewed and are negative.    Physical exam:  Patient Vitals for the past 24 hrs:   BP Temp Temp src Pulse Resp SpO2 Weight   07/25/19 0830 108/77 36.2 °C (97.1 °F) Temporal (!) 144 18 96 % -   07/25/19 0600 - - - (!) 125 - - -   07/25/19 0436 - - - (!) 150 - - -   07/25/19 0400 123/70 36.1 °C (97 °F) Temporal (!) 120 18 91 % -   07/25/19 0000 114/82 36.2 °C (97.1 °F) Temporal (!) 110 18 95 % -   07/24/19 2000 110/86 36 °C (96.8 °F) Temporal 81 18 92 % 71 kg (156 lb 8.4 oz)   07/24/19 1600 136/99 36.2 °C (97.1 °F) Temporal 100 20 95 % -       Physical Exam   Constitutional: He is oriented to person, place, and time and well-developed, well-nourished, and in no distress. No distress.   HENT:   Head: Normocephalic and atraumatic.   Cardiovascular: An irregularly irregular rhythm present. Tachycardia present.    Murmur heard.  Pulmonary/Chest: Effort normal. He has decreased breath sounds in the right lower field and the left lower field.   Abdominal: Soft. Bowel sounds are normal.  He exhibits no distension. There is no tenderness.   Musculoskeletal: He exhibits no edema or tenderness.   Neurological: He is alert and oriented to person, place, and time.   Skin: Skin is warm and dry.   Vitals reviewed.    Data:  Laboratory studies personally reviewed by me:  Recent Results (from the past 24 hour(s))   TROPONIN    Collection Time: 07/25/19  2:41 AM   Result Value Ref Range    Troponin T 165 (H) 6 - 19 ng/L       Imaging:  DX-CHEST-LIMITED (1 VIEW)   Final Result      Improved right basilar atelectasis and probable edema      Stable cardiac silhouette enlargement, moderate left pleural fluid and basilar atelectasis. Consolidation is not excluded          EKG : personally reviewed by me. Left ventricular hypertrophy with Afib    All pertinent features of laboratory and imaging reviewed including primary images where applicable      Principal Problem:    Atrial fibrillation with RVR (HCC) POA: Yes      Overview: Rylan update 10/1/2016  Active Problems:    Acute on chronic renal failure (HCC) POA: Yes    Essential hypertension, benign POA: Yes    Benign prostatic hyperplasia with urinary frequency POA: Yes    HLD (hyperlipidemia) POA: Yes    DNR (do not resuscitate) POA: Yes  Resolved Problems:    * No resolved hospital problems. *      Assessment / Plan:    # Atrial Fibrillation with RVR  # Hypertension  # Acute kidney injury  - The patient has not been eating or drinking well. He was also complaining of diarrhea in the room. He also has JCARLOS based on his labs. CXR suggested mild pleural effusion on the left side. No peripheral edema. Lung exam normal.  - Recent ECHO showed EF 70% with severe pulm hypertension.   - Troponin Elevated however he has JCARLOS. No chest pain and no ekg changes. Code status DNR DNI  - Would like to rehydrate this patient with IV fluids at 75.  - Would like to reduce metoprolol to 50 mg BID which is his home dose.  - Add Dilt 30 mg TID for now which can be changed to long  acting before discharge if the rate is below 110.  - Dced atorvastatin. No reason to continue it for primary or secondary prevention. He is 89 years old.    Future Appointments  Date Time Provider Department Center   12/17/2019 10:00 AM Chaz Linder M.D. [facility] None       It is my pleasure to participate in the care of Mr. Rashid. Will continue to follow. Please do not hesitate to contact me with questions or concerns.    7/25/2019    Moni Peace MD MPH  Attending: Awais Fitzgerald MD    Please note that this dictation was created using voice recognition software. I have worked with consultants from the vendor as well as technical experts from [facility] [employer] to optimize the interface. I have made every reasonable attempt to correct obvious errors, but I expect that there are errors of grammar and possibly content I did not discover before finalizing the note.

## 2019-07-25 NOTE — PROGRESS NOTES
Patient repeatedly trying to get out of bed and get dressed to leave. Re-enforced education with patient that the doctor has not been to see him yet this morning to discharge him and that his family is not yet here to transport him back to his facility if he is discharged. Patient agreeable to rest in bed and wait until physician sees him for discharge. Bed alarm is on and operational.

## 2019-07-25 NOTE — DOCUMENTATION QUERY
[facility]                                                                       Query Response Note      PATIENT:               YEIMY AGARWAL  ACCT #:                  [id number]  MRN:                     [id number]  :                      1920  ADMIT DATE:       2019 5:59 PM  DISCH DATE:          RESPONDING  PROVIDER #:        [id number]           QUERY TEXT:    A past medical history of Congestive Heart Failure is documented in the Medical Record. Please document the type and acuity (includes probable or suspected).     NOTE:  If an appropriate response is not listed below, please respond with a new note.    The patient's Clinical Indicators include:  Per H&P  PMHx-Congestive heart failure    CXR :   Improved right basilar atelectasis and probable edema. Moderate left pleural fluid and basilar atelectasis. Consolidation is not excluded.     ECHO 18:  EF 70%. Indeterminate diastolic function. Moderate concentric left ventricular hypertrophy. Moderately dilated right ventricle. Estimated RVSP 65 mmHg    Treatment:   Metoprolol, telemetry monitoring, & CXR    Risk Factors:   Advanced age, CHF, HTN, HLD, chronic atrial fibrillation, acute kidney injury, & CKD 3  Options provided:   -- Acute Systolic heart failure   -- Chronic Systolic heart failure   -- Acute on Chronic Systolic heart failure   -- Acute Diastolic heart failure   -- Chronic Diastolic heart failure   -- Acute on Chronic Diastolic heart failure   -- Acute Systolic and Diastolic heart failure   -- Chronic Systolic and Diastolic heart failure   -- Acute on Chronic Systolic and Diastolic heart failure   -- Unable to determine      Query created by: Chanda Raya on 2019 1:31 PM    RESPONSE TEXT:    Unable to determine          Electronically signed by:  VITO COCHRAN 2019 2:17 PM

## 2019-07-25 NOTE — PROGRESS NOTES
[facility] Medicine Daily Progress Note    Date of Service  7/24/2019    Chief Complaint  89 y.o. male admitted 7/23/2019 with weakness and general fatigue.    Hospital Course    Past medical history of chronic atrial fibrillation and chronically on anticoagulation therapy presented with complaint of weakness and general fatigue.  Patient was noticed to have A. fib RVR on admission.      Interval Problem Update  7/24.  Patient still complains of fatigue and general body achiness. Patient's pain is general, 2-3/10, intermittent and does not radiate to other location, sharp and with some tingling. Can be controlled by pain meds.  Patient otherwise complains of fatigue and weakness.  Showing signs of decreased appetite and dehydration likely    Consultants/Specialty  palliative    Code Status  DNR    Disposition  Pending PT OT    Review of Systems  Review of Systems   Constitutional: Positive for malaise/fatigue. Negative for chills, fever and weight loss.   HENT: Negative for congestion, ear discharge, ear pain, hearing loss and nosebleeds.    Eyes: Negative for blurred vision and pain.   Respiratory: Negative for cough, sputum production, shortness of breath and wheezing.    Cardiovascular: Positive for palpitations. Negative for chest pain, leg swelling and PND.   Gastrointestinal: Negative for abdominal pain, constipation, diarrhea, heartburn, nausea and vomiting.   Genitourinary: Negative for dysuria, frequency and hematuria.   Musculoskeletal: Negative for back pain, falls, joint pain and neck pain.   Skin: Negative for rash.   Neurological: Positive for dizziness and weakness. Negative for tremors, speech change, seizures and headaches.   Psychiatric/Behavioral: Negative for depression, substance abuse and suicidal ideas.        Physical Exam  Temp:  [35.8 °C (96.5 °F)-36.7 °C (98 °F)] 36 °C (96.8 °F)  Pulse:  [] 105  Resp:  [18-20] 20  BP: (112-147)/(56-94) 114/75  SpO2:  [85 %-100 %] 96 %    Physical Exam    Constitutional: He is oriented to person, place, and time. He appears well-developed and well-nourished.   HENT:   Head: Normocephalic.   Eyes: Pupils are equal, round, and reactive to light. EOM are normal.   Neck: Normal range of motion. Neck supple. No JVD present. No thyromegaly present.   Cardiovascular: Normal heart sounds.  Exam reveals no gallop and no friction rub.    No murmur heard.  Irregularly irregularly tachycardic   Pulmonary/Chest: Effort normal and breath sounds normal. No respiratory distress. He has no wheezes. He has no rales. He exhibits no tenderness.   Abdominal: Soft. Bowel sounds are normal. He exhibits no distension and no mass. There is no tenderness. There is no rebound and no guarding.   Musculoskeletal: Normal range of motion. He exhibits no edema.   Lymphadenopathy:     He has no cervical adenopathy.   Neurological: He is alert and oriented to person, place, and time. He displays normal reflexes. No cranial nerve deficit.   Skin: Skin is dry. No rash noted.   Psychiatric: He has a normal mood and affect.   Nursing note and vitals reviewed.      Fluids    Intake/Output Summary (Last 24 hours) at 07/24/19 1704  Last data filed at 07/24/19 1500   Gross per 24 hour   Intake              760 ml   Output              400 ml   Net              360 ml       Laboratory  Recent Labs      07/22/19   1725  07/23/19   1830  07/24/19   0144   WBC  8.4  9.0  7.1   RBC  4.61*  4.80  4.76   HEMOGLOBIN  13.8*  13.8*  13.5*   HEMATOCRIT  43.1  43.8  43.1   MCV  93.5  91.3  90.5   MCH  29.9  28.8  28.4   MCHC  32.0*  31.5*  31.3*   RDW  46.9  46.5  44.8   PLATELETCT  131*  134*  125*   MPV  10.4  10.5  10.6     Recent Labs      07/22/19   1725  07/23/19   1915  07/24/19   0144   SODIUM  138  136  137   POTASSIUM  4.2  4.3  4.2   CHLORIDE  104  102  103   CO2  28  29  25   GLUCOSE  195*  132*  137*   BUN  45*  46*  42*   CREATININE  1.49*  1.50*  1.43*   CALCIUM  8.6  8.4*  8.2*                    Imaging  DX-CHEST-LIMITED (1 VIEW)   Final Result      Improved right basilar atelectasis and probable edema      Stable cardiac silhouette enlargement, moderate left pleural fluid and basilar atelectasis. Consolidation is not excluded           Assessment/Plan  * Atrial fibrillation with RVR (HCC)- (present on admission)   Assessment & Plan    -Improved with IV diltiazem however currently rate still not controlled  -I increased patient's home metoprolol to 75 mg twice daily  -Possibly due to dehydration  -Continue gentle IV fluids  I also ordered palliative consult     DNR (do not resuscitate)- (present on admission)   Assessment & Plan    Total time spent on advanced care planning, excluding time spent on daily care: 16 minutes.    1st 30 minutes [id number]        I discussed extensively with patient and patient's family is regarding code status and plan of care. We also discussed advanced care planning including diagnosis, prognosis, plan of care, risks and benefits of any therapies that could be offered, as well as alternatives including palliation and hospice, as appropriate. My discussion is summarized above in detail. Confirmed with DNR       HLD (hyperlipidemia)- (present on admission)   Assessment & Plan    -Continue home statin     Benign prostatic hyperplasia with urinary frequency- (present on admission)   Assessment & Plan    -Continue home Flomax and Proscar  Patient has no significant dysuria     Essential hypertension, benign- (present on admission)   Assessment & Plan    -Continue home metoprolol  -Place PRN enalapril     Acute on chronic renal failure (HCC)- (present on admission)   Assessment & Plan    -Due to dehydration  -Continue with gentle IV fluids  -Repeat BMP in the morning  Cr 1.5->1.4          VTE prophylaxis: Apixaban      Current Facility-Administered Medications:   •  metoprolol (LOPRESSOR) tablet 75 mg, 75 mg, Oral, BID, Pamela Mays M.D.  •  apixaban (ELIQUIS) 2.5mg tablet 2.5 mg, 2.5  mg, Oral, BID, Hector Soni D.O., 2.5 mg at 07/24/19 0600  •  atorvastatin (LIPITOR) tablet 40 mg, 40 mg, Oral, Q EVENING, Hector Soni D.O., 40 mg at 07/23/19 2215  •  finasteride (PROSCAR) tablet 5 mg, 5 mg, Oral, DAILY, Hector Soni D.O., 5 mg at 07/24/19 0555  •  tamsulosin (FLOMAX) capsule 0.4 mg, 0.4 mg, Oral, QHS, CHRISTIN BauerO., 0.4 mg at 07/23/19 2215  •  senna-docusate (PERICOLACE or SENOKOT S) 8.6-50 MG per tablet 2 Tab, 2 Tab, Oral, BID, Stopped at 07/24/19 1800 **AND** polyethylene glycol/lytes (MIRALAX) PACKET 1 Packet, 1 Packet, Oral, QDAY PRN **AND** magnesium hydroxide (MILK OF MAGNESIA) suspension 30 mL, 30 mL, Oral, QDAY PRN **AND** bisacodyl (DULCOLAX) suppository 10 mg, 10 mg, Rectal, QDAY PRN, Hector Soni D.O.  •  NS infusion, , Intravenous, Continuous, Pamela Mays M.D., Last Rate: 50 mL/hr at 07/24/19 1534  •  acetaminophen (TYLENOL) tablet 650 mg, 650 mg, Oral, Q6HRS PRN, Hector Soni D.O.  •  enalaprilat (VASOTEC) injection 1.25 mg, 1.25 mg, Intravenous, Q6HRS PRN, Hector Soni D.O.  •  DILTIAZem (CARDIZEM) injection 10 mg, 10 mg, Intravenous, Q4HRS PRN, Hector Soni D.O.

## 2019-07-25 NOTE — PROGRESS NOTES
Patient is confused and attempting to get ready to go. Attempted to re-orient patient to place and time. Patient agreed to go back to bed and try and rest until seen by MD in AM.

## 2019-07-25 NOTE — CONSULTS
"Reason for PC Consult: Advance Care Planning    Consulted by: Dr. Mays    Assessment:  General: 89 y/o male from home with AMS, weakness and fatigue found to be dehydrated in Afib w/ RVR; continues to have difficult to manage HR. PMHx of HLD, TIA, CHF (EF 70% 9/2018), prostatectomy, colon resection, CKD3, afib on AC.    Social: Pt lives at home with his wife Myranda who is 100 y/o. They reside in a group home village and have someone who helps with cleaning, cooking meals, and also a person that checks on them daily. They have 2 living children, Kiran in [place] and Aleksey in [place]. He was independent with ADLs previously, used a walker to mobilize and reports doing \"exercises daily.\" He nor his wife drive any longer. Joe is a retired  and his wife a retired teacher.    Consults: n/a- possibly cardiology?    Dyspnea: No-    Last BM: 07/25/19-    Pain: No-    Depression: Mood appropriate for situation-    Dementia: No;       Spiritual:  Is Anglican or spirituality important for coping with this illness? Yes-    Has a  or spiritual provider visit been requested? No- declined because he hopes to go home today    Palliative Performance Scale: 50%    Advance Directive: None- at home; only financial provided. States \"it's all together at home\"  DPOA: No-    POLST: Yes- DNR/selective    Code Status: DNR-      Outcome:  PC RN met with Joe at bedside and explained the role of PC. He provided his social and medical history, as well as an accurate understanding of his reason for admission and the current situation. He is anxious to get home and states \"I'm just laying here doing nothing and I feel fine.\" PC RN validated his feelings and expression of home being a more comfortable place to be. He acknowledges having been in the hospital multiple times this month and states he would want to continue coming to the hospital if needed. PC RN discussed the role of hospice vs home health and he reports having been on " "hospice in the past \"when I had some serious issues going on.\" PC RN again explained the role of hospice to ensure his understanding was synonymous with their role and he currently is not interested in hospice. He encouraged home health as a bridge from hospital to home and he is not interested in home health either as he thinks \"they just all give you the same exercises\" and feels \"I think it's a waste of money because I can do it on my own.\" He states his son is in town today and his goal is to get home today.    PC RN confirmed that Joe has his POLST at home and he does. He states his AD is there as well. Case discussed with MD, noting pt's wishes to return home w/o HH or hospice. MD considering cardiology assistance with HR control. PC RN offered to reach out to son Kiran, but MD did not feel this was necessary. While talking to Joe, PC RN provided therapeutic communication, including open ended questions, reflective listening, and normalization of thought and feelings throughout encounter, as well as reinforced his goals of care. PC contact information provided to Joe and encouraged to call with any questions or concerns.     Updated: ERIEBRTO RN/MD/DIONNA    Plan: home when cleared    Recommendations: I do not recommend an ethics or hospice consult at this time because pt not interested.    Thank you for allowing Palliative Care to participate in this patient's care. Please feel free to call [phone] with any questions or concerns.  "

## 2019-07-25 NOTE — PROGRESS NOTES
Bedside report received, assumed pt care. Pt resting in bed, no signs of distress and no complaints of pain at this time. Pt updated on POC. Pt very anxious to leave, educated that MD will be by for rounds this morning. Pt agreeable to rest in bed. Tele monitor on, safety precautions and strip alarm on and in place, call light in reach.

## 2019-07-25 NOTE — ASSESSMENT & PLAN NOTE
Total time spent on advanced care planning, excluding time spent on daily care: 16 minutes.    1st 30 minutes [id number]        I discussed extensively with patient and patient's family is regarding code status and plan of care. We also discussed advanced care planning including diagnosis, prognosis, plan of care, risks and benefits of any therapies that could be offered, as well as alternatives including palliation and hospice, as appropriate. My discussion is summarized above in detail. Confirmed with DNR

## 2019-07-25 NOTE — THERAPY
"Occupational Therapy Evaluation completed.   Functional Status:  Pt is a 89 male admitted for weakness/fatigue, found to have afib with RVR. PMHx of afib, CKD, CHF, Hep B, TIA, and HTN. Supine>sit>stand with SPV and FWW, LB dressing with SPV, Functional ambulation with SPV and FWW to BR, toilet txf with min A for gown management, and standing grooming with SPV. Reports feels he is at baseline and feels comfortable returning home. Recommend OT for d/c needs only, and Recommend home health transitional care for continued occupational therapy services.     Plan of Care: Will benefit from Occupational Therapy for d/c needs only  Discharge Recommendations:  Equipment: Shower Chair. Post-acute therapy: Recommend home health transitional care for continued occupational therapy services.      See \"Rehab Therapy-Acute\" Patient Summary Report for complete documentation.    "

## 2019-07-25 NOTE — CARE PLAN
Problem: Infection  Goal: Will remain free from infection  Outcome: PROGRESSING AS EXPECTED      Problem: Bowel/Gastric:  Goal: Will not experience complications related to bowel motility  Outcome: PROGRESSING SLOWER THAN EXPECTED   07/25/19 0800 07/25/19 1300   OTHER   Last BM --  07/25/19   Number of Times Stooled 1 --    Pt having regular bowel movements. Pt reports he is having diarrhea over the past several days.

## 2019-07-25 NOTE — DISCHARGE PLANNING
Anticipated Discharge Disposition: TBD, possible home back to [facility] ([facility]).     Action: Reviewed pt's chart and prior assessment completed by LSW in April 2019. Discussed pt's case in MDT rounds. Pt resides at [facility] with his wife with [facility] of [facility]. MD has ordered PT/OT evals and LSW requested resume HHC order from MD.      Barriers to Discharge: N/A.     Plan: As above, awaiting PT/OT evals. If pt able to return back to [facility] will need a resume HHC order. Left report for unit RN CM.     Care Transition Team Assessment    Information Source  Information Given By: Other (Comments) (chart review, last assessment completed 4/19)         Elopement Risk  Legal Hold: No  Ambulatory or Self Mobile in Wheelchair: Yes  Disoriented: No  Psychiatric Symptoms: None  History of Wandering: No  Elopement this Admit: No  Vocalizing Wanting to Leave: No  Displays Behaviors, Body Language Wanting to Leave: No-Not at Risk for Elopement  Elopement Risk: Not at Risk for Elopement    Interdisciplinary Discharge Planning  Primary Care Physician: Dr. Pompa  Lives with - Patient's Self Care Capacity: Spouse  Patient or legal guardian wants to designate a caregiver (see row info): No  Housing / Facility: Assisted Living Residence ([facility])  Name of Care Facility: [facility]  Durable Medical Equipment: Walker    Discharge Preparedness  What is your plan after discharge?: Uncertain - pending medical team collaboration (pending PT/OT)  What are your discharge supports?: Spouse, Child  Prior Functional Level: Ambulatory, Needs Assist with Activities of Daily Living, Uses Walker  Difficulity with ADLs:  (resides at [facility])  Difficulity with IADLs: Cooking, Laundry, Managing medication  Difficulity with IADL Comments: resides at [facility]    Functional Assesment  Prior Functional Level: Ambulatory, Needs Assist with Activities of Daily Living, Uses Walker    Finances  Prescription Coverage: Yes    Vision / Hearing  Impairment  Vision Impairment : Yes  Right Eye Vision: Impaired, Wears Glasses  Left Eye Vision: Impaired, Wears Glasses  Hearing Impairment : No         Advance Directive  Advance Directive?: DPOA for Health Care  Durable Power of  Name and Contact : wife Myranda Rashid     Domestic Abuse  Have you ever been the victim of abuse or violence?: No              Anticipated Discharge Information  Anticipated discharge disposition: Home, [facility]

## 2019-07-25 NOTE — PROGRESS NOTES
Report obtained from ANGEL Romero. Patient is resting in bed with belongings and call light in reach. Bed in low and locked position. Telemetry monitor is on. Patient has no needs at this time.

## 2019-07-26 PROBLEM — D72.829 LEUKOCYTOSIS: Status: ACTIVE | Noted: 2019-01-01

## 2019-07-26 PROBLEM — N39.0 ACUTE UTI: Status: ACTIVE | Noted: 2019-01-01

## 2019-07-26 NOTE — PROGRESS NOTES
Monitor Summary:  Afib/Flutter with BBB, Rate: 110-121, rare PVCs  One episode of HR in 150's  QRS: 0.10

## 2019-07-26 NOTE — PROGRESS NOTES
Patient pulled apart IV tubing, moderate amount of blood and IV fluids noted on linens. Patient cleaned, Bed changed. Re-enforced education on tubes and drains. IV tubing wrapped to disguise from patient while patient still has full ROM and use of hand. Will continue to monitor.

## 2019-07-26 NOTE — FACE TO FACE
Face to Face Supporting Documentation - Home Health    The encounter with this patient was in whole or in part the primary reason for home health admission.    Date of encounter:   Patient:                    MRN:                       YOB: 2019  Joe Rashid  [id number]  1/7/1920     Home health to see patient for:  Skilled Nursing care for assessment, interventions & education, Physical Therapy evaluation and treatment and Occupational therapy evaluation and treatment    Skilled need for:  Medication Management a fib    Skilled nursing interventions to include:  Line/Drain/Airway education and care    Homebound status evidenced by:  Need the aid of supportive devices such as crutches, canes, wheelchairs or walkers. Leaving home requires a considerable and taxing effort. There is a normal inability to leave the home.    Community Physician to provide follow up care: Mervin Pompa M.D.     Optional Interventions? No      I certify the face to face encounter for this home health care referral meets the CMS requirements and the encounter/clinical assessment with the patient was, in whole, or in part, for the medical condition(s) listed above, which is the primary reason for home health care. Based on my clinical findings: the service(s) are medically necessary, support the need for home health care, and the homebound criteria are met.  I certify that this patient has had a face to face encounter by myself.  Pamela Mays M.D. - NPI: [id number]

## 2019-07-26 NOTE — PROGRESS NOTES
[facility] Medicine Daily Progress Note    Date of Service  7/25/2019    Chief Complaint  89 y.o. male admitted 7/23/2019 with weakness and general fatigue.    Hospital Course    Past medical history of chronic atrial fibrillation and chronically on anticoagulation therapy presented with complaint of weakness and general fatigue.  Patient was noticed to have A. fib RVR on admission.      Interval Problem Update  7/24.  Patient still complains of fatigue and general body achiness. Patient's pain is general, 2-3/10, intermittent and does not radiate to other location, sharp and with some tingling. Can be controlled by pain meds.  Patient otherwise complains of fatigue and weakness.  Showing signs of decreased appetite and dehydration likely  7/25.  Patient has been stable but heart rate still not controlled.  Patient is alert and orientated.  Patient still feels fatigued but wants to go home.  Cardiologist is consulted. Patient otherwise denies fever, chills, nausea, vomiting, adb pain, SOB, CP, headache, constipation, diarrhea, cough, or sputum.      Consultants/Specialty  Palliative  Cardiologist    Code Status  DNR    Disposition  Home when heart rate better controlled    Review of Systems  Review of Systems   Constitutional: Positive for malaise/fatigue. Negative for chills, fever and weight loss.   HENT: Negative for congestion, ear discharge, ear pain and hearing loss.    Eyes: Negative for blurred vision and pain.   Respiratory: Negative for cough, sputum production, shortness of breath and wheezing.    Cardiovascular: Positive for palpitations. Negative for chest pain, leg swelling and PND.   Gastrointestinal: Negative for abdominal pain, constipation, heartburn and nausea.   Genitourinary: Negative for dysuria, frequency and hematuria.   Musculoskeletal: Negative for back pain and neck pain.   Skin: Negative for rash.   Neurological: Positive for dizziness and weakness. Negative for tremors and headaches.    Psychiatric/Behavioral: Negative for depression and substance abuse.        Physical Exam  Temp:  [35.9 °C (96.7 °F)-36.5 °C (97.7 °F)] 35.9 °C (96.7 °F)  Pulse:  [] 105  Resp:  [18] 18  BP: ()/(70-93) 124/86  SpO2:  [91 %-97 %] 97 %    Physical Exam   Constitutional: He is oriented to person, place, and time. He appears well-developed and well-nourished. No distress.   HENT:   Head: Normocephalic.   Eyes: Pupils are equal, round, and reactive to light. EOM are normal.   Neck: Normal range of motion. Neck supple. No JVD present. No thyromegaly present.   Cardiovascular: Normal heart sounds.  Exam reveals no gallop.    No murmur heard.  Irregularly irregularly tachycardic   Pulmonary/Chest: Effort normal and breath sounds normal. No respiratory distress. He has no wheezes. He has no rales.   Abdominal: Soft. Bowel sounds are normal. He exhibits no distension and no mass. There is no tenderness.   Musculoskeletal: Normal range of motion. He exhibits no edema.   Lymphadenopathy:     He has no cervical adenopathy.   Neurological: He is alert and oriented to person, place, and time. He displays normal reflexes. No cranial nerve deficit.   Skin: Skin is dry. No rash noted. He is not diaphoretic.   Psychiatric: He has a normal mood and affect.   Nursing note and vitals reviewed.      Fluids    Intake/Output Summary (Last 24 hours) at 07/25/19 1808  Last data filed at 07/25/19 1700   Gross per 24 hour   Intake              840 ml   Output              500 ml   Net              340 ml       Laboratory  Recent Labs      07/23/19   1830  07/24/19   0144   WBC  9.0  7.1   RBC  4.80  4.76   HEMOGLOBIN  13.8*  13.5*   HEMATOCRIT  43.8  43.1   MCV  91.3  90.5   MCH  28.8  28.4   MCHC  31.5*  31.3*   RDW  46.5  44.8   PLATELETCT  134*  125*   MPV  10.5  10.6     Recent Labs      07/23/19   1915  07/24/19   0144   SODIUM  136  137   POTASSIUM  4.3  4.2   CHLORIDE  102  103   CO2  29  25   GLUCOSE  132*  137*   BUN  46*   42*   CREATININE  1.50*  1.43*   CALCIUM  8.4*  8.2*                   Imaging  DX-CHEST-LIMITED (1 VIEW)   Final Result      Improved right basilar atelectasis and probable edema      Stable cardiac silhouette enlargement, moderate left pleural fluid and basilar atelectasis. Consolidation is not excluded           Assessment/Plan  * Atrial fibrillation with RVR (HCC)- (present on admission)   Assessment & Plan    -Patient heart rate still fast, 110 230 most of time.  -I consulted cardiologist, recommended to use Cardizem and beta-blocker which is ordered.  -Possibly due to dehydration  -Continue gentle IV fluids  Continue monitor heart rate     DNR (do not resuscitate)- (present on admission)   Assessment & Plan    Total time spent on advanced care planning, excluding time spent on daily care: 16 minutes.    1st 30 minutes [id number]        I discussed extensively with patient and patient's family is regarding code status and plan of care. We also discussed advanced care planning including diagnosis, prognosis, plan of care, risks and benefits of any therapies that could be offered, as well as alternatives including palliation and hospice, as appropriate. My discussion is summarized above in detail. Confirmed with DNR       HLD (hyperlipidemia)- (present on admission)   Assessment & Plan    -Discontinue statin per cardiology recommendation     Benign prostatic hyperplasia with urinary frequency- (present on admission)   Assessment & Plan    -Continue home Flomax and Proscar  Patient has no significant dysuria     Essential hypertension, benign- (present on admission)   Assessment & Plan    -Continue home metoprolol  -Place PRN enalapril     Acute on chronic renal failure (HCC)- (present on admission)   Assessment & Plan    -Due to dehydration  -Continue with gentle IV fluids  -Repeat BMP in the morning  Cr 1.5->1.4          VTE prophylaxis: Apixaban      Current Facility-Administered Medications:   •  metoprolol  (LOPRESSOR) tablet 50 mg, 50 mg, Oral, BID, Moni Peace M.D., 50 mg at 07/25/19 1707  •  DILTIAZem (CARDIZEM) tablet 30 mg, 30 mg, Oral, Q8HRS, Moni Peace M.D., 30 mg at 07/25/19 1457  •  NS infusion, , Intravenous, Continuous, Moni Peace M.D., Last Rate: 75 mL/hr at 07/25/19 1454  •  apixaban (ELIQUIS) 2.5mg tablet 2.5 mg, 2.5 mg, Oral, BID, CHRISTIN BauerO., 2.5 mg at 07/25/19 1707  •  finasteride (PROSCAR) tablet 5 mg, 5 mg, Oral, DAILY, JOCELIN Bauer.O., 5 mg at 07/25/19 0630  •  tamsulosin (FLOMAX) capsule 0.4 mg, 0.4 mg, Oral, QHS, JOCELIN Bauer.O., 0.4 mg at 07/24/19 2057  •  senna-docusate (PERICOLACE or SENOKOT S) 8.6-50 MG per tablet 2 Tab, 2 Tab, Oral, BID, Stopped at 07/24/19 1800 **AND** polyethylene glycol/lytes (MIRALAX) PACKET 1 Packet, 1 Packet, Oral, QDAY PRN **AND** magnesium hydroxide (MILK OF MAGNESIA) suspension 30 mL, 30 mL, Oral, QDAY PRN **AND** bisacodyl (DULCOLAX) suppository 10 mg, 10 mg, Rectal, QDAY PRN, JOCELIN Bauer.O.  •  acetaminophen (TYLENOL) tablet 650 mg, 650 mg, Oral, Q6HRS PRN, JOCELIN Bauer.O.  •  enalaprilat (VASOTEC) injection 1.25 mg, 1.25 mg, Intravenous, Q6HRS PRN, JOCELIN Bauer.O.

## 2019-07-26 NOTE — THERAPY
PT reorders received for DC recommendations. Pt was evaluated on 7/25/19 with PT recommending home with Home Health follow up. Please refer to full PT evaluation for details. PT reorders will be DC'ed at this time as re-eval not warranted. Thank you    Nasrin Dumont, PT, DPT Pager: [phone]

## 2019-07-26 NOTE — CONSULTS
Reason for Consult:  Asked by Dr Pamela Mays M.D. to see this patient with  Afib with RVR  Patient's PCP: Mervin Pompa M.D.    CC:   Chief Complaint   Patient presents with   • Weakness   • Dehydration       Background:   This is a 89 yo M with PMH of Afib and HTN presented with dehydration and was sent to the ED from group home. The patient has been staying with his wife who is also 100 years old. The patient was not eating and drinking well for the past few days. He is complaint with his medications and was taking lasix for pleural effusion. Her lasix was stopped on admission. He also presented with Afib with RVR on admission. We were consulted for the management of Afib with RVR.    Interval History:  - The patient is doing fine without any overnight event. His HR ranged from , sometime up to 170 yesterday on tele monitor. He did not have any signs including palpitation or dizziness.  - His HR improved a little on dilt 30 TID. Will increase it to 60 TID. Educated patient on constipation and peripheral edema  - Dced fluid today. Ambulate the patient as much as possible. Would like to see HR when ambulating.     Past Medical History:   Diagnosis Date   • Atrial fibrillation (HCC)    • Chronic anticoagulation    • Chronic kidney disease, stage III (moderate) (HCC)    • Congestive heart failure (HCC)    • Hepatitis B    • History of TIA (transient ischemic attack)    • Hypertension        Past Surgical History:   Procedure Laterality Date   • HIP HEMIARTHROPLASTY Left 1/27/2016    Procedure: HIP HEMIARTHROPLASTY;  Surgeon: Adis Martínez M.D.;  Location: SURGERY [facility];  Service:    • APPENDECTOMY     • CATARACT EXTRACTION WITH IOL      both eyes   • COLON RESECTION     • OTHER      sinus surgery of unknown type   • PB CRYOSURG ABLATION OF PBOSTATE     • PB TRANSURETHRAL ELEC-SURG PBOSTATECTOM         Family History   Problem Relation Age of Onset   • Heart Disease Brother      Patient family history  was personally reviewed, no pertinent family history to current presentation    Social History     Social History   • Marital status:      Spouse name: N/A   • Number of children: N/A   • Years of education: N/A     Occupational History   • Not on file.     Social History Main Topics   • Smoking status: Never Smoker   • Smokeless tobacco: Never Used   • Alcohol use No   • Drug use: No   • Sexual activity: Not on file     Other Topics Concern   • Not on file     Social History Narrative   • No narrative on file       ALLERGIES:  Allergies   Allergen Reactions   • Aspirin Hives and Rash     Pts grandson states rash and hives.   • Sulfa Drugs Hives and Rash     Pts grandson states rash and hives.       Review of systems:  Review of Systems   Constitutional: Negative.  Negative for chills and fever.   HENT: Negative.  Negative for ear pain and sore throat.    Eyes: Negative.  Negative for pain and redness.   Respiratory: Negative.  Negative for cough and shortness of breath.    Cardiovascular: Positive for palpitations. Negative for chest pain and leg swelling.   Gastrointestinal: Positive for diarrhea. Negative for abdominal pain, nausea and vomiting.   Genitourinary: Negative.  Negative for dysuria, flank pain and hematuria.   Musculoskeletal: Negative.  Negative for back pain, joint pain and neck pain.   Skin: Negative.  Negative for itching and rash.   All other systems reviewed and are negative.    Physical exam:  Patient Vitals for the past 24 hrs:   BP Temp Temp src Pulse Resp SpO2 Weight   07/25/19 0830 108/77 36.2 °C (97.1 °F) Temporal (!) 144 18 96 % -   07/25/19 0600 - - - (!) 125 - - -   07/25/19 0436 - - - (!) 150 - - -   07/25/19 0400 123/70 36.1 °C (97 °F) Temporal (!) 120 18 91 % -   07/25/19 0000 114/82 36.2 °C (97.1 °F) Temporal (!) 110 18 95 % -   07/24/19 2000 110/86 36 °C (96.8 °F) Temporal 81 18 92 % 71 kg (156 lb 8.4 oz)   07/24/19 1600 136/99 36.2 °C (97.1 °F) Temporal 100 20 95 % -        Physical Exam   Constitutional: He is oriented to person, place, and time and well-developed, well-nourished, and in no distress. No distress.   HENT:   Head: Normocephalic and atraumatic.   Cardiovascular: An irregularly irregular rhythm present. Tachycardia present.    Murmur heard.  Pulmonary/Chest: Effort normal. He has decreased breath sounds in the right lower field and the left lower field.   Abdominal: Soft. Bowel sounds are normal. He exhibits no distension. There is no tenderness.   Musculoskeletal: He exhibits no edema or tenderness.   Neurological: He is alert and oriented to person, place, and time.   Skin: Skin is warm and dry.   Vitals reviewed.    Data:  Laboratory studies personally reviewed by me:  Recent Results (from the past 24 hour(s))   Comp Metabolic Panel    Collection Time: 07/26/19  8:52 AM   Result Value Ref Range    Sodium 137 135 - 145 mmol/L    Potassium 4.4 3.6 - 5.5 mmol/L    Chloride 104 96 - 112 mmol/L    Co2 22 20 - 33 mmol/L    Anion Gap 11.0 0.0 - 11.9    Glucose 161 (H) 65 - 99 mg/dL    Bun 40 (H) 8 - 22 mg/dL    Creatinine 1.34 0.50 - 1.40 mg/dL    Calcium 8.5 8.5 - 10.5 mg/dL    AST(SGOT) 38 12 - 45 U/L    ALT(SGPT) 72 (H) 2 - 50 U/L    Alkaline Phosphatase 89 30 - 99 U/L    Total Bilirubin 1.2 0.1 - 1.5 mg/dL    Albumin 3.7 3.2 - 4.9 g/dL    Total Protein 6.2 6.0 - 8.2 g/dL    Globulin 2.5 1.9 - 3.5 g/dL    A-G Ratio 1.5 g/dL   CBC WITH DIFFERENTIAL    Collection Time: 07/26/19  8:52 AM   Result Value Ref Range    WBC 17.2 (H) 4.8 - 10.8 K/uL    RBC 5.27 4.70 - 6.10 M/uL    Hemoglobin 15.6 14.0 - 18.0 g/dL    Hematocrit 48.3 42.0 - 52.0 %    MCV 91.7 81.4 - 97.8 fL    MCH 29.6 27.0 - 33.0 pg    MCHC 32.3 (L) 33.7 - 35.3 g/dL    RDW 46.6 35.9 - 50.0 fL    Platelet Count 129 (L) 164 - 446 K/uL    MPV 11.2 9.0 - 12.9 fL    Neutrophils-Polys 86.30 (H) 44.00 - 72.00 %    Lymphocytes 4.60 (L) 22.00 - 41.00 %    Monocytes 8.10 0.00 - 13.40 %    Eosinophils 0.10 0.00 - 6.90 %     Basophils 0.20 0.00 - 1.80 %    Immature Granulocytes 0.70 0.00 - 0.90 %    Nucleated RBC 0.00 /100 WBC    Neutrophils (Absolute) 14.86 (H) 1.82 - 7.42 K/uL    Lymphs (Absolute) 0.80 (L) 1.00 - 4.80 K/uL    Monos (Absolute) 1.39 (H) 0.00 - 0.85 K/uL    Eos (Absolute) 0.01 0.00 - 0.51 K/uL    Baso (Absolute) 0.03 0.00 - 0.12 K/uL    Immature Granulocytes (abs) 0.12 (H) 0.00 - 0.11 K/uL    NRBC (Absolute) 0.00 K/uL   MAGNESIUM    Collection Time: 07/26/19  8:52 AM   Result Value Ref Range    Magnesium 1.8 1.5 - 2.5 mg/dL   ESTIMATED GFR    Collection Time: 07/26/19  8:52 AM   Result Value Ref Range    GFR If  59 (A) >60 mL/min/1.73 m 2    GFR If Non African American 49 (A) >60 mL/min/1.73 m 2   PROCALCITONIN    Collection Time: 07/26/19 11:41 AM   Result Value Ref Range    Procalcitonin 0.05 <0.25 ng/mL       Imaging:  DX-CHEST-LIMITED (1 VIEW)   Final Result      1.  Bilateral pleural effusions and bibasilar atelectasis, left greater than right. Right effusion is new since prior study.   2.  Stable cardiomegaly.      DX-CHEST-LIMITED (1 VIEW)   Final Result      Improved right basilar atelectasis and probable edema      Stable cardiac silhouette enlargement, moderate left pleural fluid and basilar atelectasis. Consolidation is not excluded          EKG : personally reviewed by me. Left ventricular hypertrophy with Afib    All pertinent features of laboratory and imaging reviewed including primary images where applicable      Principal Problem:    Atrial fibrillation with RVR (HCC) POA: Yes      Overview: [place] update 10/1/2016  Active Problems:    Acute on chronic renal failure (HCC) POA: Yes    Essential hypertension, benign POA: Yes    Benign prostatic hyperplasia with urinary frequency POA: Yes    HLD (hyperlipidemia) POA: Yes    DNR (do not resuscitate) POA: Yes  Resolved Problems:    * No resolved hospital problems. *      Assessment / Plan:    # Atrial Fibrillation with RVR  #  Hypertension  # Acute kidney injury  - The patient has not been eating or drinking well. He was also complaining of diarrhea in the room. He also has JCARLOS based on his labs. CXR suggested mild pleural effusion on the left side. No peripheral edema. Lung exam normal.  - Recent ECHO showed EF 70% with severe pulm hypertension.   - Troponin Elevated however he has JCARLOS. No chest pain and no ekg changes. Code status DNR DNI  - Would like to reduce metoprolol to 50 mg BID which is his home dose.  - Continue Dilt 60 mg TID  - Dced atorvastatin. No reason to continue it for primary or secondary prevention. He is 89 years old.  - Recommend ambulating the patient.    Future Appointments  Date Time Provider Department Center   12/17/2019 10:00 AM Chaz Linder M.D. [facility] None       It is my pleasure to participate in the care of Mr. Rashid. Will continue to follow. Please do not hesitate to contact me with questions or concerns.    7/26/2019    Moni Peace MD MPH  Attending: Awais Fitzgerald MD    Please note that this dictation was created using voice recognition software. I have worked with consultants from the vendor as well as technical experts from [facility] [employer] to optimize the interface. I have made every reasonable attempt to correct obvious errors, but I expect that there are errors of grammar and possibly content I did not discover before finalizing the note.

## 2019-07-26 NOTE — PROGRESS NOTES
Report obtained from ANGEL Romero. Patient is standing up to use the bathroom. CNA is at bedside. Bed alarm is on and operational. Bed is in low and locked position. Patient is on telemetry. Call light is at bedside along with personal belongings.

## 2019-07-26 NOTE — PROGRESS NOTES
Bedside report received from night nurse. Assumed care of pt. Pt resting comfortably in bed without any signs of distress, VSS. No complaints at this time. POC reviewed and white board updated. Tele box on. Call light in reach. Bed locked in lowest position with 2 upper bed rails up. Bed alarm on, plugged in correctly, and placed correctly under pt.

## 2019-07-26 NOTE — THERAPY
"Physical Therapy Evaluation completed.   Bed Mobility:  Supine to Sit: Supervised  Transfers: Sit to Stand: Supervised  Gait: Level Of Assist: Supervised (cues for safety) with Front-Wheel Walker       Plan of Care: Patient with no further skilled PT needs in the acute care setting at this time  Discharge Recommendations: Equipment: No Equipment Needed. Post-acute therapy Patient will not be actively followed for physical therapy services at this time, however may be seen if requested by physician for 1 more visit within 30 days to address any discharge or equipment needs    Recommend home health transitional care for continued physical therapy services.         See \"Rehab Therapy-Acute\" Patient Summary Report for complete documentation.     "

## 2019-07-26 NOTE — CARE PLAN
Problem: Safety  Goal: Will remain free from injury  Outcome: PROGRESSING AS EXPECTED      Problem: Urinary Elimination:  Goal: Ability to reestablish a normal urinary elimination pattern will improve  Outcome: PROGRESSING SLOWER THAN EXPECTED  Has retention, needed baig catheter

## 2019-07-26 NOTE — CARE PLAN
Problem: Safety  Goal: Will remain free from falls  Outcome: PROGRESSING AS EXPECTED  Pt educated to use call light before getting out of bed. Call light in reach. Bed locked in lowest position with 2 upper bed rails up. Pt encouraged to sit at edge of bed before standing up. No c/o dizziness. Assistance of one using the walker given to help pt to the commode and back to bed. Room floor is free from clutter. Treaded socks on pt. Bed alarm in use.     Problem: Knowledge Deficit  Goal: Knowledge of disease process/condition, treatment plan, diagnostic tests, and medications will improve    Intervention: Explain information regarding disease process/condition, treatment plan, diagnostic tests, and medications and document in education  Pt educated on plan of care, medications, pain management, and disease processes. Pt verbalized understanding and was encouraged to ask questions. All questions answered.

## 2019-07-26 NOTE — PROGRESS NOTES
"Patient attempting to climb out of the bed and remove IV and baig to \"get dressed and ready to go\" Patient re-oriented to time and situation and not to pull at lines and tubes because of bleeding precautions. Baig catheter noted to have blood tinged urine in tube.  "

## 2019-07-26 NOTE — DISCHARGE PLANNING
"Anticipated Discharge Disposition: Home without skilled therapies as pt is refusing,\"i've had them all before, they don't do any good\".    Action: RN CM met with pt who states he doesn't want home health services. Pt states he and his wife have \"a girl\" (pt can't remember name), who comes in everyday to help pt and spouse with chores, shopping, etc. Pt states she will probably be his ride home on day of dc. Pt currently on oxygen with a baig catheter to downdrain present.    Barriers to Discharge: Medical clearance.    Plan: Continue to assist with any dc needs that may be identified.    "

## 2019-07-26 NOTE — PROGRESS NOTES
Bladder scan ordered by day shift. Called on call provider and updated him on bladder scan results and that patient takes medications for his prostate. Order obtained to place baig catheter for retention.

## 2019-07-26 NOTE — THERAPY
Speech Language Therapy Clinical Swallow Evaluation completed.  Functional Status: Patient strict NPO pending evaluation. Per RN, patient's has poor lung sounds and there is concern for aspiration. Patient sleeping, but rousing to verbal and tactile cues. Patient noted to have wet/gurgly vocal quality prior to PO trials. This cleared with verbal cues for strong cough and throat clear. Patient's oral motor evaluation was WNL with the exception of vocal quality. Patient consumed PO trials of single ice chips, NTL via tsp, cup sip, and straw, purees, pudding, soft solids, and thin liquids via tsp and cup sip. Patient presented with coughing on single ice chips. Increased wet/gurgly vocal quality occurred on cup sips of thin liquids and straw sips of NTL. No s/sx of aspiration were noted on purees, NTL via tsp and cup sip, or soft solids. Patient's vocal quality initially remained clear, but patient had moderate amount of belching after PO trials and 2-3 minutes after PO trials were discontinued, patient presented with increased wet/gurgly vocal quality, which all can be consistent w/ reflux, putting patient at risk for ascending aspiration. Discussed results/recs with Dr. Mays. At this time, will hold off on MBS, as MD wants to start patient on PPI to help reduce reflux. Per MD, patient is refusing palliative/hospice. Start Dys2/NTL diet with assistance. No straws. Keep HOB elevated for 30 minutes AFTER PO intake. Float meds whole in puree. RN aware. SLP to follow.     Recommendations - Diet: Dysphagia II, Nectar Thick Liquid                          Strategies: Monitor during meals, Assistance needed for meal tray set-up, No Straws and Head of Bed at 90 Degrees; Keep HOB elevated for 30 minutes AFTER PO intake to minimize risk for ascending aspiration!                           Medication Administration: Float Whole with Puree  Plan of Care: Will benefit from Speech Therapy 3 times per week  Post-Acute Therapy:  "Recommend inpatient transitional care services for continued speech therapy services.    See \"Rehab Therapy-Acute\" Patient Summary Report for complete documentation.   "

## 2019-07-26 NOTE — PROGRESS NOTES
Pt jumped out of bed stating he had to use the restroom. Pt ambulated to restroom with one person assistance using FWW. Upon walking to the bathroom RN assessed pt sacrum and ears. Sacrum has new pressure injury and bilateral ears are red and non-blanching. Appropriate LDA's opened, wound consults placed, and pictures were taken and uploaded. Second RN Quyen assessed skin as well. 2 RN skin note to follow. CNA assisted pt while in bathroom and performed sponge bath. Lap belt was placed by RN correctly on bed for when pt returns to bed. RN ensured bed alarm and chair alarms were plugged in correctly and working properly.

## 2019-07-26 NOTE — PROGRESS NOTES
2 RN skin check completed with Quyen.   Devices in place oxygen tubing, baig catheter.  Skin assessed under devices bilateral ears are red and non-blanching; skin around baig CDI.  Confirmed pressure ulcers found on bilateral ears and sacrum.  New potential pressure ulcers noted on bilateral ears and sacrum. Wound consult placed yes.  The following interventions in place:  Oxygen tubing changed to silicone soft oxygen tubing, gray foam applied to oxygen tubing (out of off-loading stickers). Heel float boots placed. Q2 turns in place. Pillows in use for support/repositioning. Waffle mattress in use. Pt has intermittent incontinence so unable to place mepilex to sacrum. Barrier paste in use. Mepilex to bilateral elbows.     Bilateral ears are red and non-blanching. Bilateral elbows are red, boggy and slow to renaldo. Sacrum is red, non-blanching, and has open wound. Bilateral heels are pink and blanching. Skin is overall fragile and flaky.

## 2019-07-27 PROBLEM — I50.31 ACUTE DIASTOLIC CHF (CONGESTIVE HEART FAILURE) (HCC): Status: ACTIVE | Noted: 2019-01-01

## 2019-07-27 PROBLEM — J96.02 ACUTE RESPIRATORY FAILURE WITH HYPOXIA AND HYPERCARBIA (HCC): Status: ACTIVE | Noted: 2019-01-01

## 2019-07-27 PROBLEM — Z66 DNR (DO NOT RESUSCITATE): Status: RESOLVED | Noted: 2019-01-01 | Resolved: 2019-01-01

## 2019-07-27 PROBLEM — R78.81 BACTEREMIA: Status: ACTIVE | Noted: 2019-01-01

## 2019-07-27 PROBLEM — J69.0 ASPIRATION PNEUMONIA (HCC): Status: ACTIVE | Noted: 2019-01-01

## 2019-07-27 PROBLEM — J96.01 ACUTE RESPIRATORY FAILURE WITH HYPOXIA AND HYPERCARBIA (HCC): Status: ACTIVE | Noted: 2019-01-01

## 2019-07-27 NOTE — PROGRESS NOTES
ICU RNs at bedside for report and to transfer patient to [facility]. Report given to Gris, RN and Yenni RN. Patient transferred on monitor and O2 with belongings and chart to room T642.

## 2019-07-27 NOTE — PROGRESS NOTES
Patient resting in bed, respirations unlabored but has occasional congested cough of which he is unable to produce sputum. O2 sats are 97% on 6 liters with an oxi mask.  Afib.

## 2019-07-27 NOTE — CONSULTS
Critical Care Consultation    Date of consult: 7/27/2019    Referring Physician  Pamela Mays M.D.    Reason for Consultation  Respiratory failure    History of Presenting Illness  89 y.o. male who presented 7/23/2019 with generalized fatigue and weakness.  He has a past medical history of atrial fibrillation is on anticoagulation.  He did have A. fib RVR on admission.  He has had bilateral pleural effusions are small on x-ray.  He had hematuria per his Pino catheter.  His urinalysis showed UTI of which is being treated.  This evening he had worse shortness of breath.  He has transitioned up to 15 L of oxygen mask.  He is being transferred to the ICU for BiPAP.  He is DNR/DNI.  He received Lasix this evening along with digoxin.  Continues have A. fib RVR.  His ABG shows hypercapnia.  His oxygen saturation was 77%.  He is being treated for gram-negative bacteremia and his lactic acid has been elevated as increasing.  He has a leukocytosis to 17,000.  Hemoglobin is normal.  Platelet count is 129 chemistry shows creatinine of 1.34.  Lactic acid is 2.2.  He is a troponin that was 189 on 7.24 and went down to 165 on 725.  Blood culture today showed gram-positive rods.  Procalcitonin is negative.  CT imaging shows bilateral pleural edema plus he likely has some consolidations.  Bilateral pleural effusions.  In 2018 his cardiac echocardiogram showed a 70% EF and severe pulmonary hypertension..  He is on digoxin, diltiazem and metoprolol for his A. fib.  He is on Zosyn for antibiotics.    Code Status  DNAR/DNI    Review of Systems  Review of Systems   Unable to perform ROS: Acuity of condition       Past Medical History   has a past medical history of Atrial fibrillation (HCC); Chronic anticoagulation; Chronic kidney disease, stage III (moderate) (HCC); Congestive heart failure (HCC); Hepatitis B; History of TIA (transient ischemic attack); and Hypertension. He also has no past medical history of Diabetes.    Surgical  History   has a past surgical history that includes other; appendectomy; colon resection; pr transurethral elec-surg prostatectom; pr cryosurg ablation of prostate; cataract extraction with iol; and hip hemiarthroplasty (Left, 1/27/2016).    Family History  family history includes Heart Disease in his brother.    Social History   reports that he has never smoked. He has never used smokeless tobacco. He reports that he does not drink alcohol or use drugs.    Medications  Home Medications     Reviewed by Cyn Gamboa R.N. (Registered Nurse) on 07/23/19 at 1814  Med List Status: Complete   Medication Last Dose Status   apixaban (ELIQUIS) 2.5mg Tab 7/23/2019 Active   atorvastatin (LIPITOR) 40 MG Tab 7/22/2019 Active   cloNIDine (CATAPRES) 0.1 MG Tab prn Active   finasteride (PROSCAR) 5 MG Tab 7/23/2019 Active   furosemide (LASIX) 40 MG Tab 7/23/2019 Active   lisinopril (PRINIVIL, ZESTRIL) 40 MG tablet 7/23/2019 Active   metoprolol (LOPRESSOR) 25 MG Tab 7/23/2019 Active   potassium chloride SA (KDUR) 20 MEQ Tab CR 7/23/2019 Active   tamsulosin (FLOMAX) 0.4 MG capsule 7/23/2019 Active   therapeutic multivitamin-minerals (THERAGRAN-M) Tab  Active              Current Facility-Administered Medications   Medication Dose Route Frequency Provider Last Rate Last Dose   • levalbuterol (XOPENEX) 1.25 MG/3ML nebulizer solution 1.25 mg  1.25 mg Nebulization Once Patrica Garcia M.D.       • piperacillin-tazobactam (ZOSYN) 3.375 g in  mL IVPB  3.375 g Intravenous Once Patrica Garcia M.D.        And   • piperacillin-tazobactam (ZOSYN) 3.375 g in  mL IVPB  3.375 g Intravenous Q8HRS Patrica Garcia M.D.       • DILTIAZem (CARDIZEM) tablet 60 mg  60 mg Oral Q8HRS Moni Peace M.D.   60 mg at 07/26/19 3688   • omeprazole (PRILOSEC) capsule 20 mg  20 mg Oral DAILY Pamela Mays M.D.   20 mg at 07/26/19 1651   • digoxin (LANOXIN) tablet 125 mcg  125 mcg Oral DAILY AT 1800 Patrica Garcia M.D.   125 mcg at 07/26/19 215   •  guaiFENesin LA (MUCINEX) tablet 600 mg  600 mg Oral Q12HRS Patrica Garcia M.D.   600 mg at 07/26/19 2148   • metoprolol (LOPRESSOR) tablet 50 mg  50 mg Oral BID Moni Peace M.D.   50 mg at 07/26/19 1659   • apixaban (ELIQUIS) 2.5mg tablet 2.5 mg  2.5 mg Oral BID CHRISTIN BauerOTamy   2.5 mg at 07/26/19 1659   • finasteride (PROSCAR) tablet 5 mg  5 mg Oral DAILY CHRISTIN BauerO.   5 mg at 07/26/19 0612   • tamsulosin (FLOMAX) capsule 0.4 mg  0.4 mg Oral QHS CHRISTIN BauerOTamy   0.4 mg at 07/26/19 2148   • senna-docusate (PERICOLACE or SENOKOT S) 8.6-50 MG per tablet 2 Tab  2 Tab Oral BID Hector Soni D.O.   Stopped at 07/24/19 1800    And   • polyethylene glycol/lytes (MIRALAX) PACKET 1 Packet  1 Packet Oral QDAY PRN Hector Soni D.O.        And   • magnesium hydroxide (MILK OF MAGNESIA) suspension 30 mL  30 mL Oral QDAY PRN Hector Soni D.O.        And   • bisacodyl (DULCOLAX) suppository 10 mg  10 mg Rectal QDAY PRN CHRISTIN BauerO.       • acetaminophen (TYLENOL) tablet 650 mg  650 mg Oral Q6HRS PRN Hector Soni D.O.       • enalaprilat (VASOTEC) injection 1.25 mg  1.25 mg Intravenous Q6HRS PRN CHRISTIN BauerO.           Allergies  Allergies   Allergen Reactions   • Aspirin Hives and Rash     Pts grandson states rash and hives.   • Sulfa Drugs Hives and Rash     Pts grandson states rash and hives.       Vital Signs last 24 hours  Temp:  [36 °C (96.8 °F)-36.4 °C (97.6 °F)] 36.1 °C (97 °F)  Pulse:  [] 105  Resp:  [18-20] 20  BP: ()/(63-96) 102/65  SpO2:  [88 %-94 %] 94 %    Physical Exam  Physical Exam   Constitutional: He is oriented to person, place, and time. He appears well-developed and well-nourished. He appears distressed.   HENT:   Head: Normocephalic and atraumatic.   Right Ear: External ear normal.   Left Ear: External ear normal.   Mouth/Throat: No oropharyngeal exudate.   Eyes: Pupils are equal, round, and reactive to light. Conjunctivae and EOM are  normal. Right eye exhibits no discharge. Left eye exhibits no discharge.   Neck: No JVD present. No tracheal deviation present.   Cardiovascular: Normal rate, regular rhythm and normal heart sounds.    No murmur heard.  Pulmonary/Chest: Breath sounds normal. No stridor. He is in respiratory distress. He has no wheezes. He has no rales. He exhibits no tenderness.   Rhonchi, poor air movement   Abdominal: He exhibits no mass. There is no tenderness. There is no rebound and no guarding.   Musculoskeletal: He exhibits no edema, tenderness or deformity.   Neurological: He is alert and oriented to person, place, and time. He displays normal reflexes. No cranial nerve deficit. Coordination normal.   Skin: Skin is warm. No rash noted. He is diaphoretic. No erythema.   Nursing note and vitals reviewed.      Fluids    Intake/Output Summary (Last 24 hours) at 07/27/19 0102  Last data filed at 07/26/19 1800   Gross per 24 hour   Intake              760 ml   Output              100 ml   Net              660 ml       Laboratory  Recent Results (from the past 48 hour(s))   TROPONIN    Collection Time: 07/25/19  2:41 AM   Result Value Ref Range    Troponin T 165 (H) 6 - 19 ng/L   Comp Metabolic Panel    Collection Time: 07/26/19  8:52 AM   Result Value Ref Range    Sodium 137 135 - 145 mmol/L    Potassium 4.4 3.6 - 5.5 mmol/L    Chloride 104 96 - 112 mmol/L    Co2 22 20 - 33 mmol/L    Anion Gap 11.0 0.0 - 11.9    Glucose 161 (H) 65 - 99 mg/dL    Bun 40 (H) 8 - 22 mg/dL    Creatinine 1.34 0.50 - 1.40 mg/dL    Calcium 8.5 8.5 - 10.5 mg/dL    AST(SGOT) 38 12 - 45 U/L    ALT(SGPT) 72 (H) 2 - 50 U/L    Alkaline Phosphatase 89 30 - 99 U/L    Total Bilirubin 1.2 0.1 - 1.5 mg/dL    Albumin 3.7 3.2 - 4.9 g/dL    Total Protein 6.2 6.0 - 8.2 g/dL    Globulin 2.5 1.9 - 3.5 g/dL    A-G Ratio 1.5 g/dL   CBC WITH DIFFERENTIAL    Collection Time: 07/26/19  8:52 AM   Result Value Ref Range    WBC 17.2 (H) 4.8 - 10.8 K/uL    RBC 5.27 4.70 - 6.10  M/uL    Hemoglobin 15.6 14.0 - 18.0 g/dL    Hematocrit 48.3 42.0 - 52.0 %    MCV 91.7 81.4 - 97.8 fL    MCH 29.6 27.0 - 33.0 pg    MCHC 32.3 (L) 33.7 - 35.3 g/dL    RDW 46.6 35.9 - 50.0 fL    Platelet Count 129 (L) 164 - 446 K/uL    MPV 11.2 9.0 - 12.9 fL    Neutrophils-Polys 86.30 (H) 44.00 - 72.00 %    Lymphocytes 4.60 (L) 22.00 - 41.00 %    Monocytes 8.10 0.00 - 13.40 %    Eosinophils 0.10 0.00 - 6.90 %    Basophils 0.20 0.00 - 1.80 %    Immature Granulocytes 0.70 0.00 - 0.90 %    Nucleated RBC 0.00 /100 WBC    Neutrophils (Absolute) 14.86 (H) 1.82 - 7.42 K/uL    Lymphs (Absolute) 0.80 (L) 1.00 - 4.80 K/uL    Monos (Absolute) 1.39 (H) 0.00 - 0.85 K/uL    Eos (Absolute) 0.01 0.00 - 0.51 K/uL    Baso (Absolute) 0.03 0.00 - 0.12 K/uL    Immature Granulocytes (abs) 0.12 (H) 0.00 - 0.11 K/uL    NRBC (Absolute) 0.00 K/uL   MAGNESIUM    Collection Time: 07/26/19  8:52 AM   Result Value Ref Range    Magnesium 1.8 1.5 - 2.5 mg/dL   ESTIMATED GFR    Collection Time: 07/26/19  8:52 AM   Result Value Ref Range    GFR If  59 (A) >60 mL/min/1.73 m 2    GFR If Non African American 49 (A) >60 mL/min/1.73 m 2   PROCALCITONIN    Collection Time: 07/26/19 11:41 AM   Result Value Ref Range    Procalcitonin 0.05 <0.25 ng/mL   BLOOD CULTURE    Collection Time: 07/26/19 11:41 AM   Result Value Ref Range    Significant Indicator POS (POS)     Source BLD     Site PERIPHERAL     Culture Result (A)      07/26/2019  23:07  Growth detected by Bactec instrument.  Gram Stain: Gram positive rods.     URINALYSIS    Collection Time: 07/26/19  1:20 PM   Result Value Ref Range    Color Red     Character Bloody (A)     Specific Gravity 1.025 <1.035    Ph 6.5 5.0 - 8.0    Glucose 100 (A) Negative mg/dL    Ketones Trace (A) Negative mg/dL    Protein >=300 (A) Negative mg/dL    Bilirubin Negative Negative    Urobilinogen, Urine 1.0 Negative    Nitrite Positive (A) Negative    Leukocyte Esterase Moderate (A) Negative    Occult Blood  Large (A) Negative    Micro Urine Req Microscopic    URINE MICROSCOPIC (W/UA)    Collection Time: 07/26/19  1:20 PM   Result Value Ref Range    WBC  (A) /hpf    RBC >150 (A) /hpf    Bacteria Few (A) None /hpf    Epithelial Cells Negative /hpf    Hyaline Cast 3-5 (A) /lpf   ISTAT ARTERIAL BLOOD GAS    Collection Time: 07/26/19  9:05 PM   Result Value Ref Range    Ph 7.251 (LL) 7.400 - 7.500    Pco2 42.4 (H) 26.0 - 37.0 mmHg    Po2 77 64 - 87 mmHg    Tco2 20 20 - 33 mmol/L    S02 93 93 - 99 %    Hco3 18.6 17.0 - 25.0 mmol/L    BE -8 (L) -4 - 3 mmol/L    Body Temp 97.8 F degrees    O2 Therapy 44 %    iPF Ratio 175     Ph Temp Fer 7.257 (LL) 7.400 - 7.500    Pco2 Temp Co 41.6 (H) 26.0 - 37.0 mmHg    Po2 Temp Cor 74 64 - 87 mmHg    Specimen Arterial     Action Range Triggered YES     Inst. Qualified Patient YES    PROCALCITONIN    Collection Time: 07/26/19  9:18 PM   Result Value Ref Range    Procalcitonin <0.05 <0.25 ng/mL   LACTIC ACID    Collection Time: 07/26/19  9:18 PM   Result Value Ref Range    Lactic Acid 2.2 (H) 0.5 - 2.0 mmol/L       Imaging  DX-CHEST-PORTABLE (1 VIEW)   Final Result         1.  Pulmonary edema and/or infiltrates are identified, which are stable since the prior exam.   2.  Small left pleural effusion   3.  Cardiomegaly   4.  Atherosclerosis      DX-CHEST-LIMITED (1 VIEW)   Final Result      1.  Bilateral pleural effusions and bibasilar atelectasis, left greater than right. Right effusion is new since prior study.   2.  Stable cardiomegaly.      DX-CHEST-LIMITED (1 VIEW)   Final Result      Improved right basilar atelectasis and probable edema      Stable cardiac silhouette enlargement, moderate left pleural fluid and basilar atelectasis. Consolidation is not excluded          Assessment/Plan  No new Assessment & Plan notes have been filed under this hospital service since the last note was generated.  Service: Pulmonary  Unable to enter into problem list.      Acute respiratory failure,  hypoxia.  On 15 L nc.  Change to bipap after suctioning.  DNI.  If does not tolerate then high flow.  Likely pneumonia.  Also pulmonary edema.  Pneumonia, treating with zosyn.  Add doxycycline.  Possible aspiration.    Atrial fibrillation, on digoxin, metoprolol and diltiazem, RVR to 140s, given 10 mg diltiazem X 2, poor response, repeat once more and ordered dilt drip for goal hr < 110.  Exac by hypoxia  Pleural effusions, bilateral, received lasix.  Hypervolemic on bedside us.  Diuresis as tolerated.  Too much movement given high respiratory rate for given size to tap for sample.    Pulmonary edema, likely from fluids received on this hospitalization.  Diuresis as tolerated  DNAR/DNI  Severe sepsis, gram negative bacteremia.  Respiratory failure.  Urosepsis.  UTI, on zosyn  Acute kidney injury, secondary to poor perfusion with sepsis     Discussed patient condition and risk of morbidity and/or mortality with Hospitalist, RN, RT, Pharmacy and Patient.    The patient remains critically ill.  Requiring 15 L oxygen with sao2 70%.  Transitioned to bipap.  Critical care time = 55 minutes in directly providing and coordinating critical care and extensive data review.  No time overlap and excludes procedures.

## 2019-07-27 NOTE — CARE PLAN
Problem: Safety  Goal: Will remain free from falls  Outcome: PROGRESSING AS EXPECTED  Hourly rounds for pt, bed alarm set, call light in reach, bed low and locked, treaded slipper socks on, pt calls appropriately.   Intervention: Assess risk factors for falls  Completed.   Intervention: Implement fall precautions  Completed.       Problem: Skin Integrity  Goal: Risk for impaired skin integrity will decrease  Outcome: PROGRESSING AS EXPECTED  Pt turned and repositioned q2, waffle cushion positioned and inflated appropriately, skin care as needed, heels and elbows floated with pillows. Oral intake adequate. Pt mobilized to edge of bed, stand, and chair. Mepilex in place on sacrum, barrier cream in use, mepitel one on skin tear, silicon nasal cannula (pt refused grey ear pads). Wound consulted.  Intervention: Assess risk factors for impaired skin integrity and/or pressure ulcers  Completed.   Intervention: Implement precautions to protect skin integrity in collaboration with the interdisciplinary team  Completed.   Intervention: Assess and monitor skin integrity, appearance and/or temperature  Completed.

## 2019-07-27 NOTE — PROGRESS NOTES
Dr. Anglin notified of pts BP 70-80s with MAP 75 and afib rate sustaining 150s. Came to bedside and performed bedside echo. Instructed to continue increasing diltiazem gtt until maxed, if pt still afib with high rate will notify Dr. Anglin for additional med orders.

## 2019-07-27 NOTE — PROGRESS NOTES
Bedside report received from ANGEL Hill. Pt arrived to room 624 at 0115. Transported by RN x2 with monitor.     0030- Dr. Lugo and RT at beside and orders received to start bipap. Patient successfully able to take off mask. Will continue to monitor.

## 2019-07-27 NOTE — PROGRESS NOTES
Transferred to ICU overnight by Dr. Sadler  Patient seen and examined, discussed with Dr. Fitzgerald and with Dr. Anglin  Respiratory status improved  Remains in AFIB/RVR, titrating dilt drip, loading on digoxin  Continue ICU care

## 2019-07-27 NOTE — PROGRESS NOTES
Hospital Medicine Daily Progress Note    Date of Service  7/27/2019    Chief Complaint  89 y.o. male admitted 7/23/2019 with dec urine out put and fatigue    Hospital Course    Admitted with Afib with RVR and JCARLOS due to dehydration.      Interval Problem Update  7/27:Patient found to be hypoxic and in respiratory distress. The patient was tachycardic and hypotensive. CXR interperted by me found peribronchial cuffing and increased intravascular congestion indicating pulmonary edema. Lung exams found bronchial breath sounds bilaterally. He was given IV digoxin and IV lasix. ABG found evidence of hypercapnic respiratory failure and metabolic acidosis from lactic acid. He was also given a xopenex treatment and started on mucinex since he is at risk for aspiration. Antibiotics were upgraded to zosyn. He was then found to be placed on 15L of o2 sat 91%. The patient was then transferred to ICU for bipap support.    Consultants/Specialty  cardiology    Code Status  DNR    Disposition  ICU    Review of Systems  Review of Systems   Constitutional: Positive for diaphoresis and malaise/fatigue. Negative for chills and fever.   HENT: Positive for congestion. Negative for ear discharge, ear pain, hearing loss, nosebleeds, sinus pain, sore throat and tinnitus.    Eyes: Negative for blurred vision, double vision, photophobia and pain.   Respiratory: Positive for cough, sputum production, shortness of breath and wheezing. Negative for hemoptysis and stridor.    Cardiovascular: Negative for chest pain, palpitations, orthopnea, claudication, leg swelling and PND.   Gastrointestinal: Negative for abdominal pain, blood in stool, constipation, diarrhea, heartburn, melena, nausea and vomiting.   Genitourinary: Negative for dysuria, flank pain, frequency, hematuria and urgency.   Musculoskeletal: Negative for back pain, falls, joint pain, myalgias and neck pain.   Skin: Negative for itching and rash.   Neurological: Positive for weakness.  Negative for dizziness, tingling, tremors and headaches.   Endo/Heme/Allergies: Negative for environmental allergies and polydipsia. Does not bruise/bleed easily.   Psychiatric/Behavioral: Negative for depression, hallucinations, substance abuse and suicidal ideas.        Physical Exam  Temp:  [36 °C (96.8 °F)-36.4 °C (97.6 °F)] 36.1 °C (97 °F)  Pulse:  [] 105  Resp:  [18-20] 20  BP: ()/(63-96) 102/65  SpO2:  [88 %-94 %] 94 %    Physical Exam   Constitutional: He is oriented to person, place, and time. He appears well-developed and well-nourished.   HENT:   Head: Normocephalic and atraumatic.   Mouth/Throat: No oropharyngeal exudate.   Eyes: Conjunctivae are normal. No scleral icterus.   Neck: Normal range of motion. Neck supple. JVD present. No tracheal deviation present. No thyromegaly present.   Cardiovascular: Intact distal pulses.  Exam reveals no gallop and no friction rub.    No murmur heard.  tachycardia   Pulmonary/Chest: No stridor. He is in respiratory distress. He has wheezes. He has rales. He exhibits no tenderness.   Bronchial breath sounds   Abdominal: Soft. Bowel sounds are normal. He exhibits no distension and no mass. There is no tenderness. There is no rebound and no guarding.   Musculoskeletal: Normal range of motion. He exhibits edema.   Lymphadenopathy:     He has no cervical adenopathy.   Neurological: He is alert and oriented to person, place, and time. He has normal reflexes.   Nursing note and vitals reviewed.      Fluids    Intake/Output Summary (Last 24 hours) at 07/27/19 0208  Last data filed at 07/27/19 0100   Gross per 24 hour   Intake              760 ml   Output              700 ml   Net               60 ml       Laboratory  Recent Labs      07/26/19   0852  07/27/19   0058   WBC  17.2*  15.0*   RBC  5.27  4.71   HEMOGLOBIN  15.6  13.9*   HEMATOCRIT  48.3  43.5   MCV  91.7  92.4   MCH  29.6  29.5   MCHC  32.3*  32.0*   RDW  46.6  46.5   PLATELETCT  129*  118*   MPV  11.2   10.7     Recent Labs      07/26/19   0852  07/27/19   0058   SODIUM  137  135   POTASSIUM  4.4  4.3   CHLORIDE  104  102   CO2  22  26   GLUCOSE  161*  215*   BUN  40*  44*   CREATININE  1.34  1.46*   CALCIUM  8.5  8.3*                   Imaging  DX-CHEST-PORTABLE (1 VIEW)   Final Result         1.  Pulmonary edema and/or infiltrates are identified, which are stable since the prior exam.   2.  Small left pleural effusion   3.  Cardiomegaly   4.  Atherosclerosis      DX-CHEST-LIMITED (1 VIEW)   Final Result      1.  Bilateral pleural effusions and bibasilar atelectasis, left greater than right. Right effusion is new since prior study.   2.  Stable cardiomegaly.      DX-CHEST-LIMITED (1 VIEW)   Final Result      Improved right basilar atelectasis and probable edema      Stable cardiac silhouette enlargement, moderate left pleural fluid and basilar atelectasis. Consolidation is not excluded           Assessment/Plan  * Atrial fibrillation with RVR ([facility])- (present on admission)   Assessment & Plan    Goal rate 60-90.   Pulse: (!) 105 presently  Anticoagulation: eliquis 2.5mg    -Patient found to be in RVR with , gave IV digoxin and HR improved  Cont oral digoxin, metoprolol and cardizem for rate control     Acute diastolic CHF (congestive heart failure) ([facility])- (present on admission)   Assessment & Plan    Given IV lasix  Monitor volume status     Bacteremia   Assessment & Plan    Gram positive rods- wait for final results     Aspiration pneumonia ([facility])   Assessment & Plan    Leukocytosis and in respiratory distress  Started on mucinex and albuterol  Started on Zosyn      Acute respiratory failure with hypoxia and hypercarbia ([facility])   Assessment & Plan    Found to be hypercapnic on ABG likely from fatigue of respiratory drive. Avoid sedative medications.  Hypoxia likely from pulmonary edema and aspiration pneumonia  Placed on bipap and transferred to ICU  Bipap mangement per critical care  F/u abg     Acute UTI    Assessment & Plan    UA + for bacteria, LE and nitrite  F/u urine cultures       Leukocytosis   Assessment & Plan    Possible due to aspiration pneumonia  Upgraded antibiotics to zosyn to cover anaerobes     HLD (hyperlipidemia)- (present on admission)   Assessment & Plan    -Continue home statin     Benign prostatic hyperplasia with urinary frequency- (present on admission)   Assessment & Plan    -Continue home Flomax and Proscar     Essential hypertension, benign- (present on admission)   Assessment & Plan    -Continue home metoprolol  -Place PRN enalapril     Acute on chronic renal failure (HCC)- (present on admission)   Assessment & Plan    Improving cont to monitor  F/U urine electrolytes          VTE prophylaxis: eliquis    The patient is critically ill, with high chance of deterioration into heart failure, MI, stroke, and eventually death if left untreated. The care that has been undertaken is medically  complex. I spent 65 minutes of critical care time, including managing medical   issues, coordination of care, not including doing procedures, with no overlap in critical  care time.

## 2019-07-27 NOTE — ASSESSMENT & PLAN NOTE
This is severe sepsis with the following associated acute organ dysfunction(s): acute kidney failure, acute respiratory failure.   Source: Pneumonia and/or UTI  Improving  Continue sepsis protocols  Complete 5 days total of antibiotics unless cultures dictate otherwise

## 2019-07-27 NOTE — ASSESSMENT & PLAN NOTE
WBC increased after admission significantly-now resolved  Suspect UTI vs pneumonia  Continue with broad spectrum antibitoics-plan change to Augmentin.

## 2019-07-27 NOTE — PROGRESS NOTES
Dr. Lugo contacted about patient being in A. Fib with rate in the 1teen's to 150's and SBP 90. Orders received. See MAR.

## 2019-07-27 NOTE — PROGRESS NOTES
Report obtained from ANGEL Ca. Patient sitting up in chair finishing dinner. Patient is on O2 and is on telemetry. Chair alarm is on and operational. Patient assisted back to bed. Patient has baig catheter in place. Hematuria noted which is improving from this AM. Patient has suction, call light, and personal belongings in reach. Bed in low and locked position with bed alarm on and operational. No other needs noted at this time.

## 2019-07-27 NOTE — RESPIRATORY CARE
Bipap/Cpap mask placed.  Can the patient demonstrate removal of mask? Yes   If no, please notify physician.

## 2019-07-27 NOTE — PROGRESS NOTES
Cardiology follow-up:      Progress Note:  Resident:  Isiah Villafuerte M.D.  Attending:  Awais Fitzgerald M.D.  Date:  2019     Patient ID:  Name Joe Rashid [id number]   Age/Sex 89 y.o. male   MRN [id number]       Reason for Consult:  A-fib with RVR.    Background:  This is a 89 yo M with PMH of Afib and HTN presented with dehydration and was sent to the ED from group home. The patient has been staying with his wife who is also 100 years old. The patient was not eating and drinking well for the past few days. He is complaint with his medications and was taking lasix for pleural effusion. Her lasix was stopped on admission. He also presented with Afib with RVR on admission. We were consulted for the management of Afib with RVR.    - HR previously improved a bit with diltiazem, and had been increased to 60 TID.     Interval Update:  - Patient noted sudden shortness of breath last night, and noted coughing/gagging, and was found to have oxygen in the 80's.  His oxygen was increased, but continued to have shortness of breath and effort, even on 15 L O2, by NRB mask, so was transferred to ICU, for BIPAP (overnight).  He was also noted to be severely tachycardic (A-fib with RVR), and received multiple doses of IV diltiazem.  And, they started him on a daily (125) dose of digoxin.   - And, Now giving loading doses for digoxin.   - There was also some concern for aspiration pneumonia and pleural effusions, so he was given Lasix 40 IV.  His blood culture (1 of 2) noted G+ rods, and his UA was positive for nitrite and mod LE (plus blood and hyaline casts). He had received ceftriaxone, but then switched to Zosyn.       Review of Symptoms  GEN/CONST:   Denies fever, or chills.    HEENT:   Denies vision or hearing changes.       + On dysphagia diet.   CARDIO:   Currently - Denies chest pain, palpitations, orthopnea, or edema.      + overnight: see above.   RESP:   Currently - Denies wheezing, or coughing.       + Currently on partial rebreather mask.      + overnight: see above.   GI:    Denies nausea, vomiting, or abdominal pain.      + diarrhea.  :   Had been on Condom cath, yesterday.         + blood in urine, for past day.   HEME:  Denies easy bruising      + blood in urine, for past day.   ALLG:  Denies  Hives or rashes.    MSK:  Denies focal weakness, or swellings,   SKIN:  Denies rashes, or sores.   PSYCH:  Denies mood disorders or substance abuse.      Physical Exam     Vitals:    07/27/19 0637 07/27/19 0700 07/27/19 0800 07/27/19 0931   BP:       Pulse: (!) 120 97     Resp: (!) 22 18     Temp:   36.5 °C (97.7 °F)    TempSrc:   Temporal    SpO2: 96% 97%  93%   Weight:       Height:         Body mass index is 23.25 kg/m².    Oxygen Therapy:  Pulse Oximetry: 93 %, O2 (LPM): 5, O2 Delivery: Oxymask    GEN:   Alert and oriented,  Resting comfortably.     + in ICU.  On Partial NRB O2.   H / E:   Normocephalic, Atraumatic.  No scleral icterus.     ENT:   No erythema.  Trachea midline.  No stridor.  Supple neck.   HEART:   Regular rate. (at time of exam ~ 90).      + irregular rhythm      Questionable murmur.   LUNGS:   Mildly reduced breath sounds.      + crackling in lower lung fields.   ABD/GI:  Soft.  +BS.  No rebound or guarding noted.  EXT/MSK:  No pedal edema.    NEURO:  Alert and oriented x3.  No tremor.    PSYCH:  Normal mood and affect, but appeared a bit tired.       Lab Data Review:     Recent Labs      07/26/19   0852  07/27/19 0058   WBC  17.2*  15.0*   NEUTSPOLYS  86.30*  88.20*   LYMPHOCYTES  4.60*  2.70*   MONOCYTES  8.10  8.60   EOSINOPHILS  0.10  0.00   BASOPHILS  0.20  0.20   ASTSGOT  38   --    ALTSGPT  72*   --    ALKPHOSPHAT  89   --    TBILIRUBIN  1.2   --      Recent Labs      07/26/19   0852  07/27/19 0058   RBC  5.27  4.71   HEMOGLOBIN  15.6  13.9*   HEMATOCRIT  48.3  43.5   PLATELETCT  129*  118*       Recent Labs      07/26/19   0852  07/27/19   0058   SODIUM  137  135   POTASSIUM  4.4   4.3   CHLORIDE  104  102   CO2  22  26   BUN  40*  44*   CREATININE  1.34  1.46*   MAGNESIUM  1.8   --    CALCIUM  8.5  8.3*       Recent Labs      07/26/19   0852  07/27/19   0058   ALTSGPT  72*   --    ASTSGOT  38   --    ALKPHOSPHAT  89   --    TBILIRUBIN  1.2   --    GLUCOSE  161*  215*             Assessment & Plan     # Atrial Fibrillation with RVR  # Hypertension  # Acute kidney injury  # Acute Respiratory Failure (with hypoxemia and hypercapnia).   # Aspiration Pneumonia (possible)  # UTI   (+ nitrites, and LE).  # Bacteremia  (1 of 2 Cx with G+ rods).   Previously with CXR suggested mild pleural effusion on the left side. No peripheral edema.  And, some JCARLOS, on Labs.   Prior Troponin Elevated however he has JCARLOS. No chest pain and no ekg changes. Code status DNR/DNI.  Transferred to ICU overnight, after sudden coughing/gagging/SOB, with hypoxemia and hypercapnia.   Received BIPAP overnight, and improved to partial NRB / oxy-mask.   Recent ECHO showed EF 70% with severe pulm hypertension.   - Less likely to be heart failure, given recent echo.   - More likely secondary to pulmonary issue, or infectious process, as 2 potential sources for his bacteremia.        Agree with antibiotic use.   Has been on Eliquis, but held this morning, due to uncertain bleeding hx (per other team).   No need for starting atorvastatin, for prevention, given his advanced age.   Has been placed on diltiazem drip (4), and started on digoxin (125 / daily).   - Adding loading dose: 250 x 2, today.          Will get dig level in AM, per attending.          Can also recheck in 1 week.   Patient also being managed by hospitalist and ICU physicians (who will manage all other issues).       A computerized dictation system may have been used for this note.    Despite review, there may be some spelling or grammatical errors.    Isiah Villafuerte M.D.  7/27/2019   Service:  Cardiology   Attending:  Awais Fitzgerald M.D.

## 2019-07-27 NOTE — PROGRESS NOTES
[facility] Medicine Daily Progress Note    Date of Service  7/26/2019    Chief Complaint  89 y.o. male admitted 7/23/2019 with weakness and general fatigue.    Hospital Course    Past medical history of chronic atrial fibrillation and chronically on anticoagulation therapy presented with complaint of weakness and general fatigue.  Patient was noticed to have A. fib RVR on admission.      Interval Problem Update  7/24.  Patient still complains of fatigue and general body achiness. Patient's pain is general, 2-3/10, intermittent and does not radiate to other location, sharp and with some tingling. Can be controlled by pain meds.  Patient otherwise complains of fatigue and weakness.  Showing signs of decreased appetite and dehydration likely  7/25.  Patient has been stable but heart rate still not controlled.  Patient is alert and orientated.  Patient still feels fatigued but wants to go home.  Cardiologist is consulted. Patient otherwise denies fever, chills, nausea, vomiting, adb pain, SOB, CP, headache, constipation, diarrhea, cough, or sputum.  7/26.  Patient complains of shortness of breath this morning.  Patient also complains of urinary retention and bloody urine Pino catheter was noticed with hematuria.  Urinalysis showing possible UTI.  Patient's chest x-ray showing bilateral pleural effusion small amount.  Patient complains of general body achiness. Patient's pain is general 2-3/10, intermittent and does not radiate to other location, sharp and with some tingling. Can be controlled by pain meds.     Consultants/Specialty  Palliative  Cardiologist    Code Status  DNR    Disposition  Home when heart rate better controlled    Review of Systems  Review of Systems   Constitutional: Positive for malaise/fatigue. Negative for fever and weight loss.   HENT: Negative for congestion, ear discharge, ear pain and hearing loss.    Eyes: Negative for blurred vision, double vision and pain.   Respiratory: Positive for cough  and shortness of breath. Negative for sputum production and wheezing.    Cardiovascular: Positive for palpitations. Negative for chest pain, leg swelling and PND.   Gastrointestinal: Negative for abdominal pain, constipation, diarrhea, heartburn and nausea.   Genitourinary: Negative for dysuria, flank pain and frequency.   Musculoskeletal: Negative for back pain and neck pain.   Skin: Negative for rash.   Neurological: Positive for dizziness and weakness. Negative for tremors and headaches.   Psychiatric/Behavioral: Negative for depression and substance abuse.        Physical Exam  Temp:  [35.8 °C (96.5 °F)-36.3 °C (97.4 °F)] 36.3 °C (97.4 °F)  Pulse:  [] 64  Resp:  [18] 18  BP: (101-141)/(63-90) 104/63  SpO2:  [90 %-95 %] 90 %    Physical Exam   Constitutional: He is oriented to person, place, and time. He appears well-developed.   HENT:   Head: Normocephalic.   Right Ear: External ear normal.   Left Ear: External ear normal.   Eyes: Pupils are equal, round, and reactive to light. EOM are normal.   Neck: Normal range of motion. Neck supple. No JVD present. No thyromegaly present.   Cardiovascular: Normal heart sounds.  Exam reveals no gallop and no friction rub.    No murmur heard.  Irregularly irregularly tachycardic   Pulmonary/Chest: Effort normal and breath sounds normal. No respiratory distress. He has no wheezes. He has no rales.   Abdominal: Soft. Bowel sounds are normal. He exhibits no distension and no mass. There is no tenderness. There is no rebound.   Musculoskeletal: Normal range of motion. He exhibits no edema.   Lymphadenopathy:     He has no cervical adenopathy.   Neurological: He is alert and oriented to person, place, and time. He displays normal reflexes. No cranial nerve deficit.   Skin: Skin is dry. No rash noted. He is not diaphoretic.   Psychiatric: He has a normal mood and affect.   Nursing note and vitals reviewed.      Fluids    Intake/Output Summary (Last 24 hours) at 07/26/19  1758  Last data filed at 07/26/19 1600   Gross per 24 hour   Intake              400 ml   Output              100 ml   Net              300 ml       Laboratory  Recent Labs      07/23/19   1830  07/24/19   0144  07/26/19   0852   WBC  9.0  7.1  17.2*   RBC  4.80  4.76  5.27   HEMOGLOBIN  13.8*  13.5*  15.6   HEMATOCRIT  43.8  43.1  48.3   MCV  91.3  90.5  91.7   MCH  28.8  28.4  29.6   MCHC  31.5*  31.3*  32.3*   RDW  46.5  44.8  46.6   PLATELETCT  134*  125*  129*   MPV  10.5  10.6  11.2     Recent Labs      07/23/19   1915  07/24/19   0144  07/26/19   0852   SODIUM  136  137  137   POTASSIUM  4.3  4.2  4.4   CHLORIDE  102  103  104   CO2  29  25  22   GLUCOSE  132*  137*  161*   BUN  46*  42*  40*   CREATININE  1.50*  1.43*  1.34   CALCIUM  8.4*  8.2*  8.5                   Imaging  DX-CHEST-LIMITED (1 VIEW)   Final Result      1.  Bilateral pleural effusions and bibasilar atelectasis, left greater than right. Right effusion is new since prior study.   2.  Stable cardiomegaly.      DX-CHEST-LIMITED (1 VIEW)   Final Result      Improved right basilar atelectasis and probable edema      Stable cardiac silhouette enlargement, moderate left pleural fluid and basilar atelectasis. Consolidation is not excluded           Assessment/Plan  Acute UTI, acute cystitis with hematuria.:  -Urinalysis showing pyuria.  -I start patient on IV Rocephin given patient also have factor of leukocytosis.    Leukocytosis:  -Possibly from UTI-   -IV Rocephin started with 1 g for 3 days.    * Atrial fibrillation with RVR (HCC)- (present on admission)   Assessment & Plan    -Patient heart rate still fast, 110-130 most of time.  -I consulted cardiologist, recommended to use Cardizem and beta-blocker which is ordered.  Cardizem increased to 60 mg 3 times daily  -Continue gentle IV fluids  Continue monitor heart rate     DNR (do not resuscitate)- (present on admission)   Assessment & Plan    Total time spent on advanced care planning, excluding  time spent on daily care: 16 minutes.    1st 30 minutes [id number]        I discussed extensively with patient and patient's family is regarding code status and plan of care. We also discussed advanced care planning including diagnosis, prognosis, plan of care, risks and benefits of any therapies that could be offered, as well as alternatives including palliation and hospice, as appropriate. My discussion is summarized above in detail. Confirmed with DNR       HLD (hyperlipidemia)- (present on admission)   Assessment & Plan    -Discontinue statin per cardiology recommendation     Benign prostatic hyperplasia with urinary frequency- (present on admission)   Assessment & Plan    -Continue home Flomax and Proscar  Patient has no significant dysuria     Essential hypertension, benign- (present on admission)   Assessment & Plan    -Continue home metoprolol  -Place PRN enalapril     Acute on chronic renal failure (HCC)- (present on admission)   Assessment & Plan    -Due to dehydration  -resolved  -Repeat BMP in the morning  Cr 1.5->1.4          VTE prophylaxis: Apixaban      Current Facility-Administered Medications:   •  DILTIAZem (CARDIZEM) tablet 60 mg, 60 mg, Oral, Q8HRS, Moni Peace M.D., 60 mg at 07/26/19 1500  •  omeprazole (PRILOSEC) capsule 20 mg, 20 mg, Oral, DAILY, Pamela Mays M.D., 20 mg at 07/26/19 1659  •  [START ON 7/27/2019] cefTRIAXone (ROCEPHIN) 1 g in  mL IVPB, 1 g, Intravenous, Q24HRS, Pamela Mays M.D.  •  metoprolol (LOPRESSOR) tablet 50 mg, 50 mg, Oral, BID, Moni Peace M.D., 50 mg at 07/26/19 1659  •  apixaban (ELIQUIS) 2.5mg tablet 2.5 mg, 2.5 mg, Oral, BID, Hector Soni D.O., 2.5 mg at 07/26/19 1659  •  finasteride (PROSCAR) tablet 5 mg, 5 mg, Oral, DAILY, Hector Soni D.O., 5 mg at 07/26/19 0612  •  tamsulosin (FLOMAX) capsule 0.4 mg, 0.4 mg, Oral, QHS, Hector Soni D.O., 0.4 mg at 07/25/19 2108  •  senna-docusate (PERICOLACE or SENOKOT S) 8.6-50 MG per tablet 2 Tab, 2 Tab,  Oral, BID, Stopped at 07/24/19 1800 **AND** polyethylene glycol/lytes (MIRALAX) PACKET 1 Packet, 1 Packet, Oral, QDAY PRN **AND** magnesium hydroxide (MILK OF MAGNESIA) suspension 30 mL, 30 mL, Oral, QDAY PRN **AND** bisacodyl (DULCOLAX) suppository 10 mg, 10 mg, Rectal, QDAY PRN, JOCELIN Bauer.O.  •  acetaminophen (TYLENOL) tablet 650 mg, 650 mg, Oral, Q6HRS PRN, Hector Soni D.O.  •  enalaprilat (VASOTEC) injection 1.25 mg, 1.25 mg, Intravenous, Q6HRS PRN, Hector Soni D.O.

## 2019-07-27 NOTE — ASSESSMENT & PLAN NOTE
Developed leukocytosis and respiratory distress overnight on 7/26/2019  Suspect component of aspiration.  Continue with dysphagia diet, aspiration precautions.  Changed antibiotics to Augmentin.

## 2019-07-27 NOTE — PROGRESS NOTES
Patient is very restless, O2 saturations between 88-91% on 6 liters oxi mask. Dr Garcia was notified of continuing hypoxia. Patient may have xopenex breathing treatment. RT, notified.    00:20 Notified that patient is on 15L non-rebreather by RT. Updated charge nurse. Paged Dr Garcia about patient continued oxygenation requirements. Patient may move to ICU for possible Bipap. Charge nurse notified.     00:35 Called patient's daughter Aleksey (phone # [phone]) to update her on patient's status and pending transfer to ICU. Patient updated on plan to transfer to ICU.

## 2019-07-27 NOTE — ASSESSMENT & PLAN NOTE
Currently rate controlled  Off diltiazem infusion  Maintain K greater than 4 mag greater than 2  Remains on digoxin, diltiazem, metoprolol, and apixaban

## 2019-07-27 NOTE — PROGRESS NOTES
23:11 paged Dr Garcia regarding positive blood culture results.    23:27 Paged Dr Garcia regarding positive blood cultures. Lab repeated back and confirmed by MD. No new orders of antibiotics, patient is currently on IV rocephin.

## 2019-07-27 NOTE — PROGRESS NOTES
Critical Care Progress Note    Date of admission  7/23/2019    Chief Complaint  89 y.o. male admitted 7/23/2019 with shortness of breath.  Hospital Course    Is found to have atrial fibrillation with rapid ventricular response as well as bilateral pleural effusions on admission.  He has been managed on the medical floor.  He is being treated for gram-negative bacteremia.  His oxygen requirements have steadily risen to 15 L/min via oxygen mask.  He was found to be in A. fib RVR with oxygen saturations of 77%.  He was transferred to the intensive care unit in the early morning on 7/27.  He is a DO NOT INTUBATE.  He was placed on BiPAP as the only remaining rescue measure.  He has been treated with digoxin, and metoprolol.  He is now in a diltiazem infusion for his atrial fibrillation.       Interval Problem Update  Reviewed last 24 hour events:              - Transfer to ICU from floor              - Tm: He              - HR: 115-120, afib              - SBP: 90-1 03              - Neuro: Awake and alert              - GI: Regular diet              - UOP: 1.2 L, net -355              - Pino: Yes              - Lines: 2 PIV              - PPx: Eliquis, PPI              - CXR (personally reviewed):               - Antibiotic Day  Zosyn    Infusions:  Titrating Diltiazem     pH 7.25/42/77/18.6 (mixed respiratory/metabolic acidosis)  Improved w/Bipap    Review of Systems  Review of Systems   Constitutional: Negative for chills and fever.   HENT: Negative for sore throat.    Eyes: Negative.    Respiratory: Positive for cough and shortness of breath.    Cardiovascular: Negative for chest pain.   Gastrointestinal: Negative for abdominal pain, nausea and vomiting.   Genitourinary: Negative.    Musculoskeletal: Negative.    Skin: Negative.    Neurological: Positive for weakness. Negative for dizziness.   All other systems reviewed and are negative.       Vital Signs for last 24 hours   Temp:  [36.1 °C (97 °F)-36.6 °C (97.9  °F)] 36.5 °C (97.7 °F)  Pulse:  [] 131  Resp:  [18-33] 18  BP: ()/(60-96) 102/60  SpO2:  [88 %-100 %] 93 %    Hemodynamic parameters for last 24 hours       Respiratory Information for the last 24 hours       Physical Exam   Physical Exam   Constitutional: He is oriented to person, place, and time.   Thin man appearing his stated age, pleasant, no acute distress   HENT:   Head: Normocephalic.   Mouth/Throat: Oropharynx is clear and moist.   Eyes: Pupils are equal, round, and reactive to light.   Neck: Neck supple.   Cardiovascular:   Tachycardic irregularly irregular  Bedside ultrasound reveals no pericardial effusion.  IVC diameter 2.01 cm with no respiratory variation.   Pulmonary/Chest:   Scattered crackles diminished on the left.   Abdominal: Soft. He exhibits no distension. There is no tenderness.   Musculoskeletal: He exhibits no edema.   Neurological: He is alert and oriented to person, place, and time. No cranial nerve deficit.   Skin: Skin is warm and dry.   Psychiatric: He has a normal mood and affect. His behavior is normal.       Medications  Current Facility-Administered Medications   Medication Dose Route Frequency Provider Last Rate Last Dose   • levalbuterol (XOPENEX) 1.25 MG/3ML nebulizer solution 1.25 mg  1.25 mg Nebulization Once Patrica Garcia M.D.   Stopped at 07/27/19 0030   • piperacillin-tazobactam (ZOSYN) 3.375 g in  mL IVPB  3.375 g Intravenous Q8HRS Patrica Garcia M.D. 25 mL/hr at 07/27/19 1421 3.375 g at 07/27/19 1421   • levalbuterol (XOPENEX) 0.63 MG/3ML nebulizer solution 0.63 mg  0.63 mg Nebulization Q4H PRN (RT) Patrica Garcia M.D.       • SODIUM CHLORIDE 0.9 % IV SOLN            • DILTIAZem (CARDIZEM) 100 mg in D5W 100 mL Infusion  0-15 mg/hr Intravenous Continuous Blaise K Brittney 15 mL/hr at 07/27/19 1431 15 mg/hr at 07/27/19 1431   • DILTIAZem (CARDIZEM) tablet 60 mg  60 mg Oral Q8HRS Moni Peace M.D.   60 mg at 07/27/19 1422   • omeprazole (PRILOSEC) capsule  20 mg  20 mg Oral DAILY Pamela Mays M.D.   Stopped at 07/27/19 0600   • digoxin (LANOXIN) tablet 125 mcg  125 mcg Oral DAILY AT 1800 Isiah Villafuerte M.D.   125 mcg at 07/26/19 2151   • guaiFENesin LA (MUCINEX) tablet 600 mg  600 mg Oral Q12HRS Patrica Garcia M.D.   Stopped at 07/27/19 0600   • metoprolol (LOPRESSOR) tablet 50 mg  50 mg Oral BID Moni Peace M.D.   Stopped at 07/27/19 0600   • apixaban (ELIQUIS) 2.5mg tablet 2.5 mg  2.5 mg Oral BID CHRISTIN BauerOTamy   Stopped at 07/27/19 0600   • finasteride (PROSCAR) tablet 5 mg  5 mg Oral DAILY CHRISTIN BauerO.   Stopped at 07/27/19 0600   • tamsulosin (FLOMAX) capsule 0.4 mg  0.4 mg Oral QHS JOCELIN Bauer.O.   0.4 mg at 07/26/19 2148   • senna-docusate (PERICOLACE or SENOKOT S) 8.6-50 MG per tablet 2 Tab  2 Tab Oral BID CHRISTIN BauerO.   Stopped at 07/24/19 1800    And   • polyethylene glycol/lytes (MIRALAX) PACKET 1 Packet  1 Packet Oral QDAY PRN JOCELIN Bauer.O.        And   • magnesium hydroxide (MILK OF MAGNESIA) suspension 30 mL  30 mL Oral QDAY PRN Hector Soni D.O.        And   • bisacodyl (DULCOLAX) suppository 10 mg  10 mg Rectal QDAY PRN JOCELIN Bauer.O.       • acetaminophen (TYLENOL) tablet 650 mg  650 mg Oral Q6HRS PRN CHRISTIN BauerO.       • enalaprilat (VASOTEC) injection 1.25 mg  1.25 mg Intravenous Q6HRS PRN CHRISTIN BauerO.           Fluids    Intake/Output Summary (Last 24 hours) at 07/27/19 1433  Last data filed at 07/27/19 0800   Gross per 24 hour   Intake            463.3 ml   Output             1390 ml   Net           -926.7 ml       Laboratory  Recent Labs      07/26/19   2105  07/27/19   0928   ISTATAPH  7.251*  7.364*   ISTATAPCO2  42.4*  41.2*   ISTATAPO2  77  80   ISTATATCO2  20  25   KOUVXZV1ISH  93  95   ISTATARTHCO3  18.6  23.5   ISTATARTBE  -8*  -2   ISTATTEMP  97.8 F  97.2 F   ISTATFIO2  44  40   ISTATSPEC  Arterial  Arterial   ISTATAPHTC  7.257*  7.375*   [id number]  74  76          Recent Labs      07/26/19   0852  07/27/19 0058   SODIUM  137  135   POTASSIUM  4.4  4.3   CHLORIDE  104  102   CO2  22  26   BUN  40*  44*   CREATININE  1.34  1.46*   MAGNESIUM  1.8   --    CALCIUM  8.5  8.3*     Recent Labs      07/26/19   0852  07/27/19 0058   ALTSGPT  72*   --    ASTSGOT  38   --    ALKPHOSPHAT  89   --    TBILIRUBIN  1.2   --    GLUCOSE  161*  215*     Recent Labs      07/26/19   0852  07/27/19 0058   WBC  17.2*  15.0*   NEUTSPOLYS  86.30*  88.20*   LYMPHOCYTES  4.60*  2.70*   MONOCYTES  8.10  8.60   EOSINOPHILS  0.10  0.00   BASOPHILS  0.20  0.20   ASTSGOT  38   --    ALTSGPT  72*   --    ALKPHOSPHAT  89   --    TBILIRUBIN  1.2   --      Recent Labs      07/26/19 0852  07/27/19 0058   RBC  5.27  4.71   HEMOGLOBIN  15.6  13.9*   HEMATOCRIT  48.3  43.5   PLATELETCT  129*  118*       Imaging  X-Ray:  Reviewed from admission    Assessment/Plan  * Atrial fibrillation with RVR (HCC)- (present on admission)   Assessment & Plan    Titrate diltiazem infusion  Continue digoxin  Maintain intravascular euvolemia       Severe sepsis (HCC)- (present on admission)   Assessment & Plan    This is severe sepsis with the following associated acute organ dysfunction(s): acute kidney failure, acute respiratory failure.   Source: Pneumonia and/or UTI  Goal-directed therapy strategies   Achieve and maintain intravascular euvolemia  Continue broad-spectrum antibiotics until cultures and sensitivities guide de-escalation       Acute respiratory failure with hypoxia and hypercarbia (HCC)   Assessment & Plan    Continue supplemental oxygen to maintain oxygen saturations greater than 93%  DNI     Acute on chronic renal failure (HCC)- (present on admission)   Assessment & Plan    Monitor volume status, electrolytes, urine output.  Avoid nephrotoxic agents.            VTE:  NOAC  Ulcer: PPI  Lines: None    I have performed a physical exam and reviewed and updated ROS and Plan today (7/27/2019). In review  of yesterday's note (7/26/2019), there are no changes except as documented above.     Discussed patient condition and risk of morbidity and/or mortality with Hospitalist, Family, RN, RT, Pharmacy and Patient     This patient remains critically ill.  I have assessed and reassessed his hemodynamics and cardiopulmonary status throughout the day.  I am titrating his diltiazem infusion this patient remains at high risk for worsening shock and death without the above critical care interventions.       Critical care time 60 minutes in directly providing and coordinating critical care and extensive data review.  No time overlap and excludes procedures.

## 2019-07-27 NOTE — ASSESSMENT & PLAN NOTE
EF 70%, improved symptoms post bilateral thoracentesis.  Control Afib -continue Cardizem.  Lasix diuresis.  Cardiology input.  Fu CXR :      1.  Slight interval increase in right-sided pleural effusion which now appears moderate size.  2.  Decreased left pleural effusion which appears small to moderate. No evidence of pneumothorax.  3.  Bilateral basilar atelectasis and/or consolidation. Underlying infection is possible.

## 2019-07-27 NOTE — ASSESSMENT & PLAN NOTE
Improved dyspnea post bilateral thoracentesis.  Continue Lasix diuresis.  O2 support, wean as tolerated  RT protocols

## 2019-07-27 NOTE — PROGRESS NOTES
· 2 RN skin check complete with ANGEL Hyman.  · Devices in place:  ? Cardiac Monitor  ? Blood pressure cuff  ? Pulse ox probe  ? Pino catheter  · Skin assessed under devices.  · Confirmed pressure ulcers found on bilateral ears and sacrum.  · Wound consult placed and wound reported; pictures uploaded into chart.  · The following interventions in place: pillows under elbows and other bony prominences, waffle cushion, mepilex on sacrum and bilateral elbows, barrier cream, heels floated.

## 2019-07-27 NOTE — ASSESSMENT & PLAN NOTE
1 out of 2 bottles bacillus, likely contaminant   Hospitalist Progress Note


Assessment/Plan: 





*  COPD exacerbation


   -still hypoxic and wheezy


   -prednisone, nebs


*  Tobacco dependence


   -patch


*  DJD with chronic pain


   -no indication to start narcotics


*  Hep C - previous IVDA


*  Possible psychosis - ideas of persecution - ? schizophrenia


   -try Zyprexa























Subjective: Upset about lack of narcotics.   Thinks rich people get them and he 

is being denied because of his social status.   Blames the goverment and money 

laundering for his lack of housing.


Objective: 


 Vital Signs











Temp Pulse Resp BP Pulse Ox


 


 36.8 C   68   18   129/80 H  90 L


 


 03/30/18 08:00  03/30/18 11:33  03/30/18 11:33  03/30/18 08:00  03/30/18 11:33








 Microbiology











 03/30/18 09:45  - Final





 Sputum, Expectorated 


 


 03/30/18 09:45 Respiratory Panel (PCR) - Final





 Nasal, Sinus - Swab    No Organism Detected








 Laboratory Results





 03/30/18 03:37 





 03/30/18 03:37 





 











 03/29/18 03/30/18 03/31/18





 05:59 05:59 05:59


 


Intake Total  800 


 


Balance  800 














- Physical Exam


Constitutional: no apparent distress, appears nourished, not in pain


Cardiovascular: regular rate and rhythym, no murmur, rub, or gallop


Respiratory: no respiratory distress, expiratory wheeze, bronchial breath sounds

, rhonchi, No respiratory distress


Gastrointestinal: normoactive bowel sounds, soft, non-tender abdomen, no 

palpable masses


Skin: no rashes or abrasions, no fluctuance, no induration


Neurologic: AAOx3, sensation intact bilaterally


Psychiatric: agitated, poor insight, poor judgement, No interacting 

appropriately, No thought process linear





ICD10 Worksheet


Patient Problems: 


 Problems











Problem Status Onset


 


COPD exacerbation Acute  


 


Hypoxia Acute  


 


Acute bronchitis Acute  


 


Bronchitis Acute  


 


COPD (chronic obstructive pulmonary disease) Acute  


 


COPD exacerbation Acute  


 


Cellulitis Acute  


 


Chronic obstructive pulmonary disease with acute exacerbation Acute  


 


Dyspnea Acute  


 


Facial abscess Acute  


 


HCAP (healthcare-associated pneumonia) Acute  


 


Hypoxemia Acute  


 


Pneumonia Acute  


 


Pneumonia Acute  


 


Sepsis Acute  


 


Shortness of breath Acute  


 


Hepatitis C Chronic

## 2019-07-27 NOTE — PROGRESS NOTES
2 RN skin check completed with ANGEL Barnes.   Devices in place: non-rebreather mask, baig catheter.  Skin assessed under devices bilateral ears are red and non-blanching; skin around baig CDI.  Confirmed pressure ulcers found on bilateral ears and sacrum.  New potential pressure ulcers noted on bilateral ears and sacrum. Wound consult placed yes.  The following interventions in place:  Heel float boots placed, but patient removed. Q2 turns in place. Pillows in use for support/repositioning. Waffle mattress in use. Pt has intermittent incontinence so unable to place mepilex to sacrum. Barrier paste in use. Mepilex to bilateral elbows.      Bilateral ears are red and non-blanching. Bilateral elbows are red, boggy and slow to renaldo. Sacrum is red, non-blanching, and has open wound. Bilateral heels are pink and blanching. Skin is overall fragile and flaky with scattered bruising

## 2019-07-27 NOTE — PROGRESS NOTES
Patient attempting to climb out of the bed, patient coughing and gagging.  Patient's breathing is labored and cough is coarse and congested. O2 sats reading in the 80's. Called charge nurse and RRT. On call MD paged as well.

## 2019-07-27 NOTE — RESPIRATORY CARE
Respiratory Rapid Response Note    Symptoms Increased WOB. Accessory muscle use.    Breath Sounds  RUL Breath Sounds: Clear (07/26/19 0800)  RML Breath Sounds: Diminished (07/26/19 0800)  RLL Breath Sounds: Diminished;Expiratory Wheezes (07/26/19 0800)  BOO Breath Sounds: Clear (07/26/19 0800)  LLL Breath Sounds: Diminished;Expiratory Wheezes (07/26/19 0800)     ABG Results 7.25/42/74/19/-8  O2 (LPM): 6L Oxymask.

## 2019-07-28 PROBLEM — J90 PLEURAL EFFUSION: Status: ACTIVE | Noted: 2019-01-01

## 2019-07-28 NOTE — PROGRESS NOTES
Cardiology follow-up:      Progress Note:  Resident:  Moni Peace M.D.  Attending:  Awais Fitzgerald M.D.  Date:  2019     Patient ID:  Name Joe Rashid     1920   Age/Sex 89 y.o. male   MRN [id number]     Reason for Consult:  A-fib with RVR.    Background:  This is a 89 yo M with PMH of Afib and HTN presented with dehydration and was sent to the ED from group home. The patient has been staying with his wife who is also 89 years old. The patient was not eating and drinking well for the past few days. He is complaint with his medications and was taking lasix for pleural effusion. Her lasix was stopped on admission. He also presented with Afib with RVR on admission. We were consulted for the management of Afib with RVR.    - HR previously improved a bit with diltiazem, and had been increased to 60 TID.     Interval Update:  - Patient is on 4 L O2. Lung exam clear.   - Dig level 1.8. Still on 5 of dilt drip. Will stop dilt drip and see how he is doing without it.   - Will continue dig, dilt and metoprolol orally. Can increase dilt to 120 if needed to be.   - HR ranging from 70-90.   - Dullness present on the left side of the chest which needs to be tapped.     Review of Symptoms  GEN/CONST:   Denies fever, or chills.    HEENT:   Denies vision or hearing changes.       + On dysphagia diet.   CARDIO:   Currently - Denies chest pain, palpitations, orthopnea, or edema.      + overnight: see above.   RESP:   Currently - Denies wheezing, or coughing.      + Currently on partial rebreather mask.      + overnight: see above.   GI:    Denies nausea, vomiting, or abdominal pain.      + diarrhea.  :   Had been on Condom cath, yesterday.         + blood in urine, for past day.   HEME:  Denies easy bruising      + blood in urine, for past day.   ALLG:  Denies  Hives or rashes.    MSK:  Denies focal weakness, or swellings,   SKIN:  Denies rashes, or sores.   PSYCH:  Denies mood disorders or substance  abuse.      Physical Exam     Vitals:    07/28/19 0300 07/28/19 0400 07/28/19 0500 07/28/19 0600   BP:       Pulse: 98 91 84 73   Resp: (!) 23 20 17 19   Temp:       TempSrc:       SpO2: 93% 92% 94% 92%   Weight: 79 kg (174 lb 2.6 oz)      Height:         Body mass index is 25.72 kg/m². Weight: 79 kg (174 lb 2.6 oz)  Oxygen Therapy:  Pulse Oximetry: 92 %, O2 (LPM): 4, O2 Delivery: Silicone Nasal Cannula    GEN:   Alert and oriented,  Resting comfortably.     + in ICU.  On Partial NRB O2.   H / E:   Normocephalic, Atraumatic.  No scleral icterus.     ENT:   No erythema.  Trachea midline.  No stridor.  Supple neck.   HEART:   Regular rate. (at time of exam ~ 90).      + irregular rhythm      Questionable murmur.   LUNGS:   Mildly reduced breath sounds.      + crackling in lower lung fields.   ABD/GI:  Soft.  +BS.  No rebound or guarding noted.  EXT/MSK:  No pedal edema.    NEURO:  Alert and oriented x3.  No tremor.    PSYCH:  Normal mood and affect, but appeared a bit tired.       Lab Data Review:     Recent Labs      07/26/19   0852  07/27/19 0058 07/28/19 0430   WBC  17.2*  15.0*  9.3   NEUTSPOLYS  86.30*  88.20*  82.50*   LYMPHOCYTES  4.60*  2.70*  6.80*   MONOCYTES  8.10  8.60  9.90   EOSINOPHILS  0.10  0.00  0.10   BASOPHILS  0.20  0.20  0.30   ASTSGOT  38   --    --    ALTSGPT  72*   --    --    ALKPHOSPHAT  89   --    --    TBILIRUBIN  1.2   --    --      Recent Labs      07/26/19   0852  07/27/19   0058  07/28/19   0430   RBC  5.27  4.71  4.52*   HEMOGLOBIN  15.6  13.9*  13.0*   HEMATOCRIT  48.3  43.5  41.7*   PLATELETCT  129*  118*  115*       Recent Labs      07/26/19   0852  07/27/19 0058  07/28/19   0430   SODIUM  137  135  141   POTASSIUM  4.4  4.3  3.8   CHLORIDE  104  102  107   CO2  22  26  26   BUN  40*  44*  39*   CREATININE  1.34  1.46*  1.37   MAGNESIUM  1.8   --    --    CALCIUM  8.5  8.3*  8.0*       Recent Labs      07/26/19   0852  07/27/19   0058  07/28/19   0430   ALTSGPT  72*   --     --    ASTSGOT  38   --    --    ALKPHOSPHAT  89   --    --    TBILIRUBIN  1.2   --    --    GLUCOSE  161*  215*  155*             Assessment & Plan     # Atrial Fibrillation with RVR  # Hypertension  # Acute kidney injury  # Acute Respiratory Failure (with hypoxemia and hypercapnia).   # Aspiration Pneumonia (possible)  # UTI   (+ nitrites, and LE).  # Bacteremia  (1 of 2 Cx with G+ rods).   Previously with CXR suggested mild pleural effusion on the left side. No peripheral edema.  And, some JCARLOS, on Labs.   Prior Troponin Elevated however he has JCARLOS. No chest pain and no ekg changes. Code status DNR/DNI.  - Recent ECHO showed EF 70% with severe pulm hypertension.   - Less likely to be heart failure, given recent echo.   - More likely secondary to pulmonary issue, or infectious process, as 2 potential sources for his bacteremia. Agree with antibiotic use.   - No need for starting atorvastatin, for prevention, given his advanced age.   - Continue digoxin, dilt and metoprolol orally. I assume   Patient also being managed by hospitalist and ICU physicians (who will manage all other issues).  - Left chest needs to be tapped as he has dullness. Spoke to critical care specialist about it. Did a bedside US and found a pocket on the left side.  - Avoid lasix in this old man who was presented with dehydration.     Will continue to follow. Please contact us with any question.      A computerized dictation system may have been used for this note.    Despite review, there may be some spelling or grammatical errors.    Moni Peace M.D.  7/28/2019   Service:  Cardiology   Attending:  Awais Fitzgerald M.D.

## 2019-07-28 NOTE — CARE PLAN
Problem: Bowel/Gastric:  Goal: Normal bowel function is maintained or improved  Pt with watery BM overnight.    Problem: Respiratory:  Goal: Respiratory status will improve  Pt maintaining sats on 4L - 90-94%    Problem: Skin Integrity  Goal: Risk for impaired skin integrity will decrease  Pt turned q2h. Heels elevated off bed. Pillow supports utilized. Waffle mattress under pt.

## 2019-07-28 NOTE — PROGRESS NOTES
Hospital Medicine Daily Progress Note    Date of Service  7/28/2019    Chief Complaint  89 y.o. male admitted 7/23/2019 with dehydration    Hospital Course    Mr Rashid has a history of paroxsymal atrial fibrillation and is treated with anticoagulation.  He was recently seen in the ER for urinary retention and was discharged home.  His home health nurse recommended that he come to the ER on 7/23/2019 for deyhration.  Here he was found to be atrial fibrillation with rapid ventricular response.  He was treated with diltiazem and admitted to telemetry floor.  The evening of 7/26/2019 he developed worsening respiratory status and was transferred to the ICU, heart rate was uncontrolled and he was started on digoxin and drip.  Patient did require BiPAP.        Interval Problem Update  On nasal cannula, he admits to cough and fatigue, does not feel particularly short of breath  Short pause overnight, diltiazem drip was reduced, he is now in atrial fibrillation with a rate in the 80s to 100s  Denies chest pain, denies palpitations  Urine output 1,385 mL over the last 24 hours    Consultants/Specialty  Critical Care, I discussed the patient's condition with Dr. Anglin today on ICU Rounds  Cardiology    Code Status  DNAR/DNI    Disposition  ICU for now    Review of Systems  Review of Systems   Constitutional: Negative for chills and malaise/fatigue.   Respiratory: Positive for cough and shortness of breath. Negative for hemoptysis and sputum production.    Cardiovascular: Negative for chest pain, palpitations and orthopnea.   Gastrointestinal: Negative for nausea and vomiting.   Skin: Negative for itching and rash.   Neurological: Negative for dizziness.   All other systems reviewed and are negative.       Physical Exam  Temp:  [36.2 °C (97.1 °F)-36.7 °C (98 °F)] 36.2 °C (97.1 °F)  Pulse:  [] 73  Resp:  [14-25] 19  BP: (100-125)/(60-79) 125/68  SpO2:  [86 %-96 %] 92 %    Physical Exam   Constitutional: He is oriented to  person, place, and time. He appears well-developed and well-nourished. No distress.   Appears younger than stated age   HENT:   Head: Normocephalic and atraumatic.   Eyes: Conjunctivae and EOM are normal. Right eye exhibits no discharge. Left eye exhibits no discharge.   Cardiovascular: Normal rate, regular rhythm and intact distal pulses.    No murmur heard.  Tachycardic and irregular   Pulmonary/Chest: Effort normal. No respiratory distress. He has no wheezes.   Bibasilar crackles   Abdominal: Soft. Bowel sounds are normal. He exhibits no distension. There is no tenderness. There is no rebound.   Musculoskeletal: Normal range of motion. He exhibits no edema.   Neurological: He is alert and oriented to person, place, and time. No cranial nerve deficit.   Skin: Skin is warm and dry. He is not diaphoretic. No erythema.   Skin is warm and well perfused   Psychiatric: He has a normal mood and affect. His behavior is normal. Judgment and thought content normal.       Fluids    Intake/Output Summary (Last 24 hours) at 07/28/19 1006  Last data filed at 07/28/19 0600   Gross per 24 hour   Intake          1710.55 ml   Output             1060 ml   Net           650.55 ml       Laboratory  Recent Labs      07/26/19   0852  07/27/19   0058  07/28/19   0430   WBC  17.2*  15.0*  9.3   RBC  5.27  4.71  4.52*   HEMOGLOBIN  15.6  13.9*  13.0*   HEMATOCRIT  48.3  43.5  41.7*   MCV  91.7  92.4  92.3   MCH  29.6  29.5  28.8   MCHC  32.3*  32.0*  31.2*   RDW  46.6  46.5  47.2   PLATELETCT  129*  118*  115*   MPV  11.2  10.7  10.7     Recent Labs      07/26/19   0852  07/27/19   0058  07/28/19   0430   SODIUM  137  135  141   POTASSIUM  4.4  4.3  3.8   CHLORIDE  104  102  107   CO2  22  26  26   GLUCOSE  161*  215*  155*   BUN  40*  44*  39*   CREATININE  1.34  1.46*  1.37   CALCIUM  8.5  8.3*  8.0*                   Imaging  DX-CHEST-PORTABLE (1 VIEW)   Final Result         1.  Pulmonary edema and/or infiltrates are identified, which  are stable since the prior exam.   2.  Small left pleural effusion   3.  Cardiomegaly   4.  Atherosclerosis      DX-CHEST-LIMITED (1 VIEW)   Final Result      1.  Bilateral pleural effusions and bibasilar atelectasis, left greater than right. Right effusion is new since prior study.   2.  Stable cardiomegaly.      DX-CHEST-LIMITED (1 VIEW)   Final Result      Improved right basilar atelectasis and probable edema      Stable cardiac silhouette enlargement, moderate left pleural fluid and basilar atelectasis. Consolidation is not excluded           Assessment/Plan  * Atrial fibrillation with RVR (HCC)- (present on admission)   Assessment & Plan    AFIB with RVR, rate has been difficult to control  Tapered off diltiazem drip this morning, continue PO diltiazem and PO metoprolol  Continue digoxin, he is of advanced age and has abnormal renal function, monitor digoxin level     Aspiration pneumonia (HCC)- (present on admission)   Assessment & Plan    Developed leukocytosis and respiratory distress overnight on 7/26/2019  Suspect may have some component of aspiration  Continue Zosyn, follow serial procalcitonin levels     Acute diastolic CHF (congestive heart failure) (HCC)- (present on admission)   Assessment & Plan    Monitor volume status  Control heart rate     Pleural effusion- (present on admission)   Assessment & Plan    Small to moderate bilateral pleural effusions  Unclear if thoracentesis would improve his respiratory status, the patient would prefer not to do this procedure, will monitor     Acute UTI- (present on admission)   Assessment & Plan    Urinalysis positive, zosyn will provide adequate coverage       Leukocytosis   Assessment & Plan    WBC increased after admission significantly  Suspect UTI vs pneumonia  Broad spectrum antibitoics     Bacteremia- (present on admission)   Assessment & Plan    Preliminary result is gram positive rods  Await final results  Zosyn     Acute respiratory failure with  hypoxia and hypercarbia (HCC)   Assessment & Plan    Supplemental oxygen     Benign prostatic hyperplasia with urinary frequency- (present on admission)   Assessment & Plan    Flomax and Proscar     Essential hypertension, benign- (present on admission)   Assessment & Plan    On increasing doses of dilt, monitor BP     Acute on chronic renal failure (HCC)- (present on admission)   Assessment & Plan    Avoid nephrotoxins          VTE prophylaxis: eliquis

## 2019-07-28 NOTE — PROGRESS NOTES
Pt impulsive and pulling at IVs. Pt AOx4, continuing to remind pt not to pull at lines. Wrap placed over IVs to discourage pulling. New skin tear on pts left lateral wrist from pulling at blankets impulsively and hitting hand on bed rail. Site cleansed with normal saline irrigation and mepitel one placed over site.

## 2019-07-28 NOTE — PROGRESS NOTES
Critical Care Progress Note    Date of admission  7/23/2019    Chief Complaint  89 y.o. male admitted 7/23/2019 with shortness of breath.  Hospital Course    Is found to have atrial fibrillation with rapid ventricular response as well as bilateral pleural effusions on admission.  He has been managed on the medical floor.  He is being treated for gram-negative bacteremia.  His oxygen requirements have steadily risen to 15 L/min via oxygen mask.  He was found to be in A. fib RVR with oxygen saturations of 77%.  He was transferred to the intensive care unit in the early morning on 7/27.  He is a DO NOT INTUBATE.  He was placed on BiPAP as the only remaining rescue measure.  He has been treated with digoxin, and metoprolol.  He is now in a diltiazem infusion for his atrial fibrillation.       Interval Problem Update  Reviewed last 24 hour events:              - Transfer to ICU from floor 7/27              - Tm: Afebrile              - HR: 50-90s, afib              - SBP: 110-140s              - Neuro: Awake and alert              - GI: Regular chopped diet              - UOP: 1.2 L, net -355              - Pino: Yes              - Lines: 2 PIV              - PPx: Eliquis, PPI              - CXR (personally reviewed):               - Antibiotic Day 3,  Zosyn    Blood cultures 1/2 gram-positive rods  Urinalysis positive nitrate, leukocyte esterase, blood (asymptomatic)    Creatinine improved    Procalcitonin 0.22    Infusions:  Titrating Diltiazem         Review of Systems  Review of Systems   Constitutional: Negative for chills and fever.   HENT: Negative for sore throat.    Eyes: Negative.    Respiratory: Positive for cough and shortness of breath.    Cardiovascular: Negative for chest pain.   Gastrointestinal: Negative for abdominal pain, nausea and vomiting.   Genitourinary: Negative.    Musculoskeletal: Negative.    Skin: Negative.    Neurological: Positive for weakness. Negative for dizziness.   All other systems  reviewed and are negative.       Vital Signs for last 24 hours   Temp:  [36.2 °C (97.1 °F)-36.7 °C (98 °F)] 36.4 °C (97.6 °F)  Pulse:  [] 103  Resp:  [14-28] 25  BP: (102-133)/(60-72) 133/72  SpO2:  [86 %-97 %] 93 %    Hemodynamic parameters for last 24 hours       Respiratory Information for the last 24 hours       Physical Exam   Physical Exam   Constitutional: He is oriented to person, place, and time.   Thin man appearing his stated age, pleasant, no acute distress   HENT:   Head: Normocephalic.   Mouth/Throat: Oropharynx is clear and moist.   Eyes: Pupils are equal, round, and reactive to light.   Neck: Neck supple.   Cardiovascular:   Tachycardic irregularly irregular  Bedside ultrasound reveals no pericardial effusion.  IVC diameter 2.01 cm with no respiratory variation.   Pulmonary/Chest:   Scattered crackles diminished on the left.  Bedside ultrasound reveals bilateral pleural effusions worse on the left than right.   Abdominal: Soft. He exhibits no distension. There is no tenderness.   Musculoskeletal: He exhibits no edema.   Neurological: He is alert and oriented to person, place, and time. No cranial nerve deficit.   Skin: Skin is warm and dry.   Psychiatric: He has a normal mood and affect. His behavior is normal.       Medications  Current Facility-Administered Medications   Medication Dose Route Frequency Provider Last Rate Last Dose   • piperacillin-tazobactam (ZOSYN) 3.375 g in  mL IVPB  3.375 g Intravenous Q8HRS Patrica Garcia M.D. 25 mL/hr at 07/28/19 1302 3.375 g at 07/28/19 1302   • levalbuterol (XOPENEX) 0.63 MG/3ML nebulizer solution 0.63 mg  0.63 mg Nebulization Q4H PRN (RT) Patrica Garcia M.D.       • DILTIAZem (CARDIZEM) tablet 60 mg  60 mg Oral Q8HRS Moni Peace M.D.   60 mg at 07/28/19 1302   • omeprazole (PRILOSEC) capsule 20 mg  20 mg Oral DAILY Pamela Mays M.D.   20 mg at 07/28/19 0600   • digoxin (LANOXIN) tablet 125 mcg  125 mcg Oral DAILY AT 1800 Isiah Villafuerte,  M.D.   125 mcg at 07/27/19 1655   • guaiFENesin LA (MUCINEX) tablet 600 mg  600 mg Oral Q12HRS Patrica Garcia M.D.   600 mg at 07/28/19 0511   • metoprolol (LOPRESSOR) tablet 50 mg  50 mg Oral BID Moni Peace M.D.   50 mg at 07/28/19 0511   • apixaban (ELIQUIS) 2.5mg tablet 2.5 mg  2.5 mg Oral BID JOCELIN Bauer.O.   2.5 mg at 07/28/19 0511   • finasteride (PROSCAR) tablet 5 mg  5 mg Oral DAILY CHRISTIN BauerO.   5 mg at 07/28/19 0513   • tamsulosin (FLOMAX) capsule 0.4 mg  0.4 mg Oral QHS CHRISTIN BauerO.   0.4 mg at 07/27/19 2138   • senna-docusate (PERICOLACE or SENOKOT S) 8.6-50 MG per tablet 2 Tab  2 Tab Oral BID Hector Soni D.O.   Stopped at 07/28/19 0600    And   • polyethylene glycol/lytes (MIRALAX) PACKET 1 Packet  1 Packet Oral QDAY PRN Hector Soni D.O.        And   • magnesium hydroxide (MILK OF MAGNESIA) suspension 30 mL  30 mL Oral QDAY PRN Hector Soni D.O.        And   • bisacodyl (DULCOLAX) suppository 10 mg  10 mg Rectal QDAY PRN CHRISTIN BauerO.       • acetaminophen (TYLENOL) tablet 650 mg  650 mg Oral Q6HRS PRN Hector Soni D.O.       • enalaprilat (VASOTEC) injection 1.25 mg  1.25 mg Intravenous Q6HRS PRN CHRISTIN BauerO.           Fluids    Intake/Output Summary (Last 24 hours) at 07/28/19 1315  Last data filed at 07/28/19 1200   Gross per 24 hour   Intake          1049.07 ml   Output             1080 ml   Net           -30.93 ml       Laboratory  Recent Labs      07/26/19   2105  07/27/19   0928   ISTATAPH  7.251*  7.364*   ISTATAPCO2  42.4*  41.2*   ISTATAPO2  77  80   ISTATATCO2  20  25   [id number]  93  95   ISTATARTHCO3  18.6  23.5   ISTATARTBE  -8*  -2   ISTATTEMP  97.8 F  97.2 F   ISTATFIO2  44  40   ISTATSPEC  Arterial  Arterial   ISTATAPHTC  7.257*  7.375*   DPICSHIF5AT  74  76         Recent Labs      07/26/19   0852  07/27/19   0058  07/28/19   0430   SODIUM  137  135  141   POTASSIUM  4.4  4.3  3.8   CHLORIDE  104  102  107   CO2  22   26 26   BUN  40*  44*  39*   CREATININE  1.34  1.46*  1.37   MAGNESIUM  1.8   --    --    CALCIUM  8.5  8.3*  8.0*     Recent Labs      07/26/19 0852  07/27/19 0058 07/28/19   0430   ALTSGPT  72*   --    --    ASTSGOT  38   --    --    ALKPHOSPHAT  89   --    --    TBILIRUBIN  1.2   --    --    GLUCOSE  161*  215*  155*     Recent Labs      07/26/19 0852  07/27/19 0058 07/28/19   0430   WBC  17.2*  15.0*  9.3   NEUTSPOLYS  86.30*  88.20*  82.50*   LYMPHOCYTES  4.60*  2.70*  6.80*   MONOCYTES  8.10  8.60  9.90   EOSINOPHILS  0.10  0.00  0.10   BASOPHILS  0.20  0.20  0.30   ASTSGOT  38   --    --    ALTSGPT  72*   --    --    ALKPHOSPHAT  89   --    --    TBILIRUBIN  1.2   --    --      Recent Labs      07/26/19 0852 07/27/19 0058 07/28/19   0430   RBC  5.27  4.71  4.52*   HEMOGLOBIN  15.6  13.9*  13.0*   HEMATOCRIT  48.3  43.5  41.7*   PLATELETCT  129*  118*  115*       Imaging  X-Ray:  No film today    Assessment/Plan  * Atrial fibrillation with RVR (HCC)- (present on admission)   Assessment & Plan    Rate controlled  Titrate diltiazem infusion  Hold digoxin per cardiology recommendations  Maintain intravascular euvolemia       Severe sepsis (HCC)- (present on admission)   Assessment & Plan    This is severe sepsis with the following associated acute organ dysfunction(s): acute kidney failure, acute respiratory failure.   Source: Pneumonia and/or UTI  Goal-directed therapy strategies   Achieve and maintain intravascular euvolemia  Continue broad-spectrum antibiotics until cultures and sensitivities guide de-escalation       Pleural effusion- (present on admission)   Assessment & Plan    Small bilateral pleural effusions  Thoracentesis offered for diagnostic and therapeutic purposes.  Informed consent provided.    Patient would like to discuss procedure with family prior to making decision.     Acute respiratory failure with hypoxia and hypercarbia (HCC)   Assessment & Plan    Continue supplemental  oxygen to maintain oxygen saturations greater than 93%  DNI     Acute on chronic renal failure (HCC)- (present on admission)   Assessment & Plan    Monitor volume status, electrolytes, urine output.  Avoid nephrotoxic agents.            VTE:  NOAC  Ulcer: PPI  Lines: None    I have performed a physical exam and reviewed and updated ROS and Plan today (7/28/2019). In review of yesterday's note (7/27/2019), there are no changes except as documented above.     Discussed patient condition and risk of morbidity and/or mortality with Hospitalist, Family, RN, RT, Pharmacy, Patient and cardiology Case discussed with Dr. Fitzgerald this morning.    This patient remains critically ill.  I have assessed and reassessed his hemodynamics and cardiopulmonary status throughout the day.  I am titrating his diltiazem infusion this patient remains at high risk for worsening shock and death without the above critical care interventions.       Critical care time 45 minutes in directly providing and coordinating critical care and extensive data review.  No time overlap and excludes procedures.

## 2019-07-28 NOTE — CARE PLAN
Problem: Ventilation Defect:  Goal: Ability to achieve and maintain unassisted ventilation or tolerate decreased levels of ventilator support    Intervention: Support and monitor invasive and noninvasive mechanical ventilation  BIPAP PRN

## 2019-07-28 NOTE — ASSESSMENT & PLAN NOTE
Post left sided thoracentesis with 1.1 L and right-sided 1.5 L fluid removed.-Follow-up cultures negative.  Cytology negative.  Suspect transudate per lights criteria .

## 2019-07-29 NOTE — CARE PLAN
Problem: Urinary Elimination:  Goal: Ability to reestablish a normal urinary elimination pattern will improve  Outcome: PROGRESSING AS EXPECTED  Pino in place. Q2 I/Os documented. Total IVF intake of 50mL/hr per MD order, maintenance fluids adjusted accordingly.    Problem: Skin Integrity  Goal: Risk for impaired skin integrity will decrease  Outcome: PROGRESSING AS EXPECTED  Q2 turns in place. Heels floated, grey foam for oxygen tubing. Wounds dressed and documented in flowsheet. Mepilex placed on sacrum as able, pt intermittently incontinent of stool.

## 2019-07-29 NOTE — THERAPY
Speech Language Therapy FEES completed.  Functional Status: Patient seen this date for FEES procedure. Procedure explained to patient and patient agreed to proceed. Scope was introduced into patient's left nare and passed without difficulty, although nasal passage was noted to have dried secretions throughout. Upon insertion of scope into pharynx, patient was noted to have deep penetration of secretions on vocal folds prior to any PO trials. Patient was also noted to have bilateral space-occupying lesions on posterior aspect of vocal folds, but complete vocal fold adduction was noted during all tasks. Patient consumed PO trials of single ice chips, NTL via tsp and cup sip, purees, pudding, and thin liquids via tsp and cup sip. Patient presented with moderate oropharyngeal dysphagia, evidenced by delayed initiation of swallow, weak BoT retraction, weak pharyngeal constriction, decreased laryngeal elevation, and decreased sensation. Patient presented with penetration before the swallow on single ice chips, NTL via cup sip, and thins via tsp and cup sip. Patient had penetration suspected during the swallow on NTL via cup sip and thins via tsp and cup sip. Penetration was noted after the swallow on all consistencies except ice chips and most often appeared to be caused by residue that patient did not sense and could not clear despite attempts at multiple swallows, effortful swallows, and cough/throat clear. Patient attempted multiple swallow strategies, but these did not appear to reduce penetration or residue and patient had difficulty consistently following strategies which was suspected to be d/t weakness. No melonie aspiration or reflux was noted during the study. Patient still appears to be at high risk for aspiration and does not appear safe for PO intake. Recommend patient continue strict NPO with placement of Cortrak in in agreement with patient/family wishes and POC. Discussed with patient, palliative care RN,  "and MD afterward. Patient declining Cortrak and reporting he wishes to eat despite risks. Will initiate NTFL diet despite risks to MINIMIZE, but NOT PREVENT aspriation risks. RN, palliative care RN, and MD aware. SLP to follow based on POC.     Recommendations - Diet: Patient still appears to be at high risk for aspiration and does not appear safe for PO intake. Recommend patient continue strict NPO with placement of Cortrak in in agreement with patient/family wishes and POC. Discussed with patient, palliative care RN, and MD afterward. Patient declining Cortrak and reporting he wishes to eat despite risks. Will initiate NTFL diet despite risks to MINIMIZE, but NOT PREVENT aspriation risks.                           Strategies: Monitor during meals, Assistance needed for meal tray set-up, No Straws and Head of Bed at 90 Degrees                          Medication Administration: Crush in applesauce, as patient is choosing to eat despite risks   Plan of Care: Will benefit from Speech Therapy 3 times per week  Post-Acute Therapy: Recommend inpatient transitional care services for continued speech therapy services.      See \"Rehab Therapy-Acute\" Patient Summary Report for complete documentation.   "

## 2019-07-29 NOTE — WOUND TEAM
"[facility]  Inpatient Services  Initial Wound and Skin Care Evaluation    Admission Date: 7/23/2019     Consult Date: 07/24/2019 @ 1331   HPI, PMH, SH: Reviewed    Unit where seen by Wound Team: [id number]     WOUND CONSULT RELATED TO:  Bilateral ears and sacrum     SUBJECTIVE:  \"O sure go ahead\"      Self Report / Pain Level:  Denies pain       OBJECTIVE:  Pt sitting up in chair, o2 and glasses in use, sacral mepilex to sacrum.     WOUND TYPE, LOCATION, CHARACTERISTICS (Pressure Injuries: location, stage, POA or date identified)    Pressure Injury 07/23/19 Coccyx (POA)   Wound Image      Pressure Injury Stage 2    Site Assessment Red    Diane-wound Assessment Clean;Dry;Fragile;Non-blanchable erythema    Margins Attached edges;Defined edges    Wound Length (cm) 0.3 cm    Wound Width (cm) 0.3 cm    Wound Depth (cm) 0.1 cm    Wound Surface Area (cm^2) 0.09 cm^2    Drainage Amount None    Non-staged Wound Description Not applicable    Treatments Cleansed;Site care    Cleansing Normal Saline Irrigation    Periwound Protectant Barrier Paste    Dressing Options Mepilex    Dressing Changed New    Dressing Status Clean;Dry;Intact    Dressing Change Frequency Every 72 hrs    NEXT Dressing Change  07/31/19    NEXT Weekly Photo (Inpatient Only) 08/04/19    WOUND NURSE ONLY - Odor None    WOUND NURSE ONLY - Exposed Structures None    WOUND NURSE ONLY - Tissue Type and Percentage 100% Red    WOUND NURSE ONLY - Time Spent with Patient (mins) 60        Pressure Injury 07/23/19 Ear (POA)   Wound Image       Pressure Injury Stage 1    State of Healing Non-healing    Site Assessment Clean;Dry;Painful;Red;Intact    Diane-wound Assessment Clean;Dry;Intact;Painful;Red;Pink    Margins Attached edges    Wound Length (cm) 0.9 cm    Wound Width (cm) 0.6 cm    Wound Depth (cm) 0 cm    Wound Surface Area (cm^2) 0.54 cm^2    Drainage Amount None    Non-staged Wound Description Not applicable    Treatments Cleansed;Site care  "   Cleansing Normal Saline Irrigation    Periwound Protectant Not Applicable    Dressing Cleansing/Solutions Not Applicable    Dressing Changed New    Dressing Status Clean;Dry;Intact    Dressing Change Frequency As Needed    WOUND NURSE ONLY - Odor None    WOUND NURSE ONLY - Exposed Structures None    WOUND NURSE ONLY - Tissue Type and Percentage 100% red        Vascular:    Dorsal Pedal pulses:  NA  Posterior tib pulses:   NA    RITA:      NA    Lab Values:    WBC:       WBC   Date/Time Value Ref Range Status   07/28/2019 04:30 AM 9.3 4.8 - 10.8 K/uL Final     A1C:      Lab Results   Component Value Date/Time    HBA1C 6.3 (H) 04/12/2019 08:49 PM           Culture:   NA    INTERVENTIONS BY WOUND TEAM:  Chart and images reviewed. This RN in with bedside RN for assessment. Pt pleasant and agreeable. Bilateral ears assessed. This RN then floated oxygen tubing off ears using brown tender  on the cheeks. Glasses were removed and gray foam pads were applied to the glasses and reapplied to the face. Pt was then assisted to a standing position. Sacral mepilex was removed and sacrum was assessed. Pt does have a small non blanching area to the sacrococcygeal area. Measurements and pictures were obtained. HAWK Barrier paste was applied and pt was returned to the chair.     Dressing selection:  SACRUM: HAWK/Sacral mepilex; EARS: Brown tender  to O2 and Gray foam to glasses         Interdisciplinary consultation: Patient, Bedside RN (Chanda)    EVALUATION: Pt is an elderly gentleman who wears O2 and bifocals at home as well as in the hospital. Pt has what appears to be a stage I pressure injury to the left ear and redness to the right ear. Ears should heal quickly with offloading pressure. Pt also has a small circular wound to the coccyx that appears to be a stage II pressure injury. Pressure injury should resolve with offloading pressure and keeping the area clean and dry.    Factors affecting wound healing: Age,  Incontinence  Goals: Steady decrease in wound area and depth weekly.    NURSING PLAN OF CARE ORDERS (X):    Dressing changes: See Dressing Care orders:   Skin care: See Skin Care orders: X  Rectal tube care: See Rectal Tube Care orders:   Other orders:    RSKIN: CURRENT (X) ORDERED (O):   Q shift Raza:  X  Q shift pressure point assessments:  X  Pressure redistribution mattress  X          SUHAIL          Bariatric SUHAIL         Bariatric foam           Heel float boots          Float Heels off Bed with Pillows  X            Barrier wipes         Barrier Cream         Barrier paste  X        Sacral silicone dressing  X       Silicone O2 tubing  X       Anchorfast         Cannula fixation Device (Tender ) O         Londono Foam Ear protectors  O         Trach with Optifoam split foam                 Waffle cushion        Waffle Overlay         Rectal tube or BMS         Antifungal tx      Interdry          Reposition q 2 hours X       Up to chair  X      Ambulate      PT/OT        Dietician        Diabetes Education      PO  X   TF     TPN     NPO   # days   Other        WOUND TEAM PLAN OF CARE (X):   NPWT change 3 x week:        Dressing changes by wound team:       Follow up as needed:  X, please re-consult  With new or worsening wounds    Other (explain):     Anticipated discharge plans (X):   SNF:           Home Care:           Outpatient Wound Center:            Self Care:            Other:  X, TBD

## 2019-07-29 NOTE — HEART FAILURE PROGRAM
Cardiovascular Nurse Navigator () Advanced Heart Failure Program HF Exacerbation Consult Note:     Patient admitted on 7/23/19 for AF c RVR and subsequently found to have sepsis and bacteremia. HF began to appear in Dr. Zamorano's notes on 7/27/19. Patient has been seen at cardiology in the past but was not given a HF diagnosis there. The first time that HF was mentioned was on patient's admission to the CDU from 7/4/19-7/8/19.    Since March of this year, patient has had three hospital admissions and 2 ED visits. Echocardiogram shows preserved EF but also moderate, concentric LVH, mildly dilated LA, moderately dilated RV, enlarged RA, right heart pressures consistent with evere PHTN.    Cardiology are following but have not diagnosed HF, only AF c RVR. Patient was transferred to [facility] on 7/27/19 when he developed worsening SOB, he was placed in biPAP there as he is a DNAR/DNI at 89 years of age.     · HFpEF (70%)  · NYHA: IV upon transfer to [facility] as he was noted to be SOB and in respiratory distress at rest.  · Precipitant of exacerbation: Per Dr. Lugo, likely iatrogenic resulting from treatment for sepsis.  · Consider for CardioMEMS commercial or GUIDE HF? No, we will need to see if he stabilizes and if it is felt that he will have a chance of living at least one year more.  · Diuresis: IV furosemide has been discontinued.    Demographics:    · Insurance: Medicare and [employer]  · Residence: [facility] Secondary Prevention interventions:    · Pneumococcal vaccine: refused per admit diallo   · Influenza vaccine: refused per admit diallo        · AC for AF: apixaban      Device Therapy Screening Tool     Not applicable given that patient's EF is 70%    Source: [employer]- [facility] Prevention Program Screening Tool  2013 ACC/ AHA Heart Failure Guidelines  Rev date: 12/2014    Daily Weights: not ordered    Please teach patient to weigh daily and write on symptom tracker.    Please assess patient's understanding of what  "to do if weight is out of range.    If pt does not own a working scale and cannot afford to purchase one, please provide one. Scales can be found in the Care Coordination Rooms on T-7&T-8.    I's and O's: not ordered    If we are not tracking intake and output, we don't know if diuretics are working.    This also increases the risk that the patient will be sent home without enough fluid off.    [facility] Plan Notes: patient is refusing home heatlh    Therapy Notes: SLP recommending transitional care, other therapies recommending home health.    Advanced Care Planning: AD on file. DNAR/DNI code status at the time of this note's filing.    The ACC recommends engaging palliative care as part of optimization of HF treatment to solicit goals of care and focus on quality of life throughout the clinical course of HF.    Follow up appointment:   If discharged from acute care to home (exception hospice discharge), pt must have an appointment scheduled within 7 days of discharge (Cardiology, PCP, or DC Clinic).    If discharged to Transitional Care Facility (LTAC, SNF, IRH), appointment should be made about a month out for after TCF.     Bedside Nursing Education:  Please provide HF booklet and repeated, ongoing education while administering medications, weighing patient, discussing management of symptoms, diet and need to follow up and act on changes.     Bedside Nursing Discharge:  When completing the after visit summary (discharge instructions) please select \"Cardiac Diagnosis, and Heart Failure\" in the special instructions section to populate the heart failure specific discharge instructions.     Referrals/Orders Placed:    Hospital Schedulers for HF f/u?  yes  Social Work   No, following  Registered Dietician  yes  REMSA CP Program for patients with Medicaid, [facility], or group home Plus coverage?  no  Outpatient Care Coordination for patients with [facility] coverage and with 3 or more admissions within a " year?  no    Follow-up With  Details  Why  Contact Info   Mervin Ordaz M.D.  Go on 8/1/2019  DR ORDAZ WILL BE COMING BY TO SEE YOU AT YOUR HOME. THANK YOU  [address] [facility] [address]  [phone]     Emma NEWMAN RN, [facility] ext. [phone] M-F

## 2019-07-29 NOTE — OR SURGEON
Immediate Post- Operative Note        PostOp Diagnosis:L pleural effusiom      Procedure(s): US guided L thoracentesis      Estimated Blood Loss: Less than 5 ml        Complications: None            7/29/2019     1:26 PM     Spike Kinney

## 2019-07-29 NOTE — CARE PLAN
Problem: Safety  Goal: Will remain free from falls  Outcome: PROGRESSING AS EXPECTED  Hourly rounds for pt, bed alarm set, call light in reach, bed low and locked, treaded slipper socks on, pt calls appropriately and mobilizes/ambulates with assistance.   Intervention: Assess risk factors for falls  Completed.   Intervention: Implement fall precautions  Completed.       Problem: Skin Integrity  Goal: Risk for impaired skin integrity will decrease  Outcome: PROGRESSING AS EXPECTED  Pt turned and repositioned self in bed and up in chair, waffle cushion positioned and inflated appropriately, skin care done with wound RN and as needed, elbows and heels floated with pillows, oral intake adequate, mobilized to chair and ambulated hallway.   Intervention: Assess risk factors for impaired skin integrity and/or pressure ulcers  Completed.   Intervention: Implement precautions to protect skin integrity in collaboration with the interdisciplinary team  Completed.  Intervention: Assess and monitor skin integrity, appearance and/or temperature  Completed.

## 2019-07-29 NOTE — CARE PLAN
Problem: Safety  Goal: Will remain free from falls  Outcome: PROGRESSING AS EXPECTED  Hourly rounds for pt, bed alarm set, call light in reach, bed low and locked, treaded slipper socks on. Patient calls appropriately. Chair alarm used when up to chair. Mobilizing with 1 person assist and front wheeled walker.   Intervention: Assess risk factors for falls  Completed.   Intervention: Implement fall precautions  Completed.       Problem: Skin Integrity  Goal: Risk for impaired skin integrity will decrease  Outcome: PROGRESSING AS EXPECTED  Pt turned and repositioned q2 when not able to turn self, waffle cushion positioned and inflated appropriately, skin care as needed, heels floated with pillows, oral intake adequate, mepilex in use when not incontinent, chair cushion in use. Mepitel one on skin tear with gauze and padding over. Wound consulting.   Intervention: Assess risk factors for impaired skin integrity and/or pressure ulcers  Completed.   Intervention: Implement precautions to protect skin integrity in collaboration with the interdisciplinary team  Completed.   Intervention: Assess and monitor skin integrity, appearance and/or temperature  Completed.

## 2019-07-29 NOTE — PROGRESS NOTES
Pt transported to T808 bed 2 on monitor by this RN. VSS. Bedside report given to ANGEL Amaya. All belongings, meds, and chart transported with patient.

## 2019-07-29 NOTE — PROGRESS NOTES
Cardiology follow-up:      Progress Note:  Resident:  Moni Peace M.D.  Attending:  Awais Fitzgerald M.D.  Date:  2019     Patient ID:  Name Joe Rashid     1920   Age/Sex 89 y.o. male   MRN [id number]     Reason for Consult:  A-fib with RVR.    Background:  This is a 89 yo M with PMH of Afib and HTN presented with dehydration and was sent to the ED from group home. The patient has been staying with his wife who is also 100 years old. The patient was not eating and drinking well for the past few days. He is complaint with his medications and was taking lasix for pleural effusion. Her lasix was stopped on admission. He also presented with Afib with RVR on admission. We were consulted for the management of Afib with RVR.    - HR previously improved a bit with diltiazem, and had been increased to 60 TID.     Interval Update:  - Patient was on 4 L o2 before thoracentesis. Removed 1100 cc from left chest. Right side of the chest still has moderate effusion.  - CT scan was ordered to determine source of effusion. He has consolidation on CT scan. Procal 0.22.  - Doing fine. He does not feel well he said this morning which was before thoracentesis. His rate is well controlled.   - Denied having any chest pain or shortness of breath.     Review of Symptoms  GEN/CONST:   Denies fever, or chills.    HEENT:   Denies vision or hearing changes.       + On dysphagia diet.   CARDIO:   Currently - Denies chest pain, palpitations, orthopnea, or edema.      + overnight: see above.   RESP:   Currently - Denies wheezing, or coughing.      + Currently on partial rebreather mask.      + overnight: see above.   GI:    Denies nausea, vomiting, or abdominal pain.      + diarrhea.  :   Had been on Condom cath, yesterday.         + blood in urine, for past day.   HEME:  Denies easy bruising      + blood in urine, for past day.   ALLG:  Denies  Hives or rashes.    MSK:  Denies focal weakness, or swellings,   SKIN:   Denies rashes, or sores.   PSYCH:  Denies mood disorders or substance abuse.      Physical Exam     Vitals:    07/29/19 1200 07/29/19 1300 07/29/19 1400 07/29/19 1405   BP:    143/91   Pulse: (!) 114 98 78 98   Resp: 20 (!) 22 (!) 31 20   Temp:    36.6 °C (97.9 °F)   TempSrc:    Temporal   SpO2: 93% 95% 93% 93%   Weight:    73.2 kg (161 lb 6 oz)   Height:         Body mass index is 23.83 kg/m². Weight: 73.2 kg (161 lb 6 oz)  Oxygen Therapy:  Pulse Oximetry: 93 %, O2 (LPM): 3, O2 Delivery: Silicone Nasal Cannula    GEN:   Alert and oriented,  Resting comfortably.     + in ICU.  On Partial NRB O2.   H / E:   Normocephalic, Atraumatic.  No scleral icterus.     ENT:   No erythema.  Trachea midline.  No stridor.  Supple neck.   HEART:   Regular rate. (at time of exam ~ 90).      + irregular rhythm      Questionable murmur.   LUNGS:   Mildly reduced breath sounds.      + crackling in lower lung fields.   ABD/GI:  Soft.  +BS.  No rebound or guarding noted.  EXT/MSK:  No pedal edema.    NEURO:  Alert and oriented x3.  No tremor.    PSYCH:  Normal mood and affect, but appeared a bit tired.       Lab Data Review:     Recent Labs      07/27/19 0058 07/28/19 0430 07/29/19 0425   WBC  15.0*  9.3  7.3   NEUTSPOLYS  88.20*  82.50*  76.70*   LYMPHOCYTES  2.70*  6.80*  9.20*   MONOCYTES  8.60  9.90  12.00   EOSINOPHILS  0.00  0.10  1.10   BASOPHILS  0.20  0.30  0.40     Recent Labs      07/27/19 0058 07/28/19 0430 07/29/19   0425  07/29/19   1035   RBC  4.71  4.52*  4.41*   --    HEMOGLOBIN  13.9*  13.0*  12.9*   --    HEMATOCRIT  43.5  41.7*  41.2*   --    PLATELETCT  118*  115*  116*   --    PROTHROMBTM   --    --    --   18.9*   INR   --    --    --   1.53*       Recent Labs      07/27/19   0058  07/28/19   0430  07/29/19   0425   SODIUM  135  141  140   POTASSIUM  4.3  3.8  4.3   CHLORIDE  102  107  106   CO2  26  26  27   BUN  44*  39*  38*   CREATININE  1.46*  1.37  1.37   CALCIUM  8.3*  8.0*  8.4*       Recent  Labs      07/27/19   0058  07/28/19   0430  07/29/19   0425   GLUCOSE  215*  155*  156*             Assessment & Plan     # Atrial Fibrillation with RVR  # Hypertension  # Acute kidney injury  # Acute Respiratory Failure (with hypoxemia and hypercapnia).   # Aspiration Pneumonia (possible)  # UTI   (+ nitrites, and LE).  # Bacteremia  (1 of 2 Cx with G+ rods).   Previously with CXR suggested mild pleural effusion on the left side. No peripheral edema.  And, some JCARLOS, on Labs. Prior Troponin Elevated however he has JCARLOS. No chest pain and no ekg changes. Code status DNR/DNI.  - Recent ECHO showed EF 70% with severe pulm hypertension.   - Less likely to be heart failure, given recent echo.   - US thoracentesis - removed 1100 cc from left side. No complication.  - CT scan showed right sided pleural effusion with consolidation. Procal 0.22. Agree with Zosyn.   - No need for starting atorvastatin, for prevention, given his advanced age.   - Continue diltiazem and metoprolol orally.  - Avoid lasix in this old man who was presented with dehydration.   - If his breathing does not improve, I would tap his right side which has moderate effusion on CT scan.     Will continue to follow. Please contact us with any question.      A computerized dictation system may have been used for this note.    Despite review, there may be some spelling or grammatical errors.    Moni Peace M.D.  7/29/2019   Service:  Cardiology   Attending:  Latrell Rider M.D.

## 2019-07-29 NOTE — PROGRESS NOTES
Report received at bedside from Chanda ORTIZ. Pt A&O times 4. Robinson, stable. Denies pain or discomfort.

## 2019-07-29 NOTE — PALLIATIVE CARE
Palliative Care follow-up  Return call from son Stalin regarding Joe; updates provided on FEES study, his dad's wishes and plan of care. Stalin understands and respects his dad's wish for no feeding tube, as well as the risk with eating. He also expressed concern for his overall strength and condition now that he's been hospitalized for several days. PC RN agreed and offered to have PT/OT see him to re-evaluate. He confirmed that Joe and his wife share an apartment and have meals/cleaning taken care of but they otherwise manage their ADLs on their own. PC RN discussed the role of hospice in Joe's case given his lack of desire to return to the hospital, noting though, that he would likely need more help at home; he expressed understanding. Joe's daughter Aleksey is coming from [place] on Tuesday and Stalin plans to give her an update on our talk and will pass along this RN's number as well. He denied any questions or concerns at this point and was encouraged to call if anything arose.     Updated: ERIBERTO RN    Plan: f/u Tuesday- PT/OT re-eval?     Thank you for allowing Palliative Care to participate in this patient's care. Please feel free to call [phone] with any questions or concerns.

## 2019-07-29 NOTE — PROGRESS NOTES
Bedside thoracentesis with ultrasound procedure being done on pt currently. VSS. 1100 mL taken off.

## 2019-07-29 NOTE — PROGRESS NOTES
Critical Care Progress Note    Date of admission  7/23/2019    Chief Complaint  89 y.o. male admitted 7/23/2019 with shortness of breath.  Hospital Course    Is found to have atrial fibrillation with rapid ventricular response as well as bilateral pleural effusions on admission.  He has been managed on the medical floor.  He is being treated for gram-negative bacteremia.  His oxygen requirements have steadily risen to 15 L/min via oxygen mask.  He was found to be in A. fib RVR with oxygen saturations of 77%.  He was transferred to the intensive care unit in the early morning on 7/27.  He is a DO NOT INTUBATE.  He was placed on BiPAP as the only remaining rescue measure.  He has been treated with digoxin, and metoprolol.  He is now in a diltiazem infusion for his atrial fibrillation.       Interval Problem Update  Reviewed last 24 hour events:  T-max 97.6      +400 cc over last 24 hours, +1.1 L since admit  No CXR this a.m.  CR 1.3    BC X 7/26 with possible contaminant    Zosyn 7/27-P  Rocephin 7/26 x 1    LR@50    91% on 4 L  Last BM 7/28      Review of Systems  Review of Systems   Constitutional: Negative for chills and fever.   HENT: Negative for congestion.    Eyes: Negative for blurred vision.   Respiratory: Positive for cough and shortness of breath. Negative for sputum production.    Cardiovascular: Negative for chest pain.   Gastrointestinal: Negative for abdominal pain, nausea and vomiting.   Neurological: Positive for weakness. Negative for focal weakness.   Psychiatric/Behavioral: Negative for depression.   All other systems reviewed and are negative.       Vital Signs for last 24 hours   Temp:  [36.2 °C (97.1 °F)-36.4 °C (97.6 °F)] 36.2 °C (97.2 °F)  Pulse:  [] 73  Resp:  [16-36] 23  BP: (109-133)/(69-72) 109/69  SpO2:  [86 %-98 %] 91 %    Hemodynamic parameters for last 24 hours       Respiratory Information for the last 24 hours       Physical Exam   Physical Exam   Constitutional:   Frail, acute  on chronically ill   HENT:   Head: Normocephalic and atraumatic.   Eyes: Pupils are equal, round, and reactive to light. Conjunctivae are normal.   Neck: No tracheal deviation present.   Cardiovascular: Normal rate and intact distal pulses.    Pulmonary/Chest: He has no wheezes. He has rales.   Diminished   Abdominal: Soft. He exhibits no distension. There is no tenderness. There is no rebound.   Musculoskeletal: He exhibits edema.   Neurological: No cranial nerve deficit.   Skin: Skin is warm and dry. He is not diaphoretic.   Psychiatric: He has a normal mood and affect.   Nursing note and vitals reviewed.      Medications  Current Facility-Administered Medications   Medication Dose Route Frequency Provider Last Rate Last Dose   • lactated ringers infusion  1,000 mL Intravenous Continuous Awais D Derrek 25 mL/hr at 07/29/19 0510 1,000 mL at 07/29/19 0510   • piperacillin-tazobactam (ZOSYN) 3.375 g in  mL IVPB  3.375 g Intravenous Q8HRS Patrica Garcia M.D. 25 mL/hr at 07/29/19 0508 3.375 g at 07/29/19 0508   • levalbuterol (XOPENEX) 0.63 MG/3ML nebulizer solution 0.63 mg  0.63 mg Nebulization Q4H PRN (RT) Patrica Garcia M.D.       • DILTIAZem (CARDIZEM) tablet 60 mg  60 mg Oral Q8HRS Moni Peace M.D.   60 mg at 07/29/19 0507   • omeprazole (PRILOSEC) capsule 20 mg  20 mg Oral DAILY Pamela Mays M.D.   20 mg at 07/29/19 0508   • digoxin (LANOXIN) tablet 125 mcg  125 mcg Oral DAILY AT 1800 Isiah Villafuerte M.D.   125 mcg at 07/28/19 1722   • guaiFENesin LA (MUCINEX) tablet 600 mg  600 mg Oral Q12HRS Patrica Garcia M.D.   600 mg at 07/29/19 0507   • metoprolol (LOPRESSOR) tablet 50 mg  50 mg Oral BID Moni Peace M.D.   50 mg at 07/29/19 0508   • apixaban (ELIQUIS) 2.5mg tablet 2.5 mg  2.5 mg Oral BID Hector Soni D.O.   Stopped at 07/29/19 0600   • finasteride (PROSCAR) tablet 5 mg  5 mg Oral DAILY Hector Soni D.O.   5 mg at 07/29/19 0507   • tamsulosin (FLOMAX) capsule 0.4 mg  0.4 mg Oral  QHS Hector Soni D.O.   0.4 mg at 07/28/19 2108   • senna-docusate (PERICOLACE or SENOKOT S) 8.6-50 MG per tablet 2 Tab  2 Tab Oral BID Hector Soni D.O.   Stopped at 07/28/19 0600    And   • polyethylene glycol/lytes (MIRALAX) PACKET 1 Packet  1 Packet Oral QDAY PRN Hector Soni D.O.        And   • magnesium hydroxide (MILK OF MAGNESIA) suspension 30 mL  30 mL Oral QDAY PRN Hector Soni D.O.        And   • bisacodyl (DULCOLAX) suppository 10 mg  10 mg Rectal QDAY PRN Hector Soni D.O.       • acetaminophen (TYLENOL) tablet 650 mg  650 mg Oral Q6HRS PRN Hector Soni D.O.       • enalaprilat (VASOTEC) injection 1.25 mg  1.25 mg Intravenous Q6HRS PRN Hector Soni D.O.           Fluids    Intake/Output Summary (Last 24 hours) at 07/29/19 0721  Last data filed at 07/29/19 0600   Gross per 24 hour   Intake          1277.42 ml   Output              870 ml   Net           407.42 ml       Laboratory  Recent Labs      07/26/19   2105  07/27/19   0928   ISTATAPH  7.251*  7.364*   ISTATAPCO2  42.4*  41.2*   ISTATAPO2  77  80   ISTATATCO2  20  25   [id number]  93  95   ISTATARTHCO3  18.6  23.5   ISTATARTBE  -8*  -2   ISTATTEMP  97.8 F  97.2 F   ISTATFIO2  44  40   ISTATSPEC  Arterial  Arterial   ISTATAPHTC  7.257*  7.375*   [id number]  74  76         Recent Labs      07/26/19   0852  07/27/19   0058  07/28/19   0430  07/29/19   0425   SODIUM  137  135  141  140   POTASSIUM  4.4  4.3  3.8  4.3   CHLORIDE  104  102  107  106   CO2  22  26  26  27   BUN  40*  44*  39*  38*   CREATININE  1.34  1.46*  1.37  1.37   MAGNESIUM  1.8   --    --    --    CALCIUM  8.5  8.3*  8.0*  8.4*     Recent Labs      07/26/19 0852 07/27/19 0058 07/28/19 0430 07/29/19   0425   ALTSGPT  72*   --    --    --    ASTSGOT  38   --    --    --    ALKPHOSPHAT  89   --    --    --    TBILIRUBIN  1.2   --    --    --    GLUCOSE  161*  215*  155*  156*     Recent Labs      07/26/19 0852 07/27/19 0058 07/28/19   0430   07/29/19   0425   WBC  17.2*  15.0*  9.3  7.3   NEUTSPOLYS  86.30*  88.20*  82.50*  76.70*   LYMPHOCYTES  4.60*  2.70*  6.80*  9.20*   MONOCYTES  8.10  8.60  9.90  12.00   EOSINOPHILS  0.10  0.00  0.10  1.10   BASOPHILS  0.20  0.20  0.30  0.40   ASTSGOT  38   --    --    --    ALTSGPT  72*   --    --    --    ALKPHOSPHAT  89   --    --    --    TBILIRUBIN  1.2   --    --    --      Recent Labs      07/27/19   0058  07/28/19   0430  07/29/19   0425   RBC  4.71  4.52*  4.41*   HEMOGLOBIN  13.9*  13.0*  12.9*   HEMATOCRIT  43.5  41.7*  41.2*   PLATELETCT  118*  115*  116*       Imaging  X-Ray:  No film today    Assessment/Plan  * Atrial fibrillation with RVR ([facility])- (present on admission)   Assessment & Plan    Currently rate controlled  Off diltiazem infusion  Maintain K greater than 4 mag greater than 2  Remains on digoxin, diltiazem, metoprolol, and apixaban     Severe sepsis ([facility])- (present on admission)   Assessment & Plan    This is severe sepsis with the following associated acute organ dysfunction(s): acute kidney failure, acute respiratory failure.   Source: Pneumonia and/or UTI  Improving  Continue sepsis protocols  Complete 5 days total of antibiotics unless cultures dictate otherwise       Pleural effusion- (present on admission)   Assessment & Plan    Plan for thoracentesis     Acute respiratory failure with hypoxia and hypercarbia ([facility])   Assessment & Plan    RT/O2 protocols  Completing course of antibiotic  Pending thoracentesis     Benign prostatic hyperplasia with urinary frequency- (present on admission)   Assessment & Plan    On Flomax and Proscar     Essential hypertension, benign- (present on admission)   Assessment & Plan    Continue current regimen     Acute on chronic renal failure (HCC)- (present on admission)   Assessment & Plan    Renally dose medications and minimize nephrotoxic substances  Monitor urine output            VTE:  NOAC  Ulcer: PPI  Lines: Pino Catheter  Ongoing indication  addressed    I have performed a physical exam and reviewed and updated ROS and Plan today (7/29/2019). In review of yesterday's note (7/28/2019), there are no changes except as documented above.     Discussed patient condition and risk of morbidity and/or mortality with Hospitalist, RN, RT, Pharmacy, Patient and cardiology

## 2019-07-29 NOTE — PROGRESS NOTES
Hospital Medicine Daily Progress Note    Date of Service  7/29/2019    Chief Complaint  89 y.o. male admitted 7/23/2019 with dehydration    Hospital Course    Mr Rashid has a history of paroxsymal atrial fibrillation and is treated with anticoagulation.  He was recently seen in the ER for urinary retention and was discharged home.  His home health nurse recommended that he come to the ER on 7/23/2019 for deyhration.  Here he was found to be atrial fibrillation with rapid ventricular response.  He was treated with diltiazem and admitted to telemetry floor.  The evening of 7/26/2019 he developed worsening respiratory status and was transferred to the ICU, heart rate was uncontrolled and he was started on digoxin and diltizem drip, he required BiPAP for short period of time.   Heart rate did finally improve with increasing doses of metoprolol, PO diltiazem, and digoxin.  There are concerns that the patient is chronically aspirating.        Interval Problem Update  Very pleasant and cooperative, no confusion  Complains that he is hungry, tolerating diet, FEES planned for today  Not short of breath, coughing a little, clear sputum  No chest pain  Heart rate better controlled overnight, currently 70's-100's    Consultants/Specialty  Critical Care, I discussed the patient's condition with Dr. Demarco this morning on ICU Rounds  Cardiology    Code Status  DNAR/DNI    Disposition  Transfer to telemetry, orders written 7/29/2019    Review of Systems  Review of Systems   Constitutional: Negative for chills and malaise/fatigue.   Respiratory: Positive for cough and shortness of breath. Negative for hemoptysis and sputum production.    Cardiovascular: Negative for chest pain, palpitations and orthopnea.   Gastrointestinal: Negative for nausea and vomiting.   Skin: Negative for itching and rash.   Neurological: Negative for dizziness.   All other systems reviewed and are negative.       Physical Exam  Temp:  [36.2 °C (97.2 °F)-36.6  °C (97.8 °F)] 36.6 °C (97.8 °F)  Pulse:  [] 99  Resp:  [13-36] 13  BP: (109-133)/(69-72) 109/69  SpO2:  [88 %-98 %] 98 %    Physical Exam   Constitutional: He is oriented to person, place, and time. He appears well-developed and well-nourished. No distress.   Appears younger than stated age   HENT:   Head: Normocephalic and atraumatic.   Eyes: Conjunctivae and EOM are normal. Right eye exhibits no discharge. Left eye exhibits no discharge.   Neck: Normal range of motion. Neck supple.   Cardiovascular: Normal rate, regular rhythm and intact distal pulses.    No murmur heard.  Tachycardic and irregular   Pulmonary/Chest: Effort normal. No respiratory distress. He has no wheezes.   Bibasilar crackles   Abdominal: Soft. Bowel sounds are normal. He exhibits no distension. There is no tenderness. There is no rebound.   Musculoskeletal: Normal range of motion. He exhibits no edema.   Neurological: He is alert and oriented to person, place, and time. No cranial nerve deficit.   Skin: Skin is warm and dry. He is not diaphoretic. No erythema.   Skin is warm and well perfused   Psychiatric: He has a normal mood and affect. His behavior is normal. Judgment and thought content normal.       Fluids    Intake/Output Summary (Last 24 hours) at 07/29/19 1011  Last data filed at 07/29/19 1000   Gross per 24 hour   Intake          1046.67 ml   Output              810 ml   Net           236.67 ml       Laboratory  Recent Labs      07/27/19   0058  07/28/19   0430  07/29/19   0425   WBC  15.0*  9.3  7.3   RBC  4.71  4.52*  4.41*   HEMOGLOBIN  13.9*  13.0*  12.9*   HEMATOCRIT  43.5  41.7*  41.2*   MCV  92.4  92.3  93.4   MCH  29.5  28.8  29.3   MCHC  32.0*  31.2*  31.3*   RDW  46.5  47.2  47.5   PLATELETCT  118*  115*  116*   MPV  10.7  10.7  10.4     Recent Labs      07/27/19   0058  07/28/19   0430  07/29/19   0425   SODIUM  135  141  140   POTASSIUM  4.3  3.8  4.3   CHLORIDE  102  107  106   CO2  26  26  27   GLUCOSE  215*  155*   156*   BUN  44*  39*  38*   CREATININE  1.46*  1.37  1.37   CALCIUM  8.3*  8.0*  8.4*                   Imaging  DX-CHEST-PORTABLE (1 VIEW)   Final Result         1.  Pulmonary edema and/or infiltrates are identified, which are stable since the prior exam.   2.  Small left pleural effusion   3.  Cardiomegaly   4.  Atherosclerosis      DX-CHEST-LIMITED (1 VIEW)   Final Result      1.  Bilateral pleural effusions and bibasilar atelectasis, left greater than right. Right effusion is new since prior study.   2.  Stable cardiomegaly.      DX-CHEST-LIMITED (1 VIEW)   Final Result      Improved right basilar atelectasis and probable edema      Stable cardiac silhouette enlargement, moderate left pleural fluid and basilar atelectasis. Consolidation is not excluded      US-THORACENTESIS PUNCTURE LEFT    (Results Pending)   CT-CHEST (THORAX) W/O    (Results Pending)        Assessment/Plan  * Atrial fibrillation with RVR (HCC)- (present on admission)   Assessment & Plan    AFIB with RVR, rate a little better today, and is still suboptimally controlled  Continue diltiazem, metoprolol  Digoxin level reviewed, change digoxin to every other day dose     Aspiration pneumonia (HCC)- (present on admission)   Assessment & Plan    Developed leukocytosis and respiratory distress overnight on 7/26/2019  Suspect may have some component of aspiration  Continue Zosyn  FEES today     Acute diastolic CHF (congestive heart failure) (HCC)- (present on admission)   Assessment & Plan    Monitor volume status  Control heart rate  May need to consider restarting Lasix tomorrow     Pleural effusion- (present on admission)   Assessment & Plan    I after further discussion the patient is agreeable to a left sided thoracentesis today  Hold Eliquis this morning  Cardiology consult has recommended a CT scan of the chest     Acute respiratory failure with hypoxia and hypercarbia ([facility])   Assessment & Plan    Supplemental oxygen     Acute UTI- (present on  admission)   Assessment & Plan    Urinalysis positive  Zosyn will provide adequate coverage       Leukocytosis   Assessment & Plan    WBC increased after admission significantly  Suspect UTI vs pneumonia  Broad spectrum antibitoics     Bacteremia- (present on admission)   Assessment & Plan    1 out of 2 bottles bacillus, likely contaminant     Benign prostatic hyperplasia with urinary frequency- (present on admission)   Assessment & Plan    Flomax and Proscar     Essential hypertension, benign- (present on admission)   Assessment & Plan    Blood pressure is improved a little bit today     Acute on chronic renal failure (HCC)- (present on admission)   Assessment & Plan    Avoid nephrotoxins          VTE prophylaxis: eliquis

## 2019-07-29 NOTE — PALLIATIVE CARE
Palliative Care follow-up  PC RN visited BS near the end of the FEES study; updates from SLP and pt noting he is not interested in a feeding tube. Previous discussions with the pt yielded his desire to return home with his wife. Joe gave this RN permission to call his son Kiran. He believes Kiran is coming to visit this week. Call placed to Kiran at [phone] and message left requesting a call back to discuss goals and POC for his dad.       Updated: BS team/MD    Plan: f/u when son calls back    Thank you for allowing Palliative Care to participate in this patient's care. Please feel free to call [phone] with any questions or concerns.

## 2019-07-29 NOTE — PROGRESS NOTES
Went up to patients room and Dr Kinney consented the patient.  He performed the left thoracentesis.  Vitals were monitored by floor nurse.  Patient tolerated procedure well  And was in stable condition when I left.  Patient having chest CT after Removed 1100 ml and sent 1000 ml to lab

## 2019-07-30 NOTE — DISCHARGE PLANNING
"Hospital Care Management Discharge Planning       Anticipated Discharge Disposition:   · [facility] w/ hospice.     Action:   · This RN CM spoke with patient at bedside about discharge plan.   · Patient is refusing any HH agency or SNF at this time. He communicates the goal is to \"go home and be comfortable with my wife by my side\".  · This RN CM spoke to daughter, Aleksey. Aleksey communicates that they are meeting with palliative care team this afternoon at 1600 to discuss going home on hospice, which the patient indicated to his daughter that this I what he is wanting as of this AM.  · Updated Dr. Montes and BS RN.     Barriers to Discharge:   · GOC discussion with Palliative Care Team     Plan:   · Await update from palliative care team.    · Continue to provide support services and assistance with discharge planning as needed.      "

## 2019-07-30 NOTE — PROGRESS NOTES
Cardiology follow-up:      Progress Note:  Resident:  Moni Peace M.D.  Attending:  Latrell Medley M.D.  Date:  2019     Patient ID:  Name Joe Rashid     1920   Age/Sex 89 y.o. male   MRN [id number]     Reason for Consult:  A-fib with RVR.    Background:  This is a 89 yo M with PMH of Afib and HTN presented with dehydration and was sent to the ED from group home. The patient has been staying with his wife who is also 100 years old. The patient was not eating and drinking well for the past few days. He is complaint with his medications and was taking lasix for pleural effusion. Her lasix was stopped on admission. He also presented with Afib with RVR on admission. We were consulted for the management of Afib with RVR.    - HR previously improved a bit with diltiazem, and had been increased to 60 TID.     Interval Update:  - patient is on 3 L O2 now. Advised him to use more IS. Ordered CXR which showed increased right sided effusion.   - He doing fine otherwise.  - Denied having any chest pain or shortness of breath.    Review of Symptoms  GEN/CONST:   Denies fever, or chills.    HEENT:   Denies vision or hearing changes.       + On dysphagia diet.   CARDIO:   Currently - Denies chest pain, palpitations, orthopnea, or edema.      + overnight: see above.   RESP:   Currently - Denies wheezing, or coughing.      + Currently on partial rebreather mask.      + overnight: see above.   GI:    Denies nausea, vomiting, or abdominal pain.      + diarrhea.  :   Had been on Condom cath, yesterday.         + blood in urine, for past day.   HEME:  Denies easy bruising      + blood in urine, for past day.   ALLG:  Denies  Hives or rashes.    MSK:  Denies focal weakness, or swellings,   SKIN:  Denies rashes, or sores.   PSYCH:  Denies mood disorders or substance abuse.      Physical Exam     Vitals:    19 0129 19 0511 19 0840 19 1240   BP: 139/91 145/87 150/82 149/85   Pulse: 61 85  66 77   Resp: 20 20 16 16   Temp: 36.9 °C (98.5 °F) 36.2 °C (97.1 °F) 36.6 °C (97.9 °F) 36.9 °C (98.5 °F)   TempSrc: Temporal Temporal Temporal Temporal   SpO2: 95% 90% 95% 93%   Weight:       Height:         Body mass index is 23.83 kg/m².    Oxygen Therapy:  Pulse Oximetry: 93 %, O2 (LPM): 3, O2 Delivery: Silicone Nasal Cannula    GEN:   Alert and oriented,  Resting comfortably.     + in ICU.  On Partial NRB O2.   H / E:   Normocephalic, Atraumatic.  No scleral icterus.     ENT:   No erythema.  Trachea midline.  No stridor.  Supple neck.   HEART:   Regular rate. (at time of exam ~ 90).      + irregular rhythm      Questionable murmur.   LUNGS:   Mildly reduced breath sounds.      + crackling in lower lung fields.   ABD/GI:  Soft.  +BS.  No rebound or guarding noted.  EXT/MSK:  No pedal edema.    NEURO:  Alert and oriented x3.  No tremor.    PSYCH:  Normal mood and affect, but appeared a bit tired.       Lab Data Review:     Recent Labs      07/28/19 0430 07/29/19 0425 07/30/19 0448   WBC  9.3  7.3  6.4   NEUTSPOLYS  82.50*  76.70*  75.90*   LYMPHOCYTES  6.80*  9.20*  10.40*   MONOCYTES  9.90  12.00  12.00   EOSINOPHILS  0.10  1.10  0.60   BASOPHILS  0.30  0.40  0.50     Recent Labs      07/28/19 0430 07/29/19 0425 07/29/19   1035  07/30/19 0448   RBC  4.52*  4.41*   --   4.61*   HEMOGLOBIN  13.0*  12.9*   --   13.5*   HEMATOCRIT  41.7*  41.2*   --   43.0   PLATELETCT  115*  116*   --   120*   PROTHROMBTM   --    --   18.9*   --    INR   --    --   1.53*   --        Recent Labs      07/28/19 0430 07/29/19 0425  07/30/19 0448   SODIUM  141  140  140   POTASSIUM  3.8  4.3  3.8   CHLORIDE  107  106  107   CO2  26  27  25   BUN  39*  38*  31*   CREATININE  1.37  1.37  1.12   MAGNESIUM   --    --   1.8   CALCIUM  8.0*  8.4*  8.5       Recent Labs      07/28/19   0430  07/29/19   0425  07/30/19   0448   GLUCOSE  155*  156*  152*             Assessment & Plan     # Atrial Fibrillation with RVR  #  Hypertension  # Acute Respiratory Failure (with hypoxemia and hypercapnia).   # Aspiration Pneumonia (possible)  # UTI   (+ nitrites, and LE)  Previously with CXR suggested mild pleural effusion on the left side. No peripheral edema.  And, some JCARLOS, on Labs. Prior Troponin Elevated however he has JCARLOS. No chest pain and no ekg changes. Code status DNR/DNI.  - Recent ECHO showed EF 70% with severe pulm hypertension in 9.2018. Will order another ECHO to rule out any valvular disease as a cause of his bilateral effusion. He is certainly not a candidate for any surgery.   - Less likely to be heart failure, given recent echo.   - US thoracentesis - removed 1100 cc from left side. No complication. Agree with right sided thoracentesis.   - CT scan showed right sided pleural effusion with consolidation. Procal 0.22. Agree with Zosyn.   - No need for starting atorvastatin, for prevention, given his advanced age.   - Continue diltiazem and metoprolol orally. Rate controlled.  - JCARLOS resolved.  - Avoid lasix in this old man who was presented with dehydration.   - If his breathing does not improve, I would tap his right side which has moderate effusion on CT scan.   - NT pro BNP 7794. Does not have signs of   - Would consult pulmonary if ECHO is unremarkable except pulmonary hypertension.     Will continue to follow. Please contact us with any question.      A computerized dictation system may have been used for this note.    Despite review, there may be some spelling or grammatical errors.      Moni Peace M.D.  7/30/2019   Service:  Cardiology   Attending:  Latrell Rider M.D.

## 2019-07-30 NOTE — PALLIATIVE CARE
Palliative Care follow-up  PC RN spoke with dtr Aleksey who will be coming to town today from [place]. Plan made to meet around 1600 at Joe's room. Aleksey was provided with his room number and has this RN's number for any questions or concerns.     Plan: f/u today on DC plan with pt/dtr    Thank you for allowing Palliative Care to participate in this patient's care. Please feel free to call [phone] with any questions or concerns.

## 2019-07-30 NOTE — PALLIATIVE CARE
"Palliative Care follow-up  PC RN met with Joe's daughter Aleksey (from [place]), grandson Lopez and wife Myranda in the conference room. Joe was being taken for thoracentesis at this time. Lengthy discussion about current situation and discussion with Joe about his wishes, which included no feeding tubes and not wanting to come back to the hospital. Myranda make several joking comments about being \"only 89\" and noted that \"when God's ready to take us, he's going to take us.\" The family expressed understanding of the current situation regarding Joe's medical situation and agreed with his wishes. PC RN discussed the role of hospice in Joe's situation, including members of their team, type of care provided, volunteer and respite support services, and goal of aggressive symptom management and focus on QoL vs aggressive medical care. They expressed understanding and were also interested in discussing his situation with the hospice team. Choice made for [facility] and the family requested 10AM as this is the best time for Myranda to be able to come. No questions remained at this time and PC made a plan to work on the meeting for Wednesday.    Updates to MD and RN; requested hospice referral and choice form faxed to [phone]. TT to Hospice RN Lily with information for tentative meeting.     Plan: f/u in AM to confirm meeting for 1000    Thank you for allowing Palliative Care to participate in this patient's care. Please feel free to call [phone] with any questions or concerns.  "

## 2019-07-30 NOTE — PROGRESS NOTES
· 2 RN skin check complete with ANGEL Simpson.  · Devices in place:  ? Cardiac Monitor  ? Blood pressure cuff  ? Pulse ox probe  ? Pino catheter  ? NC  · Skin assessed under devices yes, ears red, blanchable.  · Sacrum red, blanchable. Back slow to renaldo with some kyphosis.  · Heels boggy, floated.  · Skin tear to left wrist, gauze dressing in place.     · The following interventions in place: pillows under elbows and other bony prominences, waffle cushion, mepilex on bilateral elbows, barrier cream, heels floated, gray foam pads to NC

## 2019-07-30 NOTE — CARE PLAN
Problem: Safety  Goal: Will remain free from falls  Outcome: PROGRESSING AS EXPECTED  Patient will remain free from falls during shift.    Problem: Pain Management  Goal: Pain level will decrease to patient's comfort goal  Outcome: PROGRESSING AS EXPECTED  Patient will maintain comfort at rest during shift.    Problem: Skin Integrity  Goal: Risk for impaired skin integrity will decrease  Outcome: PROGRESSING AS EXPECTED  Patient will not have further skin breakdown during shift.

## 2019-07-30 NOTE — PROGRESS NOTES
Hospital Medicine Daily Progress Note    Date of Service  7/30/2019    Chief Complaint  89 y.o. male admitted 7/23/2019 with findings of rapid afib.     Hospital Course    Hx of Afib on Eliquis, CKD III, CHF , Htn,  recently seen in the ER for urinary retention and was discharged home Txd to [facility] with findings  A fib RVR.  Patient admitted initially to [facility] then developed increasing dyspnea with persistent Afib RVR- started on IV Digoxin,  Cardizem gtt.  Patient  required Bipap and treated for Asp pna.      Interval Problem Update    Afib better controlled  80-low 100s . Post 1.1 L left thoracocentesis. Fu CXR :  slight increased right pl effusion- moderate In size. Noted decreased left sided effusion.   Trying to titrate o2- remains above his baseline requirements. . Pino in place for urinary obstruction.       Consultants/Specialty    Cardiology     Code Status  DNR    Disposition  Plan to mobilize, Tbd    Review of Systems  Review of Systems   Constitutional: Negative for chills, diaphoresis and fever.   HENT: Negative for congestion.    Eyes: Negative for discharge and redness.   Respiratory: Positive for cough and shortness of breath. Negative for sputum production, wheezing and stridor.    Cardiovascular: Negative for chest pain, palpitations and leg swelling.   Gastrointestinal: Negative for abdominal pain, diarrhea and vomiting.   Genitourinary: Negative for flank pain and hematuria.   Musculoskeletal: Negative for back pain, joint pain and neck pain.   Skin: Negative for itching and rash.   Neurological: Negative for tremors, sensory change, speech change, focal weakness and weakness.   Psychiatric/Behavioral: Negative for hallucinations and substance abuse.        Physical Exam  Temp:  [36.1 °C (97 °F)-37.1 °C (98.8 °F)] 36.2 °C (97.1 °F)  Pulse:  [] 85  Resp:  [13-31] 20  BP: (132-156)/(86-95) 145/87  SpO2:  [90 %-98 %] 90 %    Physical Exam   Constitutional: He is oriented to person, place, and  time. No distress.   HENT:   Head: Normocephalic and atraumatic.   Nose: Nose normal.   Eyes: Conjunctivae and EOM are normal. No scleral icterus.   Neck: Normal range of motion. No JVD present.   Cardiovascular:   No murmur heard.  irreg irreg   Pulmonary/Chest: No stridor. He has no wheezes. He has rales (bi basilar , sporadic ).   Abdominal: Soft. Bowel sounds are normal. He exhibits no distension. There is no tenderness.   Genitourinary:   Genitourinary Comments: Pino present    Musculoskeletal: He exhibits no edema or tenderness.   Neurological: He is alert and oriented to person, place, and time.   No gross focal weakness   Skin: Skin is warm and dry. He is not diaphoretic. No pallor.   Psychiatric: He has a normal mood and affect. His behavior is normal.   Vitals reviewed.      Fluids    Intake/Output Summary (Last 24 hours) at 07/30/19 0804  Last data filed at 07/29/19 1400   Gross per 24 hour   Intake              340 ml   Output              170 ml   Net              170 ml       Laboratory  Recent Labs      07/28/19   0430  07/29/19   0425  07/30/19   0448   WBC  9.3  7.3  6.4   RBC  4.52*  4.41*  4.61*   HEMOGLOBIN  13.0*  12.9*  13.5*   HEMATOCRIT  41.7*  41.2*  43.0   MCV  92.3  93.4  93.3   MCH  28.8  29.3  29.3   MCHC  31.2*  31.3*  31.4*   RDW  47.2  47.5  47.1   PLATELETCT  115*  116*  120*   MPV  10.7  10.4  10.2     Recent Labs      07/28/19   0430  07/29/19   0425  07/30/19   0448   SODIUM  141  140  140   POTASSIUM  3.8  4.3  3.8   CHLORIDE  107  106  107   CO2  26  27  25   GLUCOSE  155*  156*  152*   BUN  39*  38*  31*   CREATININE  1.37  1.37  1.12   CALCIUM  8.0*  8.4*  8.5     Recent Labs      07/29/19   1035   INR  1.53*               Imaging  DX-CHEST-PORTABLE (1 VIEW)   Final Result      1.  Slight interval increase in right-sided pleural effusion which now appears moderate size.   2.  Decreased left pleural effusion which appears small to moderate. No evidence of pneumothorax.   3.   Bilateral basilar atelectasis and/or consolidation. Underlying infection is possible.      CT-CHEST (THORAX) W/O   Final Result      1.  Bilateral pleural effusions, larger on the RIGHT, with associated consolidation particularly in the RIGHT lower lobe.   2.  Bronchial wall thickening primarily lower lobe suggesting inflammation such as bronchitis.   3.  Secretions within the LEFT lower lobe bronchi concerning for pneumonia.               US-THORACENTESIS PUNCTURE LEFT   Final Result      1. Ultrasound guided left sided diagnostic and therapeutic thoracentesis.      2. 1100 mL of fluid withdrawn.      DX-CHEST-PORTABLE (1 VIEW)   Final Result         1.  Pulmonary edema and/or infiltrates are identified, which are stable since the prior exam.   2.  Small left pleural effusion   3.  Cardiomegaly   4.  Atherosclerosis      DX-CHEST-LIMITED (1 VIEW)   Final Result      1.  Bilateral pleural effusions and bibasilar atelectasis, left greater than right. Right effusion is new since prior study.   2.  Stable cardiomegaly.      DX-CHEST-LIMITED (1 VIEW)   Final Result      Improved right basilar atelectasis and probable edema      Stable cardiac silhouette enlargement, moderate left pleural fluid and basilar atelectasis. Consolidation is not excluded      EC-ECHOCARDIOGRAM COMPLETE W/O CONT    (Results Pending)        Assessment/Plan  * Pleural effusion- (present on admission)   Assessment & Plan    Post left sided thoracentesis with 1.1 L fluid removed.-Follow-up cultures.  Will check serum LDH to check light criteria   Start Lasix diuresis  CT scan of the chest revealed larger right-sided pleural effusion.  Discussed with patient.  Will order IR for  right-sided thoracentesis as he has persistent hypoxia over baseline , cough and exertional dyspnea.        Aspiration pneumonia (HCC)- (present on admission)   Assessment & Plan    Developed leukocytosis and respiratory distress overnight on 7/26/2019  Suspect component of  aspiration.  Continue with dysphagia diet, aspiration precautions.  Continue with IV Zosyn       Acute diastolic CHF (congestive heart failure) ([facility])- (present on admission)   Assessment & Plan    EF 70%, improved symptoms.   Control Afib   Start lasix  Fu CXR :      1.  Slight interval increase in right-sided pleural effusion which now appears moderate size.  2.  Decreased left pleural effusion which appears small to moderate. No evidence of pneumothorax.  3.  Bilateral basilar atelectasis and/or consolidation. Underlying infection is possible.              Atrial fibrillation with RVR ([facility])- (present on admission)   Assessment & Plan    AFIB with RVR-improved control.  Continue diltiazem, metoprolol for rate control.  Eliquis for stroke risk reduction- hold for procedure.      Acute respiratory failure with hypoxia and hypercarbia ([facility])   Assessment & Plan    Supplemental oxygen     Acute UTI- (present on admission)   Assessment & Plan    Urinalysis positive. No urine cultures done.   Adequate coverage with IV Zosyn        Leukocytosis   Assessment & Plan    WBC increased after admission significantly-now resolved  Suspect UTI vs pneumonia  Continue with broad spectrum antibitoics     Bacteremia- (present on admission)   Assessment & Plan    1 out of 2 bottles bacillus, likely contaminant     Benign prostatic hyperplasia with urinary frequency- (present on admission)   Assessment & Plan    Flomax and Proscar  Had urinary retention-Pino in place-plan DC Pino and voiding trial soon.     Essential hypertension, benign- (present on admission)   Assessment & Plan    Blood pressure is improved a little bit today     Acute on chronic renal failure (HCC)- (present on admission)   Assessment & Plan    Avoid nephrotoxins          VTE prophylaxis: Eliquis    Discussed with mult disciplinary team plan of care .

## 2019-07-30 NOTE — PROGRESS NOTES
PHYSICIAN: DR GOODE  TECH: GERARD    PROCEDURE: ULTRASOUND GUIDED THORACENTESIS RT     PT CAME TO ULTRASOUND ROOM 1 FOR HIS PROCEDURE. 1500 ML OF FLUID WAS REMOVED FROM PT RT THORAX. 1260 ML OF FLUID WAS SENT TO LAB. PT APPEARED TO BE IN A STABLE CONDITION FOR HIS PROCEDURE. VITALS WERE MONITORED AT BEDSIDE. XRAY WAS TAKEN POST THORA BEFORE PT WENT BACK TO HIS ROOM.

## 2019-07-31 NOTE — HOSPICE
ATTN: PROVIDER/CARE MANAGEMENT TEAM/NURSES     RE: Referral for [facility]     As of 7/31/19, we have accepted the referral to [facility] for the patient listed above.     To access the [facility] Hospice Notes you can click on Chart Review and then click onto Encounters (left top corner of screen) and see our documentation.      If you have any questions or concerns regarding the patient’s transition to [facility], please do not hesitate to contact us at [phone]     We look forward to collaborating with you,  [facility] Hospice Care Team

## 2019-07-31 NOTE — PALLIATIVE CARE
Palliative Care follow-up  F/U on hospice meeting desired for today. MD placed order and [facility] refaxed; SHEY Brown aware and will ask the [facility] to process. Case discussed with Hospice RN April and 10AM meeting at bedside planned. Call placed to Aleksey and meeting time confirmed. Discussed with BS RN and need for PT/OT to determine level of function to better prepare for returning home and how much assistance will be needed so family can plan/hire if necessary.      Updated: BS team    Plan: meeting with hospice today at 10    Thank you for allowing Palliative Care to participate in this patient's care. Please feel free to call [phone] with any questions or concerns.

## 2019-07-31 NOTE — THERAPY
"Physical Therapy Treatment completed.   Bed Mobility:  Supine to Sit: Supervised  Transfers: Sit to Stand: Supervised  Gait: Level Of Assist: Supervised with Front-Wheel Walker       Plan of Care: Patient with no further skilled PT needs in the acute care setting at this time  Discharge Recommendations: Equipment: No Equipment Needed. Post-acute therapy unlikely as pt will be on hospice.    See \"Rehab Therapy-Acute\" Patient Summary Report for complete documentation.     Pt is a 89 y.o. male admitted to hospital for A-fib. Pt is close to his baseline, and reports/demonstrates good activity tolerance. Pt will likely be d/c'd home with hospice. Pt does not demonstrate need for further skilled PT in the acute setting at this time.   "

## 2019-07-31 NOTE — DISCHARGE PLANNING
Received Choice form at 0815.  Agency/Facility Name: [facility]   Referral sent per Choice form at 0820.

## 2019-07-31 NOTE — PROGRESS NOTES
2 RN skin check completed areli Ortega RN.   Devices in place silicone nasal cannula, waffle mattress, mepilex, foam.  Skin assessed under devices bilateral ears red, slow to renaldo (Pt's glasses very tight), nose bridge-red, non-blanching; glutteal cleft-red, slow to renaldo, generalized skin: dry, fragile, flaky.  Confirmed pressure ulcers found on N/A.  New potential pressure ulcers noted on glutteal cleft. Wound consult placed none.  The following interventions in place waffle mattress, heels floated, pillows to support turns, foam to nose, glasses off; barrier cream (Pt incontinent of stool).

## 2019-07-31 NOTE — DIETARY
"Nutrition services: Day 7 of admit.  Joe Rashid is a 89 y.o. male with admitting DX of atrial fibrillation with RVR    Missing meal information per ADLs during weekly screen    RD attempted to visit at bedside, though was with family and provider at attempt.     Assessment:  Height: 175.3 cm (5' 9\")  Weight: 78.9 kg (173 lb 15.1 oz)  Body mass index is 25.69 kg/m²., BMI classification: WNL  Diet/Intake: NTFL    Evaluation:   1. Hx of acute respiratory failure with hypoxia and hypercarbia, aspiration pneumonia, acute diastolic CHF, severe sepsis, acute on chronic renal failure  2. Noted per SLP, pt declined cortrak placement and wishes to eat despite risks on NTFL diet.   3. Per 8x meals charted since 7/26, pt has consumed on average ~66%. This is likely meeting pt's needs at this time, especially in regards to pt's advanced age.  4. Per computer hx, pt weighed 154 lbs (69.9 kg) on 12/2/01/8, indicating a potential weight gain of 19 lbs within 6 months. Pt with bilateral trace edema to RLE and LLE. Wt has increased slightly since admission.  5. Labs: Glucose 162, BUN 30, A1C 6.3%  6. Meds: furosemide, senokot  7. Last BM: 7/31; noted pt with incontinence/loose stools on 7/30    Malnutrition Risk: Pt does not appear to present with malnutrition at this time per ASPEN guidelines. To reevaluate as appropriate    Recommendations/Plan:  1. Encourage intake of all meals  2. Document intake of all meals as % taken in ADL's to provide interdisciplinary communication across all shifts.   3. Monitor weight.  4. Nutrition rep will continue to see patient for ongoing meal and snack preferences.     RD available PRN, please consult as needed.      "

## 2019-07-31 NOTE — PROGRESS NOTES
Family at bedside for Hospice consult. Will hold Lasix until end of meeting to allow consult to go uninterrupted.

## 2019-07-31 NOTE — PROGRESS NOTES
Cardiology follow-up:      Progress Note:  Resident:  Moni Peace M.D.  Attending:  Laterll Medley M.D.  Date:  2019     Patient ID:  Name Joe Rashid     1920   Age/Sex 89 y.o. male   MRN [id number]     Reason for Consult:  A-fib with RVR.    Background:  This is a very pleasant 89 yo M with PMH of Afib and HTN presented with dehydration and was sent to the ED from group home. The patient has been staying with his wife who is also 89 years old. The patient was not eating and drinking well for the past few days. He is complaint with his medications and was taking lasix for pleural effusion. Her lasix was stopped on admission. He also presented with Afib with RVR on admission. We were consulted for the management of Afib with RVR.    - HR previously improved a bit with diltiazem, and had been increased to 60 TID.     Interval Update:  - patient is still on 3 L O2 now. Advised him to use more incentive spirometry.  Chest x-ray demonstrated improved aeration on the right side of the lung after thoracentesis.  -Echocardiogram not done yet.  - He doing fine otherwise.   - Denied having any chest pain or shortness of breath.  His shortness of breath improved per patient.    Review of Symptoms  GEN/CONST:   Denies fever, or chills.    HEENT:   Denies vision or hearing changes.       + On dysphagia diet.   CARDIO:   Currently - Denies chest pain, palpitations, orthopnea, or edema.      + overnight: see above.   RESP:   Currently - Denies wheezing, or coughing.      + Currently on partial rebreather mask.      + overnight: see above.   GI:    Denies nausea, vomiting, or abdominal pain.      + diarrhea.  :   Had been on Condom cath, yesterday.         + blood in urine, for past day.   HEME:  Denies easy bruising      + blood in urine, for past day.   ALLG:  Denies  Hives or rashes.    MSK:  Denies focal weakness, or swellings,   SKIN:  Denies rashes, or sores.   PSYCH:  Denies mood disorders or  substance abuse.      Physical Exam     Vitals:    07/31/19 0038 07/31/19 0523 07/31/19 0840 07/31/19 1245   BP: 145/87 156/102 123/82 139/87   Pulse: (!) 57 71 79 97   Resp: 16 16 16 16   Temp: 36.2 °C (97.2 °F) 36.2 °C (97.2 °F) 36.6 °C (97.9 °F) 37.2 °C (98.9 °F)   TempSrc: Temporal Temporal Temporal Temporal   SpO2: 95% 97% 93% 99%   Weight:       Height:         Body mass index is 25.69 kg/m². Weight: 78.9 kg (173 lb 15.1 oz)  Oxygen Therapy:  Pulse Oximetry: 99 %, O2 (LPM): 3, O2 Delivery: Silicone Nasal Cannula    GEN:   Alert and oriented,  Resting comfortably.     + in ICU.  On Partial NRB O2.   H / E:   Normocephalic, Atraumatic.  No scleral icterus.     ENT:   No erythema.  Trachea midline.  No stridor.  Supple neck.   HEART:   Regular rate. (at time of exam ~ 90).      + irregular rhythm      Questionable murmur.   LUNGS:   Mildly reduced breath sounds.      + crackling in lower lung fields.   ABD/GI:  Soft.  +BS.  No rebound or guarding noted.  EXT/MSK:  No pedal edema.    NEURO:  Alert and oriented x3.  No tremor.    PSYCH:  Normal mood and affect, but appeared a bit tired.       Lab Data Review:     Recent Labs     07/29/19 0425 07/30/19 0448 07/31/19  0206   WBC 7.3 6.4 7.4   NEUTSPOLYS 76.70* 75.90* 75.80*   LYMPHOCYTES 9.20* 10.40* 11.60*   MONOCYTES 12.00 12.00 10.20   EOSINOPHILS 1.10 0.60 1.20   BASOPHILS 0.40 0.50 0.40     Recent Labs     07/29/19  0425 07/29/19  1035 07/30/19 0448 07/31/19  0206   RBC 4.41*  --  4.61* 4.64*   HEMOGLOBIN 12.9*  --  13.5* 13.7*   HEMATOCRIT 41.2*  --  43.0 43.7   PLATELETCT 116*  --  120* 107*   PROTHROMBTM  --  18.9*  --   --    INR  --  1.53*  --   --        Recent Labs     07/29/19 0425 07/30/19 0448 07/31/19  0206   SODIUM 140 140 140   POTASSIUM 4.3 3.8 3.9   CHLORIDE 106 107 107   CO2 27 25 26   BUN 38* 31* 30*   CREATININE 1.37 1.12 0.99   MAGNESIUM  --  1.8 1.8   CALCIUM 8.4* 8.5 8.3*       Recent Labs     07/29/19 0425 07/30/19 0448  07/31/19  0206   GLUCOSE 156* 152* 162*     Assessment & Plan     # Atrial Fibrillation with RVR  # Hypertension  # Acute Respiratory Failure (with hypoxemia and hypercapnia).   # Aspiration Pneumonia (possible)  # UTI   (+ nitrites, and LE)  Previously with CXR suggested mild pleural effusion on the left side. No peripheral edema.  And, some JCARLOS, on Labs. Prior Troponin Elevated however he has JCARLOS. No chest pain and no ekg changes. Code status DNR/DNI.  - Recent ECHO showed EF 70% with severe pulm hypertension in 9.2018. Will order another ECHO to rule out any valvular disease as a cause of his bilateral effusion. He is certainly not a candidate for any surgery.   - Less likely to be heart failure, given recent echo.   - US thoracentesis - removed 1100 cc from left side. No complication.  Removed 1500 cc from the right side of the chest.  Pleural fluid demonstrated transudate in the most likely reason of the left ventricle hypertrophy given his age.  Age-related amyloidosis is certainly in the differential diagnosis.  - CT scan showed right sided pleural effusion with consolidation. Agree with Zosyn.   - No need for starting atorvastatin, for prevention, given his advanced age.   - Continue diltiazem and metoprolol orally. Rate controlled.  - JCARLOS resolved.  Continue Lasix to prevent recurrence of pleural effusion.  Would consider starting him on the same dose he was on before.  - NT pro BNP [id number]. Does not have signs of heart failure.  - Would consult pulmonary if ECHO is unremarkable except pulmonary hypertension.     Will continue to follow. Please contact us with any question.      A computerized dictation system may have been used for this note.    Despite review, there may be some spelling or grammatical errors.      Moni Peace M.D.  7/31/2019   Service:  Cardiology   Attending:  Latrell Rider M.D.

## 2019-07-31 NOTE — PROGRESS NOTES
Hospital Medicine Daily Progress Note    Date of Service  7/31/2019    Chief Complaint  89 y.o. male admitted 7/23/2019 with findings of rapid afib.     Hospital Course    Hx of Afib on Eliquis, CKD III, CHF , Htn,  recently seen in the ER for urinary retention and was discharged home Txd to [facility] with findings  A fib RVR.  Patient admitted initially to [facility] then developed increasing dyspnea with persistent Afib RVR- started on IV Digoxin,  Cardizem gtt.  Patient  required Bipap and treated for Asp pna.      Interval Problem Update    Feeling better after 1.5 L right-sided thoracentesis.  Decreased dyspnea.  Fluid cultures, cytology negative.  Family making arrangements for discharge home with hospice.    Consultants/Specialty    Cardiology     Code Status  DNR    Disposition  Plan to mobilize, Tbd    Review of Systems  Review of Systems   Constitutional: Negative for chills, diaphoresis and fever.   Eyes: Negative for discharge and redness.   Respiratory: Positive for cough and shortness of breath. Negative for sputum production and wheezing.         Improved   Cardiovascular: Negative for chest pain and palpitations.   Gastrointestinal: Negative for abdominal pain, diarrhea and vomiting.   Genitourinary: Negative for flank pain and hematuria.   Musculoskeletal: Negative for back pain, joint pain and neck pain.   Neurological: Negative for tremors, sensory change, speech change and focal weakness.        Physical Exam  Temp:  [36.2 °C (97.2 °F)-37.2 °C (98.9 °F)] 37.2 °C (98.9 °F)  Pulse:  [57-97] 97  Resp:  [16] 16  BP: (123-156)/() 139/87  SpO2:  [93 %-99 %] 99 %    Physical Exam   Constitutional: No distress.   HENT:   Head: Normocephalic and atraumatic.   Right Ear: External ear normal.   Left Ear: External ear normal.   Nose: Nose normal.   Eyes: Conjunctivae and EOM are normal. No scleral icterus.   Neck: Normal range of motion.   Cardiovascular:   No murmur heard.  irreg irreg   Pulmonary/Chest: No  stridor. He has no wheezes. He has rales (Mild bibasilar).   Abdominal: Soft. He exhibits no distension. There is no tenderness.   Genitourinary:   Genitourinary Comments: Pino present    Musculoskeletal: He exhibits no edema or tenderness.   Neurological: He is alert.   No gross focal weakness   Skin: Skin is warm and dry. He is not diaphoretic. No pallor.   Vitals reviewed.      Fluids    Intake/Output Summary (Last 24 hours) at 7/31/2019 1538  Last data filed at 7/31/2019 1320  Gross per 24 hour   Intake 220 ml   Output 2600 ml   Net -2380 ml       Laboratory  Recent Labs     07/29/19  0425 07/30/19  0448 07/31/19  0206   WBC 7.3 6.4 7.4   RBC 4.41* 4.61* 4.64*   HEMOGLOBIN 12.9* 13.5* 13.7*   HEMATOCRIT 41.2* 43.0 43.7   MCV 93.4 93.3 94.2   MCH 29.3 29.3 29.5   MCHC 31.3* 31.4* 31.4*   RDW 47.5 47.1 48.3   PLATELETCT 116* 120* 107*   MPV 10.4 10.2 10.0     Recent Labs     07/29/19  0425 07/30/19  0448 07/31/19  0206   SODIUM 140 140 140   POTASSIUM 4.3 3.8 3.9   CHLORIDE 106 107 107   CO2 27 25 26   GLUCOSE 156* 152* 162*   BUN 38* 31* 30*   CREATININE 1.37 1.12 0.99   CALCIUM 8.4* 8.5 8.3*     Recent Labs     07/29/19  1035   INR 1.53*               Imaging  DX-CHEST-PORTABLE (1 VIEW)   Final Result      1.  No pneumothorax identified following right thoracentesis.      2.  Persistent atelectasis, pneumonia, or effusion in the left lower lobe.      3.  Unchanged cardiomegaly.      US-THORACENTESIS PUNCTURE RIGHT   Final Result      1. Ultrasound guided right sided diagnostic and therapeutic thoracentesis.      2. 1500 mL of fluid withdrawn.      DX-CHEST-PORTABLE (1 VIEW)   Final Result      1.  Slight interval increase in right-sided pleural effusion which now appears moderate size.   2.  Decreased left pleural effusion which appears small to moderate. No evidence of pneumothorax.   3.  Bilateral basilar atelectasis and/or consolidation. Underlying infection is possible.      CT-CHEST (THORAX) W/O   Final  Result      1.  Bilateral pleural effusions, larger on the RIGHT, with associated consolidation particularly in the RIGHT lower lobe.   2.  Bronchial wall thickening primarily lower lobe suggesting inflammation such as bronchitis.   3.  Secretions within the LEFT lower lobe bronchi concerning for pneumonia.               US-THORACENTESIS PUNCTURE LEFT   Final Result      1. Ultrasound guided left sided diagnostic and therapeutic thoracentesis.      2. 1100 mL of fluid withdrawn.      DX-CHEST-PORTABLE (1 VIEW)   Final Result         1.  Pulmonary edema and/or infiltrates are identified, which are stable since the prior exam.   2.  Small left pleural effusion   3.  Cardiomegaly   4.  Atherosclerosis      DX-CHEST-LIMITED (1 VIEW)   Final Result      1.  Bilateral pleural effusions and bibasilar atelectasis, left greater than right. Right effusion is new since prior study.   2.  Stable cardiomegaly.      DX-CHEST-LIMITED (1 VIEW)   Final Result      Improved right basilar atelectasis and probable edema      Stable cardiac silhouette enlargement, moderate left pleural fluid and basilar atelectasis. Consolidation is not excluded      EC-ECHOCARDIOGRAM COMPLETE W/O CONT    (Results Pending)        Assessment/Plan  * Aspiration pneumonia (HCC)- (present on admission)  Assessment & Plan  Developed leukocytosis and respiratory distress overnight on 7/26/2019  Suspect component of aspiration.  Continue with dysphagia diet, aspiration precautions.  Changed antibiotics to Augmentin.      Pleural effusion- (present on admission)  Assessment & Plan  Post left sided thoracentesis with 1.1 L and right-sided 1.5 L fluid removed.-Follow-up cultures negative.  Cytology negative.  Suspect transudate per lights criteria .     Bacteremia- (present on admission)  Assessment & Plan  1 out of 2 bottles bacillus, likely contaminant    Acute respiratory failure with hypoxia and hypercarbia (HCC)  Assessment & Plan  Improved dyspnea post  bilateral thoracentesis.  Continue Lasix diuresis.  O2 support, wean as tolerated  RT protocols    Acute UTI- (present on admission)  Assessment & Plan  Urinalysis positive. No urine cultures done.   Adequate coverage with IV Zosyn -will DC.    Leukocytosis  Assessment & Plan  WBC increased after admission significantly-now resolved  Suspect UTI vs pneumonia  Continue with broad spectrum antibitoics-plan change to Augmentin.    Acute diastolic CHF (congestive heart failure) (HCC)- (present on admission)  Assessment & Plan  EF 70%, improved symptoms post bilateral thoracentesis.  Control Afib -continue Cardizem.  Lasix diuresis.  Cardiology input.  Fu CXR :      1.  Slight interval increase in right-sided pleural effusion which now appears moderate size.  2.  Decreased left pleural effusion which appears small to moderate. No evidence of pneumothorax.  3.  Bilateral basilar atelectasis and/or consolidation. Underlying infection is possible.             Benign prostatic hyperplasia with urinary frequency- (present on admission)  Assessment & Plan  Flomax and Proscar  Had urinary retention-Pino in place-plan  Anticipate will discharge with Pino to hospice    Atrial fibrillation with RVR ([facility])- (present on admission)  Assessment & Plan  AFIB with RVR-improved control.  Continue diltiazem, metoprolol for rate control.  Cardiology input  Eliquis for stroke risk reduction -  consider DC due to bleed risk with transition to hospice.     Essential hypertension, benign- (present on admission)  Assessment & Plan  Blood pressure is improved a little bit today    Acute on chronic renal failure (HCC)- (present on admission)  Assessment & Plan  Renal function improved   avoid nephrotoxins         VTE prophylaxis: Eliquis      Discussed with multi disciplinary team plan of care.

## 2019-07-31 NOTE — PROGRESS NOTES
2 RN skin check completed with ANGEL Felder.   Devices in place silicone nc, glasses.  Skin assessed under devices ears red, slow to renaldo; BUE-bruising and red spots; RFA-skin tear; glutteal cleft-red, slow to renaldo, opening; BLE-dry, flaky, fragile .  Confirmed pressure ulcers found on-open area on glutteal cleft.  New potential pressure ulcers noted on glutteal cleft. Wound consult placed 7/31/19.  The following interventions in place waffle mattress, turns, mepilex to R elbow and coccyx, heels floated.

## 2019-07-31 NOTE — DISCHARGE PLANNING
Hospital Care Management Discharge Planning       Anticipated Discharge Disposition:   · Home ([facility]) on Hospice     Action:   · This RN CM spoke to daughter, Aleksey. Aleksey will deliver the patient's portable oxygen tank tonight so that Ember can take him home in the morning.  · The granddaughter, Ember will be at bedside to  the patient and take him home between [id number].  · This RN CM left [facility] a  for verification of home DME delivery prior to discharge.      Barriers to Discharge:   · DME delivery.     Plan:   · Await returned call from [facility].   · F/U with treatment team.  · Continue to provide support services and assistance with discharge planning as needed.

## 2019-07-31 NOTE — THERAPY
"Occupational Therapy Re-Evaluation completed.   Functional Status:  Pt seen for OT re-evaluation to identify needs for possible d/c home with hospice and assist from spouse. Pt performed basic self care tasks, functional mobility and t/f's with supervision using gb. Pt appears to be close to his functional baseline and reports spouse able to assist some with ADLs and also has hired caregiver that can assist out as needed, pt is resident of [facility] along with spouse and IADLs are completed by facility. Pt doesn't demonstrate the need for acute OT services at this time, if pt doesn't discharge home with hospice then  OT recommended for safety evaluation.   Plan of Care: Patient with no further skilled OT needs in the acute care setting at this time  Discharge Recommendations:  Equipment: No Equipment Needed. Post-acute therapy Patient will not be actively followed for occupational therapy services at this time, however may be seen if requested by physician for 1 more visit within 30 days to address any discharge or equipment needs.       See \"Rehab Therapy-Acute\" Patient Summary Report for complete documentation.    "

## 2019-07-31 NOTE — DISCHARGE PLANNING
Hospital Care Management Discharge Planning       Action:   · This RN CM requested KIZZY Mckeon to follow up with Hospice choice.

## 2019-08-01 NOTE — PROGRESS NOTES
2 RN skin check completed with ANGEL Harrell.  Devices in place heart monitor, 2 PIVs, baig catheter, nasal cannula.  Skin assessed under devices pink/red and blanching.  Confirmed pressure ulcers found on Sacrum.  New potential pressure ulcers noted on N/A. Wound consult placed previously.  The following interventions in place waffle mattress, heels floated off pillow, soft silicone nasal cannula, gray foam pads, mepilex on sacrum, mepilex lite on bilateral elbows, barrier cream, pillows for support and positioning.    Bilateral ears are red and blanching.  Bilateral arms have scattered bruising.  Bilateral elbows are red and blanching.  Sacrum is red and blanching with opening in gluteal cleft.  Bilateral feet are red, calloused, dry, and heels are boggy.

## 2019-08-01 NOTE — DISCHARGE PLANNING
Hospital Care Management Discharge Planning       Anticipated Discharge Disposition:   · Home on Hospice     Action:   · This RN CM requested call back from April, Hospice RN for updates regarding      Barriers to Discharge:   · ***     Plan:   · ***   · Continue to provide support services and assistance with discharge planning as needed.       {Thank you for allowing me the pleasure of participating in this patient's care coordination and discharge planning.}    {Thank you for allowing me the honor of assisting the pt and family during this difficult time.}

## 2019-08-01 NOTE — DISCHARGE INSTRUCTIONS
Follow up with Hospice   Discharge Instructions    Discharged to home by car with friend. Discharged via wheelchair, hospital escort: Yes.  Special equipment needed: Not Applicable and Oxygen    Be sure to schedule a follow-up appointment with your primary care doctor or any specialists as instructed.     Discharge Plan:   Diet Plan: Discussed  Activity Level: Discussed  Confirmed Follow up Appointment: Patient to Call and Schedule Appointment  Confirmed Symptoms Management: Discussed  Medication Reconciliation Updated: Yes  Pneumococcal Vaccine Administered/Refused: Not given - Patient refused pneumococcal vaccine  Influenza Vaccine Indication: Patient Refuses, Indicated: Not available from distributor/    I understand that a diet low in cholesterol, fat, and sodium is recommended for good health. Unless I have been given specific instructions below for another diet, I accept this instruction as my diet prescription.       Special Instructions:   [facility] Patient Discharge Instructions  · Monitor your weight daily, and maintain a weight chart, to track your weight changes.   · Activity as tolerated, unless your Doctor has ordered otherwise. Other activity order: as tolerated.  · Follow a low fat, low cholesterol, low salt diet unless instructed otherwise by your Doctor. Read the labels on the back of food products and track your intake of fat, cholesterol and salt.   · Fluid Restriction No. If a Fluid Restriction has been ordered by your Doctor, measure fluids with a measuring cup to ensure that you are not exceeding the restriction.   · No smoking.  · Oxygen Yes. If your Doctor has ordered that you wear Oxygen at home, it is important to wear it as ordered.  · Did you receive an explanation from staff on the importance of taking each of your medications and why it is necessary to keep taking them unless your doctor says to stop? Yes  · Were all of your questions answered about how to manage your heart  failure and what to do if you have increased signs and symptoms after you go home? Yes  · Do you feel like your heart failure care team involved you in the care treatment plan and allowed you to make decisions regarding your care while in the hospital and addressed any discharge needs you might have? Yes    See the educational handout provided at discharge for more information on monitoring your daily weight, activity and diet. This also explains more about Heart Failure, symptoms of a flare-up and some of the tests that you have undergone.     Warning Signs of a Flare-Up include:  · Swelling in the ankles or lower legs.  · Shortness of breath, while at rest, or while doing normal activities.   · Shortness of breath at night when in bed, or coughing in bed.   · Requiring more pillows to sleep at night, or needing to sit up at night to sleep.  · Feeling weak, dizzy or fatigued.     When to call your Doctor:  · Call [facility] seven days a week from 8:00 a.m. to 8:00 p.m. for medical questions [phone].  · Call your Primary Care Physician or Cardiologist if:   1. You experience any pain radiating to your jaw or neck.  2. You have any difficulty breathing.  3. You experience weight gain of 3 lbs in a day or 5 lbs in a week.   4. You feel any palpitations or irregular heartbeats.  5. You become dizzy or lose consciousness.   If you have had an angiogram or had a pacemaker or AICD placed, and experience:  1. Bleeding, drainage or swelling at the surgical / puncture site.  2. Fever greater than 100.0 F  3. Shock from internal defibrillator.  4. Cool and / or numb extremities.      · Is patient discharged on Warfarin / Coumadin?   No       Atrial Fibrillation  Introduction  Atrial fibrillation is a type of heartbeat that is irregular or fast (rapid). If you have this condition, your heart keeps quivering in a weird (chaotic) way. This condition can make it so your heart cannot pump blood normally. Having  this condition gives a person more risk for stroke, heart failure, and other heart problems. There are different types of atrial fibrillation. Talk with your doctor to learn about the type that you have.  Follow these instructions at home:  · Take over-the-counter and prescription medicines only as told by your doctor.  · If your doctor prescribed a blood-thinning medicine, take it exactly as told. Taking too much of it can cause bleeding. If you do not take enough of it, you will not have the protection that you need against stroke and other problems.  · Do not use any tobacco products. These include cigarettes, chewing tobacco, and e-cigarettes. If you need help quitting, ask your doctor.  · If you have apnea (obstructive sleep apnea), manage it as told by your doctor.  · Do not drink alcohol.  · Do not drink beverages that have caffeine. These include coffee, soda, and tea.  · Maintain a healthy weight. Do not use diet pills unless your doctor says they are safe for you. Diet pills may make heart problems worse.  · Follow diet instructions as told by your doctor.  · Exercise regularly as told by your doctor.  · Keep all follow-up visits as told by your doctor. This is important.  Contact a doctor if:  · You notice a change in the speed, rhythm, or strength of your heartbeat.  · You are taking a blood-thinning medicine and you notice more bruising.  · You get tired more easily when you move or exercise.  Get help right away if:  · You have pain in your chest or your belly (abdomen).  · You have sweating or weakness.  · You feel sick to your stomach (nauseous).  · You notice blood in your throw up (vomit), poop (stool), or pee (urine).  · You are short of breath.  · You suddenly have swollen feet and ankles.  · You feel dizzy.  · Your suddenly get weak or numb in your face, arms, or legs, especially if it happens on one side of your body.  · You have trouble talking, trouble understanding, or both.  · Your face or  your eyelid droops on one side.  These symptoms may be an emergency. Do not wait to see if the symptoms will go away. Get medical help right away. Call your local emergency services (911 in the [place] Do not drive yourself to the hospital.   This information is not intended to replace advice given to you by your health care provider. Make sure you discuss any questions you have with your health care provider.  Document Released: 09/26/2009 Document Revised: 05/25/2017 Document Reviewed: 04/13/2016  © 2017 [employer]    Diltiazem tablets  What is this medicine?  DILTIAZEM (dil DARRICK a zem) is a calcium-channel blocker. It affects the amount of calcium found in your heart and muscle cells. This relaxes your blood vessels, which can reduce the amount of work the heart has to do. This medicine is used to treat chest pain caused by angina.  This medicine may be used for other purposes; ask your health care provider or pharmacist if you have questions.  COMMON BRAND NAME(S): Cardizem  What should I tell my health care provider before I take this medicine?  They need to know if you have any of these conditions:  -heart problems, low blood pressure, irregular heartbeat  -liver disease  -previous heart attack  -an unusual or allergic reaction to diltiazem, other medicines, foods, dyes, or preservatives  -pregnant or trying to get pregnant  -breast-feeding  How should I use this medicine?  Take this medicine by mouth with a glass of water. Follow the directions on the prescription label. Do not cut, crush or chew this medicine. This medicine is usually taken before meals and at bedtime. Take your doses at regular intervals. Do not take your medicine more often then directed. Do not stop taking except on the advice of your doctor or health care professional.  Talk to your pediatrician regarding the use of this medicine in children. Special care may be needed.  Overdosage: If you think you have taken too much of this medicine  contact a poison control center or emergency room at once.  NOTE: This medicine is only for you. Do not share this medicine with others.  What if I miss a dose?  If you miss a dose, take it as soon as you can. If it is almost time for your next dose, take only that dose. Do not take double or extra doses.  What may interact with this medicine?  Do not take this medicine with any of the following:  -cisapride  -hawthorn  -pimozide  -ranolazine  -red yeast rice  This medicine may also interact with the following medications:  -buspirone  -carbamazepine  -cimetidine  -cyclosporine  -digoxin  -local anesthetics or general anesthetics  -lovastatin  -medicines for anxiety or difficulty sleeping like midazolam and triazolam  -medicines for high blood pressure or heart problems  -quinidine  -rifampin, rifabutin, or rifapentine  This list may not describe all possible interactions. Give your health care provider a list of all the medicines, herbs, non-prescription drugs, or dietary supplements you use. Also tell them if you smoke, drink alcohol, or use illegal drugs. Some items may interact with your medicine.  What should I watch for while using this medicine?  Check your blood pressure and pulse rate regularly. Ask your doctor or health care professional what your blood pressure and pulse rate should be and when you should contact him or her.  You may feel dizzy or lightheaded. Do not drive, use machinery, or do anything that needs mental alertness until you know how this medicine affects you. To reduce the risk of dizzy or fainting spells, do not sit or stand up quickly, especially if you are an older patient. Alcohol can make you more dizzy or increase flushing and rapid heartbeats. Avoid alcoholic drinks.  What side effects may I notice from receiving this medicine?  Side effects that you should report to your doctor or health care professional as soon as possible:  -allergic reactions like skin rash, itching or hives,  swelling of the face, lips, or tongue  -confusion, mental depression  -feeling faint or lightheaded, falls  -pinpoint red spots on the skin  -redness, blistering, peeling or loosening of the skin, including inside the mouth  -slow, irregular heartbeat  -swelling of the ankles, feet  -unusual bleeding or bruising  Side effects that usually do not require medical attention (report to your doctor or health care professional if they continue or are bothersome):  -change in sex drive or performance  -constipation or diarrhea  -flushing of the face  -headache  -nausea, vomiting  -tired or weak  -trouble sleeping  This list may not describe all possible side effects. Call your doctor for medical advice about side effects. You may report side effects to FDA at 0-140-AOZ-1833.  Where should I keep my medicine?  Keep out of the reach of children.  Store at room temperature between 20 and 25 degrees C (68 and 77 degrees F). Protect from light. Keep container tightly closed. Throw away any unused medicine after the expiration date.  NOTE: This sheet is a summary. It may not cover all possible information. If you have questions about this medicine, talk to your doctor, pharmacist, or health care provider.  © 2018 [employer] (2014-12-01 10:54:31)      Depression / Suicide Risk    As you are discharged from this [facility] facility, it is important to learn how to keep safe from harming yourself.    Recognize the warning signs:  · Abrupt changes in personality, positive or negative- including increase in energy   · Giving away possessions  · Change in eating patterns- significant weight changes-  positive or negative  · Change in sleeping patterns- unable to sleep or sleeping all the time   · Unwillingness or inability to communicate  · Depression  · Unusual sadness, discouragement and loneliness  · Talk of wanting to die  · Neglect of personal appearance   · Rebelliousness- reckless behavior  · Withdrawal from  people/activities they love  · Confusion- inability to concentrate     If you or a loved one observes any of these behaviors or has concerns about self-harm, here's what you can do:  · Talk about it- your feelings and reasons for harming yourself  · Remove any means that you might use to hurt yourself (examples: pills, rope, extension cords, firearm)  · Get professional help from the community (Mental Health, Substance Abuse, psychological counseling)  · Do not be alone:Call your Safe Contact- someone whom you trust who will be there for you.  · Call your local CRISIS HOTLINE 484-2423 or 670-321-7343  · Call your local Children's Mobile Crisis Response Team [place] (609) 316-9628 or www.[employer]  · Call the toll free National Suicide Prevention Hotlines   · National Suicide Prevention Lifeline 730-231-EVXX (5393)  · National Hope Line Network 800-SUICIDE (107-8755)

## 2019-08-01 NOTE — PROGRESS NOTES
Pt dc'd home. IV and monitor removed; monitor room notified. Pt left unit via wheelchair with family. Personal belongings with pt when leaving unit. Pt given discharge instructions prior to leaving unit including prescription and when to visit with physician; verbalizes understanding. Copy of discharge instructions with pt and in the chart.

## 2019-08-01 NOTE — PROGRESS NOTES
Cardiology follow-up:      Progress Note:  Resident:  Moni Peace M.D.  Attending:  Latrell Medley M.D.  Date:  2019     Patient ID:  Name Joe Rashid     1920   Age/Sex 89 y.o. male   MRN [id number]     Reason for Consult:  A-fib with RVR.    Background:  This is a very pleasant 89 yo M with PMH of Afib and HTN presented with dehydration and was sent to the ED from group home. The patient has been staying with his wife who is also 89 years old. The patient was not eating and drinking well for the past few days. He is complaint with his medications and was taking lasix for pleural effusion. Her lasix was stopped on admission. He also presented with Afib with RVR on admission. We were consulted for the management of Afib with RVR.    - HR previously improved a bit with diltiazem, and had been increased to 60 TID.     Interval Update:  - patient is still on 3 L O2 now. Advised him to use more incentive spirometry.    - Echocardiogram done.  - He doing fine otherwise.   - Denied having any chest pain or shortness of breath.  His shortness of breath improved per patient.    Review of Symptoms  GEN/CONST:   Denies fever, or chills.    HEENT:   Denies vision or hearing changes.       + On dysphagia diet.   CARDIO:   Currently - Denies chest pain, palpitations, orthopnea, or edema.      + overnight: see above.   RESP:   Currently - Denies wheezing, or coughing.      + Currently on partial rebreather mask.      + overnight: see above.   GI:    Denies nausea, vomiting, or abdominal pain.      + diarrhea.  :   Had been on Condom cath, yesterday.         + blood in urine, for past day.   HEME:  Denies easy bruising      + blood in urine, for past day.   ALLG:  Denies  Hives or rashes.    MSK:  Denies focal weakness, or swellings,   SKIN:  Denies rashes, or sores.   PSYCH:  Denies mood disorders or substance abuse.      Physical Exam     Vitals:    19 2155 19 0220 19 0423 19  0925   BP: 138/82 134/77 130/68 130/75   Pulse: 72 (!) 102 99 60   Resp: 15 16 16 16   Temp: 36.3 °C (97.4 °F) 36.3 °C (97.3 °F) 36.3 °C (97.3 °F) 36.7 °C (98 °F)   TempSrc: Temporal Temporal Temporal Temporal   SpO2: 97% 94% 95% 97%   Weight: 79.3 kg (174 lb 13.2 oz)      Height:         Body mass index is 25.82 kg/m². Weight: 79.3 kg (174 lb 13.2 oz)  Oxygen Therapy:  Pulse Oximetry: 97 %, O2 (LPM): 3, O2 Delivery: Silicone Nasal Cannula    GEN:   Alert and oriented,  Resting comfortably.     + in ICU.  On Partial NRB O2.   H / E:   Normocephalic, Atraumatic.  No scleral icterus.     ENT:   No erythema.  Trachea midline.  No stridor.  Supple neck.   HEART:   Regular rate. (at time of exam ~ 90).      + irregular rhythm      Questionable murmur.   LUNGS:   Mildly reduced breath sounds.      + crackling in lower lung fields.   ABD/GI:  Soft.  +BS.  No rebound or guarding noted.  EXT/MSK:  No pedal edema.    NEURO:  Alert and oriented x3.  No tremor.    PSYCH:  Normal mood and affect, but appeared a bit tired.       Lab Data Review:     Recent Labs     07/30/19 0448 07/31/19 0206 08/01/19 0028   WBC 6.4 7.4 6.4   NEUTSPOLYS 75.90* 75.80* 73.00*   LYMPHOCYTES 10.40* 11.60* 13.70*   MONOCYTES 12.00 10.20 10.80   EOSINOPHILS 0.60 1.20 1.40   BASOPHILS 0.50 0.40 0.50     Recent Labs     07/30/19 0448 07/31/19 0206 08/01/19 0028   RBC 4.61* 4.64* 4.69*   HEMOGLOBIN 13.5* 13.7* 13.6*   HEMATOCRIT 43.0 43.7 42.5   PLATELETCT 120* 107* 122*       Recent Labs     07/30/19 0448 07/31/19 0206 08/01/19 0447   SODIUM 140 140 142   POTASSIUM 3.8 3.9 3.5*   CHLORIDE 107 107 101   CO2 25 26 34*   BUN 31* 30* 22   CREATININE 1.12 0.99 1.04   MAGNESIUM 1.8 1.8 1.6   CALCIUM 8.5 8.3* 8.2*       Recent Labs     07/30/19  0448 07/31/19  0206 08/01/19  0447   GLUCOSE 152* 162* 139*     Assessment & Plan     # Atrial Fibrillation with RVR  # Hypertension  # Acute Respiratory Failure (with hypoxemia and hypercapnia).   # Aspiration  Pneumonia (possible)  # UTI   (+ nitrites, and LE)  Previously with CXR suggested mild pleural effusion on the left side. No peripheral edema.  And, some JCARLOS, on Labs. Prior Troponin Elevated however he has JCARLOS. No chest pain and no ekg changes. Code status DNR/DNI.  - Recent ECHO showed EF 70% with severe pulm hypertension in 9.2018. Repeat echo showed 65% EF without any valvular disease except mild MR. He opted for hospice care and went home.   - US thoracentesis - removed 1100 cc from left side. No complication.  Removed 1500 cc from the right side of the chest.  Pleural fluid demonstrated transudate in the most likely reason of the left ventricle hypertrophy given his age.  Age-related amyloidosis is certainly in the differential diagnosis.  - CT scan showed right sided pleural effusion with consolidation. Agree with Zosyn > augmentin.   - No need for starting atorvastatin, for prevention, given his advanced age.   - Continue diltiazem and metoprolol orally. Rate controlled.  - JCARLOS resolved.  Continue Lasix to prevent recurrence of pleural effusion.  Would consider starting him on the same dose he was on before.   - NT pro BNP 7794. Does not have signs of heart failure.  - Patient went home on hospice.    Will sign off. Please contact us with any question.      A computerized dictation system may have been used for this note.    Despite review, there may be some spelling or grammatical errors.      Moni Peace M.D.  08/01/2019   Service:  Cardiology   Attending:  Latrell Rider M.D.

## 2019-08-01 NOTE — PROGRESS NOTES
Assumed care at 0700, bedside report received from day shift RN. Pt. Is afib on the monitor. Initial assessment completed, orders reviewed, call light within reach, bed alarm is in use, and hourly rounding in place. POC addressed with patient, no additional questions at this time.

## 2019-08-01 NOTE — PALLIATIVE CARE
Palliative Care follow-up  PC RN visited with Aleksey who was anticipating DC home with hospice this AM with her dad. She states that hospice is meeting them this morning but she's concerned about time of DC. Call placed to hospice and ANGEL Zacarias came to BS to discuss further with Aleksey. No questions for this RN at this time and Aleksey was appreciative of the help.      Plan: home with hospice today    Thank you for allowing Palliative Care to participate in this patient's care. Please feel free to call [phone] with any questions or concerns.

## 2019-08-02 NOTE — DISCHARGE SUMMARY
Hospital Medicine Discharge Note     Admit Date:  7/23/2019       Discharge Date:   8/1/2019    Attending Physician:  Greg Montes M.D.      Diagnoses (includes active and resolved):     Principal Problem:    Aspiration pneumonia (HCC) POA: Yes  Active Problems:    Severe sepsis (HCC) POA: Yes    Acute on chronic renal failure (HCC) POA: Yes    Essential hypertension, benign POA: Yes    Atrial fibrillation with RVR (HCC) POA: Yes      Overview: Diagnois update 10/1/2016    Benign prostatic hyperplasia with urinary frequency POA: Yes    Acute diastolic CHF (congestive heart failure) (HCC) POA: Yes    HLD (hyperlipidemia) POA: Yes    Leukocytosis POA: No    Acute UTI POA: Yes    Acute respiratory failure with hypoxia and hypercarbia with chronic respiratory (HCC) POA: No    Bacteremia POA: Yes    Pleural effusion POA: Yes  Resolved Problems:  Severe sepsis  Bacteremia  Acute UTI  Rapid A. Fib  Leukocytosis  UTI    Hospital Summary (Brief Narrative):         89-year-old male Hx of Afib on Eliquis, CKD III, CHF , Htn,  recently seen in the ER for urinary retention and was discharged home Txd to [facility] with findings  A fib RVR.  Patient admitted initially to [facility] then developed increasing dyspnea with persistent Afib RVR- started on IV Digoxin,  Cardizem gtt.  Patient  required Bipap and treated for Asp pna.  Imaging revealed bilateral pleural effusions.  He underwent 1.1 L thoracentesis on the left and 1.5 L thoracentesis on the right.  Cytology negative and pleural fluid cultures negative.  Per light's criteria transitive effusion.  He was started on Lasix by cardiology.  With treatment patient's breathing improved.. O2 requirements to  baseline 3 L nasal cannula.  Patient is afebrile with normal white count.  He has completed Rocephin, Zosyn 5-day course initially started for possible UTI, aspiration pneumonia.  He had 1/2 bacillus species blood culture, suspected contaminant.  Palliative care consulted patient was  francieal by hospice and accepted.  I had at length discussion regarding level of care with the granddaughter and patient at bedside.  They prefer to continue with his home Eliquis as he is tolerating.  Plan also continue with his cardiac meds to control his atrial fibrillation and heart failure.  Advised to follow-up outpatient cardiology.  On day of discharge I examined patient, discussed findings, plan of care with patient and granddaughter.  Patient discharged home in stable condition.        Consultants:        Palliative care  Dr. Lugo, pulmonary  Dr. Rider , cardiology      Imaging/ Testing:      EC-ECHOCARDIOGRAM COMPLETE W/O CONT   Final Result      DX-CHEST-PORTABLE (1 VIEW)   Final Result      1.  No pneumothorax identified following right thoracentesis.      2.  Persistent atelectasis, pneumonia, or effusion in the left lower lobe.      3.  Unchanged cardiomegaly.      US-THORACENTESIS PUNCTURE RIGHT   Final Result      1. Ultrasound guided right sided diagnostic and therapeutic thoracentesis.      2. 1500 mL of fluid withdrawn.      DX-CHEST-PORTABLE (1 VIEW)   Final Result      1.  Slight interval increase in right-sided pleural effusion which now appears moderate size.   2.  Decreased left pleural effusion which appears small to moderate. No evidence of pneumothorax.   3.  Bilateral basilar atelectasis and/or consolidation. Underlying infection is possible.      CT-CHEST (THORAX) W/O   Final Result      1.  Bilateral pleural effusions, larger on the RIGHT, with associated consolidation particularly in the RIGHT lower lobe.   2.  Bronchial wall thickening primarily lower lobe suggesting inflammation such as bronchitis.   3.  Secretions within the LEFT lower lobe bronchi concerning for pneumonia.               US-THORACENTESIS PUNCTURE LEFT   Final Result      1. Ultrasound guided left sided diagnostic and therapeutic thoracentesis.      2. 1100 mL of fluid withdrawn.      DX-CHEST-PORTABLE (1 VIEW)    Final Result         1.  Pulmonary edema and/or infiltrates are identified, which are stable since the prior exam.   2.  Small left pleural effusion   3.  Cardiomegaly   4.  Atherosclerosis      DX-CHEST-LIMITED (1 VIEW)   Final Result      1.  Bilateral pleural effusions and bibasilar atelectasis, left greater than right. Right effusion is new since prior study.   2.  Stable cardiomegaly.      DX-CHEST-LIMITED (1 VIEW)   Final Result      Improved right basilar atelectasis and probable edema      Stable cardiac silhouette enlargement, moderate left pleural fluid and basilar atelectasis. Consolidation is not excluded                Discharge Medications:             Medication List      START taking these medications      Instructions   DILTIAZem  MG Cp24  Commonly known as:  CARDIZEM CD   Take 1 Cap by mouth every day.  Dose:  240 mg     omeprazole 20 MG delayed-release capsule  Start taking on:  8/2/2019  Commonly known as:  PRILOSEC   Take 1 Cap by mouth every day.  Dose:  20 mg     senna-docusate 8.6-50 MG Tabs  Commonly known as:  PERICOLACE or SENOKOT S   Take 2 Tabs by mouth 2 times a day as needed for Constipation.  Dose:  2 Tab        CHANGE how you take these medications      Instructions   furosemide 20 MG Tabs  Start taking on:  8/2/2019  What changed:    · medication strength  · when to take this  Commonly known as:  LASIX   Take 2 Tabs by mouth every day.  Dose:  40 mg     metoprolol 50 MG Tabs  What changed:    · medication strength  · how much to take  · when to take this  Commonly known as:  LOPRESSOR   Take 1 Tab by mouth 2 Times a Day.  Dose:  50 mg     potassium chloride SA 20 MEQ Tbcr  What changed:  additional instructions  Commonly known as:  Kdur   Take 1 Tab by mouth every day. Take with lasix  Dose:  20 mEq        CONTINUE taking these medications      Instructions   ELIQUIS 2.5mg Tabs  Generic drug:  apixaban   Take 2.5 mg by mouth 2 Times a Day. Takes at noon and in evening  Dose:   2.5 mg     finasteride 5 MG Tabs  Commonly known as:  PROSCAR   Take 5 mg by mouth every day.  Dose:  5 mg     FLOMAX 0.4 MG capsule  Generic drug:  tamsulosin   Take 0.4 mg by mouth every bedtime.  Dose:  0.4 mg     lisinopril 40 MG tablet  Commonly known as:  PRINIVIL, ZESTRIL   Take 40 mg by mouth every day.  Dose:  40 mg        STOP taking these medications    atorvastatin 40 MG Tabs  Commonly known as:  LIPITOR     cloNIDine 0.1 MG Tabs  Commonly known as:  CATAPRES               Diet:       DIET ORDERS (From admission to next 24h)    None            Activity:   As tolerated.      Code status:   DNR    Primary Care Provider:    Mervin Pompa M.D.    Follow-up with hospice and cardiology as instructed      Time spent on discharge day patient visit: 40 minutes    #################################################

## 2019-08-03 NOTE — DOCUMENTATION QUERY
[facility]                                                                       Query Response Note      PATIENT:               YEIMY AGARWAL  ACCT #:                  [id number]  MRN:                     [id number]  :                      1920  ADMIT DATE:       2019 5:59 PM  DISCH DATE:        2019 12:39 PM  RESPONDING  PROVIDER #:        [id number]           QUERY TEXT:    Severe sepsis is documented in the Pulmonary Consultation. Per subsequent Hospital Medicine Progress Notes; severe sepsis is not being documented. Please clarify whether:    NOTE:  If an appropriate response is not listed below, please respond with a new note.    The patient's Clinical Indicators include:  Per Pulmonary Progress Notes   -Severe sepsis with acute kidney failure, acute respiratory failure  -Source: Pneumonia and/or UTI  -Pulse:  [] 131  -Resp:  [18-33] 18    WBC 9.0, 7.1, 17.2, 15.0, 9.3, 7.3, 6.4, 7.4, 6.4  lactic acid 0.4, 2.2, 1.6, 1.3  Procalcitonin 0.05, <0.05, 0.22  CXR : Bilateral pleural effusions and bibasilar atelectasis, left greater than right. Right effusion is new since prior study    Treatment:  Transfer to ICU, Pulmonary Consultation, BiPAP, Xopenex Nebs, IV NS 1000ml bolus, Rocephin, Zosyn, guaifenesin, UA, blood cultures, & CXR    Risk Factors:   Advanced age, aspiration pneumonia, UTI, acute respiratory failure with hypoxia & hypercarbia, acute diastolic heart failure, a-fib with RVR, acute kidney injury, & CKD 3  Options provided:   -- Severe Sepsis has been ruled in   -- Severe Sepsis has been ruled out; SIRS of non-infectious origin without acute organ dysfunction   -- Severe Sepsis has been ruled out; SIRS of non-infectious origin with acute organ dysfunction   -- Sepsis/SIRS have been ruled out   -- Unable to determine      Query created by: Chanda Raya on 2019 11:48 AM    RESPONSE TEXT:    Severe  Sepsis has been ruled in          Electronically signed by:  LIBAN MORTON 8/3/2019 10:50 AM

## 2019-08-22 NOTE — PROGRESS NOTES
Discharged from [facility].  Pt discharged on hospice  Isabell Filter, Clinical Pharmacist, CDE, CACP

## 2019-09-04 ENCOUNTER — APPOINTMENT (OUTPATIENT)
Dept: HOSPICE | Facility: HOSPICE | Age: 84
End: 2019-09-04
Payer: MEDICARE

## 2019-09-05 ENCOUNTER — APPOINTMENT (OUTPATIENT)
Dept: HOSPICE | Facility: HOSPICE | Age: 84
End: 2019-09-05
Payer: MEDICARE

## 2019-09-09 ENCOUNTER — APPOINTMENT (OUTPATIENT)
Dept: HOSPICE | Facility: HOSPICE | Age: 84
End: 2019-09-09
Payer: MEDICARE

## 2019-09-10 ENCOUNTER — APPOINTMENT (OUTPATIENT)
Dept: HOSPICE | Facility: HOSPICE | Age: 84
End: 2019-09-10
Payer: MEDICARE

## 2019-09-12 ENCOUNTER — APPOINTMENT (OUTPATIENT)
Dept: HOSPICE | Facility: HOSPICE | Age: 84
End: 2019-09-12
Payer: MEDICARE

## 2019-09-16 ENCOUNTER — APPOINTMENT (OUTPATIENT)
Dept: HOSPICE | Facility: HOSPICE | Age: 84
End: 2019-09-16
Payer: MEDICARE

## 2019-09-17 ENCOUNTER — APPOINTMENT (OUTPATIENT)
Dept: HOSPICE | Facility: HOSPICE | Age: 84
End: 2019-09-17
Payer: MEDICARE

## 2019-09-19 ENCOUNTER — APPOINTMENT (OUTPATIENT)
Dept: HOSPICE | Facility: HOSPICE | Age: 84
End: 2019-09-19
Payer: MEDICARE

## 2019-09-23 ENCOUNTER — APPOINTMENT (OUTPATIENT)
Dept: HOSPICE | Facility: HOSPICE | Age: 84
End: 2019-09-23
Payer: MEDICARE

## 2019-09-23 LAB
MYCOBACTERIUM SPEC CULT: NORMAL
RHODAMINE-AURAMINE STN SPEC: NORMAL
SIGNIFICANT IND 70042: NORMAL
SITE SITE: NORMAL
SOURCE SOURCE: NORMAL

## 2019-09-24 ENCOUNTER — APPOINTMENT (OUTPATIENT)
Dept: HOSPICE | Facility: HOSPICE | Age: 84
End: 2019-09-24
Payer: MEDICARE

## 2019-09-26 ENCOUNTER — APPOINTMENT (OUTPATIENT)
Dept: HOSPICE | Facility: HOSPICE | Age: 84
End: 2019-09-26
Payer: MEDICARE

## 2019-09-30 ENCOUNTER — APPOINTMENT (OUTPATIENT)
Dept: HOSPICE | Facility: HOSPICE | Age: 84
End: 2019-09-30
Payer: MEDICARE

## 2019-10-01 ENCOUNTER — APPOINTMENT (OUTPATIENT)
Dept: HOSPICE | Facility: HOSPICE | Age: 84
End: 2019-10-01
Payer: MEDICARE

## 2019-10-03 ENCOUNTER — APPOINTMENT (OUTPATIENT)
Dept: HOSPICE | Facility: HOSPICE | Age: 84
End: 2019-10-03
Payer: MEDICARE

## 2019-10-07 ENCOUNTER — APPOINTMENT (OUTPATIENT)
Dept: HOSPICE | Facility: HOSPICE | Age: 84
End: 2019-10-07
Payer: MEDICARE

## 2019-10-08 ENCOUNTER — APPOINTMENT (OUTPATIENT)
Dept: HOSPICE | Facility: HOSPICE | Age: 84
End: 2019-10-08
Payer: MEDICARE

## 2019-10-10 ENCOUNTER — APPOINTMENT (OUTPATIENT)
Dept: HOSPICE | Facility: HOSPICE | Age: 84
End: 2019-10-10
Payer: MEDICARE

## 2019-10-15 ENCOUNTER — APPOINTMENT (OUTPATIENT)
Dept: HOSPICE | Facility: HOSPICE | Age: 84
End: 2019-10-15
Payer: MEDICARE

## 2019-10-17 ENCOUNTER — APPOINTMENT (OUTPATIENT)
Dept: HOSPICE | Facility: HOSPICE | Age: 84
End: 2019-10-17
Payer: MEDICARE

## 2019-10-22 ENCOUNTER — APPOINTMENT (OUTPATIENT)
Dept: HOSPICE | Facility: HOSPICE | Age: 84
End: 2019-10-22
Payer: MEDICARE

## 2019-10-24 ENCOUNTER — APPOINTMENT (OUTPATIENT)
Dept: HOSPICE | Facility: HOSPICE | Age: 84
End: 2019-10-24
Payer: MEDICARE

## 2019-10-28 ENCOUNTER — APPOINTMENT (OUTPATIENT)
Dept: HOSPICE | Facility: HOSPICE | Age: 84
End: 2019-10-28
Payer: MEDICARE

## 2019-10-29 ENCOUNTER — APPOINTMENT (OUTPATIENT)
Dept: HOSPICE | Facility: HOSPICE | Age: 84
End: 2019-10-29
Payer: MEDICARE

## 2020-01-01 NOTE — DISCHARGE PLANNING
ATTN: Case Management  RE: Referral for Home Health                We would like to take this opportunity to thank you for submitting a referral for your patient to continue care with [facility]. Our skilled team is dedicated to helping all patients recover and gain independence in the home setting.            As of 09/02/2018, we have accepted the above patient into our service. A [facility] clinician will be out to see the patient within 48 hours to conduct our initial visit. If you have any questions or concerns regarding the patient’s transition to Home Health, please do not hesitate to contact us. We are open for referrals 7 days a week from 8AM to 5PM at [phone].      We look forward to collaborating with you,  [facility] Team   Vaccine Information Sheet, \"Influenza - Inactivated\"  given to Cricket Nageotte, or parent/legal guardian of  Cricket Nageotte and verbalized understanding. Patient responses:    Have you ever had a reaction to a flu vaccine? No  Do you have any current illness? No  Have you ever had Guillian [place] Syndrome? No  Do you have a serious allergy to any of the follow: Neomycin, Polymyxin, Thimerosal, eggs or egg products? No    Flu vaccine given per order. Please see immunization tab. Risks and benefits explained. Current VIS given.       Immunizations Administered     Name Date Dose Route    DTaP/Hib/IPV (Pentacel) 2020 0.5 mL Intramuscular    Site: Vastus Lateralis- Right    Lot: [id number]    NDC: 90615-977-37    Influenza, Quadv, 6-35 months, IM, PF (Fluzone, Afluria) 2020 0.25 mL Intramuscular    Site: Vastus Lateralis- Left    Lot: [id number]    NDC: 09979-038-28    Pneumococcal Conjugate 13-valent ([id number]) 2020 0.5 mL Intramuscular    Site: Vastus Lateralis- Right    Lot: [id number]    NDC: 3962-1636-23    Rotavirus Pentavalent (RotaTeq) 2020 2 mL Oral    Site: Oral    Lot: [id number]    NDC: 0788-0616-71

## 2020-06-01 NOTE — PROGRESS NOTES
12 hour chart check   Azathioprine Counseling:  I discussed with the patient the risks of azathioprine including but not limited to myelosuppression, immunosuppression, hepatotoxicity, lymphoma, and infections.  The patient understands that monitoring is required including baseline LFTs, Creatinine, possible TPMP genotyping and weekly CBCs for the first month and then every 2 weeks thereafter.  The patient verbalized understanding of the proper use and possible adverse effects of azathioprine.  All of the patient's questions and concerns were addressed.

## 2022-01-31 NOTE — ED AVS SNAPSHOT
[employer] Access Code: Activation code not generated  Current [employer] Status: Patient Declined    Your email address is not on file at [facility].  Email Addresses are required for you to sign up for [employer], please contact [phone] to verify your personal information and to provide your email address prior to attempting to register for [employer].    Joe Rashid  [address] [place] [address]    [employer]  A secure, online tool to manage your health information     [facility]’s [employer]® is a secure, online tool that connects you to your personalized health information from the privacy of your home -- day or night - making it very easy for you to manage your healthcare. Once the activation process is completed, you can even access your medical information using the [employer] dipak, which is available for free in the Apple Dipak store or Google Play store.     To learn more about [employer], visit [email or web address]    There are two levels of access available (as shown below):   My Chart Features  [facility] Primary Care Doctor [facility]  Specialists [facility]  Urgent  Care Non-[facility] Primary Care Doctor   Email your healthcare team securely and privately 24/7 X X X    Manage appointments: schedule your next appointment; view details of past/upcoming appointments X      Request prescription refills. X      View recent personal medical records, including lab and immunizations X X X X   View health record, including health history, allergies, medications X X X X   Read reports about your outpatient visits, procedures, consult and ER notes X X X X   See your discharge summary, which is a recap of your hospital and/or ER visit that includes your diagnosis, lab results, and care plan X X  X     How to register for Mang?rKartt:  Once your e-mail address has been verified, follow the following steps to sign up for Mang?rKartt.     1. Go to  [email or web address]  2. Click on the Sign Up Now box, which takes you to the New  -- DO NOT REPLY / DO NOT REPLY ALL --  -- Message is from the [facility]--    Patient is requesting a medication refill - medication is on active medication list    Patient is currently OUT of the requested medication.    Was Medication Pended?  Yes.    Rx Name and Dose:  norgestimate-ethinyl estradiol (Estarylla) 0.25-35 MG-MCG per tablet    Duration: 30 days    Pharmacy  [id number] - [facility] [address] [facility]    Patient confirmed the above pharmacy as correct?  Yes    Does this request need an existing or new prescription at a pharmacy to be sent to a new pharmacy location?   No    Caller Information       Type Contact Phone    01/30/2022 07:43 PM CST Phone (Incoming) Yobani Alvarenga (Self) [phone] (M)          Alternative phone number: n/a    Turnaround time given to caller:   \"Your doctor's office is closed. This message will be sent to our nurse. They will return your call as soon as they review your message. (Calls after 10 PM will be returned tomorrow morning.)\"   Member Sign Up page. You will need to provide the following information:  a. Enter your [employer] Access Code exactly as it appears at the top of this page. (You will not need to use this code after you’ve completed the sign-up process. If you do not sign up before the expiration date, you must request a new code.)   b. Enter your date of birth.   c. Enter your home email address.   d. Click Submit, and follow the next screen’s instructions.  3. Create a [employer] ID. This will be your [employer] login ID and cannot be changed, so think of one that is secure and easy to remember.  4. Create a [employer] password. You can change your password at any time.  5. Enter your Password Reset Question and Answer. This can be used at a later time if you forget your password.   6. Enter your e-mail address. This allows you to receive e-mail notifications when new information is available in [employer].  7. Click Sign Up. You can now view your health information.    For assistance activating your [employer] account, call [phone]

## 2023-05-09 NOTE — CARE PLAN
Problem: Communication  Goal: The ability to communicate needs accurately and effectively will improve  Outcome: PROGRESSING AS EXPECTED      Problem: Safety  Goal: Will remain free from injury  Outcome: PROGRESSING AS EXPECTED    Goal: Will remain free from falls  Outcome: PROGRESSING AS EXPECTED      Problem: Infection  Goal: Will remain free from infection  Outcome: PROGRESSING AS EXPECTED      Problem: Venous Thromboembolism (VTW)/Deep Vein Thrombosis (DVT) Prevention:  Goal: Patient will participate in Venous Thrombosis (VTE)/Deep Vein Thrombosis (DVT)Prevention Measures  Outcome: PROGRESSING AS EXPECTED      Problem: Bowel/Gastric:  Goal: Normal bowel function is maintained or improved  Outcome: PROGRESSING AS EXPECTED    Goal: Will not experience complications related to bowel motility  Outcome: PROGRESSING AS EXPECTED      Problem: Knowledge Deficit  Goal: Knowledge of disease process/condition, treatment plan, diagnostic tests, and medications will improve  Outcome: PROGRESSING AS EXPECTED    Goal: Knowledge of the prescribed therapeutic regimen will improve  Outcome: PROGRESSING AS EXPECTED      Problem: Discharge Barriers/Planning  Goal: Patient's continuum of care needs will be met  Outcome: PROGRESSING AS EXPECTED      Problem: Respiratory:  Goal: Respiratory status will improve  Outcome: PROGRESSING AS EXPECTED      Problem: Fluid Volume:  Goal: Will maintain balanced intake and output  Outcome: PROGRESSING AS EXPECTED      Problem: Mobility  Goal: Risk for activity intolerance will decrease  Outcome: PROGRESSING AS EXPECTED      Problem: Urinary Elimination:  Goal: Ability to reestablish a normal urinary elimination pattern will improve  Outcome: PROGRESSING AS EXPECTED      Problem: Pain Management  Goal: Pain level will decrease to patient's comfort goal  Outcome: PROGRESSING AS EXPECTED         [FreeTextEntry1] : Pt is here for follow up of multiple medical problems including\par HTN, hi chol, fasting hyperlycemia   and MUGAS

## 2025-06-19 NOTE — PROGRESS NOTES
Child's Well Visit, 14 to 15 Months: Care Instructions    Your child may be able to say a few words. And your child may let you know what they want by pointing.   Your child may drink from a cup. And they may walk and climb stairs.         Keeping your child safe and healthy   Keep hot liquids out of reach. Put plastic plug covers in electrical sockets. Put in smoke detectors, and check their batteries.  Always use a rear-facing car seat. Install it in the back seat.  Do not leave your child alone around water, including pools, hot tubs, and bathtubs.  Brush your child's teeth every day. Use a tiny amount of toothpaste with fluoride.  Keep guns away from children. If you have guns, lock them up unloaded. Lock ammunition away from guns.        Parenting your child   Don't say no all the time or have too many rules. They can confuse your child.  Teach your child how to use words to ask for things.  Set a good example. Don't get angry or yell in front of your child.  Be calm but firm if your child says no to something they must do. And praise them when they do well.        Feeding your child   Offer healthy foods, including fruits and well-cooked vegetables.  Know which foods cause choking, like grapes and hot dogs.        Getting vaccines   Make sure your child gets all the recommended vaccines.  Follow-up care is a key part of your child's treatment and safety. Be sure to make and go to all appointments, and call your doctor if your child is having problems. It's also a good idea to know your child's test results and keep a list of the medicines your child takes.  Where can you learn more?  Go to https://www.healthPinoyTravel.net/patientEd and enter I999 to learn more about \"Child's Well Visit, 14 to 15 Months: Care Instructions.\"  Current as of: October 24, 2024  Content Version: 14.5  © 1280-8476 [employer].   Care instructions adapted under license by [employer]. If you have questions about a medical  Anticoagulation Summary  As of 9/11/2018    INR goal:      TTR:   28.7 % (2.8 y)   Today's INR:   1.2   Warfarin maintenance plan:   No maintenance plan   Next INR check:      Target end date:   Indefinite    Indications    Atrial fibrillation (HCC) (Resolved) [I48.91]  TIA (transient ischemic attack) [G45.9]             Anticoagulation Episode Summary     INR check location:       Preferred lab:       Send INR reminders to:       Comments:         Anticoagulation Care Providers     Provider Role Specialty Phone number    Cristo Thompson M.D. Referring Internal Medicine [phone]    [facility] Responsible  [phone]        Anticoagulation Patient Findings     Pt is new to Eliquis and new to [facility].  Discussed indication for drug therapy.  Explained our services, hours of operation, Eliquis therapy, potential SE, potential DI.  Discussed monitoring parameters, such as blood in urine, blood in stool, discussed what to do if a dose is missed, or suspected as missed.  Pt to continue with current Eliquis dosing regimen. Pt denies any unusual s/s of bleeding, bruising, clotting or any changes to diet or medications.    CHADSVASC at least 6 (CHF, HTN, Age, TIA)  Added [facility] to Care Team     Pt was previously on warfarin in the past but states it has been years since he was anticoagulated.  He was recently hospitalized for TIA.  Pt was started on Plavix and Eliquis.     Given renal function and pt's age, current Eliquis dose of 2.5 mg BID is appropriate.  Discussed ability to afford the medication.  Provided pt with coupon and contact information for pt assistance program.  They understand to reach out to the clinic if they find the medication too expensive.    Follow BMP and CBC in 3 months.    Young Garcia, PharmD   CC   Dr Clavel Dr Bloch